# Patient Record
Sex: FEMALE | Race: OTHER | Employment: FULL TIME | URBAN - NONMETROPOLITAN AREA
[De-identification: names, ages, dates, MRNs, and addresses within clinical notes are randomized per-mention and may not be internally consistent; named-entity substitution may affect disease eponyms.]

---

## 2017-02-07 ENCOUNTER — HOSPITAL ENCOUNTER (OUTPATIENT)
Dept: NON INVASIVE DIAGNOSTICS | Age: 62
Discharge: HOME | End: 2017-02-07
Payer: COMMERCIAL

## 2017-02-07 PROCEDURE — 93350 STRESS TTE ONLY: CPT

## 2017-02-22 ENCOUNTER — TELEPHONE (OUTPATIENT)
Dept: CARDIOLOGY | Age: 62
End: 2017-02-22

## 2017-07-07 ENCOUNTER — TELEPHONE (OUTPATIENT)
Dept: CARDIOLOGY | Age: 62
End: 2017-07-07

## 2017-08-23 ENCOUNTER — OFFICE VISIT (OUTPATIENT)
Dept: CARDIOLOGY | Age: 62
End: 2017-08-23
Payer: COMMERCIAL

## 2017-08-23 VITALS
BODY MASS INDEX: 27.75 KG/M2 | HEART RATE: 60 BPM | SYSTOLIC BLOOD PRESSURE: 122 MMHG | HEIGHT: 61 IN | DIASTOLIC BLOOD PRESSURE: 70 MMHG | WEIGHT: 147 LBS

## 2017-08-23 DIAGNOSIS — I38 VALVULAR HEART DISEASE: Primary | ICD-10-CM

## 2017-08-23 DIAGNOSIS — Q23.1 BICUSPID AORTIC VALVE: ICD-10-CM

## 2017-08-23 DIAGNOSIS — I10 ESSENTIAL HYPERTENSION: ICD-10-CM

## 2017-08-23 PROBLEM — Q23.81 BICUSPID AORTIC VALVE: Status: ACTIVE | Noted: 2017-08-23

## 2017-08-23 PROCEDURE — 99213 OFFICE O/P EST LOW 20 MIN: CPT | Performed by: CLINICAL NURSE SPECIALIST

## 2017-08-23 RX ORDER — RANITIDINE 150 MG/1
150 TABLET ORAL 2 TIMES DAILY
COMMUNITY
End: 2019-02-01

## 2017-08-23 ASSESSMENT — ENCOUNTER SYMPTOMS
SHORTNESS OF BREATH: 1
VOMITING: 0
BLURRED VISION: 0
NAUSEA: 0
ORTHOPNEA: 0
COUGH: 0
HEARTBURN: 0

## 2017-08-25 ENCOUNTER — HOSPITAL ENCOUNTER (OUTPATIENT)
Dept: WOMENS IMAGING | Age: 62
Discharge: HOME | End: 2017-08-25
Payer: COMMERCIAL

## 2017-08-25 DIAGNOSIS — Z12.31 VISIT FOR SCREENING MAMMOGRAM: ICD-10-CM

## 2017-08-25 PROCEDURE — 77063 BREAST TOMOSYNTHESIS BI: CPT

## 2018-01-15 ENCOUNTER — APPOINTMENT (OUTPATIENT)
Dept: GENERAL RADIOLOGY | Age: 63
End: 2018-01-15
Payer: COMMERCIAL

## 2018-01-15 ENCOUNTER — HOSPITAL ENCOUNTER (EMERGENCY)
Age: 63
Discharge: HOME | End: 2018-01-16
Payer: COMMERCIAL

## 2018-01-15 ENCOUNTER — APPOINTMENT (OUTPATIENT)
Dept: CT IMAGING | Age: 63
End: 2018-01-15
Payer: COMMERCIAL

## 2018-01-15 VITALS
SYSTOLIC BLOOD PRESSURE: 154 MMHG | RESPIRATION RATE: 18 BRPM | TEMPERATURE: 98.9 F | DIASTOLIC BLOOD PRESSURE: 87 MMHG | OXYGEN SATURATION: 96 % | HEART RATE: 84 BPM

## 2018-01-15 DIAGNOSIS — J10.1 INFLUENZA A: Primary | ICD-10-CM

## 2018-01-15 DIAGNOSIS — I10 ESSENTIAL HYPERTENSION: ICD-10-CM

## 2018-01-15 LAB
ALBUMIN SERPL-MCNC: 4 G/DL (ref 3.5–5.2)
ALP BLD-CCNC: 61 U/L (ref 35–104)
ALT SERPL-CCNC: 19 U/L (ref 5–33)
ANION GAP SERPL CALCULATED.3IONS-SCNC: 10 MMOL/L (ref 7–19)
AST SERPL-CCNC: 26 U/L (ref 5–32)
BACTERIA: NEGATIVE /HPF
BASOPHILS ABSOLUTE: 0 K/UL (ref 0–0.2)
BASOPHILS RELATIVE PERCENT: 0.2 % (ref 0–1)
BILIRUB SERPL-MCNC: 0.3 MG/DL (ref 0.2–1.2)
BILIRUBIN URINE: NEGATIVE
BLOOD, URINE: ABNORMAL
BUN BLDV-MCNC: 12 MG/DL (ref 8–23)
CALCIUM SERPL-MCNC: 8.3 MG/DL (ref 8.8–10.2)
CHLORIDE BLD-SCNC: 94 MMOL/L (ref 98–111)
CLARITY: CLEAR
CO2: 27 MMOL/L (ref 22–29)
COLOR: YELLOW
CREAT SERPL-MCNC: 0.6 MG/DL (ref 0.5–0.9)
EOSINOPHILS ABSOLUTE: 0 K/UL (ref 0–0.6)
EOSINOPHILS RELATIVE PERCENT: 0.6 % (ref 0–5)
EPITHELIAL CELLS, UA: 1 /HPF (ref 0–5)
GFR NON-AFRICAN AMERICAN: >60
GLUCOSE BLD-MCNC: 94 MG/DL (ref 74–109)
GLUCOSE URINE: NEGATIVE MG/DL
HCT VFR BLD CALC: 40.7 % (ref 37–47)
HEMOGLOBIN: 14.1 G/DL (ref 12–16)
HYALINE CASTS: 0 /HPF (ref 0–8)
KETONES, URINE: NEGATIVE MG/DL
LACTIC ACID: 1.1 MMOL/L (ref 0.5–1.9)
LEUKOCYTE ESTERASE, URINE: NEGATIVE
LYMPHOCYTES ABSOLUTE: 1 K/UL (ref 1.1–4.5)
LYMPHOCYTES RELATIVE PERCENT: 21.6 % (ref 20–40)
MCH RBC QN AUTO: 29.8 PG (ref 27–31)
MCHC RBC AUTO-ENTMCNC: 34.6 G/DL (ref 33–37)
MCV RBC AUTO: 86 FL (ref 81–99)
MONOCYTES ABSOLUTE: 0.6 K/UL (ref 0–0.9)
MONOCYTES RELATIVE PERCENT: 11.9 % (ref 0–10)
NEUTROPHILS ABSOLUTE: 3 K/UL (ref 1.5–7.5)
NEUTROPHILS RELATIVE PERCENT: 65.7 % (ref 50–65)
NITRITE, URINE: NEGATIVE
PDW BLD-RTO: 11.5 % (ref 11.5–14.5)
PH UA: 7
PLATELET # BLD: 155 K/UL (ref 130–400)
PMV BLD AUTO: 11 FL (ref 9.4–12.3)
POTASSIUM SERPL-SCNC: 4 MMOL/L (ref 3.5–5)
PROTEIN UA: NEGATIVE MG/DL
RAPID INFLUENZA  B AGN: NEGATIVE
RAPID INFLUENZA A AGN: POSITIVE
RBC # BLD: 4.73 M/UL (ref 4.2–5.4)
RBC UA: 0 /HPF (ref 0–4)
SODIUM BLD-SCNC: 131 MMOL/L (ref 136–145)
SPECIFIC GRAVITY UA: 1
TOTAL PROTEIN: 6.6 G/DL (ref 6.6–8.7)
TROPONIN: <0.01 NG/ML (ref 0–0.03)
UROBILINOGEN, URINE: 0.2 E.U./DL
WBC # BLD: 4.6 K/UL (ref 4.8–10.8)
WBC UA: 0 /HPF (ref 0–5)

## 2018-01-15 PROCEDURE — 84484 ASSAY OF TROPONIN QUANT: CPT

## 2018-01-15 PROCEDURE — 99284 EMERGENCY DEPT VISIT MOD MDM: CPT

## 2018-01-15 PROCEDURE — 2580000003 HC RX 258: Performed by: PHYSICIAN ASSISTANT

## 2018-01-15 PROCEDURE — 36415 COLL VENOUS BLD VENIPUNCTURE: CPT

## 2018-01-15 PROCEDURE — 70450 CT HEAD/BRAIN W/O DYE: CPT

## 2018-01-15 PROCEDURE — 71045 X-RAY EXAM CHEST 1 VIEW: CPT

## 2018-01-15 PROCEDURE — 81001 URINALYSIS AUTO W/SCOPE: CPT

## 2018-01-15 PROCEDURE — 99284 EMERGENCY DEPT VISIT MOD MDM: CPT | Performed by: PHYSICIAN ASSISTANT

## 2018-01-15 PROCEDURE — 96375 TX/PRO/DX INJ NEW DRUG ADDON: CPT

## 2018-01-15 PROCEDURE — 80053 COMPREHEN METABOLIC PANEL: CPT

## 2018-01-15 PROCEDURE — 87804 INFLUENZA ASSAY W/OPTIC: CPT

## 2018-01-15 PROCEDURE — 6360000002 HC RX W HCPCS: Performed by: PHYSICIAN ASSISTANT

## 2018-01-15 PROCEDURE — 96374 THER/PROPH/DIAG INJ IV PUSH: CPT

## 2018-01-15 PROCEDURE — 93005 ELECTROCARDIOGRAM TRACING: CPT

## 2018-01-15 PROCEDURE — 6370000000 HC RX 637 (ALT 250 FOR IP): Performed by: PHYSICIAN ASSISTANT

## 2018-01-15 PROCEDURE — 83605 ASSAY OF LACTIC ACID: CPT

## 2018-01-15 PROCEDURE — 87040 BLOOD CULTURE FOR BACTERIA: CPT

## 2018-01-15 PROCEDURE — 85025 COMPLETE CBC W/AUTO DIFF WBC: CPT

## 2018-01-15 RX ORDER — ONDANSETRON 4 MG/1
4 TABLET, ORALLY DISINTEGRATING ORAL EVERY 8 HOURS PRN
Qty: 20 TABLET | Refills: 0 | Status: SHIPPED | OUTPATIENT
Start: 2018-01-15 | End: 2019-02-01

## 2018-01-15 RX ORDER — MORPHINE SULFATE 4 MG/ML
2 INJECTION, SOLUTION INTRAMUSCULAR; INTRAVENOUS ONCE
Status: COMPLETED | OUTPATIENT
Start: 2018-01-15 | End: 2018-01-15

## 2018-01-15 RX ORDER — ONDANSETRON 2 MG/ML
4 INJECTION INTRAMUSCULAR; INTRAVENOUS ONCE
Status: COMPLETED | OUTPATIENT
Start: 2018-01-15 | End: 2018-01-15

## 2018-01-15 RX ORDER — FLUTICASONE PROPIONATE 50 MCG
1 SPRAY, SUSPENSION (ML) NASAL DAILY
Qty: 1 BOTTLE | Refills: 0 | Status: SHIPPED | OUTPATIENT
Start: 2018-01-15

## 2018-01-15 RX ORDER — GUAIFENESIN 600 MG/1
600 TABLET, EXTENDED RELEASE ORAL 2 TIMES DAILY
Qty: 20 TABLET | Refills: 0 | Status: SHIPPED | OUTPATIENT
Start: 2018-01-15 | End: 2018-01-25

## 2018-01-15 RX ORDER — OSELTAMIVIR PHOSPHATE 75 MG/1
75 CAPSULE ORAL 2 TIMES DAILY
Qty: 10 CAPSULE | Refills: 0 | Status: SHIPPED | OUTPATIENT
Start: 2018-01-15 | End: 2018-01-20

## 2018-01-15 RX ORDER — SODIUM CHLORIDE 9 MG/ML
INJECTION, SOLUTION INTRAVENOUS CONTINUOUS
Status: DISCONTINUED | OUTPATIENT
Start: 2018-01-15 | End: 2018-01-16 | Stop reason: HOSPADM

## 2018-01-15 RX ORDER — CLONIDINE HYDROCHLORIDE 0.1 MG/1
0.1 TABLET ORAL ONCE
Status: COMPLETED | OUTPATIENT
Start: 2018-01-15 | End: 2018-01-15

## 2018-01-15 RX ORDER — BENZONATATE 100 MG/1
100 CAPSULE ORAL 3 TIMES DAILY PRN
Qty: 20 CAPSULE | Refills: 0 | Status: SHIPPED | OUTPATIENT
Start: 2018-01-15 | End: 2018-01-22

## 2018-01-15 RX ADMIN — ONDANSETRON 4 MG: 2 INJECTION INTRAMUSCULAR; INTRAVENOUS at 22:17

## 2018-01-15 RX ADMIN — CLONIDINE HYDROCHLORIDE 0.1 MG: 0.1 TABLET ORAL at 23:18

## 2018-01-15 RX ADMIN — SODIUM CHLORIDE: 9 INJECTION, SOLUTION INTRAVENOUS at 22:17

## 2018-01-15 RX ADMIN — Medication 2 MG: at 22:17

## 2018-01-15 ASSESSMENT — ENCOUNTER SYMPTOMS
VOMITING: 0
ABDOMINAL DISTENTION: 0
CHEST TIGHTNESS: 0
SINUS PRESSURE: 0
RHINORRHEA: 1
APNEA: 0
DIARRHEA: 0
CONSTIPATION: 0
NAUSEA: 0
WHEEZING: 0
SORE THROAT: 0
EYE PAIN: 0
SHORTNESS OF BREATH: 0
ABDOMINAL PAIN: 0
COUGH: 1

## 2018-01-15 ASSESSMENT — PAIN SCALES - GENERAL: PAINLEVEL_OUTOF10: 8

## 2018-01-16 NOTE — ED PROVIDER NOTES
Skin: Skin is warm and dry. No rash noted. She is not diaphoretic. Psychiatric: She has a normal mood and affect. Nursing note and vitals reviewed. DIAGNOSTIC RESULTS     RADIOLOGY:   Non-plain film images such as CT, Ultrasound and MRI are read by the radiologist.   Interpretation per the Radiologist below, if available at the time of this note:    RADIOLOGY REPORT   Final Result      CT Head WO Contrast   Final Result   No acute intracranial abnormality. The above findings are recorded on a digital voice clip in PACS. Signed by Dr Wagner Rasmussen on 1/16/2018 7:11 AM      XR CHEST PORTABLE   Final Result   A lobulated soft tissue density in the left superior   mediastinum which may represent an abnormal vascular structure or a   mass. This needs further evaluation with contrast enhanced CT scan of   the chest.   No active cardiopulmonary disease. The above findings are recorded on a digital voice clip in PACS. Signed by Dr Wagner Rasmussen on 1/16/2018 7:07 AM          LABS:  Labs Reviewed   RAPID INFLUENZA A/B ANTIGENS - Abnormal; Notable for the following:        Result Value    Rapid Influenza A Ag POSITIVE (*)     All other components within normal limits    Narrative:     CALL  Vargas  KLED tel. ,  Microbiology results called to and read back by KUNAL OLIVERA IN ER, 01/15/2018  22:50, by SHERMAN   CBC WITH AUTO DIFFERENTIAL - Abnormal; Notable for the following:     WBC 4.6 (*)     Neutrophils % 65.7 (*)     Monocytes % 11.9 (*)     Lymphocytes # 1.0 (*)     All other components within normal limits   URINALYSIS - Abnormal; Notable for the following:     Blood, Urine SMALL (*)     All other components within normal limits   COMPREHENSIVE METABOLIC PANEL - Abnormal; Notable for the following:     Sodium 131 (*)     Chloride 94 (*)     Calcium 8.3 (*)     All other components within normal limits    Narrative:     previously hemolyzed.    CULTURE BLOOD #1   CULTURE BLOOD #2   LACTIC ACID, PLASMA

## 2018-01-21 LAB
BLOOD CULTURE, ROUTINE: NORMAL
CULTURE, BLOOD 2: NORMAL

## 2018-07-06 ENCOUNTER — TELEPHONE (OUTPATIENT)
Dept: CARDIOLOGY | Age: 63
End: 2018-07-06

## 2018-07-06 NOTE — TELEPHONE ENCOUNTER
Called pt about rescheduling appointment on 08/29/18 with MDL due to him retiring.  Appointment was rescheduled with Dr. Suresh Liao on 10/12/18 at 1:00 pm.

## 2018-09-21 ENCOUNTER — HOSPITAL ENCOUNTER (OUTPATIENT)
Dept: WOMENS IMAGING | Age: 63
Discharge: HOME | End: 2018-09-21
Payer: COMMERCIAL

## 2018-09-21 DIAGNOSIS — Z12.39 BREAST CANCER SCREENING: ICD-10-CM

## 2018-09-21 PROCEDURE — 77063 BREAST TOMOSYNTHESIS BI: CPT

## 2018-11-02 ENCOUNTER — OFFICE VISIT (OUTPATIENT)
Dept: CARDIOLOGY | Age: 63
End: 2018-11-02
Payer: COMMERCIAL

## 2018-11-02 VITALS
WEIGHT: 150 LBS | DIASTOLIC BLOOD PRESSURE: 92 MMHG | BODY MASS INDEX: 28.32 KG/M2 | SYSTOLIC BLOOD PRESSURE: 150 MMHG | HEART RATE: 74 BPM | HEIGHT: 61 IN

## 2018-11-02 DIAGNOSIS — R06.02 SHORTNESS OF BREATH: Primary | ICD-10-CM

## 2018-11-02 DIAGNOSIS — I10 ESSENTIAL HYPERTENSION: ICD-10-CM

## 2018-11-02 DIAGNOSIS — I38 VALVULAR HEART DISEASE: ICD-10-CM

## 2018-11-02 DIAGNOSIS — Q23.1 BICUSPID AORTIC VALVE: ICD-10-CM

## 2018-11-02 PROCEDURE — 99213 OFFICE O/P EST LOW 20 MIN: CPT | Performed by: NURSE PRACTITIONER

## 2018-11-02 RX ORDER — ESTRADIOL 1 MG/1
1 TABLET ORAL DAILY
COMMUNITY
End: 2020-05-28

## 2018-11-02 RX ORDER — HYDROCHLOROTHIAZIDE 12.5 MG/1
12.5 TABLET ORAL DAILY
COMMUNITY
End: 2019-10-04 | Stop reason: SDUPTHER

## 2018-11-02 NOTE — PROGRESS NOTES
Take 1 tablet by mouth every 8 hours as needed for Nausea or Vomiting, Disp: 20 tablet, Rfl: 0    fluticasone (FLONASE) 50 MCG/ACT nasal spray, 1 spray by Nasal route daily, Disp: 1 Bottle, Rfl: 0    ranitidine (ZANTAC) 150 MG tablet, Take 150 mg by mouth 2 times daily, Disp: , Rfl:     PE:  Vitals:    11/02/18 1405   BP: (!) 150/92   Pulse: 74       Estimated body mass index is 28.34 kg/m² as calculated from the following:    Height as of this encounter: 5' 1\" (1.549 m). Weight as of this encounter: 150 lb (68 kg). Constitutional: She is oriented to person, place, and time. She appears well-developed and well-nourished in no acute distress. Head: Normocephalic and atraumatic. Neck:  Neck supple without JVD present. Cardiovascular: Normal rate, Regular rhythm, normal heart sounds. 2/6 RUSB murmur ascultated. No gallop and no friction rub. No carotid bruits. No peripheral edema. Pulmonary/Chest:  Lungs clear to auscultation bilaterally without evidence of respiratory distress. She is without wheezes. She is without rales or ronchi. Musculoskeletal: Normal range of motion. Gait is normal without assitive device. Neurological: She is alert and oriented to person, place, and time. Skin: Skin is warm and dry without rash or pallor. Psychiatric: She has a normal mood and affect. Her behavior is normal. Thought content normal.     Lab Results   Component Value Date    CREATININE 0.6 01/15/2018    HGB 14.1 01/15/2018       TTE 11/08/2016    IMPRESSION:  1. Normal left ventricular systolic function with an ejection fraction of  63%. 2.  Asymmetric septal hypertrophy without definite outflow tract obstruction. 3.  Left atrial enlargement. 4.  Mild aortic stenosis with a probable bicuspid aortic valve. 5.  Mild to moderate aortic insufficiency. 6.  Mild pulmonic insufficiency. 7.  Mild mitral and tricuspid regurgitation.   8.  The right ventricular systolic pressure was estimated to be 26

## 2019-01-09 ENCOUNTER — TELEPHONE (OUTPATIENT)
Dept: CARDIOLOGY | Age: 64
End: 2019-01-09

## 2019-01-10 ENCOUNTER — TELEPHONE (OUTPATIENT)
Dept: CARDIOLOGY | Age: 64
End: 2019-01-10

## 2019-01-11 ENCOUNTER — HOSPITAL ENCOUNTER (OUTPATIENT)
Dept: NON INVASIVE DIAGNOSTICS | Age: 64
Discharge: HOME | End: 2019-01-11
Payer: COMMERCIAL

## 2019-01-11 DIAGNOSIS — R06.02 SHORTNESS OF BREATH: ICD-10-CM

## 2019-01-11 LAB
LV EF: 58 %
LVEF MODALITY: NORMAL

## 2019-01-11 PROCEDURE — 93306 TTE W/DOPPLER COMPLETE: CPT

## 2019-02-01 ENCOUNTER — OFFICE VISIT (OUTPATIENT)
Dept: CARDIOLOGY | Age: 64
End: 2019-02-01
Payer: COMMERCIAL

## 2019-02-01 VITALS
HEART RATE: 66 BPM | DIASTOLIC BLOOD PRESSURE: 110 MMHG | WEIGHT: 156 LBS | BODY MASS INDEX: 29.45 KG/M2 | SYSTOLIC BLOOD PRESSURE: 180 MMHG | HEIGHT: 61 IN

## 2019-02-01 DIAGNOSIS — I51.89 DIASTOLIC DYSFUNCTION: ICD-10-CM

## 2019-02-01 DIAGNOSIS — R07.89 OTHER CHEST PAIN: ICD-10-CM

## 2019-02-01 DIAGNOSIS — R00.2 PALPITATIONS: ICD-10-CM

## 2019-02-01 DIAGNOSIS — I10 UNCONTROLLED HYPERTENSION: ICD-10-CM

## 2019-02-01 DIAGNOSIS — I38 VALVULAR HEART DISEASE: Primary | ICD-10-CM

## 2019-02-01 PROCEDURE — 99214 OFFICE O/P EST MOD 30 MIN: CPT | Performed by: CLINICAL NURSE SPECIALIST

## 2019-02-01 PROCEDURE — 93000 ELECTROCARDIOGRAM COMPLETE: CPT | Performed by: CLINICAL NURSE SPECIALIST

## 2019-02-01 RX ORDER — METOPROLOL SUCCINATE 25 MG/1
25 TABLET, EXTENDED RELEASE ORAL NIGHTLY
Qty: 30 TABLET | Refills: 5 | Status: SHIPPED | OUTPATIENT
Start: 2019-02-01 | End: 2019-10-04 | Stop reason: SDUPTHER

## 2019-02-01 RX ORDER — OMEPRAZOLE 20 MG/1
20 CAPSULE, DELAYED RELEASE ORAL DAILY
COMMUNITY

## 2019-02-01 ASSESSMENT — ENCOUNTER SYMPTOMS
WHEEZING: 0
VOMITING: 0
NAUSEA: 0
ABDOMINAL PAIN: 0
CHEST TIGHTNESS: 1
SHORTNESS OF BREATH: 1
COUGH: 0
EYE REDNESS: 0
FACIAL SWELLING: 0

## 2019-02-22 ENCOUNTER — HOSPITAL ENCOUNTER (OUTPATIENT)
Dept: NON INVASIVE DIAGNOSTICS | Age: 64
Discharge: HOME | End: 2019-02-22
Payer: COMMERCIAL

## 2019-02-22 ENCOUNTER — HOSPITAL ENCOUNTER (OUTPATIENT)
Dept: NUCLEAR MEDICINE | Age: 64
Discharge: HOME | End: 2019-02-24
Payer: COMMERCIAL

## 2019-02-22 DIAGNOSIS — R07.89 OTHER CHEST PAIN: ICD-10-CM

## 2019-02-22 PROCEDURE — 6360000002 HC RX W HCPCS: Performed by: CLINICAL NURSE SPECIALIST

## 2019-02-22 PROCEDURE — 78452 HT MUSCLE IMAGE SPECT MULT: CPT

## 2019-02-22 PROCEDURE — 93017 CV STRESS TEST TRACING ONLY: CPT

## 2019-02-22 PROCEDURE — A9500 TC99M SESTAMIBI: HCPCS | Performed by: CLINICAL NURSE SPECIALIST

## 2019-02-22 PROCEDURE — 3430000000 HC RX DIAGNOSTIC RADIOPHARMACEUTICAL: Performed by: CLINICAL NURSE SPECIALIST

## 2019-02-22 RX ADMIN — TETRAKIS(2-METHOXYISOBUTYLISOCYANIDE)COPPER(I) TETRAFLUOROBORATE 10 MILLICURIE: 1 INJECTION, POWDER, LYOPHILIZED, FOR SOLUTION INTRAVENOUS at 11:35

## 2019-02-22 RX ADMIN — TETRAKIS(2-METHOXYISOBUTYLISOCYANIDE)COPPER(I) TETRAFLUOROBORATE 30 MILLICURIE: 1 INJECTION, POWDER, LYOPHILIZED, FOR SOLUTION INTRAVENOUS at 11:35

## 2019-02-22 RX ADMIN — REGADENOSON 0.4 MG: 0.08 INJECTION, SOLUTION INTRAVENOUS at 11:35

## 2019-02-25 LAB
LV EF: 66 %
LVEF MODALITY: NORMAL

## 2019-03-01 ENCOUNTER — OFFICE VISIT (OUTPATIENT)
Dept: CARDIOLOGY | Age: 64
End: 2019-03-01
Payer: COMMERCIAL

## 2019-03-01 VITALS
SYSTOLIC BLOOD PRESSURE: 174 MMHG | BODY MASS INDEX: 29.45 KG/M2 | DIASTOLIC BLOOD PRESSURE: 104 MMHG | WEIGHT: 156 LBS | HEART RATE: 72 BPM | HEIGHT: 61 IN

## 2019-03-01 DIAGNOSIS — R07.89 NON-CARDIAC CHEST PAIN: ICD-10-CM

## 2019-03-01 DIAGNOSIS — Q23.1 BICUSPID AORTIC VALVE: ICD-10-CM

## 2019-03-01 DIAGNOSIS — R00.2 PALPITATIONS: ICD-10-CM

## 2019-03-01 DIAGNOSIS — I38 VALVULAR HEART DISEASE: ICD-10-CM

## 2019-03-01 DIAGNOSIS — I10 UNCONTROLLED HYPERTENSION: Primary | ICD-10-CM

## 2019-03-01 PROCEDURE — 99213 OFFICE O/P EST LOW 20 MIN: CPT | Performed by: CLINICAL NURSE SPECIALIST

## 2019-03-01 RX ORDER — AMLODIPINE BESYLATE 5 MG/1
5 TABLET ORAL DAILY
Qty: 30 TABLET | Refills: 5 | Status: SHIPPED | OUTPATIENT
Start: 2019-03-01 | End: 2019-10-04 | Stop reason: SDUPTHER

## 2019-03-01 ASSESSMENT — ENCOUNTER SYMPTOMS
ABDOMINAL PAIN: 0
CHEST TIGHTNESS: 1
COUGH: 0
WHEEZING: 0
NAUSEA: 0
FACIAL SWELLING: 0
VOMITING: 0
SHORTNESS OF BREATH: 1
EYE REDNESS: 0

## 2019-04-01 ENCOUNTER — HOSPITAL ENCOUNTER (OUTPATIENT)
Dept: GENERAL RADIOLOGY | Age: 64
Discharge: HOME | End: 2019-04-01
Payer: COMMERCIAL

## 2019-04-01 DIAGNOSIS — M54.5 LOW BACK PAIN, UNSPECIFIED BACK PAIN LATERALITY, UNSPECIFIED CHRONICITY, WITH SCIATICA PRESENCE UNSPECIFIED: ICD-10-CM

## 2019-04-01 PROCEDURE — 72100 X-RAY EXAM L-S SPINE 2/3 VWS: CPT

## 2019-04-02 ENCOUNTER — OFFICE VISIT (OUTPATIENT)
Dept: CARDIOLOGY | Age: 64
End: 2019-04-02
Payer: COMMERCIAL

## 2019-04-02 VITALS
BODY MASS INDEX: 29.45 KG/M2 | HEART RATE: 62 BPM | DIASTOLIC BLOOD PRESSURE: 78 MMHG | SYSTOLIC BLOOD PRESSURE: 136 MMHG | WEIGHT: 156 LBS | HEIGHT: 61 IN

## 2019-04-02 DIAGNOSIS — I10 ESSENTIAL HYPERTENSION: Primary | ICD-10-CM

## 2019-04-02 DIAGNOSIS — I38 VALVULAR HEART DISEASE: ICD-10-CM

## 2019-04-02 DIAGNOSIS — Q23.1 BICUSPID AORTIC VALVE: ICD-10-CM

## 2019-04-02 PROCEDURE — 99213 OFFICE O/P EST LOW 20 MIN: CPT | Performed by: CLINICAL NURSE SPECIALIST

## 2019-04-02 ASSESSMENT — ENCOUNTER SYMPTOMS
CHEST TIGHTNESS: 1
VOMITING: 0
EYE REDNESS: 0
COUGH: 0
WHEEZING: 0
NAUSEA: 0
FACIAL SWELLING: 0
ABDOMINAL PAIN: 0
SHORTNESS OF BREATH: 0

## 2019-04-02 NOTE — PATIENT INSTRUCTIONS
Return in about 6 months (around 10/2/2019) for APRN. Continue present treatment  Blood Pressure goal is less than 140/90    Call with any questionsor concerns  Follow up with Khloe Jensen MD for non cardiac problems  Report any new problems  Cardiovascular Fitness-Exercise as tolerated. Strive for 15 minutes of exercise most days of the week. Cardiac / HealthyDiet  Continue current medications as directed  Continue plan of treatment  It is always recommended that you bring your medicationsbottles with you to each visit - this is for your safety!

## 2019-04-02 NOTE — PROGRESS NOTES
[facility], [address], [address]  [address]  Phone: [phone]  Fax: [phone]    OFFICE VISIT:  2019    Michelle Oconnor - : 1955    Reason For Visit:  Lashon Cruz is a 58 y.o. female who is here for follow-up for valvular heart disease, uncontrolled hypertension    HPI  Patient is here for follow-up with a history of valvular heart disease, palpitations, and uncontrolled hypertension. Last visit we started metoprolol to treat her uncontrolled hypertension. She had been refusing blood pressure medicine in the past. Her mother ended up on dialysis due to uncontrolled blood pressure. She seems to understand the importance of controlling blood pressure to prevent end-stage organ damage. At last visit, we started amlodipine 5 mg daily. She states she is tolerating this well. She states her mother-in-law passed away about a week ago. She states she is under a lot of stress caring for her and she thinks this was also impacting her blood pressure. She followed up with her noncardiac chest pain with her PCP who in turn increased her Prozac which seems to be helping. Kyra Jung MD is PCP.   Michelle Oconnor has the following history as recorded in [facility]:    Patient Active Problem List    Diagnosis Date Noted    Non-cardiac chest pain 2019    Uncontrolled hypertension     Diastolic dysfunction     Valvular heart disease 2017    Bicuspid aortic valve 2017    Palpitations 10/20/2015    Tricuspid regurgitation     Hypertension     Murmur      Past Medical History:   Diagnosis Date    Atrophic vaginitis     Bicuspid aortic valve 2017    Bilateral carpal tunnel syndrome     GERD (gastroesophageal reflux disease)     Hypertension     Murmur     RLS (restless legs syndrome)     Tricuspid regurgitation 2014    echo mild to moderate    Valvular heart disease 2017     Past Surgical History: Procedure Laterality Date    HYSTERECTOMY       Family History   Problem Relation Age of Onset    High Blood Pressure Mother     Heart Disease Mother     Kidney Disease Mother     Coronary Art Dis Other     Hypertension Other     Breast Cancer Other     Cancer Other     Stroke Other     High Blood Pressure Father      Social History     Tobacco Use    Smoking status: Never Smoker    Smokeless tobacco: Never Used   Substance Use Topics    Alcohol use: Not on file      Current Outpatient Medications   Medication Sig Dispense Refill    amLODIPine (NORVASC) 5 MG tablet Take 1 tablet by mouth daily 30 tablet 5    omeprazole (PRILOSEC) 20 MG delayed release capsule Take 20 mg by mouth daily      metoprolol succinate (TOPROL XL) 25 MG extended release tablet Take 1 tablet by mouth nightly 30 tablet 5    hydrochlorothiazide (HYDRODIURIL) 12.5 MG tablet Take 12.5 mg by mouth daily      estradiol (ESTRACE) 1 MG tablet Take 1 mg by mouth daily      fluticasone (FLONASE) 50 MCG/ACT nasal spray 1 spray by Nasal route daily 1 Bottle 0     No current facility-administered medications for this visit. Allergies: Sulfa antibiotics    Review of Systems  Review of Systems   Constitutional: Negative for activity change, diaphoresis, fatigue, fever and unexpected weight change. HENT: Negative for facial swelling and nosebleeds. Eyes: Negative for redness and visual disturbance. Respiratory: Positive for chest tightness (improving with increase in Prozac). Negative for cough, shortness of breath and wheezing. Cardiovascular: Negative for chest pain, palpitations and leg swelling. Gastrointestinal: Negative for abdominal pain, nausea and vomiting. Endocrine: Negative for cold intolerance and heat intolerance. Genitourinary: Negative for dysuria and hematuria. Musculoskeletal: Negative for arthralgias and myalgias. Skin: Negative for pallor and rash.    Neurological: Negative for dizziness, seizures, syncope, weakness and light-headedness. Hematological: Does not bruise/bleed easily. Psychiatric/Behavioral: Negative for agitation. The patient is not nervous/anxious. Objective  Vital Signs - /78   Pulse 62   Ht 5' 1\" (1.549 m)   Wt 156 lb (70.8 kg)   BMI 29.48 kg/m²      BP Readings from Last 3 Encounters:   04/02/19 136/78   03/01/19 (!) 174/104   02/01/19 (!) 180/110    Pulse Readings from Last 3 Encounters:   04/02/19 62   03/01/19 72   02/01/19 66        Physical Exam   Constitutional: She is oriented to person, place, and time. She appears well-developed and well-nourished. HENT:   Head: Normocephalic and atraumatic. Eyes: Pupils are equal, round, and reactive to light. Right eye exhibits no discharge. Left eye exhibits no discharge. Neck: No JVD present. No tracheal deviation present. Cardiovascular: Normal rate, regular rhythm and intact distal pulses. Exam reveals no gallop and no friction rub. Murmur (2/6 systolic) heard. No carotid bruit   Pulmonary/Chest: Effort normal and breath sounds normal. No respiratory distress. She has no wheezes. She has no rales. Abdominal: Soft. There is no tenderness. Musculoskeletal: She exhibits no edema. Normal gait and station   Neurological: She is alert and oriented to person, place, and time. No cranial nerve deficit. Skin: Skin is warm and dry. No rash noted. Psychiatric: She has a normal mood and affect. Her behavior is normal. Judgment normal.   Nursing note and vitals reviewed. Data:  Echo 1/11/19  Summary   1. Normal left ventricular size and systolic function EF 97-51%. Prominent   upper septum which the anterior leaflet of the mitral valve contacts   during diastole. (No systolic anterior motion).   2. Transmitral inflow pattern consistent with grade 2 diastolic   dysfunction   3. Mildly dilated left atrium.   4. Poor visualization of a moderately calcified aortic valve.  Unable to   discern whether it is bicuspid or tricuspid. (conflicting past data). Mild   aortic stenosis with a peak gradient of 16, and we have 9, and a   calculated valve area of 1.7 cm^2. Mild aortic regurgitation.   5. Mitral leaflets are mildly thickened with preserved mobility and mild   regurgitation   6. Trace pulmonic regurgitation   7. Right-sided chambers are normal size with preserved RV systolic   function   8. Trace tricuspid regurgitation with normal right ventricular systolic   pressure estimate of 28 mmHg   9. IVC assessment consistent with normal right atrial pressure 5 mmHg   10. No pericardial effusion and aortic root dimensions are within normal   limits    Lexiscan 2/2219  1. Ejection fraction 66% normal   2. Wall motion study demonstrates normal wall motion and thickening   3. Myocardial perfusion imaging demonstrates homogeneous uptake of the   tracer in all visualized segments. No areas of ischemia or infarction   are identified.       Impression   Summary impression:   Normal study   Signed by Dr Larri Fothergill on 2/22/2019 3:01 PM       Assessment:     Diagnosis Orders   1. Essential hypertension     2. Bicuspid aortic valve     3. Valvular heart disease       Hypertension- improved control with addition of amlodipine. Continue present regimen. Valvular heart disease-reviewed recent echo which shows mild aortic stenosis and regurgitation as well as diastolic dysfunction    Non-cardiac Chest pain-normal Lexiscan. Better with increase in Prozac    Stable cardiovascular status. No evidence of overt heart failure,angina or dysrhythmia. Plan    Return in about 6 months (around 10/2/2019) for APRN. Continue present treatment    Call with any questionsor concerns  Follow up with Kathleen Phillips MD for non cardiac problems  Report any new problems  Cardiovascular Fitness-Exercise as tolerated. Strive for 15 minutes of exercise most days of the week.     Cardiac / HealthyDiet  Continue current medications as directed  Continue plan of treatment  It is always recommended that you bring your medicationsbottles with you to each visit - this is for your safety!        Hayden Rapp APRN

## 2019-07-25 ENCOUNTER — TELEPHONE (OUTPATIENT)
Dept: CARDIOLOGY | Age: 64
End: 2019-07-25

## 2019-10-04 ENCOUNTER — HOSPITAL ENCOUNTER (OUTPATIENT)
Dept: WOMENS IMAGING | Age: 64
Discharge: HOME | End: 2019-10-04
Payer: COMMERCIAL

## 2019-10-04 ENCOUNTER — OFFICE VISIT (OUTPATIENT)
Dept: CARDIOLOGY | Age: 64
End: 2019-10-04
Payer: COMMERCIAL

## 2019-10-04 VITALS
WEIGHT: 161 LBS | SYSTOLIC BLOOD PRESSURE: 136 MMHG | DIASTOLIC BLOOD PRESSURE: 84 MMHG | HEART RATE: 72 BPM | BODY MASS INDEX: 30.42 KG/M2

## 2019-10-04 DIAGNOSIS — I38 VALVULAR HEART DISEASE: ICD-10-CM

## 2019-10-04 DIAGNOSIS — M85.9 DISORDER OF BONE DENSITY AND STRUCTURE, UNSPECIFIED: ICD-10-CM

## 2019-10-04 DIAGNOSIS — I51.89 DIASTOLIC DYSFUNCTION: ICD-10-CM

## 2019-10-04 DIAGNOSIS — I10 ESSENTIAL HYPERTENSION: Primary | ICD-10-CM

## 2019-10-04 DIAGNOSIS — Z12.31 ENCOUNTER FOR SCREENING MAMMOGRAM FOR BREAST CANCER: ICD-10-CM

## 2019-10-04 PROCEDURE — 77063 BREAST TOMOSYNTHESIS BI: CPT

## 2019-10-04 PROCEDURE — 99213 OFFICE O/P EST LOW 20 MIN: CPT | Performed by: CLINICAL NURSE SPECIALIST

## 2019-10-04 PROCEDURE — 77080 DXA BONE DENSITY AXIAL: CPT

## 2019-10-04 RX ORDER — HYDROCHLOROTHIAZIDE 12.5 MG/1
12.5 TABLET ORAL DAILY
Qty: 90 TABLET | Refills: 3 | Status: SHIPPED | OUTPATIENT
Start: 2019-10-04

## 2019-10-04 RX ORDER — AMLODIPINE BESYLATE 5 MG/1
5 TABLET ORAL DAILY
Qty: 90 TABLET | Refills: 3 | Status: SHIPPED | OUTPATIENT
Start: 2019-10-04 | End: 2020-05-28

## 2019-10-04 RX ORDER — METOPROLOL SUCCINATE 25 MG/1
25 TABLET, EXTENDED RELEASE ORAL NIGHTLY
Qty: 90 TABLET | Refills: 3 | Status: SHIPPED | OUTPATIENT
Start: 2019-10-04 | End: 2021-09-16 | Stop reason: SDUPTHER

## 2019-10-04 ASSESSMENT — ENCOUNTER SYMPTOMS
WHEEZING: 0
CHEST TIGHTNESS: 0
EYE REDNESS: 0
VOMITING: 0
COUGH: 0
NAUSEA: 0
SHORTNESS OF BREATH: 0
FACIAL SWELLING: 0
ABDOMINAL PAIN: 0

## 2019-11-12 ENCOUNTER — TELEPHONE (OUTPATIENT)
Dept: CARDIOLOGY | Age: 64
End: 2019-11-12

## 2019-11-13 ENCOUNTER — TELEPHONE (OUTPATIENT)
Dept: CARDIOLOGY | Age: 64
End: 2019-11-13

## 2020-04-03 ENCOUNTER — TELEPHONE (OUTPATIENT)
Dept: CARDIOLOGY | Age: 65
End: 2020-04-03

## 2020-05-28 ENCOUNTER — OFFICE VISIT (OUTPATIENT)
Dept: CARDIOLOGY | Age: 65
End: 2020-05-28
Payer: COMMERCIAL

## 2020-05-28 VITALS
HEART RATE: 70 BPM | SYSTOLIC BLOOD PRESSURE: 140 MMHG | WEIGHT: 156 LBS | BODY MASS INDEX: 29.45 KG/M2 | DIASTOLIC BLOOD PRESSURE: 90 MMHG | HEIGHT: 61 IN

## 2020-05-28 PROCEDURE — 99213 OFFICE O/P EST LOW 20 MIN: CPT | Performed by: CLINICAL NURSE SPECIALIST

## 2020-05-28 PROCEDURE — 93000 ELECTROCARDIOGRAM COMPLETE: CPT | Performed by: CLINICAL NURSE SPECIALIST

## 2020-05-28 ASSESSMENT — ENCOUNTER SYMPTOMS
FACIAL SWELLING: 0
COUGH: 0
ABDOMINAL PAIN: 0
SHORTNESS OF BREATH: 0
WHEEZING: 0
CHEST TIGHTNESS: 0
VOMITING: 0
NAUSEA: 0
EYE REDNESS: 0

## 2020-05-28 NOTE — LETTER
[address] [facility] [place]  [address], [place] [address], [address] [id number]  Phone: [phone]  Fax: [phone]            May 28, 2020          Re:  Ms. Mansi Gipson   :  1955  GAVINO Rehman [address]    To Whom It May Concern:     Ms. Paulo Schultz is under care of a cardiologist for history of valvular heart disease and hypertension. If you have any further questions, please do not hesitate to contact my office.     Sincerely,         Electronically signed: PABLO Condon, 2020 9:47 AM

## 2020-05-28 NOTE — PATIENT INSTRUCTIONS
Message left for pt to return call.   Return in about 9 months (around 2/28/2021) for Dr. Silver Gustafson.    Continue present treatment and low-sodium diet  Will prepare letter of your condition for   Report any issues with fluid retention, sudden weight gain, shortness of breath

## 2020-05-28 NOTE — PROGRESS NOTES
Constitutional:       Appearance: She is well-developed. HENT:      Head: Normocephalic and atraumatic. Eyes:      General:         Right eye: No discharge. Left eye: No discharge. Pupils: Pupils are equal, round, and reactive to light. Neck:      Vascular: No JVD. Trachea: No tracheal deviation. Cardiovascular:      Rate and Rhythm: Normal rate and regular rhythm. Heart sounds: Murmur (2/6 systolic) present. No friction rub. No gallop. Comments: No carotid bruit  Pulmonary:      Effort: Pulmonary effort is normal. No respiratory distress. Breath sounds: Normal breath sounds. No wheezing or rales. Abdominal:      Palpations: Abdomen is soft. Tenderness: There is no abdominal tenderness. Musculoskeletal:      Comments: Normal gait and station   Skin:     General: Skin is warm and dry. Findings: No rash. Neurological:      Mental Status: She is alert and oriented to person, place, and time. Cranial Nerves: No cranial nerve deficit. Psychiatric:         Behavior: Behavior normal.         Judgment: Judgment normal.         Data:  Echo 1/11/19  Summary   1. Normal left ventricular size and systolic function EF 33-83%. Prominent   upper septum which the anterior leaflet of the mitral valve contacts   during diastole. (No systolic anterior motion).   2. Transmitral inflow pattern consistent with grade 2 diastolic   dysfunction   3. Mildly dilated left atrium.   4. Poor visualization of a moderately calcified aortic valve. Unable to   discern whether it is bicuspid or tricuspid. (conflicting past data). Mild   aortic stenosis with a peak gradient of 16, and we have 9, and a   calculated valve area of 1.7 cm^2. Mild aortic regurgitation.   5. Mitral leaflets are mildly thickened with preserved mobility and mild   regurgitation   6. Trace pulmonic regurgitation   7. Right-sided chambers are normal size with preserved RV systolic   function   8.  Trace tricuspid problems  Cardiovascular Fitness-Exercise as tolerated. Strive for 15 minutes of exercise most days of the week. Cardiac / HealthyDiet  Continue current medications as directed  Continue plan of treatment  It is always recommended that you bring your medicationsbottles with you to each visit - this is for your safety!        Towana Cheadle, APRN

## 2020-10-16 ENCOUNTER — HOSPITAL ENCOUNTER (OUTPATIENT)
Dept: WOMENS IMAGING | Age: 65
Discharge: HOME | End: 2020-10-16
Payer: MEDICARE

## 2020-10-16 PROCEDURE — 77063 BREAST TOMOSYNTHESIS BI: CPT

## 2020-12-03 ENCOUNTER — TELEPHONE (OUTPATIENT)
Dept: CARDIOLOGY | Age: 65
End: 2020-12-03

## 2021-03-16 ENCOUNTER — OFFICE VISIT (OUTPATIENT)
Dept: CARDIOLOGY CLINIC | Age: 66
End: 2021-03-16
Payer: MEDICARE

## 2021-03-16 VITALS
BODY MASS INDEX: 29.27 KG/M2 | DIASTOLIC BLOOD PRESSURE: 88 MMHG | WEIGHT: 155 LBS | SYSTOLIC BLOOD PRESSURE: 138 MMHG | HEIGHT: 61 IN | HEART RATE: 61 BPM

## 2021-03-16 DIAGNOSIS — I10 ESSENTIAL HYPERTENSION: ICD-10-CM

## 2021-03-16 DIAGNOSIS — Q23.1 BICUSPID AORTIC VALVE: ICD-10-CM

## 2021-03-16 DIAGNOSIS — I10 ESSENTIAL HYPERTENSION: Primary | ICD-10-CM

## 2021-03-16 DIAGNOSIS — I38 VALVULAR HEART DISEASE: Primary | ICD-10-CM

## 2021-03-16 DIAGNOSIS — R00.2 PALPITATIONS: ICD-10-CM

## 2021-03-16 DIAGNOSIS — I51.89 DIASTOLIC DYSFUNCTION: ICD-10-CM

## 2021-03-16 PROCEDURE — 99213 OFFICE O/P EST LOW 20 MIN: CPT | Performed by: INTERNAL MEDICINE

## 2021-03-16 PROCEDURE — 93000 ELECTROCARDIOGRAM COMPLETE: CPT | Performed by: INTERNAL MEDICINE

## 2021-03-16 RX ORDER — LISINOPRIL 10 MG/1
10 TABLET ORAL DAILY
Qty: 30 TABLET | Refills: 3 | Status: SHIPPED | OUTPATIENT
Start: 2021-03-16 | End: 2022-03-15

## 2021-03-16 SDOH — HEALTH STABILITY: MENTAL HEALTH: HOW OFTEN DO YOU HAVE A DRINK CONTAINING ALCOHOL?: NEVER

## 2021-03-16 NOTE — PROGRESS NOTES
HISTORY  77-year-old lady with a history of dyslipidemia, hypertension, and aortic valvular disease returns for routine follow-up visit. Previously suspected to have a bicuspid valve though question on another echo. Last assessed by transthoracic echo in January 2019 demonstrating normal left ventricular size and systolic function, grade 2 diastolic dysfunction, mildly dilated left atrium, indeterminate aortic cusp number, and peak/mean gradients of 16/9 mmHg with accompanying mild AI. Her blood pressures have previously run in the 130s at the time of her visits and she gives a history of an elevated LDL somewhat countered by a very good HDL. On return today she denies any significant shortness of breath, palpitations, or chest discomfort. In discussion it is obvious she is reluctant to take the vaccine. PHYSICAL EXAM  On exam she carries 155 pounds on a 5 foot 1 inch frame. Pressure is 138/88 with a pulse of 61. EOMs full, sclerae and conjunctiva normal. PERRLA. Mask in place. Trachea midline with no neck masses. Assessment of internal jugular veins reveals no elevation of central venous pressure at 45 degrees. Carotid pulses normal without delay or bruit. Thyroid normal to palpation. Chest exam reveals normal respiratory effort, no abnormal breath sounds and normal expiratory phase. No skin lesions seen. PMI normal. S1, S2 normal with a 1/6 systolic murmur which peaks significantly before mid systole. No AI heard. Mild abdominal obesity. Normal bowel sounds without palpable mass or bruit. No clubbing or acrocyanosis. No significant lower extremity edema or signs of venous insufficiency. General motor strength appears to be within normal limits. Normal range of motion with normal gait. Alert, oriented x 3, memory and cognition normal as reflected by history and conversation. EKG reveals a sinus rhythm without abnormalities. ASSESSMENT/PLAN:   1.   Aortic valvular disease - questionable bicuspid valve not sufficiently visualized at the time of the last echo to make a determination. Very mild stenosis the time of her last echo and murmur today suggests no significant progression. Focus to be on strict control of her hypertension and lipids to reduce the risk of rapid progression. 2.  Hypertension - will add lisinopril 10 mg a day having worn her to stop the drug should she develop cough. 3.  Dyslipidemia - we will again try to retrieve most recent lipid profile  4.   Pandemic response - recommended to her that she obtain the vaccine

## 2021-03-16 NOTE — PATIENT INSTRUCTIONS
· Start Lisinopril 10 mg once daily. · If you develop a dry cough that does not go away, stop Lisinopril and call us. · Have a fasting lipid panel drawn before your next appointment with us.

## 2021-09-10 ENCOUNTER — HOSPITAL ENCOUNTER (OUTPATIENT)
Dept: GENERAL RADIOLOGY | Age: 66
Discharge: HOME | End: 2021-09-10
Payer: MEDICARE

## 2021-09-10 DIAGNOSIS — M54.2 CERVICALGIA: ICD-10-CM

## 2021-09-10 DIAGNOSIS — M79.601 RIGHT ARM PAIN: ICD-10-CM

## 2021-09-10 PROCEDURE — 72040 X-RAY EXAM NECK SPINE 2-3 VW: CPT

## 2021-09-10 PROCEDURE — 73030 X-RAY EXAM OF SHOULDER: CPT

## 2021-09-14 ENCOUNTER — HOSPITAL ENCOUNTER (OUTPATIENT)
Dept: MRI IMAGING | Age: 66
Discharge: HOME | End: 2021-09-14
Payer: MEDICARE

## 2021-09-14 DIAGNOSIS — R53.1 WEAKNESS: ICD-10-CM

## 2021-09-14 DIAGNOSIS — M54.2 CERVICALGIA: ICD-10-CM

## 2021-09-14 PROCEDURE — 72141 MRI NECK SPINE W/O DYE: CPT

## 2021-09-16 ENCOUNTER — OFFICE VISIT (OUTPATIENT)
Dept: CARDIOLOGY CLINIC | Age: 66
End: 2021-09-16
Payer: MEDICARE

## 2021-09-16 VITALS
OXYGEN SATURATION: 96 % | WEIGHT: 153 LBS | HEIGHT: 61 IN | BODY MASS INDEX: 28.89 KG/M2 | DIASTOLIC BLOOD PRESSURE: 70 MMHG | SYSTOLIC BLOOD PRESSURE: 130 MMHG | HEART RATE: 67 BPM

## 2021-09-16 DIAGNOSIS — I38 VALVULAR HEART DISEASE: Primary | ICD-10-CM

## 2021-09-16 DIAGNOSIS — I51.89 DIASTOLIC DYSFUNCTION: ICD-10-CM

## 2021-09-16 DIAGNOSIS — I10 ESSENTIAL HYPERTENSION: ICD-10-CM

## 2021-09-16 PROCEDURE — 99213 OFFICE O/P EST LOW 20 MIN: CPT | Performed by: CLINICAL NURSE SPECIALIST

## 2021-09-16 RX ORDER — METOPROLOL SUCCINATE 25 MG/1
25 TABLET, EXTENDED RELEASE ORAL NIGHTLY
Qty: 90 TABLET | Refills: 3 | Status: SHIPPED | OUTPATIENT
Start: 2021-09-16

## 2021-09-16 ASSESSMENT — ENCOUNTER SYMPTOMS
WHEEZING: 0
FACIAL SWELLING: 0
CHEST TIGHTNESS: 0
SHORTNESS OF BREATH: 0
NAUSEA: 0
ABDOMINAL PAIN: 0
EYE REDNESS: 0
VOMITING: 0
COUGH: 0

## 2021-09-16 NOTE — PROGRESS NOTES
[facility], [address], [address] [address]  Phone: [phone]  Fax: [phone]    OFFICE VISIT:  2021    Franco Mir - : 1955    Reason For Visit:  Herman Flores is a 78 y.o. female who is here for follow-up for valvular heart disease, uncontrolled hypertension    HPI  Patient is here for follow-up with a history of valvular heart disease, palpitations, diastolic dysfunction and hypertension. She is feeling well overall and blood pressure has been better controlled. She denies chest pain, unusual dyspnea, orthopnea, PND, edema, palpitations. 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 Kaela Mane MD is PCP.   Franco Mir has the following history as recorded in [facility]:    Patient Active Problem List    Diagnosis Date Noted    Non-cardiac chest pain     Diastolic dysfunction     Valvular heart disease 2017    Bicuspid aortic valve 2017    Palpitations 10/20/2015    Tricuspid regurgitation     Hypertension     Murmur      Past Medical History:   Diagnosis Date    Atrophic vaginitis     Bicuspid aortic valve 2017    Bilateral carpal tunnel syndrome     GERD (gastroesophageal reflux disease)     Hypertension     Murmur     RLS (restless legs syndrome)     Tricuspid regurgitation     echo mild to moderate    Valvular heart disease 2017     Past Surgical History:   Procedure Laterality Date    HYSTERECTOMY       Family History   Problem Relation Age of Onset    High Blood Pressure Mother     Heart Disease Mother     Kidney Disease Mother     Coronary Art Dis Other     Hypertension Other     Breast Cancer Other     Cancer Other     Stroke Other     High Blood Pressure Father      Social History     Tobacco Use    Smoking status: Never Smoker    Smokeless tobacco: Never Used   Substance Use Topics    Alcohol use: Never      Current Outpatient Medications Medication Sig Dispense Refill    metoprolol succinate (TOPROL XL) 25 MG extended release tablet Take 1 tablet by mouth nightly 90 tablet 3    lisinopril (PRINIVIL;ZESTRIL) 10 MG tablet Take 1 tablet by mouth daily 30 tablet 3    hydrochlorothiazide (HYDRODIURIL) 12.5 MG tablet Take 1 tablet by mouth daily 90 tablet 3    omeprazole (PRILOSEC) 20 MG delayed release capsule Take 20 mg by mouth daily      fluticasone (FLONASE) 50 MCG/ACT nasal spray 1 spray by Nasal route daily 1 Bottle 0     No current facility-administered medications for this visit. Allergies: Sulfa antibiotics    Review of Systems  Review of Systems   Constitutional: Negative for activity change, diaphoresis, fatigue, fever and unexpected weight change. HENT: Negative for facial swelling and nosebleeds. Eyes: Negative for redness and visual disturbance. Respiratory: Negative for cough, chest tightness, shortness of breath and wheezing. Cardiovascular: Negative for chest pain, palpitations and leg swelling. Gastrointestinal: Negative for abdominal pain, nausea and vomiting. Endocrine: Negative for cold intolerance and heat intolerance. Genitourinary: Negative for dysuria and hematuria. Musculoskeletal: Negative for arthralgias and myalgias. Skin: Negative for pallor and rash. Neurological: Negative for dizziness, seizures, syncope, weakness and light-headedness. Hematological: Does not bruise/bleed easily. Psychiatric/Behavioral: Negative for agitation. The patient is not nervous/anxious. Objective  Vital Signs - /70   Pulse 67   Ht 5' 1\" (1.549 m)   Wt 153 lb (69.4 kg)   SpO2 96%   BMI 28.91 kg/m²      BP Readings from Last 3 Encounters:   09/16/21 130/70   03/16/21 138/88   05/28/20 (!) 140/90    Pulse Readings from Last 3 Encounters:   09/16/21 67   03/16/21 61   05/28/20 70        Physical Exam  Vitals and nursing note reviewed. Constitutional:       Appearance: Normal appearance.  She is well-developed. HENT:      Head: Normocephalic and atraumatic. Eyes:      General:         Right eye: No discharge. Left eye: No discharge. Pupils: Pupils are equal, round, and reactive to light. Neck:      Vascular: No carotid bruit or JVD. Trachea: No tracheal deviation. Cardiovascular:      Rate and Rhythm: Normal rate and regular rhythm. Heart sounds: Murmur (2/6 systolic) heard. No friction rub. No gallop. Comments: No carotid bruit  Pulmonary:      Effort: Pulmonary effort is normal. No respiratory distress. Breath sounds: Normal breath sounds. No wheezing or rales. Abdominal:      Palpations: Abdomen is soft. Tenderness: There is no abdominal tenderness. Musculoskeletal:         General: No swelling. Cervical back: Neck supple. No tenderness. Right lower leg: No edema. Left lower leg: No edema. Comments: Normal gait and station   Skin:     General: Skin is warm and dry. Findings: No rash. Neurological:      Mental Status: She is alert and oriented to person, place, and time. Cranial Nerves: No cranial nerve deficit. Psychiatric:         Behavior: Behavior normal.         Judgment: Judgment normal.         Data:  Echo 1/11/19  Summary   1. Normal left ventricular size and systolic function EF 90-51%. Prominent   upper septum which the anterior leaflet of the mitral valve contacts   during diastole. (No systolic anterior motion).   2. Transmitral inflow pattern consistent with grade 2 diastolic   dysfunction   3. Mildly dilated left atrium.   4. Poor visualization of a moderately calcified aortic valve. Unable to   discern whether it is bicuspid or tricuspid. (conflicting past data). Mild   aortic stenosis with a peak gradient of 16, and we have 9, and a   calculated valve area of 1.7 cm^2.  Mild aortic regurgitation.   5. Mitral leaflets are mildly thickened with preserved mobility and mild   regurgitation   6. Trace pulmonic regurgitation   7. Right-sided chambers are normal size with preserved RV systolic   function   8. Trace tricuspid regurgitation with normal right ventricular systolic   pressure estimate of 28 mmHg   9. IVC assessment consistent with normal right atrial pressure 5 mmHg   10. No pericardial effusion and aortic root dimensions are within normal   limits    Lexiscan 2/2219  1. Ejection fraction 66% normal   2. Wall motion study demonstrates normal wall motion and thickening   3. Myocardial perfusion imaging demonstrates homogeneous uptake of the   tracer in all visualized segments. No areas of ischemia or infarction   are identified.       Impression   Summary impression:   Normal study   Signed by Dr Ronald Dong on 2/22/2019 3:01 PM         Assessment:     Diagnosis Orders   1. Valvular heart disease     2. Diastolic dysfunction     3. Essential hypertension       Valvular heart diseasereviewed  echo 1/19 which shows mild aortic stenosis and regurgitation and a questionable bicuspid aortic valve. Continue to monitor. No change in symptoms    Diastolic dysfunction grade 2 without signs of heart failure. On metoprolol and lisinopril. Continue present treatment    Hypertensionstable on current regimen with metoprolol, lisinopril, HCTZ    Stable cardiovascular status. No evidence of overt heart failure,angina or dysrhythmia. Plan    Return for Dr. Belinda Mascorro, as scheduled. 6mo  Continue present treatment and low-sodium diet  Will prepare letter of your condition for   Report any issues with fluid retention, sudden weight gain, shortness of breath    Call with any questionsor concerns  Follow up with Aleksandra Rasmussen MD for non cardiac problems  Report any new problems  Cardiovascular Fitness-Exercise as tolerated. Strive for 15 minutes of exercise most days of the week.     Cardiac / HealthyDiet  Continue current medications as directed  Continue plan of treatment  It is always recommended that you bring your medicationsbottles with you to each visit - this is for your safety!        PABLO Davis

## 2021-10-22 ENCOUNTER — HOSPITAL ENCOUNTER (OUTPATIENT)
Dept: WOMENS IMAGING | Age: 66
Discharge: HOME | End: 2021-10-22
Payer: MEDICARE

## 2021-10-22 DIAGNOSIS — Z12.31 BREAST CANCER SCREENING BY MAMMOGRAM: ICD-10-CM

## 2021-10-22 PROCEDURE — 77067 SCR MAMMO BI INCL CAD: CPT

## 2022-03-14 ENCOUNTER — TELEPHONE (OUTPATIENT)
Dept: CARDIOLOGY CLINIC | Age: 67
End: 2022-03-14

## 2022-03-15 ENCOUNTER — OFFICE VISIT (OUTPATIENT)
Dept: CARDIOLOGY CLINIC | Age: 67
End: 2022-03-15
Payer: MEDICARE

## 2022-03-15 VITALS
HEIGHT: 61 IN | HEART RATE: 59 BPM | SYSTOLIC BLOOD PRESSURE: 134 MMHG | BODY MASS INDEX: 29.83 KG/M2 | DIASTOLIC BLOOD PRESSURE: 80 MMHG | WEIGHT: 158 LBS

## 2022-03-15 DIAGNOSIS — I51.89 DIASTOLIC DYSFUNCTION: Primary | ICD-10-CM

## 2022-03-15 DIAGNOSIS — R00.2 PALPITATIONS: ICD-10-CM

## 2022-03-15 PROCEDURE — 93000 ELECTROCARDIOGRAM COMPLETE: CPT | Performed by: INTERNAL MEDICINE

## 2022-03-15 PROCEDURE — 99214 OFFICE O/P EST MOD 30 MIN: CPT | Performed by: INTERNAL MEDICINE

## 2022-03-15 RX ORDER — GABAPENTIN 300 MG/1
300 CAPSULE ORAL 3 TIMES DAILY
COMMUNITY

## 2022-03-15 RX ORDER — TRAMADOL HYDROCHLORIDE 50 MG/1
50 TABLET ORAL EVERY 6 HOURS PRN
COMMUNITY

## 2022-03-15 RX ORDER — DICLOFENAC POTASSIUM 25 MG/1
CAPSULE, LIQUID FILLED ORAL DAILY
COMMUNITY

## 2022-03-15 RX ORDER — MONTELUKAST SODIUM 10 MG/1
10 TABLET ORAL NIGHTLY
COMMUNITY

## 2022-03-15 NOTE — PROGRESS NOTES
HISTORY  40-year-old lady with a history of dyslipidemia, hypertension, and aortic valvular disease returns for routine follow-up. Prior assessment including an echo in 2019 suggest the presence of a bicuspid aortic valve with peak/mean gradients of 16/9 mmHg at that time accompanied by grade 2 diastolic dysfunction and a mildly dilated left atrium. Her blood pressures have consistently run in the 130s at the time of her visit. Her primary care physician manages her lipids which supposedly been acceptable [mildly elevated LDL mitigated by high HDL]. On return today she relates no change in exercise tolerance, no symptoms of dysrhythmia, no unusual shortness of breath, and no chest discomfort. She has not been vaccinated for COVID-19. PHYSICAL EXAM  On exam she carries 158 pounds in a 5 foot 1 inch frame. Pressure is 134/80 with pulse of 59. Very pleasant endomorphic middle-aged lady. EOMs full, sclerae and conjunctiva normal. PERRLA. Mask in place. Trachea midline with no neck masses. Assessment of internal jugular veins reveals no elevation of central venous pressure at 45 degrees. Carotid pulses normal without delay or bruit. Thyroid normal to palpation. Chest exam reveals normal respiratory effort, no abnormal breath sounds and normal expiratory phase. No skin lesions seen. PMI normal. S1, S2 normal with a 1/6 systolic murmur. Mild abdominal obesity. Normal bowel sounds without palpable mass or bruit. No clubbing or acrocyanosis. No significant lower extremity edema or signs of venous insufficiency. General motor strength appears to be within normal limits. Normal range of motion with normal gait. Alert, oriented x 3, memory and cognition normal as reflected by history and conversation. EKG reveals sinus bradycardia at 59, otherwise within normal limits. ASSESSMENT/PLAN:   1.   Hypertension -as above has diastolic dysfunction and a dilated left atrium along with which there is some scarring of the aortic valve. Target pressure is consistently less than 130. She is to have a trial increase in her hydrochlorothiazide to 25 mg a day, calling us if she tolerates and responds appropriately to the change at which time we will call in a prescription. Continue metoprolol. 2.  Dyslipidemia -monitored by primary care. 3.  Bicuspid aortic valve [suspected] -we will repeat echocardiogram in 1 year.   4.  Pandemic response -unvaccinated

## 2022-09-15 ENCOUNTER — OFFICE VISIT (OUTPATIENT)
Dept: CARDIOLOGY CLINIC | Age: 67
End: 2022-09-15
Payer: MEDICARE

## 2022-09-15 VITALS
WEIGHT: 156 LBS | SYSTOLIC BLOOD PRESSURE: 106 MMHG | OXYGEN SATURATION: 98 % | BODY MASS INDEX: 29.45 KG/M2 | HEIGHT: 61 IN | HEART RATE: 58 BPM | DIASTOLIC BLOOD PRESSURE: 78 MMHG

## 2022-09-15 DIAGNOSIS — I51.89 DIASTOLIC DYSFUNCTION: ICD-10-CM

## 2022-09-15 DIAGNOSIS — I38 VALVULAR HEART DISEASE: Primary | ICD-10-CM

## 2022-09-15 DIAGNOSIS — I10 ESSENTIAL HYPERTENSION: ICD-10-CM

## 2022-09-15 PROCEDURE — 99213 OFFICE O/P EST LOW 20 MIN: CPT | Performed by: CLINICAL NURSE SPECIALIST

## 2022-09-15 PROCEDURE — 1123F ACP DISCUSS/DSCN MKR DOCD: CPT | Performed by: CLINICAL NURSE SPECIALIST

## 2022-09-15 RX ORDER — LISINOPRIL AND HYDROCHLOROTHIAZIDE 12.5; 1 MG/1; MG/1
TABLET ORAL
COMMUNITY
Start: 2022-06-23

## 2022-09-15 RX ORDER — AMLODIPINE BESYLATE 5 MG/1
TABLET ORAL
COMMUNITY
Start: 2022-08-20

## 2022-09-15 ASSESSMENT — ENCOUNTER SYMPTOMS
ABDOMINAL PAIN: 0
SHORTNESS OF BREATH: 0
NAUSEA: 0
VOMITING: 0
CHEST TIGHTNESS: 0
EYE REDNESS: 0
FACIAL SWELLING: 0
WHEEZING: 0
COUGH: 0

## 2022-09-15 NOTE — PROGRESS NOTES
[facility], [address] [address]  Phone: [phone]  Fax: [phone]    OFFICE VISIT:  9/15/2022    Sal Cyr - : 1955    Reason For Visit:  [facility] is a 75 y.o. female who is here for follow-up for valvular heart disease,  hypertension    HPI  Patient is here for follow-up with a history of mild valvular heart disease, palpitations, diastolic dysfunction and hypertension. She is feeling well overall and blood pressure has been better controlled. She denies chest pain, unusual dyspnea, orthopnea, PND, edema, palpitations. 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   Sal Cyr has the following history as recorded in [facility]:    Patient Active Problem List    Diagnosis Date Noted    Non-cardiac chest pain     Diastolic dysfunction     Valvular heart disease 2017    Bicuspid aortic valve 2017    Palpitations 10/20/2015    Tricuspid regurgitation     Hypertension     Murmur      Past Medical History:   Diagnosis Date    Atrophic vaginitis     Bicuspid aortic valve 2017    Bilateral carpal tunnel syndrome     GERD (gastroesophageal reflux disease)     Hypertension     Murmur     RLS (restless legs syndrome)     Tricuspid regurgitation 2014    echo mild to moderate    Valvular heart disease 2017     Past Surgical History:   Procedure Laterality Date    HYSTERECTOMY (CERVIX STATUS UNKNOWN)       Family History   Problem Relation Age of Onset    High Blood Pressure Mother     Heart Disease Mother     Kidney Disease Mother     Coronary Art Dis Other     Hypertension Other     Breast Cancer Other     Cancer Other     Stroke Other     High Blood Pressure Father      Social History     Tobacco Use    Smoking status: Never    Smokeless tobacco: Never   Substance Use Topics    Alcohol use: Never      Current Outpatient Medications   Medication Sig Dispense Refill    lisinopril-hydroCHLOROthiazide (PRINZIDE;ZESTORETIC) 10-12.5 MG per tablet TAKE 1 TABLET BY MOUTH TWICE DAILY      gabapentin (NEURONTIN) 300 MG capsule Take 300 mg by mouth 3 times daily. montelukast (SINGULAIR) 10 MG tablet Take 10 mg by mouth nightly      Diclofenac Potassium 25 MG CAPS Take by mouth daily      traMADol (ULTRAM) 50 MG tablet Take 50 mg by mouth every 6 hours as needed for Pain.      metoprolol succinate (TOPROL XL) 25 MG extended release tablet Take 1 tablet by mouth nightly 90 tablet 3    hydrochlorothiazide (HYDRODIURIL) 12.5 MG tablet Take 1 tablet by mouth daily 90 tablet 3    omeprazole (PRILOSEC) 20 MG delayed release capsule Take 20 mg by mouth daily      fluticasone (FLONASE) 50 MCG/ACT nasal spray 1 spray by Nasal route daily 1 Bottle 0    amLODIPine (NORVASC) 5 MG tablet TAKE 1 TABLET BY MOUTH TWICE DAILY       No current facility-administered medications for this visit. Allergies: Sulfa antibiotics    Review of Systems  Review of Systems   Constitutional:  Negative for activity change, diaphoresis, fatigue, fever and unexpected weight change. HENT:  Negative for facial swelling and nosebleeds. Eyes:  Negative for redness and visual disturbance. Respiratory:  Negative for cough, chest tightness, shortness of breath and wheezing. Cardiovascular:  Negative for chest pain, palpitations and leg swelling. Gastrointestinal:  Negative for abdominal pain, nausea and vomiting. Endocrine: Negative for cold intolerance and heat intolerance. Genitourinary:  Negative for dysuria and hematuria. Musculoskeletal:  Negative for arthralgias and myalgias. Skin:  Negative for pallor and rash. Neurological:  Negative for dizziness, seizures, syncope, weakness and light-headedness. Hematological:  Does not bruise/bleed easily. Psychiatric/Behavioral:  Negative for agitation. The patient is not nervous/anxious.       Objective  Vital Signs - /78 Pulse 58   Ht 5' 1\" (1.549 m)   Wt 156 lb (70.8 kg)   SpO2 98%   BMI 29.48 kg/m²      BP Readings from Last 3 Encounters:   09/15/22 106/78   03/15/22 134/80   09/16/21 130/70    Pulse Readings from Last 3 Encounters:   09/15/22 58   03/15/22 59   09/16/21 67        Wt Readings from Last 3 Encounters:   09/15/22 156 lb (70.8 kg)   03/15/22 158 lb (71.7 kg)   09/16/21 153 lb (69.4 kg)       Physical Exam  Vitals and nursing note reviewed. Constitutional:       Appearance: Normal appearance. She is well-developed. HENT:      Head: Normocephalic and atraumatic. Eyes:      General:         Right eye: No discharge. Left eye: No discharge. Pupils: Pupils are equal, round, and reactive to light. Neck:      Vascular: No carotid bruit or JVD. Trachea: No tracheal deviation. Cardiovascular:      Rate and Rhythm: Normal rate and regular rhythm. Heart sounds: Murmur (2/6 systolic) heard. No friction rub. No gallop. Comments: No carotid bruit  Pulmonary:      Effort: Pulmonary effort is normal. No respiratory distress. Breath sounds: Normal breath sounds. No wheezing or rales. Abdominal:      Palpations: Abdomen is soft. Tenderness: There is no abdominal tenderness. Musculoskeletal:         General: No swelling. Cervical back: Neck supple. No tenderness. Right lower leg: No edema. Left lower leg: No edema. Comments: Normal gait and station   Skin:     General: Skin is warm and dry. Findings: No rash. Neurological:      Mental Status: She is alert and oriented to person, place, and time. Cranial Nerves: No cranial nerve deficit. Psychiatric:         Behavior: Behavior normal.         Judgment: Judgment normal.       Data:  Echo 1/11/19  Summary   1. Normal left ventricular size and systolic function EF 48-98%. Prominent   upper septum which the anterior leaflet of the mitral valve contacts   during diastole.  (No systolic anterior motion). 2. Transmitral inflow pattern consistent with grade 2 diastolic   dysfunction   3. Mildly dilated left atrium. 4. Poor visualization of a moderately calcified aortic valve. Unable to   discern whether it is bicuspid or tricuspid. (conflicting past data). Mild   aortic stenosis with a peak gradient of 16, and we have 9, and a   calculated valve area of 1.7 cm^2. Mild aortic regurgitation. 5. Mitral leaflets are mildly thickened with preserved mobility and mild   regurgitation   6. Trace pulmonic regurgitation   7. Right-sided chambers are normal size with preserved RV systolic   function   8. Trace tricuspid regurgitation with normal right ventricular systolic   pressure estimate of 28 mmHg   9. IVC assessment consistent with normal right atrial pressure 5 mmHg   10. No pericardial effusion and aortic root dimensions are within normal   limits    Lexiscan 2/2219  1. Ejection fraction 66% normal   2. Wall motion study demonstrates normal wall motion and thickening   3. Myocardial perfusion imaging demonstrates homogeneous uptake of the   tracer in all visualized segments. No areas of ischemia or infarction   are identified. Impression   Summary impression:   Normal study   Signed by Dr Josseline Warren on 2/22/2019 3:01 PM         Assessment:     Diagnosis Orders   1. Valvular heart disease        2. Diastolic dysfunction        3. Essential hypertension            Valvular heart disease-reviewed  echo 1/19 which shows mild aortic stenosis and regurgitation and a questionable bicuspid aortic valve. No change in symptoms. Dr. Sophie Patrick recommended repeat echo in 2023. Diastolic dysfunction- grade 2 without signs of heart failure. On metoprolol and lisinopril. Continue present treatment    Hypertension-stable on current regimen with metoprolol, lisinopril, HCTZ    Stable cardiovascular status. No evidence of overt heart failure,angina or dysrhythmia.      Plan    Return in about 6 months (around 3/15/2023) for Dr. Robert Navarrete, as scheduled. 6mo  Repeat Echo next year    Call with any questionsor concerns  Follow up with PABLO Bernard - CNP for non cardiac problems  Report any new problems  Cardiovascular Fitness-Exercise as tolerated. Strive for 15 minutes of exercise most days of the week. Cardiac / HealthyDiet  Continue current medications as directed  Continue plan of treatment  It is always recommended that you bring your medicationsbottles with you to each visit - this is for your safety!        Colette Thibodeaux APRN

## 2023-03-20 ENCOUNTER — TELEPHONE (OUTPATIENT)
Dept: CARDIOLOGY CLINIC | Age: 68
End: 2023-03-20

## 2023-03-30 ENCOUNTER — OFFICE VISIT (OUTPATIENT)
Dept: CARDIOLOGY CLINIC | Age: 68
End: 2023-03-30
Payer: MEDICARE

## 2023-03-30 VITALS
HEART RATE: 83 BPM | HEIGHT: 61 IN | BODY MASS INDEX: 30.58 KG/M2 | SYSTOLIC BLOOD PRESSURE: 128 MMHG | WEIGHT: 162 LBS | DIASTOLIC BLOOD PRESSURE: 82 MMHG

## 2023-03-30 DIAGNOSIS — I10 PRIMARY HYPERTENSION: ICD-10-CM

## 2023-03-30 DIAGNOSIS — I35.0 AORTIC VALVE STENOSIS, ETIOLOGY OF CARDIAC VALVE DISEASE UNSPECIFIED: Primary | ICD-10-CM

## 2023-03-30 DIAGNOSIS — I38 VALVULAR HEART DISEASE: ICD-10-CM

## 2023-03-30 DIAGNOSIS — I49.3 PVC (PREMATURE VENTRICULAR CONTRACTION): ICD-10-CM

## 2023-03-30 PROBLEM — R07.89 NON-CARDIAC CHEST PAIN: Status: RESOLVED | Noted: 2019-03-01 | Resolved: 2023-03-30

## 2023-03-30 PROCEDURE — 1123F ACP DISCUSS/DSCN MKR DOCD: CPT | Performed by: CLINICAL NURSE SPECIALIST

## 2023-03-30 PROCEDURE — 3074F SYST BP LT 130 MM HG: CPT | Performed by: CLINICAL NURSE SPECIALIST

## 2023-03-30 PROCEDURE — 3079F DIAST BP 80-89 MM HG: CPT | Performed by: CLINICAL NURSE SPECIALIST

## 2023-03-30 PROCEDURE — 99213 OFFICE O/P EST LOW 20 MIN: CPT | Performed by: CLINICAL NURSE SPECIALIST

## 2023-03-30 PROCEDURE — 93000 ELECTROCARDIOGRAM COMPLETE: CPT | Performed by: CLINICAL NURSE SPECIALIST

## 2023-03-30 RX ORDER — M-VIT,TX,IRON,MINS/CALC/FOLIC 27MG-0.4MG
1 TABLET ORAL DAILY
COMMUNITY

## 2023-03-30 ASSESSMENT — ENCOUNTER SYMPTOMS
CHEST TIGHTNESS: 0
SHORTNESS OF BREATH: 0
ABDOMINAL PAIN: 0
VOMITING: 0
NAUSEA: 0
COUGH: 0
EYE REDNESS: 0
WHEEZING: 0
FACIAL SWELLING: 0

## 2023-03-30 NOTE — PROGRESS NOTES
status: Never    Smokeless tobacco: Never   Substance Use Topics    Alcohol use: Never      Current Outpatient Medications   Medication Sig Dispense Refill    Multiple Vitamins-Minerals (THERAPEUTIC MULTIVITAMIN-MINERALS) tablet Take 1 tablet by mouth daily      amLODIPine (NORVASC) 5 MG tablet TAKE 1 TABLET BY MOUTH TWICE DAILY      gabapentin (NEURONTIN) 300 MG capsule Take 300 mg by mouth 3 times daily. montelukast (SINGULAIR) 10 MG tablet Take 10 mg by mouth nightly      Diclofenac Potassium 25 MG CAPS Take by mouth daily      traMADol (ULTRAM) 50 MG tablet Take 50 mg by mouth every 6 hours as needed for Pain. omeprazole (PRILOSEC) 20 MG delayed release capsule Take 20 mg by mouth daily      fluticasone (FLONASE) 50 MCG/ACT nasal spray 1 spray by Nasal route daily 1 Bottle 0     No current facility-administered medications for this visit. Allergies: Lisinopril and Sulfa antibiotics    Review of Systems  Review of Systems   Constitutional:  Negative for activity change, diaphoresis, fatigue, fever and unexpected weight change. HENT:  Negative for facial swelling and nosebleeds. Eyes:  Negative for redness and visual disturbance. Respiratory:  Negative for cough, chest tightness, shortness of breath and wheezing. Cardiovascular:  Negative for chest pain, palpitations and leg swelling. Gastrointestinal:  Negative for abdominal pain, nausea and vomiting. Endocrine: Negative for cold intolerance and heat intolerance. Genitourinary:  Negative for dysuria and hematuria. Musculoskeletal:  Negative for arthralgias and myalgias. Skin:  Negative for pallor and rash. Neurological:  Negative for dizziness, seizures, syncope, weakness and light-headedness. Hematological:  Does not bruise/bleed easily. Psychiatric/Behavioral:  Negative for agitation. The patient is not nervous/anxious.       Objective  Vital Signs - /82   Pulse 83   Ht 5' 1\" (1.549 m)   Wt 162 lb (73.5 kg)   BMI

## 2023-03-30 NOTE — PATIENT INSTRUCTIONS
[place] [facility] and [address] [facility] located on the first floor of [address] [address] through hospital main entrance and turn immediately to your left. Date/Time:     Patient's contact number:  [phone] (home) [phone] (work)    Echocardiogram -  No prep. Takes approximately 30 min. An echocardiogram uses sound waves to produce images of your heart. This commonly used test allows your doctor to see how your heart is beating and pumping blood. Your doctor can use the images from an echocardiogram to identify various abnormalities in the heart muscle and valves. This test has 2 parts: You will be asked to disrobe from the waist up and given a gown to wear. The technologist will then hook up an EKG monitor to you for the entire exam.   You will then have an ultrasound of your heart (echocardiogram) to assess the heart muscle, heart valves and heart function. You may eat and take any medicines before the exam.     If you need to change your appointment, please call outpatient scheduling at [phone].

## 2023-10-12 ENCOUNTER — OFFICE VISIT (OUTPATIENT)
Dept: CARDIOLOGY CLINIC | Age: 68
End: 2023-10-12
Payer: MEDICARE

## 2023-10-12 VITALS
HEART RATE: 99 BPM | BODY MASS INDEX: 30.23 KG/M2 | OXYGEN SATURATION: 94 % | WEIGHT: 160 LBS | SYSTOLIC BLOOD PRESSURE: 126 MMHG | DIASTOLIC BLOOD PRESSURE: 76 MMHG

## 2023-10-12 DIAGNOSIS — I51.89 DIASTOLIC DYSFUNCTION: ICD-10-CM

## 2023-10-12 DIAGNOSIS — I35.0 AORTIC VALVE STENOSIS, ETIOLOGY OF CARDIAC VALVE DISEASE UNSPECIFIED: Primary | ICD-10-CM

## 2023-10-12 DIAGNOSIS — I10 PRIMARY HYPERTENSION: ICD-10-CM

## 2023-10-12 DIAGNOSIS — I38 VALVULAR HEART DISEASE: ICD-10-CM

## 2023-10-12 PROCEDURE — 3078F DIAST BP <80 MM HG: CPT | Performed by: CLINICAL NURSE SPECIALIST

## 2023-10-12 PROCEDURE — 3074F SYST BP LT 130 MM HG: CPT | Performed by: CLINICAL NURSE SPECIALIST

## 2023-10-12 PROCEDURE — 1123F ACP DISCUSS/DSCN MKR DOCD: CPT | Performed by: CLINICAL NURSE SPECIALIST

## 2023-10-12 PROCEDURE — 99214 OFFICE O/P EST MOD 30 MIN: CPT | Performed by: CLINICAL NURSE SPECIALIST

## 2023-10-12 ASSESSMENT — ENCOUNTER SYMPTOMS
NAUSEA: 0
VOMITING: 0
CHEST TIGHTNESS: 0
COUGH: 0
WHEEZING: 0
SHORTNESS OF BREATH: 0
EYE REDNESS: 0
FACIAL SWELLING: 0
ABDOMINAL PAIN: 0

## 2023-10-12 NOTE — PROGRESS NOTES
[facility], [address] [place] [address]  Phone: [phone]  Fax: [phone]    OFFICE VISIT:  10/12/2023    Rupal Panchal - : 1955    Reason For Visit:  Gabi Coronado is a 70 y.o. female who is here for follow-up for valvular heart disease,  hypertension    HPI  Patient is here for follow-up with a history of  valvular heart disease with aortic stenosis, palpitations, diastolic dysfunction and hypertension. She is feeling well overall and blood pressure has been better controlled. She reports lisinopril has been stopped since her last visit here due to a chronic cough which has now resolved    She denies chest pain, unusual dyspnea, orthopnea, PND, edema, palpitations. 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 PABLO Rutledge CNP is PCP.   Rupal Panchal has the following history as recorded in [facility]:    Patient Active Problem List    Diagnosis Date Noted    PVC (premature ventricular contraction) 10/65/4289    Diastolic dysfunction 10/26/9839    Valvular heart disease 2017    Bicuspid aortic valve 2017    Palpitations 10/20/2015    Tricuspid regurgitation     Hypertension      Past Medical History:   Diagnosis Date    Atrophic vaginitis     Bicuspid aortic valve 2017    Bilateral carpal tunnel syndrome     GERD (gastroesophageal reflux disease)     Hypertension     Murmur     RLS (restless legs syndrome)     Tricuspid regurgitation 2014    echo mild to moderate    Valvular heart disease 2017     Past Surgical History:   Procedure Laterality Date    HYSTERECTOMY (CERVIX STATUS UNKNOWN)       Family History   Problem Relation Age of Onset    High Blood Pressure Mother     Heart Disease Mother     Kidney Disease Mother     Coronary Art Dis Other     Hypertension Other     Breast Cancer Other     Cancer Other     Stroke Other     High Blood Pressure Father      Social History     Tobacco Use    Smoking

## 2024-03-13 ENCOUNTER — TELEPHONE (OUTPATIENT)
Dept: CARDIOLOGY CLINIC | Age: 69
End: 2024-03-13

## 2024-03-13 NOTE — TELEPHONE ENCOUNTER
CC:  Nika Abreu is here today for   Chief Complaint   Patient presents with   • Shoulder     right shoulder      PCP Bryanna Simms MD   Medications: medications verified and updated  Refills needed today?  NO  denies known Latex allergy or symptoms of Latex sensitivity.  Patient would like communication of their results via:    Home Phone: [phone] (home)  Okay to leave a message containing results? Yes  Tobacco history: verified  E-mail: No e-mail address on record    Called to reschedule to Anselmo's next available, BP 3/13

## 2024-06-07 ENCOUNTER — TELEPHONE (OUTPATIENT)
Dept: CARDIOLOGY CLINIC | Age: 69
End: 2024-06-07

## 2024-10-11 ENCOUNTER — TELEPHONE (OUTPATIENT)
Dept: CARDIOLOGY CLINIC | Age: 69
End: 2024-10-11

## 2024-10-11 NOTE — TELEPHONE ENCOUNTER
Called and LVM to patient regarding a missed appt on 10/3/24 with Dr. Briones. Letter will be mailed out. If patient calls office back, please reschedule appt to Dr. Briones's next available if patient only wants to see her or appt can be moved to NP patient has seen before. 10/11/24 SL

## 2024-10-13 ENCOUNTER — APPOINTMENT (OUTPATIENT)
Dept: GENERAL RADIOLOGY | Facility: HOSPITAL | Age: 69
End: 2024-10-13
Payer: MEDICARE

## 2024-10-13 ENCOUNTER — HOSPITAL ENCOUNTER (EMERGENCY)
Facility: HOSPITAL | Age: 69
Discharge: HOME | End: 2024-10-13
Attending: EMERGENCY MEDICINE | Admitting: EMERGENCY MEDICINE
Payer: MEDICARE

## 2024-10-13 VITALS
DIASTOLIC BLOOD PRESSURE: 94 MMHG | WEIGHT: 180 LBS | HEIGHT: 66 IN | SYSTOLIC BLOOD PRESSURE: 141 MMHG | HEART RATE: 82 BPM | OXYGEN SATURATION: 98 % | RESPIRATION RATE: 18 BRPM | TEMPERATURE: 98 F | BODY MASS INDEX: 28.93 KG/M2

## 2024-10-13 DIAGNOSIS — R91.8 LEFT UPPER LOBE PULMONARY INFILTRATE: Primary | ICD-10-CM

## 2024-10-13 LAB
ALBUMIN SERPL-MCNC: 4.4 G/DL (ref 3.5–5.2)
ALBUMIN/GLOB SERPL: 1.5 G/DL
ALP SERPL-CCNC: 56 U/L (ref 39–117)
ALT SERPL W P-5'-P-CCNC: 22 U/L (ref 1–33)
ANION GAP SERPL CALCULATED.3IONS-SCNC: 12 MMOL/L (ref 5–15)
AST SERPL-CCNC: 25 U/L (ref 1–32)
BASOPHILS # BLD AUTO: 0.03 10*3/MM3 (ref 0–0.2)
BASOPHILS NFR BLD AUTO: 0.6 % (ref 0–1.5)
BILIRUB SERPL-MCNC: 0.4 MG/DL (ref 0–1.2)
BUN SERPL-MCNC: 14 MG/DL (ref 8–23)
BUN/CREAT SERPL: 16.3 (ref 7–25)
CALCIUM SPEC-SCNC: 9.8 MG/DL (ref 8.6–10.5)
CHLORIDE SERPL-SCNC: 104 MMOL/L (ref 98–107)
CO2 SERPL-SCNC: 24 MMOL/L (ref 22–29)
CREAT SERPL-MCNC: 0.86 MG/DL (ref 0.57–1)
D-LACTATE SERPL-SCNC: 1.1 MMOL/L (ref 0.5–2)
DEPRECATED RDW RBC AUTO: 38.5 FL (ref 37–54)
EGFRCR SERPLBLD CKD-EPI 2021: 72.8 ML/MIN/1.73
EOSINOPHIL # BLD AUTO: 0.12 10*3/MM3 (ref 0–0.4)
EOSINOPHIL NFR BLD AUTO: 2.2 % (ref 0.3–6.2)
ERYTHROCYTE [DISTWIDTH] IN BLOOD BY AUTOMATED COUNT: 12.1 % (ref 12.3–15.4)
FLUAV RNA RESP QL NAA+PROBE: NOT DETECTED
FLUBV RNA RESP QL NAA+PROBE: NOT DETECTED
GLOBULIN UR ELPH-MCNC: 2.9 GM/DL
GLUCOSE SERPL-MCNC: 95 MG/DL (ref 65–99)
HCT VFR BLD AUTO: 41.7 % (ref 34–46.6)
HGB BLD-MCNC: 13.9 G/DL (ref 12–15.9)
IMM GRANULOCYTES # BLD AUTO: 0.01 10*3/MM3 (ref 0–0.05)
IMM GRANULOCYTES NFR BLD AUTO: 0.2 % (ref 0–0.5)
LYMPHOCYTES # BLD AUTO: 1.47 10*3/MM3 (ref 0.7–3.1)
LYMPHOCYTES NFR BLD AUTO: 27.4 % (ref 19.6–45.3)
MAGNESIUM SERPL-MCNC: 1.9 MG/DL (ref 1.6–2.4)
MCH RBC QN AUTO: 28.8 PG (ref 26.6–33)
MCHC RBC AUTO-ENTMCNC: 33.3 G/DL (ref 31.5–35.7)
MCV RBC AUTO: 86.3 FL (ref 79–97)
MONOCYTES # BLD AUTO: 0.67 10*3/MM3 (ref 0.1–0.9)
MONOCYTES NFR BLD AUTO: 12.5 % (ref 5–12)
NEUTROPHILS NFR BLD AUTO: 3.07 10*3/MM3 (ref 1.7–7)
NEUTROPHILS NFR BLD AUTO: 57.1 % (ref 42.7–76)
NRBC BLD AUTO-RTO: 0 /100 WBC (ref 0–0.2)
PLATELET # BLD AUTO: 189 10*3/MM3 (ref 140–450)
PMV BLD AUTO: 11.2 FL (ref 6–12)
POTASSIUM SERPL-SCNC: 4.3 MMOL/L (ref 3.5–5.2)
PROT SERPL-MCNC: 7.3 G/DL (ref 6–8.5)
RBC # BLD AUTO: 4.83 10*6/MM3 (ref 3.77–5.28)
RSV RNA RESP QL NAA+PROBE: NOT DETECTED
SARS-COV-2 RNA RESP QL NAA+PROBE: NOT DETECTED
SODIUM SERPL-SCNC: 140 MMOL/L (ref 136–145)
TROPONIN T SERPL HS-MCNC: 12 NG/L
WBC NRBC COR # BLD AUTO: 5.37 10*3/MM3 (ref 3.4–10.8)

## 2024-10-13 PROCEDURE — 85025 COMPLETE CBC W/AUTO DIFF WBC: CPT | Performed by: EMERGENCY MEDICINE

## 2024-10-13 PROCEDURE — 83605 ASSAY OF LACTIC ACID: CPT | Performed by: EMERGENCY MEDICINE

## 2024-10-13 PROCEDURE — 71045 X-RAY EXAM CHEST 1 VIEW: CPT

## 2024-10-13 PROCEDURE — 80053 COMPREHEN METABOLIC PANEL: CPT | Performed by: EMERGENCY MEDICINE

## 2024-10-13 PROCEDURE — 84484 ASSAY OF TROPONIN QUANT: CPT | Performed by: EMERGENCY MEDICINE

## 2024-10-13 PROCEDURE — 93005 ELECTROCARDIOGRAM TRACING: CPT

## 2024-10-13 PROCEDURE — 99284 EMERGENCY DEPT VISIT MOD MDM: CPT

## 2024-10-13 PROCEDURE — 83735 ASSAY OF MAGNESIUM: CPT | Performed by: EMERGENCY MEDICINE

## 2024-10-13 PROCEDURE — 87637 SARSCOV2&INF A&B&RSV AMP PRB: CPT | Performed by: EMERGENCY MEDICINE

## 2024-10-13 PROCEDURE — 36415 COLL VENOUS BLD VENIPUNCTURE: CPT

## 2024-10-13 PROCEDURE — 87040 BLOOD CULTURE FOR BACTERIA: CPT | Performed by: EMERGENCY MEDICINE

## 2024-10-13 RX ORDER — LEVOFLOXACIN 500 MG/1
500 TABLET, FILM COATED ORAL DAILY
Qty: 7 TABLET | Refills: 0 | Status: SHIPPED | OUTPATIENT
Start: 2024-10-13

## 2024-10-13 RX ORDER — SODIUM CHLORIDE 0.9 % (FLUSH) 0.9 %
10 SYRINGE (ML) INJECTION AS NEEDED
Status: DISCONTINUED | OUTPATIENT
Start: 2024-10-13 | End: 2024-10-13 | Stop reason: HOSPADM

## 2024-10-13 RX ORDER — HYDROCODONE BITARTRATE AND HOMATROPINE METHYLBROMIDE ORAL SOLUTION 5; 1.5 MG/5ML; MG/5ML
5 LIQUID ORAL EVERY 4 HOURS PRN
Status: DISCONTINUED | OUTPATIENT
Start: 2024-10-13 | End: 2024-10-13 | Stop reason: HOSPADM

## 2024-10-13 RX ORDER — HYDROCODONE BITARTRATE AND HOMATROPINE METHYLBROMIDE ORAL SOLUTION 5; 1.5 MG/5ML; MG/5ML
5 LIQUID ORAL EVERY 6 HOURS PRN
Qty: 120 ML | Refills: 0 | Status: SHIPPED | OUTPATIENT
Start: 2024-10-13

## 2024-10-13 RX ADMIN — HYDROCODONE BITARTRATE AND HOMATROPINE METHYLBROMIDE ORAL SOLUTION 5 ML: 5; 1.5 LIQUID ORAL at 11:54

## 2024-10-13 NOTE — ED PROVIDER NOTES
Subjective   History of Present Illness  Patient complains of a cough that started on this past Thursday.  The cough is worsened since then.  She says it is a deep harsh cough.  She complains of some chest pain and points to the left anterior chest wall as the source of her pain.  However this pain is generally just when she coughs.  She says the cough is productive of some nondescript sputum.  She is felt chilled at times but did not note any definite fever.  Her daughter-in-law has a similar illness.  She does have a history of valvular heart disease followed at [facility].    History provided by:  Patient   used: No    Cough  Cough characteristics:  Productive  Sputum characteristics:  Nondescript  Severity:  Moderate  Onset quality:  Gradual  Duration:  3 days  Timing:  Constant  Progression:  Worsening  Chronicity:  New  Smoker: no    Context: sick contacts    Context: not animal exposure, not exposure to allergens, not fumes, not occupational exposure, not smoke exposure, not upper respiratory infection, not weather changes and not with activity    Relieved by:  Nothing  Worsened by:  Nothing  Ineffective treatments:  None tried  Associated symptoms: chest pain, chills, myalgias and sinus congestion    Associated symptoms: no diaphoresis, no ear fullness, no ear pain, no eye discharge, no fever, no headaches, no rash, no rhinorrhea, no shortness of breath, no sore throat, no weight loss and no wheezing    Risk factors: no chemical exposure, no recent infection and no recent travel        Review of Systems   Constitutional:  Positive for chills. Negative for diaphoresis, fever and weight loss.   HENT: Negative.  Negative for ear pain, rhinorrhea and sore throat.    Eyes:  Negative for discharge.   Respiratory:  Positive for cough. Negative for shortness of breath and wheezing.    Cardiovascular:  Positive for chest pain.   Gastrointestinal: Negative.    Genitourinary: Negative.     Musculoskeletal:  Positive for myalgias.   Skin:  Negative for rash.   Neurological: Negative.  Negative for headaches.   Psychiatric/Behavioral: Negative.     All other systems reviewed and are negative.      History reviewed. No pertinent past medical history.    No Known Allergies    History reviewed. No pertinent surgical history.    History reviewed. No pertinent family history.    Social History     Socioeconomic History    Marital status:        Prior to Admission medications    Not on File       Medications   sodium chloride 0.9 % flush 10 mL (has no administration in time range)   HYDROcodone Bit-Homatrop MBr (HYCODAN) 5-1.5 MG/5ML solution 5 mL (5 mL Oral Given 10/13/24 1154)   cefTRIAXone (ROCEPHIN) 1,000 mg in sodium chloride 0.9 % 100 mL MBP (has no administration in time range)       Vitals:    10/13/24 1118   BP:    Pulse:    Resp:    Temp: 98.8 °F (37.1 °C)   SpO2:          Objective   Physical Exam  Vitals and nursing note reviewed.   Constitutional:       Appearance: She is well-developed.   HENT:      Head: Normocephalic and atraumatic.   Eyes:      Extraocular Movements: Extraocular movements intact.      Pupils: Pupils are equal, round, and reactive to light.   Cardiovascular:      Rate and Rhythm: Normal rate and regular rhythm.   Pulmonary:      Effort: Pulmonary effort is normal.      Breath sounds: Normal breath sounds.   Abdominal:      General: Bowel sounds are normal.      Palpations: Abdomen is soft.   Musculoskeletal:         General: Normal range of motion.      Cervical back: Normal range of motion and neck supple.   Skin:     General: Skin is warm and dry.   Neurological:      General: No focal deficit present.      Mental Status: She is alert and oriented to person, place, and time.   Psychiatric:         Mood and Affect: Mood normal.         Behavior: Behavior normal.         Procedures         Lab Results (last 24 hours)       Procedure Component Value Units Date/Time     Blood Culture - Blood, Arm, Left [[id number]] Collected: 10/13/24 1100    Specimen: Blood from Arm, Left Updated: 10/13/24 1125    CBC & Differential [[id number]]  (Abnormal) Collected: 10/13/24 1107    Specimen: Blood Updated: 10/13/24 1120    Narrative:      The following orders were created for panel order CBC & Differential.  Procedure                               Abnormality         Status                     ---------                               -----------         ------                     CBC Auto Differential[[id number]]        Abnormal            Final result                 Please view results for these tests on the individual orders.    Comprehensive Metabolic Panel [[id number]] Collected: 10/13/24 1107    Specimen: Blood Updated: 10/13/24 1139     Glucose 95 mg/dL      BUN 14 mg/dL      Creatinine 0.86 mg/dL      Sodium 140 mmol/L      Potassium 4.3 mmol/L      Chloride 104 mmol/L      CO2 24.0 mmol/L      Calcium 9.8 mg/dL      Total Protein 7.3 g/dL      Albumin 4.4 g/dL      ALT (SGPT) 22 U/L      AST (SGOT) 25 U/L      Alkaline Phosphatase 56 U/L      Total Bilirubin 0.4 mg/dL      Globulin 2.9 gm/dL      A/G Ratio 1.5 g/dL      BUN/Creatinine Ratio 16.3     Anion Gap 12.0 mmol/L      eGFR 72.8 mL/min/1.73     Narrative:      GFR Normal >60  Chronic Kidney Disease <60  Kidney Failure <15      Single High Sensitivity Troponin T [[id number]]  (Normal) Collected: 10/13/24 1107    Specimen: Blood Updated: 10/13/24 1137     HS Troponin T 12 ng/L     Narrative:      High Sensitive Troponin T Reference Range:  <14.0 ng/L- Negative Female for AMI  <22.0 ng/L- Negative Male for AMI  >=14 - Abnormal Female indicating possible myocardial injury.  >=22 - Abnormal Male indicating possible myocardial injury.   Clinicians would have to utilize clinical acumen, EKG, Troponin, and serial changes to determine if it is an Acute Myocardial Infarction or myocardial injury due to an underlying chronic condition.          Blood Culture - Blood, Arm, Left [[id number]] Collected: 10/13/24 1107    Specimen: Blood from Arm, Left Updated: 10/13/24 1125    Lactic Acid, Plasma [[id number]]  (Normal) Collected: 10/13/24 1107    Specimen: Blood Updated: 10/13/24 1140     Lactate 1.1 mmol/L     Magnesium [[id number]]  (Normal) Collected: 10/13/24 1107    Specimen: Blood Updated: 10/13/24 1134     Magnesium 1.9 mg/dL     CBC Auto Differential [[id number]]  (Abnormal) Collected: 10/13/24 1107    Specimen: Blood Updated: 10/13/24 1120     WBC 5.37 10*3/mm3      RBC 4.83 10*6/mm3      Hemoglobin 13.9 g/dL      Hematocrit 41.7 %      MCV 86.3 fL      MCH 28.8 pg      MCHC 33.3 g/dL      RDW 12.1 %      RDW-SD 38.5 fl      MPV 11.2 fL      Platelets 189 10*3/mm3      Neutrophil % 57.1 %      Lymphocyte % 27.4 %      Monocyte % 12.5 %      Eosinophil % 2.2 %      Basophil % 0.6 %      Immature Grans % 0.2 %      Neutrophils, Absolute 3.07 10*3/mm3      Lymphocytes, Absolute 1.47 10*3/mm3      Monocytes, Absolute 0.67 10*3/mm3      Eosinophils, Absolute 0.12 10*3/mm3      Basophils, Absolute 0.03 10*3/mm3      Immature Grans, Absolute 0.01 10*3/mm3      nRBC 0.0 /100 WBC     COVID PRE-OP / PRE-PROCEDURE SCREENING ORDER (NO ISOLATION) - Swab, Nasopharynx [[id number]]  (Normal) Collected: 10/13/24 1108    Specimen: Swab from Nasopharynx Updated: 10/13/24 1159    Narrative:      The following orders were created for panel order COVID PRE-OP / PRE-PROCEDURE SCREENING ORDER (NO ISOLATION) - Swab, Nasopharynx.  Procedure                               Abnormality         Status                     ---------                               -----------         ------                     COVID-19, FLU A/B, RSV P...[[id number]]  Normal              Final result                 Please view results for these tests on the individual orders.    COVID-19, FLU A/B, RSV PCR 1 HR TAT - Swab, Nasopharynx [[id number]]  (Normal) Collected: 10/13/24 1108    Specimen: Swab from  Nasopharynx Updated: 10/13/24 1159     COVID19 Not Detected     Influenza A PCR Not Detected     Influenza B PCR Not Detected     RSV, PCR Not Detected    Narrative:      Fact sheet for providers: https://www.fda.gov/media/521550/download    Fact sheet for patients: https://www.fda.gov/media/193624/download    Test performed by PCR.            XR Chest 1 View   Final Result   Left midlung nodular infiltrate suspicious for pneumonia   versus summation artifact associated with overlap of the ribs and   inferior scapula. An underlying neoplasm also cannot be excluded with   this appearance and follow-up chest x-rays are recommended.       This report was signed and finalized on 10/13/2024 11:28 AM by Dr. Brock Florence MD.              ED Course          MDM  Number of Diagnoses or Management Options  Left upper lobe pulmonary infiltrate: new and requires workup  Diagnosis management comments: I told the patient her chest x-ray does have a density in the left lung that is suspicious for pneumonia.  At the very least she has a bronchitis and may have a pneumonia even though her lab work is otherwise okay.  We will treat it as a pneumonia to make sure we get it better.  Cardiac enzymes and EKG okay so there is no signs of cardiac problems and her heart disease is actually valvular disease not coronary artery disease.  Will give her dose of antibiotics here and prescription for same and for something for the cough.  She will be discharged in stable condition.       Amount and/or Complexity of Data Reviewed  Clinical lab tests: ordered and reviewed  Tests in the radiology section of CPT®: ordered and reviewed  Tests in the medicine section of CPT®: ordered and reviewed    Risk of Complications, Morbidity, and/or Mortality  Presenting problems: moderate  Diagnostic procedures: moderate  Management options: moderate    Patient Progress  Patient progress: stable        Final diagnoses:   Left upper lobe pulmonary  Toni Cuevas Jr., MD  10/13/24 3124

## 2024-10-17 LAB
QT INTERVAL: 394 MS
QTC INTERVAL: 468 MS

## 2024-10-18 LAB
BACTERIA SPEC AEROBE CULT: NORMAL
BACTERIA SPEC AEROBE CULT: NORMAL

## 2024-11-20 ENCOUNTER — TELEPHONE (OUTPATIENT)
Dept: MRI IMAGING | Facility: HOSPITAL | Age: 69
End: 2024-11-20
Payer: MEDICARE

## 2025-01-16 ENCOUNTER — OFFICE VISIT (OUTPATIENT)
Dept: CARDIOLOGY CLINIC | Age: 70
End: 2025-01-16
Payer: MEDICARE

## 2025-01-16 VITALS
WEIGHT: 165 LBS | HEIGHT: 61 IN | SYSTOLIC BLOOD PRESSURE: 138 MMHG | HEART RATE: 74 BPM | DIASTOLIC BLOOD PRESSURE: 78 MMHG | BODY MASS INDEX: 31.15 KG/M2 | OXYGEN SATURATION: 98 %

## 2025-01-16 DIAGNOSIS — I51.89 DIASTOLIC DYSFUNCTION: ICD-10-CM

## 2025-01-16 DIAGNOSIS — I35.1 NONRHEUMATIC AORTIC VALVE INSUFFICIENCY: ICD-10-CM

## 2025-01-16 DIAGNOSIS — I35.0 NONRHEUMATIC AORTIC VALVE STENOSIS: ICD-10-CM

## 2025-01-16 DIAGNOSIS — I38 VALVULAR HEART DISEASE: ICD-10-CM

## 2025-01-16 DIAGNOSIS — I10 PRIMARY HYPERTENSION: ICD-10-CM

## 2025-01-16 DIAGNOSIS — I49.3 PVC (PREMATURE VENTRICULAR CONTRACTION): ICD-10-CM

## 2025-01-16 DIAGNOSIS — I35.0 AORTIC VALVE STENOSIS, ETIOLOGY OF CARDIAC VALVE DISEASE UNSPECIFIED: Primary | ICD-10-CM

## 2025-01-16 PROCEDURE — 3075F SYST BP GE 130 - 139MM HG: CPT | Performed by: INTERNAL MEDICINE

## 2025-01-16 PROCEDURE — 1123F ACP DISCUSS/DSCN MKR DOCD: CPT | Performed by: INTERNAL MEDICINE

## 2025-01-16 PROCEDURE — 1160F RVW MEDS BY RX/DR IN RCRD: CPT | Performed by: INTERNAL MEDICINE

## 2025-01-16 PROCEDURE — 93000 ELECTROCARDIOGRAM COMPLETE: CPT | Performed by: INTERNAL MEDICINE

## 2025-01-16 PROCEDURE — 99214 OFFICE O/P EST MOD 30 MIN: CPT | Performed by: INTERNAL MEDICINE

## 2025-01-16 PROCEDURE — 1159F MED LIST DOCD IN RCRD: CPT | Performed by: INTERNAL MEDICINE

## 2025-01-16 PROCEDURE — 3078F DIAST BP <80 MM HG: CPT | Performed by: INTERNAL MEDICINE

## 2025-01-16 RX ORDER — VITAMIN B COMPLEX
1 CAPSULE ORAL DAILY
COMMUNITY

## 2025-01-16 RX ORDER — MULTIVIT-MIN/IRON/FOLIC ACID/K 18-600-40
2000 CAPSULE ORAL DAILY
COMMUNITY

## 2025-01-16 RX ORDER — TELMISARTAN 40 MG/1
40 TABLET ORAL DAILY
COMMUNITY
Start: 2025-01-03

## 2025-01-16 RX ORDER — MULTIVITAMIN WITH IRON
500 TABLET ORAL DAILY
COMMUNITY

## 2025-01-16 ASSESSMENT — ENCOUNTER SYMPTOMS
APNEA: 0
ABDOMINAL DISTENTION: 0
ABDOMINAL PAIN: 0
COUGH: 0
SHORTNESS OF BREATH: 0
VOMITING: 0
EYE DISCHARGE: 0
SORE THROAT: 0
NAUSEA: 0
CONSTIPATION: 0
EYE PAIN: 0
WHEEZING: 0
EYE REDNESS: 0
BLOOD IN STOOL: 0
FACIAL SWELLING: 0
DIARRHEA: 0
CHEST TIGHTNESS: 0

## 2025-01-16 NOTE — PROGRESS NOTES
Cardiology Office Visit Note  [address] [address], [facility]  [address]  Phone: [phone]  Fax: [phone]                            Date:  1/16/2025  Patient: Maye Butler  Age:  69 y.o., 1955    Referral: No ref. provider found    REASON FOR VISIT:  Follow-up (6 month follow up-EKG,BP)         PROBLEM LIST:    Patient Active Problem List    Diagnosis Date Noted    PVC (premature ventricular contraction) 03/30/2023    Diastolic dysfunction 02/01/2019    Valvular heart disease 08/23/2017    Bicuspid aortic valve 08/23/2017    Palpitations 10/20/2015    Tricuspid regurgitation      Overview Note:     echo mild to moderate      Hypertension          PRESENTATION: Maye Butler is a 69 y.o. year old female returns for an interim cardiology follow-up appointment.  Overall she has been doing okay.  No chest pain or shortness of breath.  She notes that she is a little low on energy but overall is very active in her day-to-day activities.      REVIEW OF SYSTEMS:  Review of Systems   Constitutional:  Negative for chills, fatigue and fever.   HENT:  Negative for congestion, facial swelling, hearing loss and sore throat.    Eyes:  Negative for pain, discharge, redness and visual disturbance.   Respiratory:  Negative for apnea, cough, chest tightness, shortness of breath and wheezing.    Cardiovascular:  Negative for chest pain, palpitations and leg swelling.   Gastrointestinal:  Negative for abdominal distention, abdominal pain, blood in stool, constipation, diarrhea, nausea and vomiting.   Endocrine: Negative for polydipsia, polyphagia and polyuria.   Genitourinary:  Negative for dysuria, flank pain, frequency and hematuria.   Musculoskeletal:  Negative for joint swelling, myalgias and neck pain.   Skin:  Negative for pallor and rash.   Neurological:  Negative for dizziness, syncope, speech difficulty, light-headedness, numbness and headaches.   Psychiatric/Behavioral:  Negative for confusion,

## 2025-02-28 ENCOUNTER — HOSPITAL ENCOUNTER (OUTPATIENT)
Age: 70
Discharge: HOME | End: 2025-02-28
Attending: INTERNAL MEDICINE
Payer: MEDICARE

## 2025-02-28 VITALS
WEIGHT: 165 LBS | BODY MASS INDEX: 31.15 KG/M2 | HEIGHT: 61 IN | SYSTOLIC BLOOD PRESSURE: 181 MMHG | DIASTOLIC BLOOD PRESSURE: 82 MMHG

## 2025-02-28 DIAGNOSIS — I35.1 NONRHEUMATIC AORTIC VALVE INSUFFICIENCY: ICD-10-CM

## 2025-02-28 DIAGNOSIS — I35.0 NONRHEUMATIC AORTIC VALVE STENOSIS: ICD-10-CM

## 2025-02-28 LAB
ECHO AO ASC DIAM: 2.9 CM
ECHO AO ASCENDING AORTA INDEX: 1.67 CM/M2
ECHO AO ROOT DIAM: 2.5 CM
ECHO AO ROOT INDEX: 1.44 CM/M2
ECHO AR MAX VEL PISA: 4.5 M/S
ECHO AV AREA PEAK VELOCITY: 1.8 CM2
ECHO AV AREA VTI: 1.7 CM2
ECHO AV AREA/BSA PEAK VELOCITY: 1 CM2/M2
ECHO AV AREA/BSA VTI: 1 CM2/M2
ECHO AV MEAN GRADIENT: 10 MMHG
ECHO AV MEAN VELOCITY: 1.5 M/S
ECHO AV PEAK GRADIENT: 17 MMHG
ECHO AV PEAK VELOCITY: 2.1 M/S
ECHO AV REGURGITANT PHT: 399 MS
ECHO AV VELOCITY RATIO: 0.52
ECHO AV VTI: 51 CM
ECHO BSA: 1.79 M2
ECHO EST RA PRESSURE: 3 MMHG
ECHO IVC PROX: 1.4 CM
ECHO LA AREA 2C: 21.2 CM2
ECHO LA AREA 4C: 22.2 CM2
ECHO LA DIAMETER INDEX: 2.3 CM/M2
ECHO LA DIAMETER: 4 CM
ECHO LA MAJOR AXIS: 5.7 CM
ECHO LA MINOR AXIS: 5.8 CM
ECHO LA TO AORTIC ROOT RATIO: 1.6
ECHO LA VOL BP: 64 ML (ref 22–52)
ECHO LA VOL MOD A2C: 63 ML (ref 22–52)
ECHO LA VOL MOD A4C: 64 ML (ref 22–52)
ECHO LA VOL/BSA BIPLANE: 37 ML/M2 (ref 16–34)
ECHO LA VOLUME INDEX MOD A2C: 36 ML/M2 (ref 16–34)
ECHO LA VOLUME INDEX MOD A4C: 37 ML/M2 (ref 16–34)
ECHO LV E' LATERAL VELOCITY: 4.46 CM/S
ECHO LV E' SEPTAL VELOCITY: 4.35 CM/S
ECHO LV EDV A2C: 109 ML
ECHO LV EDV A4C: 108 ML
ECHO LV EDV INDEX A4C: 62 ML/M2
ECHO LV EDV NDEX A2C: 63 ML/M2
ECHO LV EF PHYSICIAN: 60 %
ECHO LV EJECTION FRACTION A2C: 60 %
ECHO LV EJECTION FRACTION A4C: 55 %
ECHO LV EJECTION FRACTION BIPLANE: 59 % (ref 55–100)
ECHO LV ESV A2C: 44 ML
ECHO LV ESV A4C: 48 ML
ECHO LV ESV INDEX A2C: 25 ML/M2
ECHO LV ESV INDEX A4C: 28 ML/M2
ECHO LV FRACTIONAL SHORTENING: 32 % (ref 28–44)
ECHO LV GLOBAL LONGITUDINAL STRAIN (GLS): -17.1 %
ECHO LV INTERNAL DIMENSION DIASTOLE INDEX: 2.7 CM/M2
ECHO LV INTERNAL DIMENSION DIASTOLIC: 4.7 CM (ref 3.9–5.3)
ECHO LV INTERNAL DIMENSION SYSTOLIC INDEX: 1.84 CM/M2
ECHO LV INTERNAL DIMENSION SYSTOLIC: 3.2 CM
ECHO LV IVSD: 1 CM (ref 0.6–0.9)
ECHO LV MASS 2D: 175.8 G (ref 67–162)
ECHO LV MASS INDEX 2D: 101 G/M2 (ref 43–95)
ECHO LV POSTERIOR WALL DIASTOLIC: 1.1 CM (ref 0.6–0.9)
ECHO LV RELATIVE WALL THICKNESS RATIO: 0.47
ECHO LVOT AREA: 3.5 CM2
ECHO LVOT AV VTI INDEX: 0.49
ECHO LVOT DIAM: 2.1 CM
ECHO LVOT MEAN GRADIENT: 2 MMHG
ECHO LVOT MEAN GRADIENT: 2 MMHG
ECHO LVOT PEAK GRADIENT: 5 MMHG
ECHO LVOT PEAK VELOCITY: 1.1 M/S
ECHO LVOT STROKE VOLUME INDEX: 49.3 ML/M2
ECHO LVOT SV: 85.9 ML
ECHO LVOT VTI: 24.8 CM
ECHO MV A VELOCITY: 0.78 M/S
ECHO MV AREA VTI: 3.2 CM2
ECHO MV E DECELERATION TIME (DT): 206 MS
ECHO MV E VELOCITY: 0.65 M/S
ECHO MV E/A RATIO: 0.83
ECHO MV E/E' LATERAL: 14.57
ECHO MV E/E' RATIO (AVERAGED): 14.76
ECHO MV E/E' SEPTAL: 14.94
ECHO MV LVOT VTI INDEX: 1.08
ECHO MV MAX VELOCITY: 1 M/S
ECHO MV MEAN GRADIENT: 2 MMHG
ECHO MV MEAN VELOCITY: 0.6 M/S
ECHO MV PEAK GRADIENT: 4 MMHG
ECHO MV VTI: 26.7 CM
ECHO RA AREA 4C: 12.1 CM2
ECHO RA END SYSTOLIC VOLUME APICAL 4 CHAMBER INDEX BSA: 14 ML/M2
ECHO RA VOLUME: 25 ML
ECHO RIGHT VENTRICULAR SYSTOLIC PRESSURE (RVSP): 26 MMHG
ECHO RV BASAL DIMENSION: 3.5 CM
ECHO RV LONGITUDINAL DIMENSION: 5.9 CM
ECHO RV MID DIMENSION: 1.8 CM
ECHO RV TAPSE: 2.2 CM (ref 1.7–?)
ECHO TV REGURGITANT MAX VELOCITY: 2.42 M/S
ECHO TV REGURGITANT PEAK GRADIENT: 23 MMHG

## 2025-02-28 PROCEDURE — 6360000004 HC RX CONTRAST MEDICATION: Performed by: INTERNAL MEDICINE

## 2025-02-28 PROCEDURE — C8929 TTE W OR WO FOL WCON,DOPPLER: HCPCS

## 2025-02-28 PROCEDURE — 93356 MYOCRD STRAIN IMG SPCKL TRCK: CPT

## 2025-02-28 RX ADMIN — SULFUR HEXAFLUORIDE 2 ML: 60.7; .19; .19 INJECTION, POWDER, LYOPHILIZED, FOR SUSPENSION INTRAVENOUS; INTRAVESICAL at 10:34

## 2025-02-28 NOTE — RESULT ENCOUNTER NOTE
Progression of aortic valve regurgitation.  Recommend SUSANNE for further evaluation.  Please schedule for a Wednesday morning, if patient is agreeable.

## 2025-03-03 ENCOUNTER — TELEPHONE (OUTPATIENT)
Dept: CARDIOLOGY CLINIC | Age: 70
End: 2025-03-03

## 2025-03-03 NOTE — TELEPHONE ENCOUNTER
[place] at the [facility] [place] ([facility]) located on the first floor of [facility]. Enter through hospital main entrance and turn immediately to your left.     Procedure Date: 03/12/25  [place] from the Cath lab will call with arrival time the day prior  (Times are subject to change)     Procedure Instructions:   1) You will need to not have anything to eat or drink the morning before the procedure.   2) You can still take all of your morning medication with a sip of water   3) You will need a  for the procedure.        Went over the above instructions with patient and she voiced her understanding.

## 2025-03-03 NOTE — TELEPHONE ENCOUNTER
Ken Briones MD (Physician)  Cardiology  Progression of aortic valve regurgitation.  Recommend SUSANNE for further evaluation.  Please schedule for a Wednesday morning, if patient is agreeable.

## 2025-03-12 ENCOUNTER — RESULTS FOLLOW-UP (OUTPATIENT)
Age: 70
End: 2025-03-12

## 2025-03-12 ENCOUNTER — ANESTHESIA (OUTPATIENT)
Age: 70
End: 2025-03-12
Payer: MEDICARE

## 2025-03-12 ENCOUNTER — ANESTHESIA EVENT (OUTPATIENT)
Age: 70
End: 2025-03-12
Payer: MEDICARE

## 2025-03-12 ENCOUNTER — HOSPITAL ENCOUNTER (OUTPATIENT)
Age: 70
Discharge: HOME | End: 2025-03-12
Attending: INTERNAL MEDICINE | Admitting: INTERNAL MEDICINE
Payer: MEDICARE

## 2025-03-12 VITALS
SYSTOLIC BLOOD PRESSURE: 162 MMHG | BODY MASS INDEX: 31.15 KG/M2 | HEIGHT: 61 IN | DIASTOLIC BLOOD PRESSURE: 87 MMHG | TEMPERATURE: 97 F | RESPIRATION RATE: 27 BRPM | WEIGHT: 165 LBS | OXYGEN SATURATION: 97 % | HEART RATE: 69 BPM

## 2025-03-12 DIAGNOSIS — I35.1 MODERATE TO SEVERE AORTIC VALVE REGURGITATION: ICD-10-CM

## 2025-03-12 LAB
ANION GAP SERPL CALCULATED.3IONS-SCNC: 11 MMOL/L (ref 8–16)
BUN SERPL-MCNC: 19 MG/DL (ref 8–23)
CALCIUM SERPL-MCNC: 9.6 MG/DL (ref 8.8–10.2)
CHLORIDE SERPL-SCNC: 103 MMOL/L (ref 98–107)
CO2 SERPL-SCNC: 25 MMOL/L (ref 22–29)
CREAT SERPL-MCNC: 0.7 MG/DL (ref 0.5–0.9)
ECHO AO ASC DIAM: 2.7 CM
ECHO AO ASCENDING AORTA INDEX: 1.55 CM/M2
ECHO AO ROOT DIAM: 2.1 CM
ECHO AO ROOT INDEX: 1.21 CM/M2
ECHO AV MEAN GRADIENT: 20 MMHG
ECHO AV MEAN VELOCITY: 2 M/S
ECHO AV PEAK GRADIENT: 39 MMHG
ECHO AV PEAK VELOCITY: 3.1 M/S
ECHO AV VTI: 65.2 CM
ECHO BSA: 1.79 M2
ECHO LVOT AREA: 3.8 CM2
ECHO LVOT DIAM: 2.2 CM
ECHO TV REGURGITANT MAX VELOCITY: 3.18 M/S
ECHO TV REGURGITANT PEAK GRADIENT: 40 MMHG
EKG P AXIS: 37 DEGREES
EKG P-R INTERVAL: 180 MS
EKG Q-T INTERVAL: 448 MS
EKG QRS DURATION: 106 MS
EKG QTC CALCULATION (BAZETT): 456 MS
EKG T AXIS: 23 DEGREES
ERYTHROCYTE [DISTWIDTH] IN BLOOD BY AUTOMATED COUNT: 12.1 % (ref 11.5–14.5)
GLUCOSE SERPL-MCNC: 112 MG/DL (ref 70–99)
HCT VFR BLD AUTO: 42.6 % (ref 37–47)
HGB BLD-MCNC: 14.5 G/DL (ref 12–16)
MCH RBC QN AUTO: 29.5 PG (ref 27–31)
MCHC RBC AUTO-ENTMCNC: 34 G/DL (ref 33–37)
MCV RBC AUTO: 86.6 FL (ref 81–99)
PLATELET # BLD AUTO: 181 K/UL (ref 130–400)
PMV BLD AUTO: 11.1 FL (ref 9.4–12.3)
POTASSIUM SERPL-SCNC: 4 MMOL/L (ref 3.5–5)
RBC # BLD AUTO: 4.92 M/UL (ref 4.2–5.4)
SODIUM SERPL-SCNC: 139 MMOL/L (ref 136–145)
WBC # BLD AUTO: 4.7 K/UL (ref 4.8–10.8)

## 2025-03-12 PROCEDURE — 36415 COLL VENOUS BLD VENIPUNCTURE: CPT

## 2025-03-12 PROCEDURE — 80048 BASIC METABOLIC PNL TOTAL CA: CPT

## 2025-03-12 PROCEDURE — 3700000001 HC ADD 15 MINUTES (ANESTHESIA): Performed by: INTERNAL MEDICINE

## 2025-03-12 PROCEDURE — 3700000000 HC ANESTHESIA ATTENDED CARE: Performed by: INTERNAL MEDICINE

## 2025-03-12 PROCEDURE — 7100000010 HC PHASE II RECOVERY - FIRST 15 MIN: Performed by: INTERNAL MEDICINE

## 2025-03-12 PROCEDURE — 93312 ECHO TRANSESOPHAGEAL: CPT

## 2025-03-12 PROCEDURE — 7100000011 HC PHASE II RECOVERY - ADDTL 15 MIN: Performed by: INTERNAL MEDICINE

## 2025-03-12 PROCEDURE — 85027 COMPLETE CBC AUTOMATED: CPT

## 2025-03-12 PROCEDURE — 6360000002 HC RX W HCPCS: Performed by: NURSE ANESTHETIST, CERTIFIED REGISTERED

## 2025-03-12 PROCEDURE — 2580000003 HC RX 258: Performed by: INTERNAL MEDICINE

## 2025-03-12 RX ORDER — SODIUM CHLORIDE 9 MG/ML
INJECTION, SOLUTION INTRAVENOUS PRN
Status: DISCONTINUED | OUTPATIENT
Start: 2025-03-12 | End: 2025-03-12 | Stop reason: HOSPADM

## 2025-03-12 RX ORDER — SODIUM CHLORIDE 0.9 % (FLUSH) 0.9 %
5-40 SYRINGE (ML) INJECTION PRN
Status: DISCONTINUED | OUTPATIENT
Start: 2025-03-12 | End: 2025-03-12 | Stop reason: HOSPADM

## 2025-03-12 RX ORDER — LIDOCAINE HYDROCHLORIDE 20 MG/ML
SOLUTION OROPHARYNGEAL
Status: DISCONTINUED | OUTPATIENT
Start: 2025-03-12 | End: 2025-03-12

## 2025-03-12 RX ORDER — SODIUM CHLORIDE 0.9 % (FLUSH) 0.9 %
5-40 SYRINGE (ML) INJECTION EVERY 12 HOURS SCHEDULED
Status: DISCONTINUED | OUTPATIENT
Start: 2025-03-12 | End: 2025-03-12 | Stop reason: HOSPADM

## 2025-03-12 RX ORDER — LIDOCAINE HYDROCHLORIDE 10 MG/ML
INJECTION, SOLUTION EPIDURAL; INFILTRATION; INTRACAUDAL; PERINEURAL
Status: DISCONTINUED | OUTPATIENT
Start: 2025-03-12 | End: 2025-03-12 | Stop reason: SDUPTHER

## 2025-03-12 RX ORDER — SODIUM CHLORIDE 9 MG/ML
INJECTION, SOLUTION INTRAVENOUS CONTINUOUS
Status: DISCONTINUED | OUTPATIENT
Start: 2025-03-12 | End: 2025-03-12 | Stop reason: HOSPADM

## 2025-03-12 RX ORDER — PROPOFOL 10 MG/ML
INJECTION, EMULSION INTRAVENOUS
Status: DISCONTINUED | OUTPATIENT
Start: 2025-03-12 | End: 2025-03-12 | Stop reason: SDUPTHER

## 2025-03-12 RX ADMIN — PROPOFOL 550 MG: 10 INJECTION, EMULSION INTRAVENOUS at 07:46

## 2025-03-12 RX ADMIN — SODIUM CHLORIDE: 9 INJECTION, SOLUTION INTRAVENOUS at 06:57

## 2025-03-12 RX ADMIN — LIDOCAINE HYDROCHLORIDE 50 MG: 10 INJECTION, SOLUTION EPIDURAL; INFILTRATION; INTRACAUDAL; PERINEURAL at 07:46

## 2025-03-12 NOTE — H&P
-----------------------------------------------------------------  IMAGING:  No orders to display         Assessment and Recommendations:    This is a 69 y.o. year old female with past medical history of progressive aortic regurgitation.  Proceed with SUSANNE as planned.            Electronically signed by Ken Briones MD on 3/12/2025 at 5:15 PM

## 2025-03-12 NOTE — PROGRESS NOTES
TRANSFER - OUT REPORT:    Verbal report given to recovery, RN(name) on Maye Butler being transferred to recovery (unit) for routine post-op       Report consisted of patient's Situation, Background, Assessment and   Recommendations(SBAR).     Information from the following report(s) Nurse Handoff Report was reviewed with the receiving nurse.    Opportunity for questions and clarification was provided.      Patient transported with:   Monitor     13-Jul-2021 17:57

## 2025-03-12 NOTE — ANESTHESIA PRE PROCEDURE
Department of Anesthesiology  Preprocedure Note       Name:  Maye Butler   Age:  69 y.o.  :  1955                                          MRN:  [id number]         Date:  3/12/2025      Surgeon: * No surgeons listed *    Procedure: * No procedures listed *    Medications prior to admission:   Prior to Admission medications    Medication Sig Start Date End Date Taking? Authorizing Provider   telmisartan (MICARDIS) 40 MG tablet Take 1 tablet by mouth daily 1/3/25  Yes Cory Rodarte MD   b complex vitamins capsule Take 1 capsule by mouth daily   Yes Cory Rodarte MD   vitamin D 50 MCG ( UT) CAPS capsule Take 1 capsule by mouth daily   Yes Cory Rodarte MD   vitamin B-12 (CYANOCOBALAMIN) 500 MCG tablet Take 1 tablet by mouth daily   Yes Cory Rodarte MD   Misc Natural Products (BEET ROOT PO) Take by mouth   Yes Cory Rodarte MD   Multiple Vitamins-Minerals (THERAPEUTIC MULTIVITAMIN-MINERALS) tablet Take 1 tablet by mouth daily   Yes Cory Rodarte MD   amLODIPine (NORVASC) 5 MG tablet TAKE 1 TABLET BY MOUTH TWICE DAILY 22  Yes Cory Rodarte MD   omeprazole (PRILOSEC) 20 MG delayed release capsule Take 2 capsules by mouth daily   Yes Cory Rodarte MD   fluticasone (FLONASE) 50 MCG/ACT nasal spray 1 spray by Nasal route daily 1/15/18  Yes Isa Hoyt PA   gabapentin (NEURONTIN) 300 MG capsule Take 1 capsule by mouth 3 times daily.  Patient not taking: Reported on 3/12/2025    Cory Rodarte MD   montelukast (SINGULAIR) 10 MG tablet Take 1 tablet by mouth nightly  Patient not taking: Reported on 3/12/2025    Cory Rodarte MD   Diclofenac Potassium 25 MG CAPS Take by mouth daily  Patient not taking: Reported on 3/12/2025    Cory Rodarte MD   traMADol (ULTRAM) 50 MG tablet Take 1 tablet by mouth every 6 hours as needed for Pain.  Patient not taking: Reported on 3/12/2025    Cory Rodarte MD       Current

## 2025-03-12 NOTE — ANESTHESIA POSTPROCEDURE EVALUATION
Department of Anesthesiology  Postprocedure Note    Patient: Maye Butler  MRN: [id number]  YOB: 1955  Date of evaluation: 3/12/2025    Procedure Summary       Date: 03/12/25 Room / Location: [facility] Cath Lab    Anesthesia Start: 0736 Anesthesia Stop: 0817    Procedure: SUSANNE (PRN CONTRAST/BUBBLE/3D) Diagnosis:       Moderate to severe aortic valve regurgitation      Moderate to severe aortic valve regurgitation    Scheduled Providers: Ken Briones MD Responsible Provider: Anabel Asher APRN - CRNA    Anesthesia Type: General, TIVA ASA Status: 3            Anesthesia Type: General, TIVA    Yue Phase I: Yue Score: 10    Yue Phase II:      Anesthesia Post Evaluation    Patient location during evaluation: PACU  Patient participation: complete - patient participated  Level of consciousness: awake and alert  Pain score: 0  Airway patency: patent  Nausea & Vomiting: no nausea and no vomiting  Cardiovascular status: blood pressure returned to baseline  Respiratory status: acceptable, spontaneous ventilation, nonlabored ventilation and room air  Hydration status: euvolemic  Pain management: adequate    No notable events documented.

## 2025-03-12 NOTE — PROGRESS NOTES
Patient and daughter in law instructed on care post SUSANNE.  Given written instructions.  Both stated understanding.

## 2025-03-12 NOTE — PROGRESS NOTES
Returned post SUSANNE to room.   Drowsy but awakens easily to verbal stimuli.Denies any c/o pain.  Daughter in law at bedside.

## 2025-03-17 ENCOUNTER — TELEPHONE (OUTPATIENT)
Dept: CARDIAC SURGERY | Facility: CLINIC | Age: 70
End: 2025-03-17
Payer: MEDICARE

## 2025-03-17 NOTE — TELEPHONE ENCOUNTER
----- Message from Jayda Bravo sent at 3/17/2025 11:59 AM CDT -----  Magdalena this will be a self referral to Dr. Keenan.  Do you mind to call her and schedule new patient appointment with Dr. Keenan for valvular abnormality.

## 2025-03-17 NOTE — TELEPHONE ENCOUNTER
Attempted to call pt re: this message.  No answer.  VM message left for pt to call back to sched.  If pt calls back, please tx call to me.  Thanks!/marcia

## 2025-03-20 ENCOUNTER — TELEPHONE (OUTPATIENT)
Dept: CARDIOLOGY CLINIC | Age: 70
End: 2025-03-20

## 2025-03-20 NOTE — TELEPHONE ENCOUNTER
Patient is requesting a return call from the office in regards to if she needs to keep her appt on Monday. She states the provider is already aware that patient wants to have her surgery done at [facility] with . Please return her call.    Thank you!

## 2025-03-20 NOTE — TELEPHONE ENCOUNTER
HUB TRIED TO WT, UNSUCCESSFUL    Caller: Gladys Pino    Relationship: Self    Best call back number: [phone]    What is the best time to reach you: ANY    Who are you requesting to speak with (clinical staff, provider,  specific staff member): LAKEISHA    Do you know the name of the person who called: PT    What was the call regarding: PT WAS RETURNING LAKEISHA'S CALL ABOUT SCHEDULING. THANK YOU    Is it okay if the provider responds through [employer]: NO

## 2025-03-24 ENCOUNTER — RESULTS FOLLOW-UP (OUTPATIENT)
Dept: CARDIOLOGY CLINIC | Age: 70
End: 2025-03-24

## 2025-03-24 ENCOUNTER — OFFICE VISIT (OUTPATIENT)
Dept: CARDIOLOGY CLINIC | Age: 70
End: 2025-03-24

## 2025-03-24 ENCOUNTER — TELEPHONE (OUTPATIENT)
Dept: CARDIOLOGY CLINIC | Age: 70
End: 2025-03-24

## 2025-03-24 VITALS
DIASTOLIC BLOOD PRESSURE: 92 MMHG | WEIGHT: 161 LBS | SYSTOLIC BLOOD PRESSURE: 138 MMHG | BODY MASS INDEX: 30.4 KG/M2 | HEIGHT: 61 IN | HEART RATE: 84 BPM

## 2025-03-24 DIAGNOSIS — I35.0 AORTIC VALVE STENOSIS, ETIOLOGY OF CARDIAC VALVE DISEASE UNSPECIFIED: ICD-10-CM

## 2025-03-24 DIAGNOSIS — I38 VALVULAR HEART DISEASE: Primary | ICD-10-CM

## 2025-03-24 DIAGNOSIS — Z01.818 PREOP TESTING: Primary | ICD-10-CM

## 2025-03-24 DIAGNOSIS — R06.02 SHORTNESS OF BREATH: ICD-10-CM

## 2025-03-24 DIAGNOSIS — I38 VALVULAR HEART DISEASE: ICD-10-CM

## 2025-03-24 PROBLEM — R07.9 CHEST PAIN, UNSPECIFIED: Status: ACTIVE | Noted: 2025-03-24

## 2025-03-24 LAB
ALBUMIN SERPL-MCNC: 4.7 G/DL (ref 3.5–5.2)
ALP SERPL-CCNC: 66 U/L (ref 35–104)
ALT SERPL-CCNC: 36 U/L (ref 10–35)
ANION GAP SERPL CALCULATED.3IONS-SCNC: 9 MMOL/L (ref 8–16)
AST SERPL-CCNC: 27 U/L (ref 10–35)
BASOPHILS # BLD: 0 K/UL (ref 0–0.2)
BASOPHILS NFR BLD: 0.7 % (ref 0–1)
BILIRUB SERPL-MCNC: 0.3 MG/DL (ref 0.2–1.2)
BUN SERPL-MCNC: 21 MG/DL (ref 8–23)
CALCIUM SERPL-MCNC: 9.6 MG/DL (ref 8.8–10.2)
CHLORIDE SERPL-SCNC: 103 MMOL/L (ref 98–107)
CO2 SERPL-SCNC: 27 MMOL/L (ref 22–29)
CREAT SERPL-MCNC: 0.7 MG/DL (ref 0.5–0.9)
EOSINOPHIL # BLD: 0.1 K/UL (ref 0–0.6)
EOSINOPHIL NFR BLD: 1.4 % (ref 0–5)
ERYTHROCYTE [DISTWIDTH] IN BLOOD BY AUTOMATED COUNT: 12 % (ref 11.5–14.5)
GLUCOSE SERPL-MCNC: 93 MG/DL (ref 70–99)
HCT VFR BLD AUTO: 44.6 % (ref 37–47)
HGB BLD-MCNC: 14.5 G/DL (ref 12–16)
IMM GRANULOCYTES # BLD: 0 K/UL
LYMPHOCYTES # BLD: 2 K/UL (ref 1.1–4.5)
LYMPHOCYTES NFR BLD: 34.8 % (ref 20–40)
MCH RBC QN AUTO: 29 PG (ref 27–31)
MCHC RBC AUTO-ENTMCNC: 32.5 G/DL (ref 33–37)
MCV RBC AUTO: 89.2 FL (ref 81–99)
MONOCYTES # BLD: 0.6 K/UL (ref 0–0.9)
MONOCYTES NFR BLD: 10.2 % (ref 0–10)
NEUTROPHILS # BLD: 3 K/UL (ref 1.5–7.5)
NEUTS SEG NFR BLD: 52.7 % (ref 50–65)
PLATELET # BLD AUTO: 174 K/UL (ref 130–400)
PMV BLD AUTO: 12.1 FL (ref 9.4–12.3)
POTASSIUM SERPL-SCNC: 4.3 MMOL/L (ref 3.5–5.1)
PROT SERPL-MCNC: 7.3 G/DL (ref 6.4–8.3)
RBC # BLD AUTO: 5 M/UL (ref 4.2–5.4)
SODIUM SERPL-SCNC: 139 MMOL/L (ref 136–145)
WBC # BLD AUTO: 5.8 K/UL (ref 4.8–10.8)

## 2025-03-24 RX ORDER — POTASSIUM CHLORIDE 750 MG/1
10 TABLET, EXTENDED RELEASE ORAL DAILY
Qty: 30 TABLET | Refills: 2 | Status: SHIPPED | OUTPATIENT
Start: 2025-03-24 | End: 2025-04-23

## 2025-03-24 RX ORDER — FUROSEMIDE 20 MG/1
20 TABLET ORAL DAILY
Qty: 30 TABLET | Refills: 0 | Status: SHIPPED | OUTPATIENT
Start: 2025-03-24 | End: 2025-04-23

## 2025-03-24 RX ORDER — HYDRALAZINE HYDROCHLORIDE 25 MG/1
25 TABLET, FILM COATED ORAL 3 TIMES DAILY
Qty: 90 TABLET | Refills: 3 | Status: SHIPPED | OUTPATIENT
Start: 2025-03-24

## 2025-03-24 ASSESSMENT — ENCOUNTER SYMPTOMS
APNEA: 0
WHEEZING: 0
NAUSEA: 0
EYE REDNESS: 0
EYE DISCHARGE: 0
SORE THROAT: 0
DIARRHEA: 0
ABDOMINAL PAIN: 0
COUGH: 1
ABDOMINAL DISTENTION: 0
VOMITING: 0
EYE PAIN: 0
BLOOD IN STOOL: 0
CONSTIPATION: 0
SHORTNESS OF BREATH: 1
CHEST TIGHTNESS: 1
FACIAL SWELLING: 0

## 2025-03-24 NOTE — H&P (VIEW-ONLY)
Cardiology Office Visit Note  [address]  [address]  Phone: [phone]  Fax: [phone]                            Date:  3/24/2025  Patient: Maye Butler  Age:  69 y.o., 1955    Referral: No ref. provider found    REASON FOR VISIT:  2 Week Follow-Up         PROBLEM LIST:    Patient Active Problem List    Diagnosis Date Noted    PVC (premature ventricular contraction) 03/30/2023    Diastolic dysfunction 02/01/2019    Valvular heart disease 08/23/2017    Bicuspid aortic valve 08/23/2017    Palpitations 10/20/2015    Tricuspid regurgitation      Overview Note:     echo mild to moderate      Hypertension      She is accompanied by her son.    PRESENTATION:   History of Present Illness  The patient is a 69-year-old female who returns today for an internal cardiology follow-up appointment. She is known to have chronic valvular heart disease.     She reports experiencing fatigue and occasional lower extremity edema. She also mentions episodes of dyspnea and chest discomfort, accompanied by a persistent cough. Additionally, she describes a sensation of burning on her side.          REVIEW OF SYSTEMS:  Review of Systems   Constitutional:  Negative for chills, fatigue and fever.   HENT:  Negative for congestion, facial swelling, hearing loss and sore throat.    Eyes:  Negative for pain, discharge, redness and visual disturbance.   Respiratory:  Positive for cough, chest tightness and shortness of breath. Negative for apnea and wheezing.    Cardiovascular:  Negative for chest pain, palpitations and leg swelling.   Gastrointestinal:  Negative for abdominal distention, abdominal pain, blood in stool, constipation, diarrhea, nausea and vomiting.   Endocrine: Negative for polydipsia, polyphagia and polyuria.   Genitourinary:  Negative for dysuria, flank pain, frequency and hematuria.   Musculoskeletal:  Negative for joint swelling, myalgias and neck pain.   Skin:  Negative for

## 2025-03-24 NOTE — TELEPHONE ENCOUNTER
vessel. The wire will then be guided through until it reaches your heart. A soft, flexible catheter tube will then be slipped over the wire and threaded up to your heart.  The doctor will be taking x-ray pictures during the procedure to know where the wire and catheter are. Dye will be injected into the arteries of the heart. This will make the arteries and heart show up on the x-ray images. You may feel warm during the dye injection.     Insertion of Catheter with Guide Wire    Once in place, the catheter can be used to take measurements. Blood pressure can be taken within the heart's different chambers. Blood samples may also be taken. Multiple x-ray images will be taken to look for any disease in the arteries. An aortogram may also be done at this time. This step will give a clear image of the aorta (large artery leaving the heart). Once all the tests and images are complete, the catheter will be removed.  Sometimes, the doctor will perform balloon angioplasty and stenting if he finds an area in your arteries that is narrow or clogged. These are procedures that help to open narrowed arteries.  Finally, a bandage will be placed over the groin or arm area.     How Long Will It Take?  The procedure takes about 1-2 hours. Preparation before the test will take another 1-2 hours.      How Much Will It Hurt?  Although the procedure is generally not painful, it can cause some discomfort, including:   Burning sensation (when skin at catheter insertion site is anesthetized)   Pressure when catheter is inserted or replaced with other catheters   A flushing feeling or nausea when the dye is injected   Headache   Heart palpitations   Pain medicine will be given when needed.     Average Hospital Stay:  0-1 days     Postoperative Care At the Care Center   EKG and blood studies may be done.   You will likely need to lie still and flat on your back for a period of time. A pressure dressing may be placed over the area where the

## 2025-03-24 NOTE — PROGRESS NOTES
Cardiology Office Visit Note  [address]  [address]  Phone: [phone]  Fax: [phone]                            Date:  3/24/2025  Patient: Maye Butler  Age:  69 y.o., 1955    Referral: No ref. provider found    REASON FOR VISIT:  2 Week Follow-Up         PROBLEM LIST:    Patient Active Problem List    Diagnosis Date Noted    PVC (premature ventricular contraction) 03/30/2023    Diastolic dysfunction 02/01/2019    Valvular heart disease 08/23/2017    Bicuspid aortic valve 08/23/2017    Palpitations 10/20/2015    Tricuspid regurgitation      Overview Note:     echo mild to moderate      Hypertension      She is accompanied by her son.    PRESENTATION:   History of Present Illness  The patient is a 69-year-old female who returns today for an internal cardiology follow-up appointment. She is known to have chronic valvular heart disease.     She reports experiencing fatigue and occasional lower extremity edema. She also mentions episodes of dyspnea and chest discomfort, accompanied by a persistent cough. Additionally, she describes a sensation of burning on her side.          REVIEW OF SYSTEMS:  Review of Systems   Constitutional:  Negative for chills, fatigue and fever.   HENT:  Negative for congestion, facial swelling, hearing loss and sore throat.    Eyes:  Negative for pain, discharge, redness and visual disturbance.   Respiratory:  Positive for cough, chest tightness and shortness of breath. Negative for apnea and wheezing.    Cardiovascular:  Negative for chest pain, palpitations and leg swelling.   Gastrointestinal:  Negative for abdominal distention, abdominal pain, blood in stool, constipation, diarrhea, nausea and vomiting.   Endocrine: Negative for polydipsia, polyphagia and polyuria.   Genitourinary:  Negative for dysuria, flank pain, frequency and hematuria.   Musculoskeletal:  Negative for joint swelling, myalgias and neck pain.   Skin:  Negative for

## 2025-03-26 ENCOUNTER — HOSPITAL ENCOUNTER (OUTPATIENT)
Dept: CARDIOLOGY | Facility: HOSPITAL | Age: 70
Discharge: HOME | End: 2025-03-26
Payer: MEDICARE

## 2025-03-26 ENCOUNTER — DOCUMENTATION (OUTPATIENT)
Dept: CARDIAC SURGERY | Facility: CLINIC | Age: 70
End: 2025-03-26
Payer: MEDICARE

## 2025-03-26 DIAGNOSIS — I35.0 AORTIC VALVE STENOSIS, ETIOLOGY OF CARDIAC VALVE DISEASE UNSPECIFIED: ICD-10-CM

## 2025-03-26 DIAGNOSIS — I35.0 AORTIC VALVE STENOSIS, ETIOLOGY OF CARDIAC VALVE DISEASE UNSPECIFIED: Primary | ICD-10-CM

## 2025-03-26 NOTE — PROGRESS NOTES
Received echo imagin2023 Echo - [facility]  2025 Echo - [facility]  2025 Echo - [facility]    Will get these uploaded.

## 2025-03-26 NOTE — TELEPHONE ENCOUNTER
Attempted to notify PT of Lab results - WNL  Left a message   Can you call and let patient know of the procedure please.

## 2025-03-27 ENCOUNTER — HOSPITAL ENCOUNTER (OUTPATIENT)
Age: 70
Setting detail: OUTPATIENT SURGERY
Discharge: HOME | End: 2025-03-27
Attending: INTERNAL MEDICINE | Admitting: INTERNAL MEDICINE
Payer: MEDICARE

## 2025-03-27 VITALS
DIASTOLIC BLOOD PRESSURE: 60 MMHG | SYSTOLIC BLOOD PRESSURE: 152 MMHG | OXYGEN SATURATION: 95 % | HEIGHT: 61 IN | BODY MASS INDEX: 30.96 KG/M2 | WEIGHT: 164 LBS | TEMPERATURE: 97.3 F | HEART RATE: 76 BPM | RESPIRATION RATE: 11 BRPM

## 2025-03-27 DIAGNOSIS — R06.02 SHORTNESS OF BREATH: ICD-10-CM

## 2025-03-27 DIAGNOSIS — R07.9 CHEST PAIN, UNSPECIFIED: ICD-10-CM

## 2025-03-27 PROBLEM — I35.1 SEVERE AORTIC REGURGITATION: Status: ACTIVE | Noted: 2025-03-27

## 2025-03-27 LAB — ECHO BSA: 1.79 M2

## 2025-03-27 PROCEDURE — 6360000002 HC RX W HCPCS: Performed by: INTERNAL MEDICINE

## 2025-03-27 PROCEDURE — 7100000010 HC PHASE II RECOVERY - FIRST 15 MIN: Performed by: INTERNAL MEDICINE

## 2025-03-27 PROCEDURE — 93458 L HRT ARTERY/VENTRICLE ANGIO: CPT | Performed by: INTERNAL MEDICINE

## 2025-03-27 PROCEDURE — 6370000000 HC RX 637 (ALT 250 FOR IP): Performed by: INTERNAL MEDICINE

## 2025-03-27 PROCEDURE — 7100000011 HC PHASE II RECOVERY - ADDTL 15 MIN: Performed by: INTERNAL MEDICINE

## 2025-03-27 PROCEDURE — 6360000004 HC RX CONTRAST MEDICATION: Performed by: INTERNAL MEDICINE

## 2025-03-27 PROCEDURE — 2709999900 HC NON-CHARGEABLE SUPPLY: Performed by: INTERNAL MEDICINE

## 2025-03-27 PROCEDURE — 99152 MOD SED SAME PHYS/QHP 5/>YRS: CPT | Performed by: INTERNAL MEDICINE

## 2025-03-27 PROCEDURE — C1769 GUIDE WIRE: HCPCS | Performed by: INTERNAL MEDICINE

## 2025-03-27 PROCEDURE — C1894 INTRO/SHEATH, NON-LASER: HCPCS | Performed by: INTERNAL MEDICINE

## 2025-03-27 PROCEDURE — 2500000003 HC RX 250 WO HCPCS: Performed by: INTERNAL MEDICINE

## 2025-03-27 PROCEDURE — 2580000003 HC RX 258: Performed by: INTERNAL MEDICINE

## 2025-03-27 RX ORDER — HEPARIN SODIUM 1000 [USP'U]/ML
INJECTION, SOLUTION INTRAVENOUS; SUBCUTANEOUS PRN
Status: DISCONTINUED | OUTPATIENT
Start: 2025-03-27 | End: 2025-03-27 | Stop reason: HOSPADM

## 2025-03-27 RX ORDER — SODIUM CHLORIDE 9 MG/ML
INJECTION, SOLUTION INTRAVENOUS PRN
Status: DISCONTINUED | OUTPATIENT
Start: 2025-03-27 | End: 2025-03-27 | Stop reason: HOSPADM

## 2025-03-27 RX ORDER — SODIUM CHLORIDE 0.9 % (FLUSH) 0.9 %
5-40 SYRINGE (ML) INJECTION PRN
Status: DISCONTINUED | OUTPATIENT
Start: 2025-03-27 | End: 2025-03-27 | Stop reason: HOSPADM

## 2025-03-27 RX ORDER — ACETAMINOPHEN 325 MG/1
650 TABLET ORAL EVERY 4 HOURS PRN
Status: DISCONTINUED | OUTPATIENT
Start: 2025-03-27 | End: 2025-03-27 | Stop reason: HOSPADM

## 2025-03-27 RX ORDER — IODIXANOL 320 MG/ML
INJECTION, SOLUTION INTRAVASCULAR PRN
Status: DISCONTINUED | OUTPATIENT
Start: 2025-03-27 | End: 2025-03-27 | Stop reason: HOSPADM

## 2025-03-27 RX ORDER — SODIUM CHLORIDE 0.9 % (FLUSH) 0.9 %
5-40 SYRINGE (ML) INJECTION EVERY 12 HOURS SCHEDULED
Status: DISCONTINUED | OUTPATIENT
Start: 2025-03-27 | End: 2025-03-27 | Stop reason: HOSPADM

## 2025-03-27 RX ORDER — NITROGLYCERIN 20 MG/100ML
INJECTION INTRAVENOUS PRN
Status: DISCONTINUED | OUTPATIENT
Start: 2025-03-27 | End: 2025-03-27 | Stop reason: HOSPADM

## 2025-03-27 RX ORDER — SODIUM CHLORIDE 9 MG/ML
INJECTION, SOLUTION INTRAVENOUS CONTINUOUS
Status: DISCONTINUED | OUTPATIENT
Start: 2025-03-27 | End: 2025-03-27 | Stop reason: HOSPADM

## 2025-03-27 RX ORDER — ASPIRIN 81 MG/1
81 TABLET ORAL ONCE
Status: COMPLETED | OUTPATIENT
Start: 2025-03-27 | End: 2025-03-27

## 2025-03-27 RX ORDER — VERAPAMIL HYDROCHLORIDE 2.5 MG/ML
INJECTION, SOLUTION INTRAVENOUS PRN
Status: DISCONTINUED | OUTPATIENT
Start: 2025-03-27 | End: 2025-03-27 | Stop reason: HOSPADM

## 2025-03-27 RX ORDER — ONDANSETRON 2 MG/ML
4 INJECTION INTRAMUSCULAR; INTRAVENOUS EVERY 6 HOURS PRN
Status: DISCONTINUED | OUTPATIENT
Start: 2025-03-27 | End: 2025-03-27 | Stop reason: HOSPADM

## 2025-03-27 RX ADMIN — ASPIRIN 81 MG: 81 TABLET, COATED ORAL at 09:09

## 2025-03-27 RX ADMIN — SODIUM CHLORIDE: 9 INJECTION, SOLUTION INTRAVENOUS at 08:40

## 2025-03-27 RX ADMIN — ACETAMINOPHEN 650 MG: 325 TABLET ORAL at 13:30

## 2025-03-27 ASSESSMENT — PAIN SCALES - GENERAL
PAINLEVEL_OUTOF10: 5
PAINLEVEL_OUTOF10: 5

## 2025-03-27 ASSESSMENT — PAIN DESCRIPTION - ORIENTATION
ORIENTATION: RIGHT;MID
ORIENTATION: RIGHT

## 2025-03-27 ASSESSMENT — PAIN DESCRIPTION - DESCRIPTORS: DESCRIPTORS: SORE;TENDER

## 2025-03-27 ASSESSMENT — PAIN DESCRIPTION - LOCATION
LOCATION: OTHER (COMMENT)
LOCATION: OTHER (COMMENT)

## 2025-03-27 NOTE — INTERVAL H&P NOTE
Update History & Physical      Plan: The risks, benefits, expected outcome, and alternative to the recommended procedure have been discussed with the patient. Patient understands and wants to proceed with the procedure.     Electronically signed by Lena Armstrong MD on 3/27/2025 at 11:45 AM

## 2025-03-27 NOTE — PROGRESS NOTES
Patient out of bed with RN at bedside and ambulated to bathroom and back. Patient tolerated activity without difficulty. PT Now sitting up and moving rt arm around slightly as instructed. Daughter n law present.

## 2025-04-14 ENCOUNTER — OFFICE VISIT (OUTPATIENT)
Dept: CARDIAC SURGERY | Facility: CLINIC | Age: 70
End: 2025-04-14
Payer: MEDICARE

## 2025-04-14 ENCOUNTER — TELEPHONE (OUTPATIENT)
Dept: CARDIAC SURGERY | Facility: CLINIC | Age: 70
End: 2025-04-14
Payer: MEDICARE

## 2025-04-14 ENCOUNTER — LAB (OUTPATIENT)
Dept: LAB | Facility: HOSPITAL | Age: 70
End: 2025-04-14
Payer: MEDICARE

## 2025-04-14 VITALS
DIASTOLIC BLOOD PRESSURE: 78 MMHG | OXYGEN SATURATION: 97 % | BODY MASS INDEX: 26.07 KG/M2 | HEIGHT: 66 IN | SYSTOLIC BLOOD PRESSURE: 126 MMHG | HEART RATE: 96 BPM | WEIGHT: 162.2 LBS

## 2025-04-14 DIAGNOSIS — I35.1 SEVERE AORTIC VALVE REGURGITATION: ICD-10-CM

## 2025-04-14 DIAGNOSIS — I34.0 SEVERE MITRAL VALVE REGURGITATION: Primary | ICD-10-CM

## 2025-04-14 DIAGNOSIS — R79.89 OTHER SPECIFIED ABNORMAL FINDINGS OF BLOOD CHEMISTRY: ICD-10-CM

## 2025-04-14 DIAGNOSIS — I35.1 SEVERE AORTIC VALVE REGURGITATION: Primary | ICD-10-CM

## 2025-04-14 DIAGNOSIS — I34.0 SEVERE MITRAL VALVE REGURGITATION: ICD-10-CM

## 2025-04-14 LAB
ALBUMIN SERPL-MCNC: 4.3 G/DL (ref 3.5–5.2)
ALBUMIN/GLOB SERPL: 1.7 G/DL
ALP SERPL-CCNC: 70 U/L (ref 39–117)
ALT SERPL W P-5'-P-CCNC: 28 U/L (ref 1–33)
ANION GAP SERPL CALCULATED.3IONS-SCNC: 11 MMOL/L (ref 5–15)
AST SERPL-CCNC: 24 U/L (ref 1–32)
BASOPHILS # BLD AUTO: 0.03 10*3/MM3 (ref 0–0.2)
BASOPHILS NFR BLD AUTO: 0.5 % (ref 0–1.5)
BILIRUB SERPL-MCNC: 0.2 MG/DL (ref 0–1.2)
BUN SERPL-MCNC: 17 MG/DL (ref 8–23)
BUN/CREAT SERPL: 15 (ref 7–25)
CALCIUM SPEC-SCNC: 9.7 MG/DL (ref 8.6–10.5)
CHLORIDE SERPL-SCNC: 104 MMOL/L (ref 98–107)
CO2 SERPL-SCNC: 25 MMOL/L (ref 22–29)
CREAT SERPL-MCNC: 1.13 MG/DL (ref 0.57–1)
DEPRECATED RDW RBC AUTO: 38.5 FL (ref 37–54)
EGFRCR SERPLBLD CKD-EPI 2021: 52.4 ML/MIN/1.73
EOSINOPHIL # BLD AUTO: 0.11 10*3/MM3 (ref 0–0.4)
EOSINOPHIL NFR BLD AUTO: 1.9 % (ref 0.3–6.2)
ERYTHROCYTE [DISTWIDTH] IN BLOOD BY AUTOMATED COUNT: 12.3 % (ref 12.3–15.4)
GLOBULIN UR ELPH-MCNC: 2.6 GM/DL
GLUCOSE SERPL-MCNC: 98 MG/DL (ref 65–99)
HBA1C MFR BLD: 5.7 % (ref 4.8–5.6)
HCT VFR BLD AUTO: 40 % (ref 34–46.6)
HGB BLD-MCNC: 13.4 G/DL (ref 12–15.9)
IMM GRANULOCYTES # BLD AUTO: 0.01 10*3/MM3 (ref 0–0.05)
IMM GRANULOCYTES NFR BLD AUTO: 0.2 % (ref 0–0.5)
LYMPHOCYTES # BLD AUTO: 2.04 10*3/MM3 (ref 0.7–3.1)
LYMPHOCYTES NFR BLD AUTO: 34.8 % (ref 19.6–45.3)
MCH RBC QN AUTO: 28.8 PG (ref 26.6–33)
MCHC RBC AUTO-ENTMCNC: 33.5 G/DL (ref 31.5–35.7)
MCV RBC AUTO: 86 FL (ref 79–97)
MONOCYTES # BLD AUTO: 0.46 10*3/MM3 (ref 0.1–0.9)
MONOCYTES NFR BLD AUTO: 7.8 % (ref 5–12)
NEUTROPHILS NFR BLD AUTO: 3.21 10*3/MM3 (ref 1.7–7)
NEUTROPHILS NFR BLD AUTO: 54.8 % (ref 42.7–76)
NRBC BLD AUTO-RTO: 0 /100 WBC (ref 0–0.2)
PLATELET # BLD AUTO: 189 10*3/MM3 (ref 140–450)
PMV BLD AUTO: 11.2 FL (ref 6–12)
POTASSIUM SERPL-SCNC: 4 MMOL/L (ref 3.5–5.2)
PROT SERPL-MCNC: 6.9 G/DL (ref 6–8.5)
RBC # BLD AUTO: 4.65 10*6/MM3 (ref 3.77–5.28)
SODIUM SERPL-SCNC: 140 MMOL/L (ref 136–145)
WBC NRBC COR # BLD AUTO: 5.86 10*3/MM3 (ref 3.4–10.8)

## 2025-04-14 PROCEDURE — 83036 HEMOGLOBIN GLYCOSYLATED A1C: CPT

## 2025-04-14 PROCEDURE — 85025 COMPLETE CBC W/AUTO DIFF WBC: CPT

## 2025-04-14 PROCEDURE — 80053 COMPREHEN METABOLIC PANEL: CPT

## 2025-04-14 PROCEDURE — 36415 COLL VENOUS BLD VENIPUNCTURE: CPT

## 2025-04-14 RX ORDER — OMEPRAZOLE 40 MG/1
40 CAPSULE, DELAYED RELEASE ORAL DAILY
COMMUNITY

## 2025-04-14 RX ORDER — FUROSEMIDE 20 MG/1
1 TABLET ORAL DAILY
COMMUNITY
Start: 2025-03-24 | End: 2025-04-24

## 2025-04-14 RX ORDER — MONTELUKAST SODIUM 10 MG/1
10 TABLET ORAL DAILY
COMMUNITY

## 2025-04-14 RX ORDER — FLUTICASONE PROPIONATE 50 MCG
1 SPRAY, SUSPENSION (ML) NASAL DAILY
COMMUNITY
Start: 2024-06-07

## 2025-04-14 RX ORDER — POTASSIUM CHLORIDE 750 MG/1
1 TABLET, EXTENDED RELEASE ORAL DAILY
COMMUNITY
Start: 2025-03-24 | End: 2025-04-24

## 2025-04-14 RX ORDER — VITAMIN B COMPLEX
1 CAPSULE ORAL DAILY
COMMUNITY

## 2025-04-14 RX ORDER — M-VIT,TX,IRON,MINS/CALC/FOLIC 27MG-0.4MG
1 TABLET ORAL DAILY
COMMUNITY

## 2025-04-14 RX ORDER — TELMISARTAN 40 MG/1
40 TABLET ORAL DAILY
COMMUNITY

## 2025-04-14 RX ORDER — AMLODIPINE BESYLATE 5 MG/1
1 TABLET ORAL EVERY 12 HOURS
COMMUNITY

## 2025-04-14 NOTE — TELEPHONE ENCOUNTER
Can we get heart cath images from [facility] please.  TAVR CT ordered.  Tentative OR date is 5/21/2025.  Please advise patient we will need dental clearance prior to surgery if she does not have full dentures.

## 2025-04-14 NOTE — PROGRESS NOTES
Cardiac Surgery Consultation    Referring Physician: Self-Referral, Cardiologist - Dr. Donis    Primary Care Physician: LYNNE Mei    Chief Complaint   Patient presents with    Aortic Stenosis     New patient self-referred         Subjective     History of Present Illness  The patient presents for evaluation of aortic and mitral valve regurgitation.    She was initially diagnosed with a heart murmur by her primary care physician, which led to a referral to Dr. Donis. An echocardiogram conducted 5 years ago revealed some valvular abnormalities but nothing needing intervention, but annual monitoring was recommended due to the presence of the murmur. A subsequent echocardiogram identified valvular issues, prompting further investigation via a transesophageal echocardiogram. This test revealed a congenital bicuspid aortic valve, aortic valve regurgitation, this was potentially causing impaired function of the adjacent valve. She reports experiencing dyspnea and fatigue, particularly during activities such as singing in [employer] or performing household chores like mopping or sweeping. These symptoms have progressively worsened over the past few months. She also reports occasional chest discomfort described as a burning sensation. She has no history of cardiac, pulmonary, or thoracic surgeries. She also reports no history of untreated streptococcal pharyngitis or rheumatic fever. 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 She occasionally requires additional rest breaks during work due to her symptoms. She has been prescribed Lasix, which provides some relief when she is seated.    She reports lower extremity edema and has been prescribed a diuretic. She suspects she may have sleep apnea and experiences palpitations approximately once a week, even during sleep.    SOCIAL HISTORY  She does not smoke. 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    MEDICATIONS  Current: Lasix      Review of Systems   Constitutional:  Positive for activity change and fatigue. Negative for unexpected weight change.   Respiratory:  Positive for chest tightness and shortness of breath.    Cardiovascular:  Positive for leg swelling. Negative for chest pain.        A complete review of systems was performed, is negative except stated above.    History reviewed. No pertinent past medical history.  History reviewed. No pertinent surgical history.  History reviewed. No pertinent family history.  Social History     Tobacco Use    Smoking status: Never     Passive exposure: Past (Pt reports her parents used to smoke)    Smokeless tobacco: Never   Vaping Use    Vaping status: Never Used   Substance Use Topics    Alcohol use: Never    Drug use: Never     Current Outpatient Medications   Medication Sig Dispense Refill    amLODIPine (NORVASC) 5 MG tablet Take 1 tablet by mouth Every 12 (Twelve) Hours.      b complex vitamins capsule Take 1 capsule by mouth Daily.      Cholecalciferol 50 MCG (2000 UT) capsule Take 1 capsule by mouth Daily.      Coenzyme Q10 (CO Q 10 PO) Take  by mouth.      cyanocobalamin (VITAMIN B-12) 500 MCG tablet Take 1 tablet by mouth Daily.      fluticasone (FLONASE) 50 MCG/ACT nasal spray 1 spray by Each Nare route Daily.      furosemide (LASIX) 20 MG tablet Take 1 tablet by mouth Daily.      HYDROcodone Bit-Homatrop MBr (HYCODAN) 5-1.5 MG/5ML solution Take 5 mL by mouth Every 6 (Six) Hours As Needed for Cough. 120 mL 0    levoFLOXacin (LEVAQUIN) 500 MG tablet Take 1 tablet by mouth Daily. 7 tablet 0    Misc Natural Products (BEET ROOT PO) Take  by mouth.      montelukast (SINGULAIR) 10 MG tablet Take 1 tablet by mouth Daily.      multivitamin with minerals (therapeutic multivitamin-minerals) tablet tablet Take 1 tablet by mouth Daily.      NON FORMULARY Liver Aid      NON FORMULARY Sugarbear Hair Vitamins      Omega-3 Fatty Acids (OMEGA 3 PO) Take  by  "mouth.      omeprazole (priLOSEC) 40 MG capsule Take 1 capsule by mouth Daily.      potassium chloride (KLOR-CON M10) 10 MEQ CR tablet Take 1 tablet by mouth Daily.      Probiotic Product (PROBIOTIC PO) Take  by mouth.      Red Yeast Rice Extract (RED YEAST RICE PO) Take  by mouth.      telmisartan (MICARDIS) 40 MG tablet Take 1 tablet by mouth Daily.      Thiamine HCl (B-1 PO) Take  by mouth.      [START ON 5/20/2025] mupirocin (BACTROBAN) 2 % nasal ointment Administer 1 Application into the nostril(s) as directed by provider 1 time for 1 dose. Apply pea-sized amount to a Q-tip and swab each nare x 1 dose on 5/20/2025 at 1700 1 each 0     No current facility-administered medications for this visit.     Allergies:  Lisinopril and Sulfa antibiotics    Objective      Vital Signs  Visit Vitals  /78   Pulse 96   Ht 167.6 cm (66\")   Wt 73.6 kg (162 lb 3.2 oz)   SpO2 97%   BMI 26.18 kg/m²         Physical Exam  Vitals reviewed.   Constitutional:       General: She is not in acute distress.     Appearance: She is well-developed. She is not diaphoretic.   HENT:      Head: Normocephalic and atraumatic.   Eyes:      General: No scleral icterus.     Pupils: Pupils are equal, round, and reactive to light.   Neck:      Vascular: No JVD.      Trachea: No tracheal deviation.   Cardiovascular:      Rate and Rhythm: Normal rate and regular rhythm.      Heart sounds: Murmur heard.   Pulmonary:      Effort: Pulmonary effort is normal. No respiratory distress.      Breath sounds: Normal breath sounds. No stridor. No wheezing.   Abdominal:      General: There is no distension.      Palpations: Abdomen is soft.      Tenderness: There is no abdominal tenderness.   Musculoskeletal:         General: No deformity. Normal range of motion.      Cervical back: Normal range of motion and neck supple.   Skin:     General: Skin is warm and dry.      Capillary Refill: Capillary refill takes less than 2 seconds.      Coloration: Skin is not " pale.      Findings: No erythema or rash.   Neurological:      Mental Status: She is alert and oriented to person, place, and time.      Cranial Nerves: No cranial nerve deficit.   Psychiatric:         Behavior: Behavior normal.         Thought Content: Thought content normal.         Judgment: Judgment normal.        Physical Exam      Results Review:     WBC   Date Value Ref Range Status   04/14/2025 5.86 3.40 - 10.80 10*3/mm3 Final   03/24/2025 5.8 4.8 - 10.8 K/uL Final     RBC   Date Value Ref Range Status   04/14/2025 4.65 3.77 - 5.28 10*6/mm3 Final   03/24/2025 5 4.20 - 5.40 M/uL Final     Hemoglobin   Date Value Ref Range Status   04/14/2025 13.4 12.0 - 15.9 g/dL Final   03/24/2025 14.5 12.0 - 16.0 g/dL Final     Hematocrit   Date Value Ref Range Status   04/14/2025 40.0 34.0 - 46.6 % Final   03/24/2025 44.6 37.0 - 47.0 % Final     MCV   Date Value Ref Range Status   04/14/2025 86.0 79.0 - 97.0 fL Final   03/24/2025 89.2 81.0 - 99.0 fL Final     MCH   Date Value Ref Range Status   04/14/2025 28.8 26.6 - 33.0 pg Final   03/24/2025 29 27.0 - 31.0 pg Final     MCHC   Date Value Ref Range Status   04/14/2025 33.5 31.5 - 35.7 g/dL Final   03/24/2025 32.5 (L) 33.0 - 37.0 g/dL Final     RDW   Date Value Ref Range Status   04/14/2025 12.3 12.3 - 15.4 % Final   03/24/2025 12 11.5 - 14.5 % Final     RDW-SD   Date Value Ref Range Status   04/14/2025 38.5 37.0 - 54.0 fl Final     MPV   Date Value Ref Range Status   04/14/2025 11.2 6.0 - 12.0 fL Final   03/24/2025 12.1 9.4 - 12.3 fL Final     Platelets   Date Value Ref Range Status   04/14/2025 189 140 - 450 10*3/mm3 Final   03/24/2025 174 130 - 400 K/uL Final     Neutrophil Rel %   Date Value Ref Range Status   03/24/2025 52.7 50.0 - 65.0 % Final     Neutrophil %   Date Value Ref Range Status   04/14/2025 54.8 42.7 - 76.0 % Final     Lymphocyte Rel %   Date Value Ref Range Status   03/24/2025 34.8 20.0 - 40.0 % Final     Lymphocyte %   Date Value Ref Range Status    04/14/2025 34.8 19.6 - 45.3 % Final     Monocyte Rel %   Date Value Ref Range Status   03/24/2025 10.2 (H) 0.0 - 10.0 % Final     Monocyte %   Date Value Ref Range Status   04/14/2025 7.8 5.0 - 12.0 % Final     Eosinophil Rel %   Date Value Ref Range Status   01/15/2018 0.6 0.0 - 5.0 % Final     Eosinophil %   Date Value Ref Range Status   04/14/2025 1.9 0.3 - 6.2 % Final   03/24/2025 1.4 0.0 - 5.0 % Final     Basophil Rel %   Date Value Ref Range Status   03/24/2025 0.7 0.0 - 1.0 % Final     Basophil %   Date Value Ref Range Status   04/14/2025 0.5 0.0 - 1.5 % Final     Immature Grans %   Date Value Ref Range Status   04/14/2025 0.2 0.0 - 0.5 % Final     Neutrophils Absolute   Date Value Ref Range Status   03/24/2025 3 1.5 - 7.5 K/uL Final     Neutrophils, Absolute   Date Value Ref Range Status   04/14/2025 3.21 1.70 - 7.00 10*3/mm3 Final     Lymphocytes Absolute   Date Value Ref Range Status   03/24/2025 2 1.1 - 4.5 K/uL Final     Lymphocytes, Absolute   Date Value Ref Range Status   04/14/2025 2.04 0.70 - 3.10 10*3/mm3 Final     Monocytes Absolute   Date Value Ref Range Status   03/24/2025 0.6 0.00 - 0.90 K/uL Final     Monocytes, Absolute   Date Value Ref Range Status   04/14/2025 0.46 0.10 - 0.90 10*3/mm3 Final     Eosinophils Absolute   Date Value Ref Range Status   03/24/2025 0.1 0.00 - 0.60 K/uL Final     Eosinophils, Absolute   Date Value Ref Range Status   04/14/2025 0.11 0.00 - 0.40 10*3/mm3 Final     Basophils Absolute   Date Value Ref Range Status   03/24/2025 0 0.00 - 0.20 K/uL Final     Basophils, Absolute   Date Value Ref Range Status   04/14/2025 0.03 0.00 - 0.20 10*3/mm3 Final     Immature Grans, Absolute   Date Value Ref Range Status   04/14/2025 0.01 0.00 - 0.05 10*3/mm3 Final   03/24/2025 0 K/uL Final     nRBC   Date Value Ref Range Status   04/14/2025 0.0 0.0 - 0.2 /100 WBC Final     Glucose   Date Value Ref Range Status   04/14/2025 98 65 - 99 mg/dL Final   03/24/2025 93 70 - 99 mg/dL Final      Sodium   Date Value Ref Range Status   04/14/2025 140 136 - 145 mmol/L Final   03/24/2025 139 136 - 145 mmol/L Final     Potassium   Date Value Ref Range Status   04/14/2025 4.0 3.5 - 5.2 mmol/L Final   03/24/2025 4.3 3.5 - 5.1 mmol/L Final     CO2   Date Value Ref Range Status   04/14/2025 25.0 22.0 - 29.0 mmol/L Final   03/24/2025 27 22 - 29 mmol/L Final     Chloride   Date Value Ref Range Status   04/14/2025 104 98 - 107 mmol/L Final   03/24/2025 103 98 - 107 mmol/L Final     Anion Gap   Date Value Ref Range Status   04/14/2025 11.0 5.0 - 15.0 mmol/L Final   03/24/2025 9 8 - 16 mmol/L Final     Creatinine   Date Value Ref Range Status   04/14/2025 1.13 (H) 0.57 - 1.00 mg/dL Final   03/24/2025 0.7 0.5 - 0.9 mg/dL Final     BUN   Date Value Ref Range Status   04/14/2025 17 8 - 23 mg/dL Final   03/24/2025 21 8 - 23 mg/dL Final     BUN/Creatinine Ratio   Date Value Ref Range Status   04/14/2025 15.0 7.0 - 25.0 Final     Calcium   Date Value Ref Range Status   04/14/2025 9.7 8.6 - 10.5 mg/dL Final   03/24/2025 9.6 8.8 - 10.2 mg/dL Final     eGFR Non  Am   Date Value Ref Range Status   01/15/2018 >60 >60 Final     Comment:     This calculation may be inaccurate for patients under the age of 18 years.  For ages 18 and older, a GFR >60 mL/min/1.73m2 (not corrected for weight) is  valid for stable renal function.     Alkaline Phosphatase   Date Value Ref Range Status   04/14/2025 70 39 - 117 U/L Final   03/24/2025 66 35 - 104 U/L Final     Total Protein   Date Value Ref Range Status   04/14/2025 6.9 6.0 - 8.5 g/dL Final   03/24/2025 7.3 6.4 - 8.3 g/dL Final     ALT (SGPT)   Date Value Ref Range Status   04/14/2025 28 1 - 33 U/L Final   03/24/2025 36 (H) 10 - 35 U/L Final     AST (SGOT)   Date Value Ref Range Status   04/14/2025 24 1 - 32 U/L Final   03/24/2025 27 10 - 35 U/L Final     Total Bilirubin   Date Value Ref Range Status   04/14/2025 0.2 0.0 - 1.2 mg/dL Final   03/24/2025 0.3 0.2 - 1.2 mg/dL Final      Albumin   Date Value Ref Range Status   04/14/2025 4.3 3.5 - 5.2 g/dL Final   03/24/2025 4.7 3.5 - 5.2 g/dL Final   01/15/2018 4.0 3.5 - 5.2 g/dL Final     Globulin   Date Value Ref Range Status   04/14/2025 2.6 gm/dL Final        I reviewed the patient's clinical results and discussed with patient.    Results  I personally reviewed echocardiogram the following is my interpretation:  Normal LV function, there is severe mitral regurgitation with a posteriorly directed jet and prolapse of the anterior leaflet, difficult to say exactly where this is but it does correspond to where the eccentric AI jet is.  In some views it appears that there is prolapse of the posterior leaflet but there is not significant MR associated with this. There is severe eccentric aortic valve regurgitation with bicuspid valve, moderate calcifications, there is mild to moderate aortic valve stenosis there is a mild to moderate septal bulge below the aortic valve    Coronary Angiography 3/27/2025:  1.  No significant CAD.   2.  Proceed with surgical consultation for complex valvular heart disease   as mentioned in primary cardiologist note.   3.  Follow-up with primary cardiologist as scheduled.     Procedure Details   The patient was prepped and draped in the usual sterile fashion. A micropuncture needle was used to obtain right radial arterial access. After access was gained, we placed to a short 6 slender sheath. Through this sheath, we advanced our usual diagnostic catheters over a 0.038 inch wire to perform our diagnostic cardiac catheterization.     An LVEDP was measured but an LV gram was not performed given recent echo.     Coronary Findings Diagnostic Dominance: Left   Left Main: The vessel is large.   Left Anterior Descending: The vessel is moderate in size. The vessel is angiographically normal.   Left Circumflex: The vessel is angiographically normal.   Right Coronary Artery: The vessel is small. The vessel is angiographically  normal.     Intervention   No interventions have been documented.     Left Ventricle   LV EDP is: 18.     Bleeding Risk Calculator   Bleeding Risk points = 0.     Cath Recommendations   Post cardiac cath protocol is recommended.       Assessment & Plan       Assessment & Plan    Ms. Pino is a 69-year-old female she presents to me with complex valvular heart disease.  She has severe eccentric aortic valve insufficiency likely associated with a bicuspid aortic valve.  She also has severe eccentric mitral valve regurgitation.  She has class II NYHA heart failure symptoms.  She is never had surgery on her lungs or chest.  She has known about some valvular heart disease for some time but recently she has been experiencing more heart failure type symptoms with fatigue and shortness of breath with activity, no chest pain.  She has normal endorgan function.  She does not remember having rheumatic fever before.    I discussed with Ms. Pino and her son today the natural history of valvular heart disease and patient such as hers.  We discussed causes, and treatment options.  I would recommend aortic valve replacement mitral valve repair versus replacement.  I did discuss with her the possibility of this being from rheumatic disease before and if this is the case more likely to have to replace mitral valve at time of surgery.  We discussed the rationale and expectations behind this they understand.  We discussed operative approaches and need for double valve surgery required sternotomy.  She understands and she is okay with this.  I discussed with her risk and benefits of surgery at length. We discussed the risk of surgery including but not limited to bleeding, infection, injury to major organs or vessels, chronic heart failure, arrhythmias, need for pacemaker, prolonged ventilation, renal failure, stroke, risk of anesthesia, risk of sternal wound or bone healing problems, reoperation, and/or death.  I did use the novel STS  multi valve risk calculator, her risk of mortality is less than 2% for mitral valve repair aortic valve replacement.  We discussed this she understands.  She wants to proceed with surgery.    We will complete our workup and plan on surgery soon.  She will follow-up with [facility] cardiology after repair.  Thank you for trusting me with the care of Ms. Pino.  Please do not hesitate to call questions or concerns.        Dilshad Keenan MD   Cardiothoracic Surgeon        Patient or patient representative verbalized consent for the use of Ambient Listening during the visit with  Dilshad Keenan MD for chart documentation. 4/17/2025  06:02 CDT

## 2025-04-15 ENCOUNTER — OFFICE VISIT (OUTPATIENT)
Dept: CARDIOLOGY CLINIC | Age: 70
End: 2025-04-15
Payer: MEDICARE

## 2025-04-15 ENCOUNTER — PREP FOR SURGERY (OUTPATIENT)
Dept: OTHER | Facility: HOSPITAL | Age: 70
End: 2025-04-15
Payer: MEDICARE

## 2025-04-15 VITALS
BODY MASS INDEX: 30.78 KG/M2 | DIASTOLIC BLOOD PRESSURE: 88 MMHG | HEART RATE: 79 BPM | HEIGHT: 61 IN | WEIGHT: 163 LBS | SYSTOLIC BLOOD PRESSURE: 130 MMHG | OXYGEN SATURATION: 99 %

## 2025-04-15 DIAGNOSIS — R06.02 SHORTNESS OF BREATH: ICD-10-CM

## 2025-04-15 DIAGNOSIS — I34.0 SEVERE MITRAL VALVE REGURGITATION: ICD-10-CM

## 2025-04-15 DIAGNOSIS — I35.1 SEVERE AORTIC VALVE REGURGITATION: Primary | ICD-10-CM

## 2025-04-15 DIAGNOSIS — R06.02 SOB (SHORTNESS OF BREATH): ICD-10-CM

## 2025-04-15 DIAGNOSIS — I38 VALVULAR HEART DISEASE: Primary | ICD-10-CM

## 2025-04-15 PROCEDURE — 1159F MED LIST DOCD IN RCRD: CPT | Performed by: INTERNAL MEDICINE

## 2025-04-15 PROCEDURE — 1123F ACP DISCUSS/DSCN MKR DOCD: CPT | Performed by: INTERNAL MEDICINE

## 2025-04-15 PROCEDURE — 3075F SYST BP GE 130 - 139MM HG: CPT | Performed by: INTERNAL MEDICINE

## 2025-04-15 PROCEDURE — 3079F DIAST BP 80-89 MM HG: CPT | Performed by: INTERNAL MEDICINE

## 2025-04-15 PROCEDURE — 1160F RVW MEDS BY RX/DR IN RCRD: CPT | Performed by: INTERNAL MEDICINE

## 2025-04-15 PROCEDURE — 99214 OFFICE O/P EST MOD 30 MIN: CPT | Performed by: INTERNAL MEDICINE

## 2025-04-15 RX ORDER — SODIUM CHLORIDE 9 MG/ML
30 INJECTION, SOLUTION INTRAVENOUS CONTINUOUS PRN
OUTPATIENT
Start: 2025-05-21 | End: 2025-05-21

## 2025-04-15 RX ORDER — ACETAMINOPHEN 500 MG
1000 TABLET ORAL ONCE
OUTPATIENT
Start: 2025-05-21

## 2025-04-15 RX ORDER — IBUPROFEN 600 MG/1
1 TABLET ORAL
OUTPATIENT
Start: 2025-04-15

## 2025-04-15 RX ORDER — SODIUM CHLORIDE 9 MG/ML
40 INJECTION, SOLUTION INTRAVENOUS AS NEEDED
OUTPATIENT
Start: 2025-04-15

## 2025-04-15 RX ORDER — DEXTROSE MONOHYDRATE 25 G/50ML
10-50 INJECTION, SOLUTION INTRAVENOUS
OUTPATIENT
Start: 2025-04-15

## 2025-04-15 RX ORDER — NICOTINE POLACRILEX 4 MG
15 LOZENGE BUCCAL
OUTPATIENT
Start: 2025-04-15

## 2025-04-15 RX ORDER — SODIUM CHLORIDE 0.9 % (FLUSH) 0.9 %
10 SYRINGE (ML) INJECTION EVERY 12 HOURS SCHEDULED
OUTPATIENT
Start: 2025-04-15

## 2025-04-15 RX ORDER — SODIUM CHLORIDE 0.9 % (FLUSH) 0.9 %
30 SYRINGE (ML) INJECTION ONCE AS NEEDED
OUTPATIENT
Start: 2025-04-15

## 2025-04-15 RX ORDER — SODIUM CHLORIDE 0.9 % (FLUSH) 0.9 %
10 SYRINGE (ML) INJECTION AS NEEDED
OUTPATIENT
Start: 2025-04-15

## 2025-04-15 NOTE — TELEPHONE ENCOUNTER
Called Sarah to request images as we still have not received them in [employer]. Spoke with Gary. She states they do not have a way to send cardiac imaging electronically, so she is making a disc to send to [facility]. She has requested a faxed request for this. I am sending this now.

## 2025-04-15 NOTE — TELEPHONE ENCOUNTER
Surgery orders are in for 5/21/2025.  Confirm she does not take anticoagulation or Plavix.  Need dental clearance.  Please call with prework information.

## 2025-04-15 NOTE — PROGRESS NOTES
Cardiology Office Visit Note  [address] [facility] [address] [place] [address]  [address]  Phone: [phone]  Fax: [phone]                            Date:  4/15/2025  Patient: Maye Butler  Age:  69 y.o., 1955    Referral: No ref. provider found    REASON FOR VISIT:  Follow-up         PROBLEM LIST:  Patient Active Problem List    Diagnosis Date Noted    Severe aortic regurgitation 03/27/2025     Priority: High    Chest pain, unspecified 03/24/2025    Shortness of breath 03/24/2025    PVC (premature ventricular contraction) 03/30/2023    Diastolic dysfunction 02/01/2019    Valvular heart disease 08/23/2017    Bicuspid aortic valve 08/23/2017    Palpitations 10/20/2015    Tricuspid regurgitation      Overview Note:     echo mild to moderate      Hypertension        ASSESSMENT PLAN:  Assessment & Plan  Chronic valvular heart disease: Severe mitral regurgitation due to mitral valve prolapse; severe aortic regurgitation due to congenital bicuspid valve; diagnostic left heart catheterization excluded significant coronary disease.  - Monitor for worsening symptoms: shortness of breath, cough, chest pain, syncope  - Maintain stable condition prior to surgery  - Monitor blood pressure and heart rate at home  - Weigh daily to detect rapid weight gain due to fluid retention  - Take an extra dose of Lasix if necessary to manage fluid retention  - Undergo CT scan of the heart to evaluate valves  - Complete pre-surgical evaluations: carotid scan, pulmonary function tests, blood work  - Tentative surgery date: third week of May 2025    Follow-up:  - Post-surgery follow-up anticipated around July 2025      PRESENTATION:   History of Present Illness  The patient, a 69-year-old female with chronic valvular heart disease, was last evaluated on 03/24/2025. She presents with multiple symptoms and has been diagnosed with severe mitral regurgitation and severe aortic regurgitation.  Complains of fatigue, shortness of breath

## 2025-04-16 ENCOUNTER — TELEPHONE (OUTPATIENT)
Dept: CARDIAC SURGERY | Facility: CLINIC | Age: 70
End: 2025-04-16
Payer: MEDICARE

## 2025-04-16 NOTE — TELEPHONE ENCOUNTER
Pt scheduled for CT TAVR on 05/13. Called re: this appointment. No answer. Left detailed message and requested she return call to confirm her appointment. All appointment information/instructions are in separate TE.

## 2025-04-18 NOTE — TELEPHONE ENCOUNTER
Called pt to provide prework/surgery information. No answer. LM requesting call back.    If pt returns call, please relay the following and document in this encounter:    05/19/2025: Prework (appointment with nurse and anesthesiologist)  Please check in at main registration at 11:15 AM. Please bring a photo ID, insurance card and list of current medications.     05/20/2025: LYNNE Holland sent in a prescription for mupirocin (Bactroban) nasal ointment to [facility] in [place]. This may be picked up now, but please do not use this until 05/20/2025 at 5:00 PM. You will place pea-size amount on a Q-Tip and swab in each nostril. This will help prevent infection prior to surgery.     05/21/2025: Surgery  Please check in at main registration at 5:00 AM. Please have nothing to eat or drink and no medications after midnight (unless they are approved by prework).     We will need dental clearance from your dentist (if you have any native teeth [your own teeth]) If you have not already visited them, please make an appointment. Your dental clearance may be faxed to our office at [phone]. Please call our office and let us know when and where your appointment is, or if you only have dentures.

## 2025-04-21 RX ORDER — FUROSEMIDE 20 MG/1
20 TABLET ORAL DAILY
Qty: 90 TABLET | Refills: 1 | Status: SHIPPED | OUTPATIENT
Start: 2025-04-21

## 2025-04-21 NOTE — TELEPHONE ENCOUNTER
Gladys Pino is aware of prework date/time and surgery date 05/15/2025, arrival at 0500. Pt is aware of Bactroban prescription and instructions for use at 1700 the evening prior to surgery. Pt is aware nothing to eat or drink and no medications after midnight (unless approved by prework). Last dose of Plavix 05/09/2025. Pt is aware of echo appointment added to his testing 04/24. Pt voiced understanding to all and has also requested a message with this information [facility]. I will do this now.

## 2025-04-29 ENCOUNTER — TELEPHONE (OUTPATIENT)
Dept: CARDIAC SURGERY | Facility: CLINIC | Age: 70
End: 2025-04-29

## 2025-04-29 NOTE — TELEPHONE ENCOUNTER
Patient calling re: toenail fungus. Pt states she has been treating toenail fungus with OTC medication and it is still not healing up. She is inquiring if this would cause delay with OR. She can be reached at [phone]

## 2025-04-29 NOTE — TELEPHONE ENCOUNTER
Called pt and discussed. Advised her to reach out to her PCP now in case she needs evaluation and treatment that exceeds anything OTC. She voiced understanding.

## 2025-05-06 ASSESSMENT — ENCOUNTER SYMPTOMS
ABDOMINAL PAIN: 0
EYE REDNESS: 0
BLOOD IN STOOL: 0
WHEEZING: 0
EYE DISCHARGE: 0
CHEST TIGHTNESS: 0
SORE THROAT: 0
APNEA: 0
VOMITING: 0
EYE PAIN: 0
DIARRHEA: 0
ABDOMINAL DISTENTION: 0
NAUSEA: 0
SHORTNESS OF BREATH: 0
CONSTIPATION: 0
FACIAL SWELLING: 0
COUGH: 0

## 2025-05-13 ENCOUNTER — HOSPITAL ENCOUNTER (OUTPATIENT)
Dept: CT IMAGING | Facility: HOSPITAL | Age: 70
Discharge: HOME | End: 2025-05-13
Admitting: NURSE PRACTITIONER
Payer: MEDICARE

## 2025-05-13 DIAGNOSIS — I34.0 SEVERE MITRAL VALVE REGURGITATION: ICD-10-CM

## 2025-05-13 DIAGNOSIS — I35.1 SEVERE AORTIC VALVE REGURGITATION: ICD-10-CM

## 2025-05-13 PROCEDURE — 71275 CT ANGIOGRAPHY CHEST: CPT

## 2025-05-13 PROCEDURE — 74174 CTA ABD&PLVS W/CONTRAST: CPT

## 2025-05-13 PROCEDURE — 25510000001 IOPAMIDOL PER 1 ML: Performed by: NURSE PRACTITIONER

## 2025-05-13 RX ORDER — IOPAMIDOL 755 MG/ML
100 INJECTION, SOLUTION INTRAVASCULAR
Status: COMPLETED | OUTPATIENT
Start: 2025-05-13 | End: 2025-05-13

## 2025-05-13 RX ADMIN — IOPAMIDOL 100 ML: 755 INJECTION, SOLUTION INTRAVENOUS at 13:01

## 2025-05-16 ENCOUNTER — TELEPHONE (OUTPATIENT)
Dept: CARDIAC SURGERY | Facility: CLINIC | Age: 70
End: 2025-05-16
Payer: MEDICARE

## 2025-05-16 NOTE — TELEPHONE ENCOUNTER
"Pt calling to report she is having problems with her sinuses. She has been sneezing in the morning and her right eye has been \"crying\". She reports she has been taking OTC pseudoephedrine. She denies fever. She states she is starting to feel better after taking that medication. She is wondering if this will affect her surgery. I did advise her to reach out to her PCP (she confirmed is LYNNE Mei). She states she did this morning and they told her to contact our office.  "

## 2025-05-19 ENCOUNTER — PRE-ADMISSION TESTING (OUTPATIENT)
Dept: PREADMISSION TESTING | Facility: HOSPITAL | Age: 70
DRG: 220 | End: 2025-05-19
Payer: MEDICARE

## 2025-05-19 ENCOUNTER — ANESTHESIA EVENT (OUTPATIENT)
Dept: PERIOP | Facility: HOSPITAL | Age: 70
End: 2025-05-19
Payer: MEDICARE

## 2025-05-19 ENCOUNTER — HOSPITAL ENCOUNTER (OUTPATIENT)
Dept: PULMONOLOGY | Facility: HOSPITAL | Age: 70
Discharge: HOME | End: 2025-05-19
Payer: MEDICARE

## 2025-05-19 ENCOUNTER — HOSPITAL ENCOUNTER (OUTPATIENT)
Dept: GENERAL RADIOLOGY | Facility: HOSPITAL | Age: 70
Discharge: HOME | End: 2025-05-19
Payer: MEDICARE

## 2025-05-19 VITALS
DIASTOLIC BLOOD PRESSURE: 75 MMHG | RESPIRATION RATE: 18 BRPM | SYSTOLIC BLOOD PRESSURE: 144 MMHG | HEART RATE: 77 BPM | WEIGHT: 164.46 LBS | OXYGEN SATURATION: 97 % | BODY MASS INDEX: 31.05 KG/M2 | HEIGHT: 61 IN

## 2025-05-19 DIAGNOSIS — R06.02 SOB (SHORTNESS OF BREATH): ICD-10-CM

## 2025-05-19 DIAGNOSIS — I35.1 SEVERE AORTIC VALVE REGURGITATION: ICD-10-CM

## 2025-05-19 DIAGNOSIS — I34.0 SEVERE MITRAL VALVE REGURGITATION: ICD-10-CM

## 2025-05-19 LAB
ABO GROUP BLD: NORMAL
ALBUMIN SERPL-MCNC: 4.6 G/DL (ref 3.5–5.2)
ALBUMIN/GLOB SERPL: 1.6 G/DL
ALP SERPL-CCNC: 61 U/L (ref 39–117)
ALT SERPL W P-5'-P-CCNC: 25 U/L (ref 1–33)
ANION GAP SERPL CALCULATED.3IONS-SCNC: 11 MMOL/L (ref 5–15)
APTT PPP: 23 SECONDS (ref 24.5–36)
ARTERIAL PATENCY WRIST A: ABNORMAL
AST SERPL-CCNC: 31 U/L (ref 1–32)
ATMOSPHERIC PRESS: 750 MMHG
BASE EXCESS BLDA CALC-SCNC: 1 MMOL/L (ref 0–2)
BASOPHILS # BLD AUTO: 0.05 10*3/MM3 (ref 0–0.2)
BASOPHILS NFR BLD AUTO: 1.1 % (ref 0–1.5)
BDY SITE: ABNORMAL
BILIRUB SERPL-MCNC: 0.4 MG/DL (ref 0–1.2)
BILIRUB UR QL STRIP: NEGATIVE
BLD GP AB SCN SERPL QL: NEGATIVE
BODY TEMPERATURE: 37
BUN SERPL-MCNC: 18 MG/DL (ref 8–23)
BUN/CREAT SERPL: 24.3 (ref 7–25)
CALCIUM SPEC-SCNC: 9.4 MG/DL (ref 8.6–10.5)
CHLORIDE SERPL-SCNC: 101 MMOL/L (ref 98–107)
CLARITY UR: CLEAR
CO2 SERPL-SCNC: 25 MMOL/L (ref 22–29)
COLOR UR: YELLOW
CREAT SERPL-MCNC: 0.74 MG/DL (ref 0.57–1)
DEPRECATED RDW RBC AUTO: 41.7 FL (ref 37–54)
EGFRCR SERPLBLD CKD-EPI 2021: 87.2 ML/MIN/1.73
EOSINOPHIL # BLD AUTO: 0.11 10*3/MM3 (ref 0–0.4)
EOSINOPHIL NFR BLD AUTO: 2.3 % (ref 0.3–6.2)
ERYTHROCYTE [DISTWIDTH] IN BLOOD BY AUTOMATED COUNT: 13 % (ref 12.3–15.4)
GLOBULIN UR ELPH-MCNC: 2.8 GM/DL
GLUCOSE SERPL-MCNC: 94 MG/DL (ref 65–99)
GLUCOSE UR STRIP-MCNC: NEGATIVE MG/DL
HCO3 BLDA-SCNC: 25.3 MMOL/L (ref 20–26)
HCT VFR BLD AUTO: 38.7 % (ref 34–46.6)
HGB BLD-MCNC: 12.9 G/DL (ref 12–15.9)
HGB UR QL STRIP.AUTO: NEGATIVE
IMM GRANULOCYTES # BLD AUTO: 0.01 10*3/MM3 (ref 0–0.05)
IMM GRANULOCYTES NFR BLD AUTO: 0.2 % (ref 0–0.5)
INR PPP: 0.9 (ref 0.91–1.09)
KETONES UR QL STRIP: NEGATIVE
LEUKOCYTE ESTERASE UR QL STRIP.AUTO: NEGATIVE
LYMPHOCYTES # BLD AUTO: 1.67 10*3/MM3 (ref 0.7–3.1)
LYMPHOCYTES NFR BLD AUTO: 35.5 % (ref 19.6–45.3)
Lab: ABNORMAL
MCH RBC QN AUTO: 29.2 PG (ref 26.6–33)
MCHC RBC AUTO-ENTMCNC: 33.3 G/DL (ref 31.5–35.7)
MCV RBC AUTO: 87.6 FL (ref 79–97)
MODALITY: ABNORMAL
MONOCYTES # BLD AUTO: 0.45 10*3/MM3 (ref 0.1–0.9)
MONOCYTES NFR BLD AUTO: 9.6 % (ref 5–12)
NEUTROPHILS NFR BLD AUTO: 2.42 10*3/MM3 (ref 1.7–7)
NEUTROPHILS NFR BLD AUTO: 51.3 % (ref 42.7–76)
NITRITE UR QL STRIP: NEGATIVE
NRBC BLD AUTO-RTO: 0 /100 WBC (ref 0–0.2)
PCO2 BLDA: 38.3 MM HG (ref 35–45)
PCO2 TEMP ADJ BLD: 38.3 MM HG (ref 35–45)
PH BLDA: 7.43 PH UNITS (ref 7.35–7.45)
PH UR STRIP.AUTO: 7 [PH] (ref 5–8)
PH, TEMP CORRECTED: 7.43 PH UNITS (ref 7.35–7.45)
PLATELET # BLD AUTO: 173 10*3/MM3 (ref 140–450)
PMV BLD AUTO: 11.9 FL (ref 6–12)
PO2 BLDA: 76.8 MM HG (ref 83–108)
PO2 TEMP ADJ BLD: 76.8 MM HG (ref 83–108)
POTASSIUM SERPL-SCNC: 4.2 MMOL/L (ref 3.5–5.2)
PROT SERPL-MCNC: 7.4 G/DL (ref 6–8.5)
PROT UR QL STRIP: NEGATIVE
PROTHROMBIN TIME: 12.6 SECONDS (ref 11.8–14.8)
RBC # BLD AUTO: 4.42 10*6/MM3 (ref 3.77–5.28)
RH BLD: POSITIVE
SAO2 % BLDCOA: 95.3 % (ref 94–99)
SODIUM SERPL-SCNC: 137 MMOL/L (ref 136–145)
SP GR UR STRIP: <=1.005 (ref 1–1.03)
T&S EXPIRATION DATE: NORMAL
UROBILINOGEN UR QL STRIP: NORMAL
VENTILATOR MODE: ABNORMAL
WBC NRBC COR # BLD AUTO: 4.71 10*3/MM3 (ref 3.4–10.8)

## 2025-05-19 PROCEDURE — 71046 X-RAY EXAM CHEST 2 VIEWS: CPT

## 2025-05-19 PROCEDURE — 85730 THROMBOPLASTIN TIME PARTIAL: CPT

## 2025-05-19 PROCEDURE — 85610 PROTHROMBIN TIME: CPT

## 2025-05-19 PROCEDURE — 86901 BLOOD TYPING SEROLOGIC RH(D): CPT

## 2025-05-19 PROCEDURE — 86920 COMPATIBILITY TEST SPIN: CPT

## 2025-05-19 PROCEDURE — 86900 BLOOD TYPING SEROLOGIC ABO: CPT

## 2025-05-19 PROCEDURE — 81003 URINALYSIS AUTO W/O SCOPE: CPT

## 2025-05-19 PROCEDURE — 80053 COMPREHEN METABOLIC PANEL: CPT

## 2025-05-19 PROCEDURE — 82803 BLOOD GASES ANY COMBINATION: CPT

## 2025-05-19 PROCEDURE — 93010 ELECTROCARDIOGRAM REPORT: CPT | Performed by: INTERNAL MEDICINE

## 2025-05-19 PROCEDURE — 86850 RBC ANTIBODY SCREEN: CPT

## 2025-05-19 PROCEDURE — 85025 COMPLETE CBC W/AUTO DIFF WBC: CPT

## 2025-05-19 PROCEDURE — 36600 WITHDRAWAL OF ARTERIAL BLOOD: CPT

## 2025-05-19 PROCEDURE — 93005 ELECTROCARDIOGRAM TRACING: CPT

## 2025-05-19 PROCEDURE — 36415 COLL VENOUS BLD VENIPUNCTURE: CPT

## 2025-05-19 RX ORDER — POTASSIUM CHLORIDE 750 MG/1
10 TABLET, EXTENDED RELEASE ORAL DAILY
Status: ON HOLD | COMMUNITY

## 2025-05-19 RX ORDER — TERBINAFINE HYDROCHLORIDE 250 MG/1
250 TABLET ORAL DAILY
Status: ON HOLD | COMMUNITY

## 2025-05-19 NOTE — DISCHARGE INSTRUCTIONS

## 2025-05-19 NOTE — TELEPHONE ENCOUNTER
Patient denies fever.  She states she has a runny nose and watery eyes and does have seasonal allergies.  No cough or shortness of breath.  Discussed with Dr. Keenan.  Use allergy pill daily and plan to proceed with surgery as scheduled.

## 2025-05-19 NOTE — ANESTHESIA PREPROCEDURE EVALUATION
Anesthesia Evaluation     Patient summary reviewed   no history of anesthetic complications:   NPO Solid Status: > 8 hours             Airway   Mallampati: II  TM distance: >3 FB  Neck ROM: full  No difficulty expected  Dental      Pulmonary    (+) ,shortness of breath  (-) sleep apnea, no home oxygen  Cardiovascular   Exercise tolerance: poor (<4 METS)    (+) hypertension, valvular problems/murmurs AI, MR and AS, GIL  (-) pacemaker, past MI, cardiac stents, CABG    ROS comment: Mitral Valve -Severe regurgitation   Bicuspid valve with complete commisural fusion of the right and noncoronary cusps. Moderate sclerosis of the aortic valve cusps. Severe regurgitation with an eccentrically directed jet and may underestimate severity. Mild to moderate stenosis of the aortic valve.    Neuro/Psych  (-) seizures, CVA  GI/Hepatic/Renal/Endo    (+) obesity, GERD  (-) diabetes    Musculoskeletal     Abdominal    Substance History      OB/GYN          Other                          Anesthesia Plan    ASA 4     general     (Predominant symptom is SOA.  No C/I to SHIRA)  intravenous induction     Anesthetic plan, risks, benefits, and alternatives have been provided, discussed and informed consent has been obtained with: patient.    Use of blood products discussed with patient  Consented to blood products.        CODE STATUS:

## 2025-05-21 ENCOUNTER — ANESTHESIA (OUTPATIENT)
Dept: PERIOP | Facility: HOSPITAL | Age: 70
End: 2025-05-21
Payer: MEDICARE

## 2025-05-21 ENCOUNTER — APPOINTMENT (OUTPATIENT)
Dept: CARDIOLOGY | Facility: HOSPITAL | Age: 70
End: 2025-05-21
Payer: MEDICARE

## 2025-05-21 ENCOUNTER — APPOINTMENT (OUTPATIENT)
Dept: GENERAL RADIOLOGY | Facility: HOSPITAL | Age: 70
End: 2025-05-21
Payer: MEDICARE

## 2025-05-21 ENCOUNTER — TELEPHONE (OUTPATIENT)
Dept: CARDIAC SURGERY | Facility: CLINIC | Age: 70
End: 2025-05-21
Payer: MEDICARE

## 2025-05-21 ENCOUNTER — HOSPITAL ENCOUNTER (INPATIENT)
Facility: HOSPITAL | Age: 70
LOS: 8 days | Discharge: HOME | End: 2025-05-29
Attending: SURGERY | Admitting: SURGERY
Payer: MEDICARE

## 2025-05-21 DIAGNOSIS — I34.0 SEVERE MITRAL VALVE REGURGITATION: ICD-10-CM

## 2025-05-21 DIAGNOSIS — I35.1 SEVERE AORTIC VALVE REGURGITATION: ICD-10-CM

## 2025-05-21 DIAGNOSIS — Z74.09 IMPAIRED MOBILITY: Primary | ICD-10-CM

## 2025-05-21 LAB
A-A DO2: ABNORMAL
ALBUMIN SERPL-MCNC: 3.6 G/DL (ref 3.5–5.2)
ALBUMIN SERPL-MCNC: 4.5 G/DL (ref 3.5–5.2)
ANION GAP SERPL CALCULATED.3IONS-SCNC: 11 MMOL/L (ref 5–15)
ANION GAP SERPL CALCULATED.3IONS-SCNC: 11 MMOL/L (ref 5–15)
APTT PPP: 41.7 SECONDS (ref 24.5–36)
ARTERIAL PATENCY WRIST A: ABNORMAL
ATMOSPHERIC PRESS: 749 MMHG
ATMOSPHERIC PRESS: 749 MMHG
ATMOSPHERIC PRESS: 750 MMHG
ATMOSPHERIC PRESS: 751 MMHG
AV MEAN PRESS GRAD SYS DOP V1V2: 12 MMHG
AV VMAX SYS DOP: 219 CM/SEC
BASE EXCESS BLDA CALC-SCNC: -1.5 MMOL/L (ref 0–2)
BASE EXCESS BLDA CALC-SCNC: -1.6 MMOL/L (ref 0–2)
BASE EXCESS BLDA CALC-SCNC: -1.7 MMOL/L (ref 0–2)
BASE EXCESS BLDA CALC-SCNC: -2.2 MMOL/L (ref 0–2)
BASE EXCESS BLDA CALC-SCNC: -2.7 MMOL/L (ref 0–2)
BASE EXCESS BLDA CALC-SCNC: -2.9 MMOL/L (ref 0–2)
BASE EXCESS BLDA CALC-SCNC: 0.6 MMOL/L (ref 0–2)
BASE EXCESS BLDA CALC-SCNC: 0.9 MMOL/L (ref 0–2)
BASOPHILS # BLD AUTO: 0.02 10*3/MM3 (ref 0–0.2)
BASOPHILS NFR BLD AUTO: 0.2 % (ref 0–1.5)
BDY SITE: ABNORMAL
BH CV ECHO MEAS - AO MAX PG: 19.2 MMHG
BH CV ECHO MEAS - AO V2 VTI: 55.3 CM
BODY TEMPERATURE: 37
BUN SERPL-MCNC: 18 MG/DL (ref 8–23)
BUN SERPL-MCNC: 18 MG/DL (ref 8–23)
BUN/CREAT SERPL: 22 (ref 7–25)
BUN/CREAT SERPL: 23.1 (ref 7–25)
CA-I BLD-MCNC: 4.16 MG/DL (ref 4.6–5.4)
CA-I BLD-MCNC: 4.24 MG/DL (ref 4.6–5.4)
CA-I BLD-MCNC: 4.65 MG/DL (ref 4.6–5.4)
CA-I BLD-MCNC: 4.77 MG/DL (ref 4.6–5.4)
CA-I BLD-MCNC: 5.09 MG/DL (ref 4.6–5.4)
CA-I BLD-MCNC: 5.39 MG/DL (ref 4.6–5.4)
CA-I BLD-MCNC: 6.26 MG/DL (ref 4.6–5.4)
CALCIUM SPEC-SCNC: 9.2 MG/DL (ref 8.6–10.5)
CALCIUM SPEC-SCNC: 9.8 MG/DL (ref 8.6–10.5)
CHLORIDE SERPL-SCNC: 110 MMOL/L (ref 98–107)
CHLORIDE SERPL-SCNC: 111 MMOL/L (ref 98–107)
CO2 SERPL-SCNC: 21 MMOL/L (ref 22–29)
CO2 SERPL-SCNC: 21 MMOL/L (ref 22–29)
COHGB MFR BLD: 0.3 % (ref 0–5)
COHGB MFR BLD: 0.4 % (ref 0–5)
COHGB MFR BLD: 0.6 % (ref 0–5)
COHGB MFR BLD: 0.8 % (ref 0–5)
COHGB MFR BLD: 0.8 % (ref 0–5)
COHGB MFR BLD: 1.1 % (ref 0–5)
CPAP: ABNORMAL
CREAT SERPL-MCNC: 0.78 MG/DL (ref 0.57–1)
CREAT SERPL-MCNC: 0.82 MG/DL (ref 0.57–1)
DEPRECATED RDW RBC AUTO: 40.3 FL (ref 37–54)
DEPRECATED RDW RBC AUTO: 40.8 FL (ref 37–54)
DEPRECATED RDW RBC AUTO: 41.6 FL (ref 37–54)
EGFRCR SERPLBLD CKD-EPI 2021: 77.1 ML/MIN/1.73
EGFRCR SERPLBLD CKD-EPI 2021: 81.8 ML/MIN/1.73
EOSINOPHIL # BLD AUTO: 0.05 10*3/MM3 (ref 0–0.4)
EOSINOPHIL NFR BLD AUTO: 0.4 % (ref 0.3–6.2)
EPAP: ABNORMAL
ERYTHROCYTE [DISTWIDTH] IN BLOOD BY AUTOMATED COUNT: 12.8 % (ref 12.3–15.4)
ERYTHROCYTE [DISTWIDTH] IN BLOOD BY AUTOMATED COUNT: 12.9 % (ref 12.3–15.4)
ERYTHROCYTE [DISTWIDTH] IN BLOOD BY AUTOMATED COUNT: 12.9 % (ref 12.3–15.4)
FIBRINOGEN PPP-MCNC: 193 MG/DL (ref 240–460)
FIBRINOGEN PPP-MCNC: 223 MG/DL (ref 240–460)
GAS FLOW AIRWAY: 2.5 LPM
GAS FLOW AIRWAY: 3 LPM
GAS FLOW AIRWAY: ABNORMAL L/MIN
GLUCOSE BLDA-MCNC: 100 MG/DL (ref 65–99)
GLUCOSE BLDA-MCNC: 109 MG/DL (ref 65–99)
GLUCOSE BLDA-MCNC: 114 MG/DL (ref 65–99)
GLUCOSE BLDA-MCNC: 115 MG/DL (ref 65–99)
GLUCOSE BLDA-MCNC: 98 MG/DL (ref 65–99)
GLUCOSE BLDC GLUCOMTR-MCNC: 146 MG/DL (ref 70–130)
GLUCOSE BLDC GLUCOMTR-MCNC: 153 MG/DL (ref 70–130)
GLUCOSE BLDC GLUCOMTR-MCNC: 156 MG/DL (ref 70–130)
GLUCOSE BLDC GLUCOMTR-MCNC: 174 MG/DL (ref 70–130)
GLUCOSE BLDC GLUCOMTR-MCNC: 179 MG/DL (ref 70–130)
GLUCOSE BLDC GLUCOMTR-MCNC: 183 MG/DL (ref 70–130)
GLUCOSE SERPL-MCNC: 126 MG/DL (ref 65–99)
GLUCOSE SERPL-MCNC: 183 MG/DL (ref 65–99)
HCO3 BLDA-SCNC: 22.5 MMOL/L (ref 20–26)
HCO3 BLDA-SCNC: 22.8 MMOL/L (ref 20–26)
HCO3 BLDA-SCNC: 23 MMOL/L (ref 20–26)
HCO3 BLDA-SCNC: 23.1 MMOL/L (ref 20–26)
HCO3 BLDA-SCNC: 23.3 MMOL/L (ref 20–26)
HCO3 BLDA-SCNC: 23.6 MMOL/L (ref 20–26)
HCO3 BLDA-SCNC: 23.8 MMOL/L (ref 20–26)
HCO3 BLDA-SCNC: 24.3 MMOL/L (ref 20–26)
HCT VFR BLD AUTO: 24.7 % (ref 34–46.6)
HCT VFR BLD AUTO: 29.6 % (ref 34–46.6)
HCT VFR BLD AUTO: 32.3 % (ref 34–46.6)
HCT VFR BLD CALC: 25 % (ref 38–51)
HCT VFR BLD CALC: 25.5 % (ref 38–51)
HCT VFR BLD CALC: 26.7 % (ref 38–51)
HCT VFR BLD CALC: 34.6 % (ref 38–51)
HCT VFR BLD CALC: 34.7 % (ref 38–51)
HCT VFR BLD CALC: 36.1 % (ref 38–51)
HGB BLD-MCNC: 10 G/DL (ref 12–15.9)
HGB BLD-MCNC: 10.9 G/DL (ref 12–15.9)
HGB BLD-MCNC: 8.2 G/DL (ref 12–15.9)
HGB BLDA-MCNC: 11.3 G/DL (ref 12–16)
HGB BLDA-MCNC: 11.3 G/DL (ref 12–16)
HGB BLDA-MCNC: 11.8 G/DL (ref 12–16)
HGB BLDA-MCNC: 8.2 G/DL (ref 12–16)
HGB BLDA-MCNC: 8.3 G/DL (ref 12–16)
HGB BLDA-MCNC: 8.7 G/DL (ref 12–16)
IMM GRANULOCYTES # BLD AUTO: 0.08 10*3/MM3 (ref 0–0.05)
IMM GRANULOCYTES NFR BLD AUTO: 0.7 % (ref 0–0.5)
INHALED O2 CONCENTRATION: 100 %
INHALED O2 CONCENTRATION: 30 %
INHALED O2 CONCENTRATION: 30 %
INHALED O2 CONCENTRATION: 60 %
INR PPP: 1.22 (ref 0.91–1.09)
INR PPP: 1.44 (ref 0.91–1.09)
IPAP: ABNORMAL
LACTATE BLDA-SCNC: 1.3 MMOL/L (ref 0.5–2)
LACTATE BLDA-SCNC: 1.4 MMOL/L (ref 0.5–2)
LACTATE BLDA-SCNC: 1.5 MMOL/L (ref 0.5–2)
LACTATE BLDA-SCNC: 1.5 MMOL/L (ref 0.5–2)
LACTATE BLDA-SCNC: 2.3 MMOL/L (ref 0.5–2)
LYMPHOCYTES # BLD AUTO: 1.82 10*3/MM3 (ref 0.7–3.1)
LYMPHOCYTES NFR BLD AUTO: 15.3 % (ref 19.6–45.3)
Lab: ABNORMAL
Lab: NORMAL
Lab: NORMAL
MCH RBC QN AUTO: 29.3 PG (ref 26.6–33)
MCH RBC QN AUTO: 29.4 PG (ref 26.6–33)
MCH RBC QN AUTO: 29.4 PG (ref 26.6–33)
MCHC RBC AUTO-ENTMCNC: 33.2 G/DL (ref 31.5–35.7)
MCHC RBC AUTO-ENTMCNC: 33.7 G/DL (ref 31.5–35.7)
MCHC RBC AUTO-ENTMCNC: 33.8 G/DL (ref 31.5–35.7)
MCV RBC AUTO: 86.8 FL (ref 79–97)
MCV RBC AUTO: 87.1 FL (ref 79–97)
MCV RBC AUTO: 88.5 FL (ref 79–97)
METHGB BLD QL: 0.8 % (ref 0–3)
METHGB BLD QL: 0.9 % (ref 0–3)
METHGB BLD QL: 1 % (ref 0–3)
METHGB BLD QL: 1.1 % (ref 0–3)
MODALITY: ABNORMAL
MONOCYTES # BLD AUTO: 0.5 10*3/MM3 (ref 0.1–0.9)
MONOCYTES NFR BLD AUTO: 4.2 % (ref 5–12)
NEUTROPHILS NFR BLD AUTO: 79.2 % (ref 42.7–76)
NEUTROPHILS NFR BLD AUTO: 9.43 10*3/MM3 (ref 1.7–7)
NITRIC OXIDE: ABNORMAL
NOTE: ABNORMAL
NOTIFIED BY: ABNORMAL
NOTIFIED WHO: ABNORMAL
OXYHGB MFR BLDV: 94.8 % (ref 94–99)
OXYHGB MFR BLDV: 98.3 % (ref 94–99)
OXYHGB MFR BLDV: 98.4 % (ref 94–99)
OXYHGB MFR BLDV: 98.6 % (ref 94–99)
OXYHGB MFR BLDV: 98.7 % (ref 94–99)
OXYHGB MFR BLDV: 98.8 % (ref 94–99)
PAW @ PEAK INSP FLOW SETTING VENT: ABNORMAL CM[H2O]
PCO2 BLDA: 29.3 MM HG (ref 35–45)
PCO2 BLDA: 31.9 MM HG (ref 35–45)
PCO2 BLDA: 35.1 MM HG (ref 35–45)
PCO2 BLDA: 36.6 MM HG (ref 35–45)
PCO2 BLDA: 37.1 MM HG (ref 35–45)
PCO2 BLDA: 40.8 MM HG (ref 35–45)
PCO2 BLDA: 45.6 MM HG (ref 35–45)
PCO2 BLDA: 51.2 MM HG (ref 35–45)
PCO2 TEMP ADJ BLD: 29.3 MM HG (ref 35–45)
PCO2 TEMP ADJ BLD: 31.9 MM HG (ref 35–45)
PCO2 TEMP ADJ BLD: 35.1 MM HG (ref 35–45)
PCO2 TEMP ADJ BLD: 36.6 MM HG (ref 35–45)
PCO2 TEMP ADJ BLD: 37.1 MM HG (ref 35–45)
PCO2 TEMP ADJ BLD: 40.8 MM HG (ref 35–45)
PCO2 TEMP ADJ BLD: 45.6 MM HG (ref 35–45)
PCO2 TEMP ADJ BLD: 51.2 MM HG (ref 35–45)
PEEP RESPIRATORY: 10 CM[H2O]
PEEP RESPIRATORY: 10 CM[H2O]
PEEP RESPIRATORY: 5 CM[H2O]
PEEP RESPIRATORY: ABNORMAL CM[H2O]
PH BLDA: 7.29 PH UNITS (ref 7.35–7.45)
PH BLDA: 7.32 PH UNITS (ref 7.35–7.45)
PH BLDA: 7.36 PH UNITS (ref 7.35–7.45)
PH BLDA: 7.4 PH UNITS (ref 7.35–7.45)
PH BLDA: 7.4 PH UNITS (ref 7.35–7.45)
PH BLDA: 7.41 PH UNITS (ref 7.35–7.45)
PH BLDA: 7.48 PH UNITS (ref 7.35–7.45)
PH BLDA: 7.51 PH UNITS (ref 7.35–7.45)
PH, TEMP CORRECTED: 7.29 PH UNITS (ref 7.35–7.45)
PH, TEMP CORRECTED: 7.32 PH UNITS (ref 7.35–7.45)
PH, TEMP CORRECTED: 7.36 PH UNITS (ref 7.35–7.45)
PH, TEMP CORRECTED: 7.4 PH UNITS (ref 7.35–7.45)
PH, TEMP CORRECTED: 7.4 PH UNITS (ref 7.35–7.45)
PH, TEMP CORRECTED: 7.41 PH UNITS (ref 7.35–7.45)
PH, TEMP CORRECTED: 7.48 PH UNITS (ref 7.35–7.45)
PH, TEMP CORRECTED: 7.51 PH UNITS (ref 7.35–7.45)
PHOSPHATE SERPL-MCNC: 4.1 MG/DL (ref 2.5–4.5)
PHOSPHATE SERPL-MCNC: 4.2 MG/DL (ref 2.5–4.5)
PLATELET # BLD AUTO: 132 10*3/MM3 (ref 140–450)
PLATELET # BLD AUTO: 89 10*3/MM3 (ref 140–450)
PLATELET # BLD AUTO: 91 10*3/MM3 (ref 140–450)
PMV BLD AUTO: 10.9 FL (ref 6–12)
PMV BLD AUTO: 11.4 FL (ref 6–12)
PMV BLD AUTO: 11.5 FL (ref 6–12)
PO2 BLD: 242 MM[HG] (ref 0–500)
PO2 BLD: 326 MM[HG] (ref 0–500)
PO2 BLDA: 72.5 MM HG (ref 83–108)
PO2 BLDA: 81.5 MM HG (ref 83–108)
PO2 BLDA: 97.7 MM HG (ref 83–108)
PO2 BLDA: ABNORMAL MM[HG]
PO2 TEMP ADJ BLD: 252 MM HG (ref 83–108)
PO2 TEMP ADJ BLD: 396 MM HG (ref 83–108)
PO2 TEMP ADJ BLD: 408 MM HG (ref 83–108)
PO2 TEMP ADJ BLD: 72.5 MM HG (ref 83–108)
PO2 TEMP ADJ BLD: 81.5 MM HG (ref 83–108)
PO2 TEMP ADJ BLD: 97.7 MM HG (ref 83–108)
PO2 TEMP ADJ BLD: >547 MM HG (ref 83–108)
PO2 TEMP ADJ BLD: >547 MM HG (ref 83–108)
POTASSIUM BLDA-SCNC: 3.9 MMOL/L (ref 3.5–5.2)
POTASSIUM BLDA-SCNC: 4 MMOL/L (ref 3.5–5.2)
POTASSIUM BLDA-SCNC: 4.2 MMOL/L (ref 3.5–5.2)
POTASSIUM BLDA-SCNC: 4.3 MMOL/L (ref 3.5–5.2)
POTASSIUM BLDA-SCNC: 4.9 MMOL/L (ref 3.5–5.2)
POTASSIUM BLDA-SCNC: 5.1 MMOL/L (ref 3.5–5.2)
POTASSIUM SERPL-SCNC: 4.1 MMOL/L (ref 3.5–5.2)
POTASSIUM SERPL-SCNC: 5 MMOL/L (ref 3.5–5.2)
PROTHROMBIN TIME: 16.1 SECONDS (ref 11.8–14.8)
PROTHROMBIN TIME: 18.3 SECONDS (ref 11.8–14.8)
PSV: 10 CMH2O
PSV: ABNORMAL
PULSE OX: ABNORMAL
RBC # BLD AUTO: 2.79 10*6/MM3 (ref 3.77–5.28)
RBC # BLD AUTO: 3.4 10*6/MM3 (ref 3.77–5.28)
RBC # BLD AUTO: 3.72 10*6/MM3 (ref 3.77–5.28)
SAO2 % BLDCOA: 100 % (ref 94–99)
SAO2 % BLDCOA: 95.4 % (ref 94–99)
SAO2 % BLDCOA: 96.6 % (ref 94–99)
SAO2 % BLDCOA: 97.3 % (ref 94–99)
SAO2 % BLDCOA: 99.6 % (ref 94–99)
SAO2 % BLDCOA: 99.9 % (ref 94–99)
SAO2 % BLDCOA: >100.1 % (ref 94–99)
SAO2 % BLDCOA: >100.1 % (ref 94–99)
SET MECH RESP RATE: 20
SET MECH RESP RATE: 20
SET MECH RESP RATE: ABNORMAL
SODIUM BLDA-SCNC: 140 MMOL/L (ref 136–145)
SODIUM BLDA-SCNC: 141 MMOL/L (ref 136–145)
SODIUM BLDA-SCNC: 143 MMOL/L (ref 136–145)
SODIUM BLDA-SCNC: 145 MMOL/L (ref 136–145)
SODIUM SERPL-SCNC: 142 MMOL/L (ref 136–145)
SODIUM SERPL-SCNC: 143 MMOL/L (ref 136–145)
TOTAL RATE: ABNORMAL
VENTILATOR MODE: ABNORMAL
VT ON VENT VENT: 500 ML
VT ON VENT VENT: 500 ML
VT ON VENT VENT: ABNORMAL ML
WBC NRBC COR # BLD AUTO: 11.9 10*3/MM3 (ref 3.4–10.8)
WBC NRBC COR # BLD AUTO: 7.61 10*3/MM3 (ref 3.4–10.8)
WBC NRBC COR # BLD AUTO: 8.5 10*3/MM3 (ref 3.4–10.8)

## 2025-05-21 PROCEDURE — 25010000002 NICARDIPINE IN NACL 20-0.86 MG/200ML-% SOLUTION: Performed by: SURGERY

## 2025-05-21 PROCEDURE — 25810000003 LACTATED RINGERS PER 1000 ML: Performed by: SURGERY

## 2025-05-21 PROCEDURE — 85018 HEMOGLOBIN: CPT

## 2025-05-21 PROCEDURE — 82803 BLOOD GASES ANY COMBINATION: CPT

## 2025-05-21 PROCEDURE — 25810000003 SODIUM CHLORIDE 0.9 % SOLUTION 250 ML FLEX CONT: Performed by: NURSE ANESTHETIST, CERTIFIED REGISTERED

## 2025-05-21 PROCEDURE — 85610 PROTHROMBIN TIME: CPT | Performed by: SURGERY

## 2025-05-21 PROCEDURE — 94664 DEMO&/EVAL PT USE INHALER: CPT

## 2025-05-21 PROCEDURE — 71045 X-RAY EXAM CHEST 1 VIEW: CPT

## 2025-05-21 PROCEDURE — 25810000003 LACTATED RINGERS PER 1000 ML: Performed by: NURSE ANESTHETIST, CERTIFIED REGISTERED

## 2025-05-21 PROCEDURE — 85027 COMPLETE CBC AUTOMATED: CPT | Performed by: SURGERY

## 2025-05-21 PROCEDURE — 82947 ASSAY GLUCOSE BLOOD QUANT: CPT

## 2025-05-21 PROCEDURE — 25010000002 ONDANSETRON PER 1 MG: Performed by: SURGERY

## 2025-05-21 PROCEDURE — 02RF08Z REPLACEMENT OF AORTIC VALVE WITH ZOOPLASTIC TISSUE, OPEN APPROACH: ICD-10-PCS | Performed by: SURGERY

## 2025-05-21 PROCEDURE — 5A1221Z PERFORMANCE OF CARDIAC OUTPUT, CONTINUOUS: ICD-10-PCS | Performed by: SURGERY

## 2025-05-21 PROCEDURE — C1760 CLOSURE DEV, VASC: HCPCS | Performed by: SURGERY

## 2025-05-21 PROCEDURE — 94799 UNLISTED PULMONARY SVC/PX: CPT

## 2025-05-21 PROCEDURE — 84295 ASSAY OF SERUM SODIUM: CPT

## 2025-05-21 PROCEDURE — P9037 PLATE PHERES LEUKOREDU IRRAD: HCPCS

## 2025-05-21 PROCEDURE — 84132 ASSAY OF SERUM POTASSIUM: CPT

## 2025-05-21 PROCEDURE — 25010000002 SUGAMMADEX 200 MG/2ML SOLUTION: Performed by: NURSE ANESTHETIST, CERTIFIED REGISTERED

## 2025-05-21 PROCEDURE — 85384 FIBRINOGEN ACTIVITY: CPT | Performed by: SURGERY

## 2025-05-21 PROCEDURE — 94002 VENT MGMT INPAT INIT DAY: CPT

## 2025-05-21 PROCEDURE — 25010000002 MIDAZOLAM PER 1MG: Performed by: ANESTHESIOLOGY

## 2025-05-21 PROCEDURE — 25010000002 ACETAMINOPHEN 10 MG/ML SOLUTION: Performed by: SURGERY

## 2025-05-21 PROCEDURE — 88311 DECALCIFY TISSUE: CPT | Performed by: SURGERY

## 2025-05-21 PROCEDURE — P9035 PLATELET PHERES LEUKOREDUCED: HCPCS

## 2025-05-21 PROCEDURE — 25010000002 ALBUMIN HUMAN 5% PER 50 ML: Performed by: SURGERY

## 2025-05-21 PROCEDURE — 33426 REPAIR OF MITRAL VALVE: CPT | Performed by: SURGERY

## 2025-05-21 PROCEDURE — C1889 IMPLANT/INSERT DEVICE, NOC: HCPCS | Performed by: SURGERY

## 2025-05-21 PROCEDURE — 33405 REPLACEMENT AORTIC VALVE OPN: CPT | Performed by: SURGERY

## 2025-05-21 PROCEDURE — 25010000002 VANCOMYCIN 1 G RECONSTITUTED SOLUTION 1 EACH VIAL: Performed by: SURGERY

## 2025-05-21 PROCEDURE — C1713 ANCHOR/SCREW BN/BN,TIS/BN: HCPCS | Performed by: SURGERY

## 2025-05-21 PROCEDURE — C1751 CATH, INF, PER/CENT/MIDLINE: HCPCS | Performed by: NURSE ANESTHETIST, CERTIFIED REGISTERED

## 2025-05-21 PROCEDURE — B24BZZ4 ULTRASONOGRAPHY OF HEART WITH AORTA, TRANSESOPHAGEAL: ICD-10-PCS | Performed by: SURGERY

## 2025-05-21 PROCEDURE — S0260 H&P FOR SURGERY: HCPCS | Performed by: SURGERY

## 2025-05-21 PROCEDURE — 88305 TISSUE EXAM BY PATHOLOGIST: CPT | Performed by: SURGERY

## 2025-05-21 PROCEDURE — 80069 RENAL FUNCTION PANEL: CPT | Performed by: SURGERY

## 2025-05-21 PROCEDURE — 25010000002 CEFAZOLIN PER 500 MG: Performed by: NURSE ANESTHETIST, CERTIFIED REGISTERED

## 2025-05-21 PROCEDURE — 85730 THROMBOPLASTIN TIME PARTIAL: CPT | Performed by: SURGERY

## 2025-05-21 PROCEDURE — 83050 HGB METHEMOGLOBIN QUAN: CPT

## 2025-05-21 PROCEDURE — 25010000002 AMINOCAPROIC ACID PER 5 G: Performed by: NURSE ANESTHETIST, CERTIFIED REGISTERED

## 2025-05-21 PROCEDURE — 82330 ASSAY OF CALCIUM: CPT

## 2025-05-21 PROCEDURE — 36430 TRANSFUSION BLD/BLD COMPNT: CPT

## 2025-05-21 PROCEDURE — 85025 COMPLETE CBC W/AUTO DIFF WBC: CPT | Performed by: SURGERY

## 2025-05-21 PROCEDURE — 25010000002 HEPARIN (PORCINE) PER 1000 UNITS: Performed by: NURSE ANESTHETIST, CERTIFIED REGISTERED

## 2025-05-21 PROCEDURE — 83605 ASSAY OF LACTIC ACID: CPT

## 2025-05-21 PROCEDURE — 82375 ASSAY CARBOXYHB QUANT: CPT

## 2025-05-21 PROCEDURE — 25010000002 MIDAZOLAM HCL (PF) 5 MG/5ML SOLUTION: Performed by: NURSE ANESTHETIST, CERTIFIED REGISTERED

## 2025-05-21 PROCEDURE — 25010000002 CEFAZOLIN PER 500 MG: Performed by: NURSE PRACTITIONER

## 2025-05-21 PROCEDURE — P9041 ALBUMIN (HUMAN),5%, 50ML: HCPCS | Performed by: SURGERY

## 2025-05-21 PROCEDURE — 25010000002 BUPIVACAINE LIPOSOME 1.3 % SUSPENSION 20 ML VIAL: Performed by: SURGERY

## 2025-05-21 PROCEDURE — 93010 ELECTROCARDIOGRAM REPORT: CPT | Performed by: INTERNAL MEDICINE

## 2025-05-21 PROCEDURE — 93005 ELECTROCARDIOGRAM TRACING: CPT | Performed by: SURGERY

## 2025-05-21 PROCEDURE — 94761 N-INVAS EAR/PLS OXIMETRY MLT: CPT

## 2025-05-21 PROCEDURE — 82805 BLOOD GASES W/O2 SATURATION: CPT

## 2025-05-21 PROCEDURE — 02UG0JZ SUPPLEMENT MITRAL VALVE WITH SYNTHETIC SUBSTITUTE, OPEN APPROACH: ICD-10-PCS | Performed by: SURGERY

## 2025-05-21 PROCEDURE — 82948 REAGENT STRIP/BLOOD GLUCOSE: CPT

## 2025-05-21 PROCEDURE — 25810000003 LACTATED RINGERS PER 1000 ML: Performed by: ANESTHESIOLOGY

## 2025-05-21 PROCEDURE — 25010000002 LIDOCAINE PF 2% 2 % SOLUTION: Performed by: NURSE ANESTHETIST, CERTIFIED REGISTERED

## 2025-05-21 PROCEDURE — 25010000002 PROTAMINE SULFATE PER 10 MG: Performed by: NURSE ANESTHETIST, CERTIFIED REGISTERED

## 2025-05-21 PROCEDURE — 93318 ECHO TRANSESOPHAGEAL INTRAOP: CPT | Performed by: NURSE ANESTHETIST, CERTIFIED REGISTERED

## 2025-05-21 PROCEDURE — P9100 PATHOGEN TEST FOR PLATELETS: HCPCS

## 2025-05-21 PROCEDURE — 25810000003 SODIUM CHLORIDE 0.9 % SOLUTION: Performed by: NURSE ANESTHETIST, CERTIFIED REGISTERED

## 2025-05-21 PROCEDURE — 25810000003 SODIUM CHLORIDE 0.9 % SOLUTION 250 ML FLEX CONT: Performed by: SURGERY

## 2025-05-21 PROCEDURE — 02L70CK OCCLUSION OF LEFT ATRIAL APPENDAGE WITH EXTRALUMINAL DEVICE, OPEN APPROACH: ICD-10-PCS | Performed by: SURGERY

## 2025-05-21 DEVICE — RNG MITRAL PHYSIOII MDL 5200 30MM: Type: IMPLANTABLE DEVICE | Site: HEART | Status: FUNCTIONAL

## 2025-05-21 DEVICE — COR-KNOT® QUICK LOAD® SINGLES
Type: IMPLANTABLE DEVICE | Site: HEART | Status: FUNCTIONAL
Brand: COR-KNOT® QUICK LOAD®

## 2025-05-21 DEVICE — VLV AORTA INSPRIS RESILIA 23MM: Type: IMPLANTABLE DEVICE | Site: HEART | Status: FUNCTIONAL

## 2025-05-21 DEVICE — APPL CLIP LAA ATRICLIP FLX 35MM: Type: IMPLANTABLE DEVICE | Site: HEART | Status: FUNCTIONAL

## 2025-05-21 DEVICE — PLEDGET INCISIONLINE REINF TFE SFT PTFE 1.5X3X7MM WHT: Type: IMPLANTABLE DEVICE | Site: HEART | Status: FUNCTIONAL

## 2025-05-21 DEVICE — ADHS SURG BIOGLUE PREF 5ML: Type: IMPLANTABLE DEVICE | Site: HEART | Status: FUNCTIONAL

## 2025-05-21 DEVICE — COR-KNOT MINI® COMBO KITBASE PACKAGE TYPE - KITEACH STERILE PACKAGE KIT CONTAINS (2) SINGLE PATIENT USE COR-KNOT MINI® DEVICES AND (12) COR-KNOT® QUICK LOADS®.
Type: IMPLANTABLE DEVICE | Site: HEART | Status: FUNCTIONAL
Brand: COR-KNOT MINI®

## 2025-05-21 DEVICE — WAX BONE HEMO AESCULAP 2.5GM: Type: IMPLANTABLE DEVICE | Site: CHEST | Status: FUNCTIONAL

## 2025-05-21 DEVICE — WR SUT NONABS MF SS V40 1/2CIR TC 6/0 18IN M649G: Type: IMPLANTABLE DEVICE | Site: CHEST | Status: FUNCTIONAL

## 2025-05-21 DEVICE — KT HEMOST ABS SURGIFOAM PORCN 1GRAM: Type: IMPLANTABLE DEVICE | Site: CHEST | Status: FUNCTIONAL

## 2025-05-21 RX ORDER — SUFENTANIL CITRATE 50 UG/ML
INJECTION EPIDURAL; INTRAVENOUS AS NEEDED
Status: DISCONTINUED | OUTPATIENT
Start: 2025-05-21 | End: 2025-05-21 | Stop reason: SURG

## 2025-05-21 RX ORDER — ACETAMINOPHEN 10 MG/ML
1000 INJECTION, SOLUTION INTRAVENOUS EVERY 8 HOURS
Status: COMPLETED | OUTPATIENT
Start: 2025-05-21 | End: 2025-05-21

## 2025-05-21 RX ORDER — SODIUM CHLORIDE 0.9 % (FLUSH) 0.9 %
10 SYRINGE (ML) INJECTION AS NEEDED
Status: DISCONTINUED | OUTPATIENT
Start: 2025-05-21 | End: 2025-05-21 | Stop reason: HOSPADM

## 2025-05-21 RX ORDER — LIDOCAINE HYDROCHLORIDE 10 MG/ML
0.5 INJECTION, SOLUTION EPIDURAL; INFILTRATION; INTRACAUDAL; PERINEURAL ONCE AS NEEDED
Status: DISCONTINUED | OUTPATIENT
Start: 2025-05-21 | End: 2025-05-21 | Stop reason: HOSPADM

## 2025-05-21 RX ORDER — IBUPROFEN 600 MG/1
1 TABLET ORAL
Status: DISCONTINUED | OUTPATIENT
Start: 2025-05-21 | End: 2025-05-21 | Stop reason: HOSPADM

## 2025-05-21 RX ORDER — ACETAMINOPHEN 160 MG/5ML
650 SOLUTION ORAL EVERY 4 HOURS PRN
Status: DISCONTINUED | OUTPATIENT
Start: 2025-05-22 | End: 2025-05-29 | Stop reason: HOSPADM

## 2025-05-21 RX ORDER — SODIUM CHLORIDE 9 MG/ML
40 INJECTION, SOLUTION INTRAVENOUS AS NEEDED
Status: DISCONTINUED | OUTPATIENT
Start: 2025-05-21 | End: 2025-05-21 | Stop reason: HOSPADM

## 2025-05-21 RX ORDER — METOPROLOL TARTRATE 25 MG/1
12.5 TABLET, FILM COATED ORAL EVERY 12 HOURS SCHEDULED
Status: DISCONTINUED | OUTPATIENT
Start: 2025-05-22 | End: 2025-05-22

## 2025-05-21 RX ORDER — ASPIRIN 81 MG/1
81 TABLET ORAL DAILY
Status: DISCONTINUED | OUTPATIENT
Start: 2025-05-23 | End: 2025-05-29 | Stop reason: HOSPADM

## 2025-05-21 RX ORDER — SODIUM CHLORIDE, SODIUM LACTATE, POTASSIUM CHLORIDE, CALCIUM CHLORIDE 600; 310; 30; 20 MG/100ML; MG/100ML; MG/100ML; MG/100ML
INJECTION, SOLUTION INTRAVENOUS CONTINUOUS PRN
Status: DISCONTINUED | OUTPATIENT
Start: 2025-05-21 | End: 2025-05-21 | Stop reason: SURG

## 2025-05-21 RX ORDER — MONTELUKAST SODIUM 10 MG/1
10 TABLET ORAL DAILY
Status: DISCONTINUED | OUTPATIENT
Start: 2025-05-21 | End: 2025-05-21

## 2025-05-21 RX ORDER — DEXTROSE MONOHYDRATE AND SODIUM CHLORIDE 5; .45 G/100ML; G/100ML
30 INJECTION, SOLUTION INTRAVENOUS CONTINUOUS
Status: DISPENSED | OUTPATIENT
Start: 2025-05-21 | End: 2025-05-22

## 2025-05-21 RX ORDER — NOREPINEPHRINE BITARTRATE 0.03 MG/ML
INJECTION, SOLUTION INTRAVENOUS CONTINUOUS PRN
Status: DISCONTINUED | OUTPATIENT
Start: 2025-05-21 | End: 2025-05-21 | Stop reason: SURG

## 2025-05-21 RX ORDER — OXYCODONE HYDROCHLORIDE 10 MG/1
10 TABLET ORAL EVERY 4 HOURS PRN
Status: DISCONTINUED | OUTPATIENT
Start: 2025-05-21 | End: 2025-05-23

## 2025-05-21 RX ORDER — CHLORHEXIDINE GLUCONATE 500 MG/1
1 CLOTH TOPICAL ONCE
Status: COMPLETED | OUTPATIENT
Start: 2025-05-21 | End: 2025-05-21

## 2025-05-21 RX ORDER — SODIUM CHLORIDE, SODIUM LACTATE, POTASSIUM CHLORIDE, CALCIUM CHLORIDE 600; 310; 30; 20 MG/100ML; MG/100ML; MG/100ML; MG/100ML
100 INJECTION, SOLUTION INTRAVENOUS CONTINUOUS
Status: DISCONTINUED | OUTPATIENT
Start: 2025-05-21 | End: 2025-05-21

## 2025-05-21 RX ORDER — BISACODYL 5 MG/1
10 TABLET, DELAYED RELEASE ORAL 2 TIMES DAILY
Status: DISCONTINUED | OUTPATIENT
Start: 2025-05-22 | End: 2025-05-29 | Stop reason: HOSPADM

## 2025-05-21 RX ORDER — ALBUMIN HUMAN 50 G/1000ML
500 SOLUTION INTRAVENOUS AS NEEDED
Status: DISPENSED | OUTPATIENT
Start: 2025-05-21 | End: 2025-05-24

## 2025-05-21 RX ORDER — ALBUTEROL SULFATE 0.83 MG/ML
2.5 SOLUTION RESPIRATORY (INHALATION) EVERY 4 HOURS PRN
Status: ACTIVE | OUTPATIENT
Start: 2025-05-21 | End: 2025-05-22

## 2025-05-21 RX ORDER — ENOXAPARIN SODIUM 100 MG/ML
40 INJECTION SUBCUTANEOUS DAILY
Status: DISCONTINUED | OUTPATIENT
Start: 2025-05-22 | End: 2025-05-29 | Stop reason: HOSPADM

## 2025-05-21 RX ORDER — MORPHINE SULFATE 2 MG/ML
2 INJECTION, SOLUTION INTRAMUSCULAR; INTRAVENOUS
Status: DISCONTINUED | OUTPATIENT
Start: 2025-05-21 | End: 2025-05-23 | Stop reason: ALTCHOICE

## 2025-05-21 RX ORDER — FLUTICASONE PROPIONATE 50 MCG
1 SPRAY, SUSPENSION (ML) NASAL DAILY
Status: DISCONTINUED | OUTPATIENT
Start: 2025-05-22 | End: 2025-05-29 | Stop reason: HOSPADM

## 2025-05-21 RX ORDER — ACETAMINOPHEN 650 MG/1
650 SUPPOSITORY RECTAL EVERY 4 HOURS PRN
Status: DISCONTINUED | OUTPATIENT
Start: 2025-05-22 | End: 2025-05-29 | Stop reason: HOSPADM

## 2025-05-21 RX ORDER — ROCURONIUM BROMIDE 10 MG/ML
INJECTION, SOLUTION INTRAVENOUS AS NEEDED
Status: DISCONTINUED | OUTPATIENT
Start: 2025-05-21 | End: 2025-05-21 | Stop reason: SURG

## 2025-05-21 RX ORDER — OXYCODONE HYDROCHLORIDE 5 MG/1
5 TABLET ORAL EVERY 4 HOURS PRN
Status: DISCONTINUED | OUTPATIENT
Start: 2025-05-21 | End: 2025-05-23

## 2025-05-21 RX ORDER — DEXTROSE MONOHYDRATE AND SODIUM CHLORIDE 5; .45 G/100ML; G/100ML
30 INJECTION, SOLUTION INTRAVENOUS CONTINUOUS
Status: DISCONTINUED | OUTPATIENT
Start: 2025-05-21 | End: 2025-05-21

## 2025-05-21 RX ORDER — PROTAMINE SULFATE 10 MG/ML
INJECTION, SOLUTION INTRAVENOUS AS NEEDED
Status: DISCONTINUED | OUTPATIENT
Start: 2025-05-21 | End: 2025-05-21 | Stop reason: SURG

## 2025-05-21 RX ORDER — SODIUM CHLORIDE 0.9 % (FLUSH) 0.9 %
10 SYRINGE (ML) INJECTION EVERY 12 HOURS SCHEDULED
Status: DISCONTINUED | OUTPATIENT
Start: 2025-05-21 | End: 2025-05-21 | Stop reason: HOSPADM

## 2025-05-21 RX ORDER — PHENYLEPHRINE HCL IN 0.9% NACL 1 MG/10 ML
SYRINGE (ML) INTRAVENOUS AS NEEDED
Status: DISCONTINUED | OUTPATIENT
Start: 2025-05-21 | End: 2025-05-21 | Stop reason: SURG

## 2025-05-21 RX ORDER — VECURONIUM BROMIDE 1 MG/ML
INJECTION, POWDER, LYOPHILIZED, FOR SOLUTION INTRAVENOUS AS NEEDED
Status: DISCONTINUED | OUTPATIENT
Start: 2025-05-21 | End: 2025-05-21 | Stop reason: SURG

## 2025-05-21 RX ORDER — HYDRALAZINE HYDROCHLORIDE 25 MG/1
25 TABLET, FILM COATED ORAL 3 TIMES DAILY
COMMUNITY
End: 2025-05-29 | Stop reason: HOSPADM

## 2025-05-21 RX ORDER — IPRATROPIUM BROMIDE AND ALBUTEROL SULFATE 2.5; .5 MG/3ML; MG/3ML
3 SOLUTION RESPIRATORY (INHALATION)
Status: DISCONTINUED | OUTPATIENT
Start: 2025-05-21 | End: 2025-05-22

## 2025-05-21 RX ORDER — DEXTROSE MONOHYDRATE 25 G/50ML
10-50 INJECTION, SOLUTION INTRAVENOUS
Status: DISCONTINUED | OUTPATIENT
Start: 2025-05-21 | End: 2025-05-29 | Stop reason: HOSPADM

## 2025-05-21 RX ORDER — HEPARIN SODIUM 1000 [USP'U]/ML
INJECTION, SOLUTION INTRAVENOUS; SUBCUTANEOUS AS NEEDED
Status: DISCONTINUED | OUTPATIENT
Start: 2025-05-21 | End: 2025-05-21 | Stop reason: SURG

## 2025-05-21 RX ORDER — MIDAZOLAM HYDROCHLORIDE 5 MG/5ML
INJECTION, SOLUTION INTRAMUSCULAR; INTRAVENOUS AS NEEDED
Status: DISCONTINUED | OUTPATIENT
Start: 2025-05-21 | End: 2025-05-21 | Stop reason: SURG

## 2025-05-21 RX ORDER — NICARDIPINE HYDROCHLORIDE 2.5 MG/ML
INJECTION INTRAVENOUS AS NEEDED
Status: DISCONTINUED | OUTPATIENT
Start: 2025-05-21 | End: 2025-05-21 | Stop reason: SURG

## 2025-05-21 RX ORDER — SODIUM CHLORIDE 9 MG/ML
INJECTION, SOLUTION INTRAVENOUS CONTINUOUS PRN
Status: DISCONTINUED | OUTPATIENT
Start: 2025-05-21 | End: 2025-05-21 | Stop reason: SURG

## 2025-05-21 RX ORDER — METOPROLOL TARTRATE 1 MG/ML
INJECTION, SOLUTION INTRAVENOUS AS NEEDED
Status: DISCONTINUED | OUTPATIENT
Start: 2025-05-21 | End: 2025-05-21 | Stop reason: SURG

## 2025-05-21 RX ORDER — POLYETHYLENE GLYCOL 3350 17 G/17G
17 POWDER, FOR SOLUTION ORAL DAILY
Status: DISCONTINUED | OUTPATIENT
Start: 2025-05-22 | End: 2025-05-29 | Stop reason: HOSPADM

## 2025-05-21 RX ORDER — PANTOPRAZOLE SODIUM 40 MG/1
40 TABLET, DELAYED RELEASE ORAL
Status: DISCONTINUED | OUTPATIENT
Start: 2025-05-22 | End: 2025-05-29 | Stop reason: HOSPADM

## 2025-05-21 RX ORDER — CEFAZOLIN SODIUM 1 G/3ML
INJECTION, POWDER, FOR SOLUTION INTRAMUSCULAR; INTRAVENOUS AS NEEDED
Status: DISCONTINUED | OUTPATIENT
Start: 2025-05-21 | End: 2025-05-21 | Stop reason: SURG

## 2025-05-21 RX ORDER — MIDAZOLAM HYDROCHLORIDE 2 MG/2ML
0.5 INJECTION, SOLUTION INTRAMUSCULAR; INTRAVENOUS
Status: DISCONTINUED | OUTPATIENT
Start: 2025-05-21 | End: 2025-05-21 | Stop reason: HOSPADM

## 2025-05-21 RX ORDER — MIDAZOLAM HYDROCHLORIDE 2 MG/2ML
2 INJECTION, SOLUTION INTRAMUSCULAR; INTRAVENOUS ONCE
Status: COMPLETED | OUTPATIENT
Start: 2025-05-21 | End: 2025-05-21

## 2025-05-21 RX ORDER — SODIUM CHLORIDE 0.9 % (FLUSH) 0.9 %
30 SYRINGE (ML) INJECTION ONCE AS NEEDED
Status: DISCONTINUED | OUTPATIENT
Start: 2025-05-21 | End: 2025-05-21 | Stop reason: HOSPADM

## 2025-05-21 RX ORDER — CALCIUM CHLORIDE 100 MG/ML
INJECTION INTRAVENOUS; INTRAVENTRICULAR AS NEEDED
Status: DISCONTINUED | OUTPATIENT
Start: 2025-05-21 | End: 2025-05-21 | Stop reason: SURG

## 2025-05-21 RX ORDER — MONTELUKAST SODIUM 10 MG/1
10 TABLET ORAL DAILY
Status: DISCONTINUED | OUTPATIENT
Start: 2025-05-22 | End: 2025-05-29 | Stop reason: HOSPADM

## 2025-05-21 RX ORDER — MAGNESIUM HYDROXIDE 1200 MG/15ML
LIQUID ORAL AS NEEDED
Status: DISCONTINUED | OUTPATIENT
Start: 2025-05-21 | End: 2025-05-21 | Stop reason: HOSPADM

## 2025-05-21 RX ORDER — LIDOCAINE HYDROCHLORIDE 20 MG/ML
INJECTION, SOLUTION EPIDURAL; INFILTRATION; INTRACAUDAL; PERINEURAL AS NEEDED
Status: DISCONTINUED | OUTPATIENT
Start: 2025-05-21 | End: 2025-05-21 | Stop reason: SURG

## 2025-05-21 RX ORDER — DEXTROSE MONOHYDRATE, SODIUM CHLORIDE, AND POTASSIUM CHLORIDE 50; 1.49; 4.5 G/1000ML; G/1000ML; G/1000ML
30 INJECTION, SOLUTION INTRAVENOUS CONTINUOUS
Status: DISCONTINUED | OUTPATIENT
Start: 2025-05-21 | End: 2025-05-23

## 2025-05-21 RX ORDER — SODIUM CHLORIDE 9 MG/ML
1000 INJECTION, SOLUTION INTRAVENOUS CONTINUOUS
Status: DISCONTINUED | OUTPATIENT
Start: 2025-05-21 | End: 2025-05-21

## 2025-05-21 RX ORDER — MEPERIDINE HYDROCHLORIDE 50 MG/ML
25 INJECTION INTRAMUSCULAR; INTRAVENOUS; SUBCUTANEOUS
Status: DISCONTINUED | OUTPATIENT
Start: 2025-05-21 | End: 2025-05-22

## 2025-05-21 RX ORDER — FUROSEMIDE 20 MG/1
20 TABLET ORAL DAILY
COMMUNITY
End: 2025-05-29 | Stop reason: HOSPADM

## 2025-05-21 RX ORDER — SODIUM CHLORIDE 9 MG/ML
30 INJECTION, SOLUTION INTRAVENOUS CONTINUOUS PRN
Status: DISCONTINUED | OUTPATIENT
Start: 2025-05-21 | End: 2025-05-21 | Stop reason: HOSPADM

## 2025-05-21 RX ORDER — DEXMEDETOMIDINE HYDROCHLORIDE 4 UG/ML
.2-1.5 INJECTION, SOLUTION INTRAVENOUS
Status: DISCONTINUED | OUTPATIENT
Start: 2025-05-21 | End: 2025-05-23

## 2025-05-21 RX ORDER — NICOTINE POLACRILEX 4 MG
15 LOZENGE BUCCAL
Status: DISCONTINUED | OUTPATIENT
Start: 2025-05-21 | End: 2025-05-21 | Stop reason: HOSPADM

## 2025-05-21 RX ORDER — DEXMEDETOMIDINE HYDROCHLORIDE 4 UG/ML
INJECTION, SOLUTION INTRAVENOUS CONTINUOUS PRN
Status: DISCONTINUED | OUTPATIENT
Start: 2025-05-21 | End: 2025-05-21 | Stop reason: SURG

## 2025-05-21 RX ORDER — SODIUM CHLORIDE 0.9 % (FLUSH) 0.9 %
3 SYRINGE (ML) INJECTION EVERY 12 HOURS SCHEDULED
Status: DISCONTINUED | OUTPATIENT
Start: 2025-05-21 | End: 2025-05-21 | Stop reason: HOSPADM

## 2025-05-21 RX ORDER — SODIUM CHLORIDE, SODIUM LACTATE, POTASSIUM CHLORIDE, CALCIUM CHLORIDE 600; 310; 30; 20 MG/100ML; MG/100ML; MG/100ML; MG/100ML
1000 INJECTION, SOLUTION INTRAVENOUS CONTINUOUS
Status: DISCONTINUED | OUTPATIENT
Start: 2025-05-21 | End: 2025-05-21

## 2025-05-21 RX ORDER — ASPIRIN 81 MG/1
162 TABLET, CHEWABLE ORAL ONCE
Status: COMPLETED | OUTPATIENT
Start: 2025-05-22 | End: 2025-05-22

## 2025-05-21 RX ORDER — DEXTROSE MONOHYDRATE, SODIUM CHLORIDE, AND POTASSIUM CHLORIDE 50; 1.49; 4.5 G/1000ML; G/1000ML; G/1000ML
30 INJECTION, SOLUTION INTRAVENOUS CONTINUOUS
Status: DISPENSED | OUTPATIENT
Start: 2025-05-21 | End: 2025-05-22

## 2025-05-21 RX ORDER — ONDANSETRON 2 MG/ML
4 INJECTION INTRAMUSCULAR; INTRAVENOUS EVERY 6 HOURS PRN
Status: DISCONTINUED | OUTPATIENT
Start: 2025-05-21 | End: 2025-05-29 | Stop reason: HOSPADM

## 2025-05-21 RX ORDER — CHLORHEXIDINE GLUCONATE 500 MG/1
1 CLOTH TOPICAL EVERY 24 HOURS
Status: DISCONTINUED | OUTPATIENT
Start: 2025-05-22 | End: 2025-05-24 | Stop reason: HOSPADM

## 2025-05-21 RX ORDER — SODIUM CHLORIDE 0.9 % (FLUSH) 0.9 %
3-10 SYRINGE (ML) INJECTION AS NEEDED
Status: DISCONTINUED | OUTPATIENT
Start: 2025-05-21 | End: 2025-05-21 | Stop reason: HOSPADM

## 2025-05-21 RX ORDER — ATORVASTATIN CALCIUM 10 MG/1
20 TABLET, FILM COATED ORAL NIGHTLY
Status: DISCONTINUED | OUTPATIENT
Start: 2025-05-22 | End: 2025-05-29 | Stop reason: HOSPADM

## 2025-05-21 RX ORDER — ACETAMINOPHEN 500 MG
1000 TABLET ORAL ONCE
Status: COMPLETED | OUTPATIENT
Start: 2025-05-21 | End: 2025-05-21

## 2025-05-21 RX ORDER — NICOTINE POLACRILEX 4 MG
15 LOZENGE BUCCAL
Status: DISCONTINUED | OUTPATIENT
Start: 2025-05-21 | End: 2025-05-29 | Stop reason: HOSPADM

## 2025-05-21 RX ORDER — MULTIPLE VITAMINS W/ MINERALS TAB 9MG-400MCG
1 TAB ORAL DAILY
Status: DISCONTINUED | OUTPATIENT
Start: 2025-05-21 | End: 2025-05-21

## 2025-05-21 RX ORDER — ACETAMINOPHEN 325 MG/1
650 TABLET ORAL EVERY 4 HOURS PRN
Status: DISCONTINUED | OUTPATIENT
Start: 2025-05-22 | End: 2025-05-29 | Stop reason: HOSPADM

## 2025-05-21 RX ORDER — MULTIPLE VITAMINS W/ MINERALS TAB 9MG-400MCG
1 TAB ORAL DAILY
Status: DISCONTINUED | OUTPATIENT
Start: 2025-05-22 | End: 2025-05-29 | Stop reason: HOSPADM

## 2025-05-21 RX ORDER — DEXTROSE MONOHYDRATE 25 G/50ML
10-50 INJECTION, SOLUTION INTRAVENOUS
Status: DISCONTINUED | OUTPATIENT
Start: 2025-05-21 | End: 2025-05-21 | Stop reason: HOSPADM

## 2025-05-21 RX ORDER — NOREPINEPHRINE BITARTRATE 0.03 MG/ML
.02-.3 INJECTION, SOLUTION INTRAVENOUS CONTINUOUS PRN
Status: DISCONTINUED | OUTPATIENT
Start: 2025-05-21 | End: 2025-05-23

## 2025-05-21 RX ORDER — IBUPROFEN 600 MG/1
1 TABLET ORAL
Status: DISCONTINUED | OUTPATIENT
Start: 2025-05-21 | End: 2025-05-29 | Stop reason: HOSPADM

## 2025-05-21 RX ADMIN — METOPROLOL TARTRATE 1 MG: 5 INJECTION INTRAVENOUS at 08:00

## 2025-05-21 RX ADMIN — ALBUMIN (HUMAN) 500 ML: 12.5 INJECTION, SOLUTION INTRAVENOUS at 13:39

## 2025-05-21 RX ADMIN — Medication 1 APPLICATION: at 06:00

## 2025-05-21 RX ADMIN — Medication 100 MCG: at 08:21

## 2025-05-21 RX ADMIN — MIDAZOLAM HYDROCHLORIDE 2 MG: 1 INJECTION, SOLUTION INTRAMUSCULAR; INTRAVENOUS at 10:40

## 2025-05-21 RX ADMIN — AMINOCAPROIC ACID 5 G: 250 INJECTION, SOLUTION INTRAVENOUS at 07:51

## 2025-05-21 RX ADMIN — ACETAMINOPHEN 1000 MG: 500 TABLET, FILM COATED ORAL at 06:00

## 2025-05-21 RX ADMIN — HEPARIN SODIUM 28000 UNITS: 1000 INJECTION, SOLUTION INTRAVENOUS; SUBCUTANEOUS at 08:13

## 2025-05-21 RX ADMIN — Medication 100 MCG: at 08:19

## 2025-05-21 RX ADMIN — ONDANSETRON 4 MG: 2 INJECTION INTRAMUSCULAR; INTRAVENOUS at 18:53

## 2025-05-21 RX ADMIN — VECURONIUM BROMIDE 5 MG: 1 INJECTION, POWDER, LYOPHILIZED, FOR SOLUTION INTRAVENOUS at 08:13

## 2025-05-21 RX ADMIN — CALCIUM CHLORIDE 1 G: 100 INJECTION INTRAVENOUS; INTRAVENTRICULAR at 11:26

## 2025-05-21 RX ADMIN — SUFENTANIL CITRATE 50 MCG: 50 INJECTION, SOLUTION EPIDURAL; INTRAVENOUS at 08:06

## 2025-05-21 RX ADMIN — VECURONIUM BROMIDE 5 MG: 1 INJECTION, POWDER, LYOPHILIZED, FOR SOLUTION INTRAVENOUS at 08:43

## 2025-05-21 RX ADMIN — MIDAZOLAM HYDROCHLORIDE 1 MG: 1 INJECTION, SOLUTION INTRAMUSCULAR; INTRAVENOUS at 06:30

## 2025-05-21 RX ADMIN — NICARDIPINE HYDROCHLORIDE 2.5 MG/HR: 0.1 INJECTION, SOLUTION INTRAVENOUS at 13:00

## 2025-05-21 RX ADMIN — Medication 100 MCG: at 08:29

## 2025-05-21 RX ADMIN — DEXTROSE MONOHYDRATE AND SODIUM CHLORIDE 30 ML/HR: 5; .45 INJECTION, SOLUTION INTRAVENOUS at 19:03

## 2025-05-21 RX ADMIN — POTASSIUM CHLORIDE, DEXTROSE MONOHYDRATE AND SODIUM CHLORIDE 30 ML/HR: 150; 5; 450 INJECTION, SOLUTION INTRAVENOUS at 14:11

## 2025-05-21 RX ADMIN — IPRATROPIUM BROMIDE AND ALBUTEROL SULFATE 3 ML: 2.5; .5 SOLUTION RESPIRATORY (INHALATION) at 20:36

## 2025-05-21 RX ADMIN — ALBUMIN (HUMAN) 500 ML: 12.5 INJECTION, SOLUTION INTRAVENOUS at 12:54

## 2025-05-21 RX ADMIN — Medication 100 MCG: at 07:31

## 2025-05-21 RX ADMIN — INSULIN HUMAN 1.9 UNITS/HR: 1 INJECTION, SOLUTION INTRAVENOUS at 16:01

## 2025-05-21 RX ADMIN — IPRATROPIUM BROMIDE AND ALBUTEROL SULFATE 3 ML: 2.5; .5 SOLUTION RESPIRATORY (INHALATION) at 14:05

## 2025-05-21 RX ADMIN — Medication 1 APPLICATION: at 18:01

## 2025-05-21 RX ADMIN — SODIUM CHLORIDE: 9 INJECTION, SOLUTION INTRAVENOUS at 07:35

## 2025-05-21 RX ADMIN — SUFENTANIL CITRATE 100 MCG: 50 INJECTION, SOLUTION EPIDURAL; INTRAVENOUS at 07:15

## 2025-05-21 RX ADMIN — MIDAZOLAM HYDROCHLORIDE 1 MG: 1 INJECTION, SOLUTION INTRAMUSCULAR; INTRAVENOUS at 07:14

## 2025-05-21 RX ADMIN — POTASSIUM CHLORIDE, DEXTROSE MONOHYDRATE AND SODIUM CHLORIDE 30 ML/HR: 150; 5; 450 INJECTION, SOLUTION INTRAVENOUS at 14:03

## 2025-05-21 RX ADMIN — ACETAMINOPHEN 1000 MG: 10 INJECTION INTRAVENOUS at 21:05

## 2025-05-21 RX ADMIN — ROCURONIUM BROMIDE 100 MG: 10 INJECTION, SOLUTION INTRAVENOUS at 07:17

## 2025-05-21 RX ADMIN — Medication 100 MCG: at 08:03

## 2025-05-21 RX ADMIN — SUFENTANIL CITRATE 50 MCG: 50 INJECTION, SOLUTION EPIDURAL; INTRAVENOUS at 08:07

## 2025-05-21 RX ADMIN — LIDOCAINE HYDROCHLORIDE 100 MG: 20 INJECTION, SOLUTION EPIDURAL; INFILTRATION; INTRACAUDAL; PERINEURAL at 07:15

## 2025-05-21 RX ADMIN — CEFAZOLIN 2000 MG: 2 INJECTION, POWDER, FOR SOLUTION INTRAMUSCULAR; INTRAVENOUS at 07:54

## 2025-05-21 RX ADMIN — DEXMEDETOMIDINE HYDROCHLORIDE 0.2 MCG/KG/HR: 4 INJECTION, SOLUTION INTRAVENOUS at 11:29

## 2025-05-21 RX ADMIN — SODIUM CHLORIDE, POTASSIUM CHLORIDE, SODIUM LACTATE AND CALCIUM CHLORIDE: 600; 310; 30; 20 INJECTION, SOLUTION INTRAVENOUS at 07:35

## 2025-05-21 RX ADMIN — DEXTROSE MONOHYDRATE AND SODIUM CHLORIDE 30 ML/HR: 5; .45 INJECTION, SOLUTION INTRAVENOUS at 19:13

## 2025-05-21 RX ADMIN — SUGAMMADEX 200 MG: 100 INJECTION, SOLUTION INTRAVENOUS at 12:37

## 2025-05-21 RX ADMIN — Medication 100 MCG: at 08:31

## 2025-05-21 RX ADMIN — Medication 100 MCG: at 07:35

## 2025-05-21 RX ADMIN — AMINOCAPROIC ACID 1 G/HR: 250 INJECTION, SOLUTION INTRAVENOUS at 07:50

## 2025-05-21 RX ADMIN — SODIUM CHLORIDE, POTASSIUM CHLORIDE, SODIUM LACTATE AND CALCIUM CHLORIDE 1000 ML: 600; 310; 30; 20 INJECTION, SOLUTION INTRAVENOUS at 06:30

## 2025-05-21 RX ADMIN — Medication 100 MCG: at 07:54

## 2025-05-21 RX ADMIN — NICARDIPINE HYDROCHLORIDE 250 MCG: 2.5 INJECTION INTRAVENOUS at 12:11

## 2025-05-21 RX ADMIN — Medication 1250 MG: at 07:50

## 2025-05-21 RX ADMIN — VANCOMYCIN HYDROCHLORIDE 1000 MG: 1 INJECTION, POWDER, LYOPHILIZED, FOR SOLUTION INTRAVENOUS at 19:40

## 2025-05-21 RX ADMIN — DEXTROSE MONOHYDRATE AND SODIUM CHLORIDE 30 ML/HR: 5; .45 INJECTION, SOLUTION INTRAVENOUS at 19:04

## 2025-05-21 RX ADMIN — Medication 100 MCG: at 07:21

## 2025-05-21 RX ADMIN — NOREPINEPHRINE BITARTRATE 0.02 MCG/KG/MIN: 0.03 INJECTION, SOLUTION INTRAVENOUS at 11:39

## 2025-05-21 RX ADMIN — SUFENTANIL CITRATE 50 MCG: 50 INJECTION, SOLUTION EPIDURAL; INTRAVENOUS at 10:56

## 2025-05-21 RX ADMIN — ROCURONIUM BROMIDE 50 MG: 10 INJECTION, SOLUTION INTRAVENOUS at 11:35

## 2025-05-21 RX ADMIN — SODIUM CHLORIDE, POTASSIUM CHLORIDE, SODIUM LACTATE AND CALCIUM CHLORIDE 100 ML/HR: 600; 310; 30; 20 INJECTION, SOLUTION INTRAVENOUS at 06:30

## 2025-05-21 RX ADMIN — SUFENTANIL CITRATE 50 MCG: 50 INJECTION, SOLUTION EPIDURAL; INTRAVENOUS at 08:57

## 2025-05-21 RX ADMIN — Medication 100 MCG: at 07:27

## 2025-05-21 RX ADMIN — CEFAZOLIN 2 G: 1 INJECTION, POWDER, FOR SOLUTION INTRAMUSCULAR; INTRAVENOUS at 11:51

## 2025-05-21 RX ADMIN — CHLORHEXIDINE GLUCONATE 1 APPLICATION: 500 CLOTH TOPICAL at 12:44

## 2025-05-21 RX ADMIN — OXYCODONE HYDROCHLORIDE 10 MG: 10 TABLET ORAL at 20:47

## 2025-05-21 RX ADMIN — ACETAMINOPHEN 1000 MG: 10 INJECTION INTRAVENOUS at 13:40

## 2025-05-21 RX ADMIN — MIDAZOLAM HYDROCHLORIDE 2 MG: 1 INJECTION, SOLUTION INTRAMUSCULAR; INTRAVENOUS at 07:07

## 2025-05-21 RX ADMIN — PROTAMINE SULFATE 250 MG: 10 INJECTION, SOLUTION INTRAVENOUS at 11:29

## 2025-05-21 NOTE — OP NOTE
Cardiac Surgery Operative Note     Date of Procedure: 5/21/2025     Preoperative Diagnosis:   Severe Mitral Valve Regurgitation  Severe Aortic Valve Insufficiency  Hypertension  Hyperlipidemia  Obesity, BMI 31.1  GERD     Postoperative Diagnosis:  Same with Bicuspid Aortic Valve     Procedures Performed:  1. Mitral Valve Repair, Radical with Annuloplasty  2. AVR, Inspiris 23mm  3. LAAE with 35mm Atriclip     Procedure Summary: Mitral Valve Repair, (Closure of Cleft Between P1 and P2, Annuloplasty with 30mm Physio2 Ring), Aortic Valve Replacement (23mm Inspiris), LAAE (35mm Atriclip)     Surgeon:  Dilshad Keenan MD  Assistants: Verito Castillo CFA  Anesthesia Staff:  Lorelei Jackson MD  Anesthesia Type:  General     Estimated Blood Loss:  Minimal (Cell Saver)     Drains:  1. 24 Upper sorbian Dean drains x2-anterior and posterior mediastinum     Specimens: None      Operative Findings:     Posterior leaflet of mitral valve with deep cleft between P1 and P2, Primarily Closed, Annuloplasty with 30mm Physio 2 Ring    Aortic Valve was Dora 1 Bicuspid with Fusion of Right and Non-coronary Cusps, moderately calcified, replaced with 23 mm Inspiris Valve     SHIRA showed normal LV function with severe MR preop with eccentric posteriorly directed jet and severe eccentric AI with jet hitting AMVL, post procedure, trace MR with excellent coaptation, well seated AV with no AI, expected transvalvular gradient, normal RV and LV function.       Operative Indications:   Ms. Pino is a 69-year-old female she presented me with progressively worsening heart failure symptoms.  She has shortness of breath with activity fatigue.  She had known moderate aortic insufficiency and mild to moderate MR previously but underwent further workup and was found to progress to severe eccentric aortic insufficiency and severe mitral regurgitation with a posteriorly directed jet.  With this she was referred to me for consideration of surgical treatment of  her valve disease.  She had no significant coronary artery disease, LV function was normal.  With this I discussed with her proceeding with aortic valve replacement, mitral valve repair versus replacement.  Her aortic valve did appear bicuspid on echocardiogram.  She understood and agreed to proceed.     Total aortic cross-clamp time: 114 minutes  Total cardiac bypass time:  149 minutes     Operative description in detail:  The patient was taken to the operative suite where she was placed in a supine position.  Induction of general anesthesia and placement of a single-lumen endotracheal tube was performed without remark.  Appropriate arterial and venous access was established without remark.  A right IJ central venous catheter was placed followed by a [place] pulmonary artery catheter by anesthesia staff.  The patient was then prepped and draped in the usual and sterile surgical fashion.  A timeout was performed.  Perioperative antibiotics were administered. Beta blocker was given.     Median sternotomy was performed in standard fashion. Hemostasis was ensured along sternal edges.  Sternal retractor was placed.  Midline mediastinal fat and thymus were divided and pericardium opened.  A pericardial well was created.  The distal ascending aorta, right atrial appendage, and superior vena cava were cannulated for cardiopulmonary bypass standard fashion.  Aortic line was tested and was satisfactory.  A combined cardioplegia/aortic root vent set was secured with a horizontal mattress 4-0 Prolene suture.  A retrograde cardioplegia catheter was inserted into the coronary sinus to the free wall of the right atrium.  With an appropriate ACT cardiopulmonary bypass was commenced.      With that, I proceeded to apply the aortic cross-clamp and administered standard cardioplegia in antegrade and retrograde fashion.  I ensured there is no ventricular distention through the entirety of administration of cardioplegia.  I applied  topical hypothermia to the ventricle upon arrest.       Sondergaard's groove was developed sharply and the left atrium was entered.  A drop sucker was placed in the left atrium to aid in decompression.  Once arrest occurred switched to retrograde cardioplegia given the severe AI, created aortotomy just below the aortic fat pad and extended towards the pulmonary artery and then into the noncoronary cusp using scissors.  I suspended the valve with sutures in each commissure.  I then gave the remaining antegrade dose with direct cardioplegia down each coronary.  There was excellent diastolic arrest.  Cardioplegia was given every 15 minutes combination of antegrade and retrograde plegia.    I then inspected the aortic valve it was per above findings aortic valve was incised in its entirety and valve sized to a 23 mm Inspiris valve.    I then measured the left atrial appendage to 35mm atrial clip and placed in standard fashion.    I then turned my attention of the mitral valve.  I then extended the atriotomy superiorly and inferiorly.  Mitral retractor was placed and the mitral valve and left atrium was exposed.  I then placed commissure sutures to suspend the valve.  I examined the valve and there was some thickening of the anterior leaflet but mild at most.  There is a cleft between P1 and P2 and on testing this appeared to be where the MR was coming from.  I closed this cleft with interrupted 5-0 Ethibond sutures. I then tested the valve using Thomas balloon and the aortic outflow tract which showed appropriate Ant to Post leaflet ratio and excellent coaptation without significant MR.  I then ringed the mitral valve with 2-0 Ethibond repair sutures. I then passed the annular sutures through a 30 mm physio2 annuloplasty ring.  The ring was seated and all sutures tied using core knot device.  Valve was retested and showed excellent coaptation with essential no MR.  I was happy with the overall result of mitral valve  repair. I tied down the final knots for the NeoChords. After this I performed de-airing maneuvers and close the left atrium with running 3-0 Prolene suture from each corner.    I then returned my attention to the aortic valve.  I resized the aortic annulus and confirmed 23 mm Inspiris valve is correct size.  I then placed 2-0 Ethibond sutures around the aortic valve annulus with pledgets on the ventricular side.  The sutures were passed through the sewing cuff of the 23 mm valve and it was seated into place.  It seated well and both coronary ostia were present above the annulus and not behind post.  All sutures were then tied using the core knot device.  I then performed appropriate de-airing maneuvers and closed the aorta in 2 layers using running 4-0 Prolene suture.  Suture line was reinforced with BioGlue.     With that being accomplished, a terminal hotshot was administered.  The patient was placed in trendelenburg position.  Upon completion of terminal hotshot and placement of temporary epicardial pacing wires, with the aortic vent on high and pump flows diminished, the aortic cross-clamp was released.  With that, full support was implemented.  A nonworking beating phase was implemented.       Ventilation was restored.  With all in readiness, the heart was allowed to fill and then weaned off cardiopulmonary bypass.  We removed the aortic root vent/cardioplegia cannula set after ensuring no intracardiac air.  Its associated pursestring suture was tied securely and this was reinforced as per matter of routine.  The table was now placed in neutral position.  I decannulated the superior vena caval line and reinforced with Prolene suture.  I then removed the IVC venous line and reinforced it as a matter routine.  Systemic intravenous protamine was administered.  All associated blood volume was returned to the patient.  With continued good hemodynamics, I decannulated the arterial line and tied down it associated  pursestring sutures.  Aortic cannulation site was reinforced as per routine.  At this time I tied down the previously snared pursestring suture.  The mediastinum was drained with 24 Frisian Dean drains placed anteriorly and posteriorly.  I surveyed the chest and hemostasis was excellent.  The sternum was reapproximated with stainless steel wires.  In layers anatomically the soft tissue planes were reapproximated. Instruments, sharps, and sponge counts were reported as correct.  Dressings were placed.  She was transferred to the ICU in stable condition.        Complications: None     Disposition: Transferred to ICU in stable and guarded condition.     Dilshad Keenan M.D.  Cardiothoracic Surgeon

## 2025-05-21 NOTE — PLAN OF CARE
Goal Outcome Evaluation:  Plan of Care Reviewed With: patient        Progress: improving     Recovering in ICU s/p AVR, MVR, and LAAE. She is mechanically ventilated and beginning to awaken. Received 1L albumin. Paced AAI 90 bpm, underlying NSR 70's. CO 4.61, CI 2.65, SV 51, SVR 1266.  Drips: IVFs, Insulin  MST: 140 ml, UOP: 2810 ml.       Problem: Adult Inpatient Plan of Care  Goal: Plan of Care Review  Outcome: Progressing  Flowsheets (Taken 5/21/2025 1709)  Progress: improving  Plan of Care Reviewed With: patient  Goal: Patient-Specific Goal (Individualized)  Outcome: Progressing  Goal: Absence of Hospital-Acquired Illness or Injury  Outcome: Progressing  Intervention: Identify and Manage Fall Risk  Recent Flowsheet Documentation  Taken 5/21/2025 1704 by Myla Farrell RN  Safety Promotion/Fall Prevention: safety round/check completed  Taken 5/21/2025 1616 by Myla Farrell RN  Safety Promotion/Fall Prevention: safety round/check completed  Taken 5/21/2025 1500 by Myla Farrell RN  Safety Promotion/Fall Prevention: safety round/check completed  Taken 5/21/2025 1415 by Myla Farrell RN  Safety Promotion/Fall Prevention: safety round/check completed  Taken 5/21/2025 1250 by Myla Farrell RN  Safety Promotion/Fall Prevention: safety round/check completed  Taken 5/21/2025 1240 by Myla Farrell RN  Safety Promotion/Fall Prevention: safety round/check completed  Intervention: Prevent Skin Injury  Recent Flowsheet Documentation  Taken 5/21/2025 1704 by Myla Farrell RN  Body Position:   turned   supine   lower extremity elevated   upper extremity elevated  Taken 5/21/2025 1616 by Myla Farrell RN  Body Position:   turned   left   lower extremity elevated   upper extremity elevated  Skin Protection:   incontinence pads utilized   silicone foam dressing in place  Taken 5/21/2025 1415 by Myla Farrell RN  Body Position:   turned   right   lower extremity elevated   upper extremity elevated  Taken 5/21/2025  1250 by Myla Farrell RN  Body Position: supine  Skin Protection:   incontinence pads utilized   transparent dressing maintained  Intervention: Prevent and Manage VTE (Venous Thromboembolism) Risk  Recent Flowsheet Documentation  Taken 5/21/2025 1616 by Myla Farrell RN  VTE Prevention/Management:   bilateral   SCDs (sequential compression devices) on  Taken 5/21/2025 1250 by Myla Farrell RN  VTE Prevention/Management:   bilateral   SCDs (sequential compression devices) on  Goal: Optimal Comfort and Wellbeing  Outcome: Progressing  Goal: Readiness for Transition of Care  Outcome: Progressing     Problem: Mechanical Ventilation Invasive  Goal: Effective Communication  Outcome: Progressing  Goal: Optimal Device Function  Outcome: Progressing  Intervention: Optimize Device Care and Function  Recent Flowsheet Documentation  Taken 5/21/2025 1704 by Myla Farrell RN  Oral Care: swabbed with antiseptic solution  Taken 5/21/2025 1415 by Myla Farrell RN  Oral Care: swabbed with antiseptic solution  Taken 5/21/2025 1250 by Myla Farrell RN  Oral Care: swabbed with antiseptic solution  Goal: Mechanical Ventilation Liberation  Outcome: Progressing  Goal: Absence of Device-Related Skin and Tissue Injury  Outcome: Progressing  Intervention: Maintain Skin and Tissue Health  Recent Flowsheet Documentation  Taken 5/21/2025 1616 by Myla Farrell RN  Device Skin Pressure Protection:   absorbent pad utilized/changed   tubing/devices free from skin contact  Taken 5/21/2025 1250 by Myla Farrell RN  Device Skin Pressure Protection: absorbent pad utilized/changed  Goal: Absence of Ventilator-Induced Lung Injury  Outcome: Progressing  Intervention: Prevent Ventilator-Associated Pneumonia  Recent Flowsheet Documentation  Taken 5/21/2025 1704 by Myla Farrell RN  Head of Bed ([facility]) Positioning: HOB at 30 degrees  Oral Care: swabbed with antiseptic solution  Taken 5/21/2025 1616 by Myla Farrell RN  Head of Bed ([facility])  Positioning: HOB at 30 degrees  Taken 5/21/2025 1415 by Myla Farrell RN  Head of Bed ([facility]) Positioning: HOB at 30 degrees  Oral Care: swabbed with antiseptic solution  Taken 5/21/2025 1250 by Myla Farrell RN  Head of Bed ([facility]) Positioning: HOB at 30 degrees  Oral Care: swabbed with antiseptic solution     Problem: Skin Injury Risk Increased  Goal: Skin Health and Integrity  Outcome: Progressing  Intervention: Optimize Skin Protection  Recent Flowsheet Documentation  Taken 5/21/2025 1704 by Myla Farrell RN  Head of Bed ([facility]) Positioning: HOB at 30 degrees  Taken 5/21/2025 1616 by Myla Farrell RN  Pressure Reduction Techniques:   heels elevated off bed   weight shift assistance provided  Head of Bed ([facility]) Positioning: [facility] at 30 degrees  Pressure Reduction Devices: pressure-redistributing mattress utilized  Skin Protection:   incontinence pads utilized   silicone foam dressing in place  Taken 5/21/2025 1415 by Myla Farrell RN  Head of Bed ([facility]) Positioning: [facility] at 30 degrees  Taken 5/21/2025 1250 by Myla Farrell RN  Activity Management: bedrest  Pressure Reduction Techniques:   heels elevated off bed   weight shift assistance provided  Head of Bed ([facility]) Positioning: HOB at 30 degrees  Pressure Reduction Devices: pressure-redistributing mattress utilized  Skin Protection:   incontinence pads utilized   transparent dressing maintained     Problem: Cardiovascular Surgery  Goal: Improved Activity Tolerance  Outcome: Progressing  Goal: Optimal Coping with Heart Surgery  Outcome: Progressing  Goal: Absence of Bleeding  Outcome: Progressing  Goal: Effective Bowel Elimination  Outcome: Progressing  Goal: Effective Cardiac Function  Outcome: Progressing  Goal: Optimal Cerebral Tissue Perfusion  Outcome: Progressing  Intervention: Protect and Optimize Cerebral Perfusion  Recent Flowsheet Documentation  Taken 5/21/2025 1704 by Myla Farrell RN  Head of Bed ([facility]) Positioning: HOB at 30 degrees  Taken 5/21/2025  1616 by Myla Farrell RN  Head of Bed ([facility]) Positioning: HOB at 30 degrees  Taken 5/21/2025 1415 by Myla Farrell RN  Head of Bed ([facility]) Positioning: HOB at 30 degrees  Taken 5/21/2025 1250 by Myla Farrell RN  Head of Bed ([facility]) Positioning: HOB at 30 degrees  Goal: Fluid and Electrolyte Balance  Outcome: Progressing  Goal: Blood Glucose Level Within Target Range  Outcome: Progressing  Goal: Absence of Infection Signs and Symptoms  Outcome: Progressing  Goal: Anesthesia/Sedation Recovery  Outcome: Progressing  Intervention: Optimize Anesthesia Recovery  Recent Flowsheet Documentation  Taken 5/21/2025 1704 by Myla Farrell RN  Safety Promotion/Fall Prevention: safety round/check completed  Taken 5/21/2025 1616 by Myla Farrell RN  Safety Promotion/Fall Prevention: safety round/check completed  Taken 5/21/2025 1500 by Myla Farrell RN  Safety Promotion/Fall Prevention: safety round/check completed  Taken 5/21/2025 1415 by Myla Farrell RN  Safety Promotion/Fall Prevention: safety round/check completed  Taken 5/21/2025 1250 by Myla Farrell RN  Safety Promotion/Fall Prevention: safety round/check completed  Taken 5/21/2025 1240 by Myla Farrell RN  Safety Promotion/Fall Prevention: safety round/check completed  Goal: Acceptable Pain Control  Outcome: Progressing  Goal: Nausea and Vomiting Relief  Outcome: Progressing  Goal: Effective Urinary Elimination  Outcome: Progressing  Goal: Effective Oxygenation and Ventilation  Outcome: Progressing

## 2025-05-21 NOTE — TELEPHONE ENCOUNTER
Pt having surgery this morning and had heart cath completed at [facility] [place]. We have the heart cath report, however we do not have imaging. I have called the cardiopulmonary department and spoke with Laz. Provided pt information and she states she will have them sent over in the next 10 minutes. Will upload to pt's chart once received.

## 2025-05-21 NOTE — TELEPHONE ENCOUNTER
Gela called our office and states that when she got to [facility], [employer] OR manager had already come to  the disc, so there was nothing for her to . Jayda called OR and confirmed this. Thanked Gela and told her I appreciated her very much for trying although there was lack of communication coming from all departments.

## 2025-05-21 NOTE — ANESTHESIA PROCEDURE NOTES
Intra-Op Anesthesia SHIRA    Procedure Performed: Intra-Op Anesthesia SHIRA       Start Time:  5/21/2025 7:36 AM       End Time:      Preanesthesia Checklist:  Patient identified, IV assessed, risks and benefits discussed, monitors and equipment assessed, procedure being performed at surgeon's request and anesthesia consent obtained.    General Procedure Information  SHIRA Placed for monitoring purposes only -- This is not a diagnostic SHIRA  Diagnostic Indications for Echo:  assessment of surgical repair, defect repair evaluation and hemodynamic monitoring  Physician Requesting Echo: Dilshad Keenan MD  Location performed:  OR  Intubated  Bite block not placed  Heart visualized  Probe Insertion:  Easy  Probe Type:  Multiplane  Modalities:  2D only, color flow mapping and continuous wave Doppler        Anesthesia Information  Performed Personally  Anesthesiologist:  Lorelei Jackson MD      Echocardiogram Comments:       SHIRA placed at surgeon request.     Please see cardiology diagnostic evaluation for complete report.

## 2025-05-21 NOTE — PROGRESS NOTES
RT EQUIPMENT DEVICE RELATED - SKIN ASSESSMENT    Dennys Score:        RT Medical Equipment/Device:     ETT Garcia/Anchorfast    Skin Assessment:      Cheek:  Intact  Neck:  Intact  Lips:  Intact  Mouth:  Intact    Device Skin Pressure Protection:  Skin-to-device areas padded:  Anchorfast    Nurse Notification:  Brandi Ceron, RRT

## 2025-05-21 NOTE — ANESTHESIA PROCEDURE NOTES
Central Line      Patient reassessed immediately prior to procedure    Patient location during procedure: OR  Start time: 5/21/2025 7:25 AM  Indications: vascular access, MD/Surgeon request and central pressure monitoring  Staff  Anesthesiologist: Lorelei Jackson MD  Preanesthetic Checklist  Completed: patient identified, IV checked, site marked, risks and benefits discussed, surgical consent, monitors and equipment checked, pre-op evaluation and timeout performed  Central Line Prep  Sterile Tech:cap, gloves, gown, mask and sterile barriers  Prep: chloraprep  Patient monitoring: blood pressure monitoring, continuous pulse oximetry and EKG  Central Line Procedure  Laterality:right  Location:internal jugular  Catheter Type:MAC and [place]-Souleymane  Catheter Size:9 Fr  Guidance:ultrasound guided  PROCEDURE NOTE/ULTRASOUND INTERPRETATION.  Using ultrasound guidance the potential vascular sites for insertion of the catheter were visualized to determine the patency of the vessel to be used for vascular access.  After selecting the appropriate site for insertion, the needle was visualized under ultrasound being inserted into the internal jugular vein, followed by ultrasound confirmation of wire and catheter placement. There were no abnormalities seen on ultrasound; an image was taken; and the patient tolerated the procedure with no complications.   Assessment  Post procedure:biopatch applied, line sutured and occlusive dressing applied  Assessement:blood return through all ports and chest x-ray ordered  Complications:no  Patient Tolerance:patient tolerated the procedure well with no apparent complications

## 2025-05-21 NOTE — ANESTHESIA POSTPROCEDURE EVALUATION
"Patient: Gladys Pino    Procedure Summary       Date: 05/21/25 Room / Location:  [facility] OR  /  [facility]    Anesthesia Start: 0706 Anesthesia Stop: 1240    Procedures:       AORTIC VALVE REPLACEMENT 23MM, MITRAL VALVE REPAIR 30MM RING, LEFT ATRIAL APPENDAGE EXCLUSION 35MM, TRANSESOPHAGEAL ECHOCARDIOGRAM (Chest)      MITRAL VALVE REPAIR/REPLACEMENT (Chest)      ATRIAL APPENDAGE EXCLUSION LEFT WITH TRANSESOPHAGEAL ECHOCARDIOGRAM (Chest) Diagnosis:       Severe aortic valve regurgitation      Severe mitral valve regurgitation      (Severe aortic valve regurgitation [I35.1])      (Severe mitral valve regurgitation [I34.0])    Surgeons: Dilshad Keenan MD Provider: Jaskaran Rosales CRNA    Anesthesia Type: general ASA Status: 4            Anesthesia Type: general    Vitals  Vitals Value Taken Time   BP 88/64 05/21/25 12:46   Temp     Pulse 90 05/21/25 12:46   Resp     SpO2 95 % 05/21/25 12:46   Vitals shown include unfiled device data.        Post Anesthesia Care and Evaluation    Patient location during evaluation: ICU  Patient participation: complete - patient participated  Level of consciousness: obtunded/minimal responses (sedated on ventilator)  Pain management: adequate    Airway patency: patent  Anesthetic complications: No anesthetic complications  PONV Status: NA  Cardiovascular status: acceptable and hemodynamically stable  Respiratory status: acceptable and ETT  Hydration status: acceptable    Comments: Patient transported to ICU with invasive monitors intact. No issues throughout transport, VSS. Blood pressure 101/63, pulse 90, temperature 97.8 °F (36.6 °C), temperature source Temporal, resp. rate 14, height 155 cm (61.02\"), weight 74.7 kg (164 lb 10.9 oz), SpO2 99%.      "

## 2025-05-21 NOTE — ANESTHESIA PROCEDURE NOTES
Airway  Reason: elective    Date/Time: 5/21/2025 7:19 AM  Airway not difficult    General Information and Staff    Patient location during procedure: OR  CRNA/CAA: Jaskaran Rosales CRNA    Indications and Patient Condition  Indications for airway management: airway protection    Preoxygenated: yes    Mask difficulty assessment: 1 - vent by mask    Final Airway Details    Final airway type: endotracheal airway      Successful airway: ETT  Cuffed: yes   Successful intubation technique: video laryngoscopy  Adjuncts used in placement: intubating stylet  Endotracheal tube insertion site: oral  Blade: Jacobson  Blade size: 3  ETT size (mm): 7.5  Cormack-Lehane Classification: grade I - full view of glottis  Placement verified by: chest auscultation and capnometry   Cuff volume (mL): 10  Measured from: lips  ETT/EBT  to lips (cm): 22  Number of attempts at approach: 1  Assessment: lips, teeth, and gum same as pre-op and atraumatic intubation    Additional Comments  Grade iib with DL per cl cunningham

## 2025-05-21 NOTE — TELEPHONE ENCOUNTER
Spoke with Anh and she states imaging is sent to [facility]. Imaging is actually uploaded to [facility] once we receive this electronically. Anh advised me to contact Melva or Whitney. I did call Shira VIVIAN And she states she does not have access to [employer] and advised me to contact Melva. She provided extension [phone]. Called this extension and rang once and went to busy signal. Called the extension that I have for her [phone]. This rang twice and call was forwarded to voicemail. I did not leave a voicemail. I have sent a secure chat to both Melva and Whitney and this message has still yet to be read. Jayda was able to reach Melva and she told her that [facility] can send via [facility]. Called [facility] again and spoke with Viral and Dilip. Both state they do not and cannot send the images electronically. They state that when they try to send via [facility] that [employer] is not able to open images, however they have the disc ready and waiting for a  to come get this. Called our  line and placed STAT order for pickup and delivery of this disc. Per Sydnee, they will have this picked up and sent to our office within the next 30 minutes. Tracking # [id number]. Provided our office address and informed we are in the [facility]. Received a call from the  that is picking up the disc, Gela ([phone]). She confirmed all information and she will have this to our office in the next 30 minutes. I will upload this once this is received.

## 2025-05-21 NOTE — PROGRESS NOTES
"Pharmacy Dosing Service  Pharmacokinetics  Vancomycin Initial Evaluation  Assessment/Action/Plan:  Loading dose?: 1250 mg   Current Order: Vancomycin 1000 mg IVPB every 12 hours  Current end date:5/22/25    Vancomycin dosage initiated based on indication, patient specific factors (age, weight, and renal function), and population pharmacokinetic parameters (see below). Pharmacy will continue to follow daily and adjust dose accordingly.     Subjective:  Gladys Pino is a 69 y.o. female with a Vancomycin \"Pharmacy to Dose\" consult for the treatment of surgical ppx, day 1 of 2 of treatment.    AUC Model Data:  Regimen: 1000 mg IV every 12 hours.  Start time: 20:50 on 05/21/2025  Exposure target: AUC24 (range) 400-600 mg/L.hr   AUC24,ss: 582 mg/L.hr  Probability of AUC24 > 400: 86 %  Ctrough,ss: 18.9 mg/L  Probability of Ctrough,ss > 20: 45 %  Probability of nephrotoxicity (Lodise LAURENT 2009): 15 %    Objective:  Ht: 155 cm (61.02\"); Wt: 74.7 kg (164 lb 10.9 oz)  Estimated Creatinine Clearance: 65.4 mL/min (by C-G formula based on SCr of 0.74 mg/dL).   Creatinine   Date Value Ref Range Status   05/19/2025 0.74 0.57 - 1.00 mg/dL Final      Lab Results   Component Value Date    WBC 8.50 05/21/2025    WBC 4.71 05/19/2025    WBC 5.86 04/14/2025      Baseline culture results:  Microbiology Results (last 10 days)       ** No results found for the last 240 hours. **            Angel Fernández, PharmD  05/21/25 12:46 CDT    "

## 2025-05-21 NOTE — ANESTHESIA PROCEDURE NOTES
Arterial Line      Patient reassessed immediately prior to procedure    Patient location during procedure: holding area   Line placed for hemodynamic monitoring.  Performed By   CRNA/CAA: Jaskaran Rosales CRNA   Preanesthetic Checklist  Completed: patient identified, IV checked, site marked, risks and benefits discussed, surgical consent, monitors and equipment checked, pre-op evaluation and timeout performed  Arterial Line Prep    Sterile Tech: gloves  Prep: ChloraPrep  Arterial Line Procedure   Laterality:left  Location:  radial artery  Catheter size: 20 G   Guidance: landmark technique  Number of attempts: 2  Successful placement: yes   Post Assessment   Dressing Type: secured with tape and wrist guard applied.   Complications no  Circ/Move/Sens Assessment: normal.   Patient Tolerance: patient tolerated the procedure well with no apparent complications  Additional Notes  Placed by cl cunningham

## 2025-05-21 NOTE — PAYOR COMM NOTE
"ADMIT INPT 5-21-25  [id number]     Gladys Chong (69 y.o. Female)       Date of Birth   1955    Social Security Number       Address   [address] [place] [id number]    Home Phone   [phone]    MRN   [id number]       Episcopalian   Nondenominational    Marital Status                               Admission Date   5/21/2025    Admission Type   Elective    Admitting Provider   Dilshad Keenan MD    Attending Provider   Dilshad Keenan MD    Department, Room/Bed   [facility] INTENSIVE CARE, I007/1       Discharge Date       Discharge Disposition       Discharge Destination                                 Attending Provider: Dilshad Keenan MD    Allergies: Lisinopril, Sulfa Antibiotics    Isolation: None   Infection: None   Code Status: CPR    Ht: 155 cm (61.02\")   Wt: 74.7 kg (164 lb 10.9 oz)    Admission Cmt: None   Principal Problem: Aortic valve insufficiency [I35.1]                   Active Insurance as of 5/21/2025       Primary Coverage       Payor Plan Insurance Group Employer/Plan Group    ANTHEM MEDICARE REPLACEMENT ANTHEM MEDICARE ADVANTAGE PPO [id number]       Payor Plan Address Payor Plan Phone Number Payor Plan Fax Number Effective Dates    [address] [phone]  1/1/2024 - None Entered    [facility] [id number]         Subscriber Name Subscriber Birth Date Member ID       GLADYS CHONG 1955 [id number]                     Emergency Contacts        (Rel.) Home Phone Work Phone Mobile Phone    ROHIT CHONG (Son) -- -- [phone]    kayla chong (Relative) -- -- [phone]                 History & Physical        KeenanDilshad MD at 05/21/25 0705          No significant change.  Discussed again the operative plan, risks and benefits; she understands and agrees to proceed.    Dilshad Keenan M.D.  Cardiothoracic Surgeon    Cardiac Surgery Consultation     Referring Physician: Self-Referral, Cardiologist - Dr. Donis     Primary Care Physician: " LYNNE Mei          Chief Complaint   Patient presents with    Aortic Stenosis       New patient self-referred            Subjective  History of Present Illness  The patient presents for evaluation of aortic and mitral valve regurgitation.     She was initially diagnosed with a heart murmur by her primary care physician, which led to a referral to Dr. Donis. An echocardiogram conducted 5 years ago revealed some valvular abnormalities but nothing needing intervention, but annual monitoring was recommended due to the presence of the murmur. A subsequent echocardiogram identified valvular issues, prompting further investigation via a transesophageal echocardiogram. This test revealed a congenital bicuspid aortic valve, aortic valve regurgitation, this was potentially causing impaired function of the adjacent valve. She reports experiencing dyspnea and fatigue, particularly during activities such as singing in [employer] or performing household chores like mopping or sweeping. These symptoms have progressively worsened over the past few months. She also reports occasional chest discomfort described as a burning sensation. She has no history of cardiac, pulmonary, or thoracic surgeries. She also reports no history of untreated streptococcal pharyngitis or rheumatic fever. 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 She occasionally requires additional rest breaks during work due to her symptoms. She has been prescribed Lasix, which provides some relief when she is seated.     She reports lower extremity edema and has been prescribed a diuretic. She suspects she may have sleep apnea and experiences palpitations approximately once a week, even during sleep.     SOCIAL HISTORY  She does not smoke. 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     MEDICATIONS  Current: Lasix        Review of Systems   Constitutional:  Positive for activity change and  fatigue. Negative for unexpected weight change.   Respiratory:  Positive for chest tightness and shortness of breath.    Cardiovascular:  Positive for leg swelling. Negative for chest pain.         A complete review of systems was performed, is negative except stated above.     Medical History   History reviewed. No pertinent past medical history.     Surgical History   History reviewed. No pertinent surgical history.     History reviewed. No pertinent family history.  Social History   Social History            Tobacco Use    Smoking status: Never       Passive exposure: Past (Pt reports her parents used to smoke)    Smokeless tobacco: Never   Vaping Use    Vaping status: Never Used   Substance Use Topics    Alcohol use: Never    Drug use: Never         Current Medications          Current Outpatient Medications   Medication Sig Dispense Refill    amLODIPine (NORVASC) 5 MG tablet Take 1 tablet by mouth Every 12 (Twelve) Hours.        b complex vitamins capsule Take 1 capsule by mouth Daily.        Cholecalciferol 50 MCG (2000 UT) capsule Take 1 capsule by mouth Daily.        Coenzyme Q10 (CO Q 10 PO) Take  by mouth.        cyanocobalamin (VITAMIN B-12) 500 MCG tablet Take 1 tablet by mouth Daily.        fluticasone (FLONASE) 50 MCG/ACT nasal spray 1 spray by Each Nare route Daily.        furosemide (LASIX) 20 MG tablet Take 1 tablet by mouth Daily.        HYDROcodone Bit-Homatrop MBr (HYCODAN) 5-1.5 MG/5ML solution Take 5 mL by mouth Every 6 (Six) Hours As Needed for Cough. 120 mL 0    levoFLOXacin (LEVAQUIN) 500 MG tablet Take 1 tablet by mouth Daily. 7 tablet 0    Misc Natural Products (BEET ROOT PO) Take  by mouth.        montelukast (SINGULAIR) 10 MG tablet Take 1 tablet by mouth Daily.        multivitamin with minerals (therapeutic multivitamin-minerals) tablet tablet Take 1 tablet by mouth Daily.        NON FORMULARY Liver Aid        NON FORMULARY Sugarbear Hair Vitamins        Omega-3 Fatty Acids (OMEGA 3 PO)  "Take  by mouth.        omeprazole (priLOSEC) 40 MG capsule Take 1 capsule by mouth Daily.        potassium chloride (KLOR-CON M10) 10 MEQ CR tablet Take 1 tablet by mouth Daily.        Probiotic Product (PROBIOTIC PO) Take  by mouth.        Red Yeast Rice Extract (RED YEAST RICE PO) Take  by mouth.        telmisartan (MICARDIS) 40 MG tablet Take 1 tablet by mouth Daily.        Thiamine HCl (B-1 PO) Take  by mouth.        [START ON 5/20/2025] mupirocin (BACTROBAN) 2 % nasal ointment Administer 1 Application into the nostril(s) as directed by provider 1 time for 1 dose. Apply pea-sized amount to a Q-tip and swab each nare x 1 dose on 5/20/2025 at 1700 1 each 0      No current facility-administered medications for this visit.         Allergies:  Lisinopril and Sulfa antibiotics     Objective  Vital Signs  Visit Vitals  /78   Pulse 96   Ht 167.6 cm (66\")   Wt 73.6 kg (162 lb 3.2 oz)   SpO2 97%   BMI 26.18 kg/m²            Physical Exam  Vitals reviewed.   Constitutional:       General: She is not in acute distress.     Appearance: She is well-developed. She is not diaphoretic.   HENT:      Head: Normocephalic and atraumatic.   Eyes:      General: No scleral icterus.     Pupils: Pupils are equal, round, and reactive to light.   Neck:      Vascular: No JVD.      Trachea: No tracheal deviation.   Cardiovascular:      Rate and Rhythm: Normal rate and regular rhythm.      Heart sounds: Murmur heard.   Pulmonary:      Effort: Pulmonary effort is normal. No respiratory distress.      Breath sounds: Normal breath sounds. No stridor. No wheezing.   Abdominal:      General: There is no distension.      Palpations: Abdomen is soft.      Tenderness: There is no abdominal tenderness.   Musculoskeletal:         General: No deformity. Normal range of motion.      Cervical back: Normal range of motion and neck supple.   Skin:     General: Skin is warm and dry.      Capillary Refill: Capillary refill takes less than 2 seconds.     "  Coloration: Skin is not pale.      Findings: No erythema or rash.   Neurological:      Mental Status: She is alert and oriented to person, place, and time.      Cranial Nerves: No cranial nerve deficit.   Psychiatric:         Behavior: Behavior normal.         Thought Content: Thought content normal.         Judgment: Judgment normal.         Physical Exam        Results Review:            WBC   Date Value Ref Range Status   04/14/2025 5.86 3.40 - 10.80 10*3/mm3 Final   03/24/2025 5.8 4.8 - 10.8 K/uL Final            RBC   Date Value Ref Range Status   04/14/2025 4.65 3.77 - 5.28 10*6/mm3 Final   03/24/2025 5 4.20 - 5.40 M/uL Final            Hemoglobin   Date Value Ref Range Status   04/14/2025 13.4 12.0 - 15.9 g/dL Final   03/24/2025 14.5 12.0 - 16.0 g/dL Final            Hematocrit   Date Value Ref Range Status   04/14/2025 40.0 34.0 - 46.6 % Final   03/24/2025 44.6 37.0 - 47.0 % Final            MCV   Date Value Ref Range Status   04/14/2025 86.0 79.0 - 97.0 fL Final   03/24/2025 89.2 81.0 - 99.0 fL Final            MCH   Date Value Ref Range Status   04/14/2025 28.8 26.6 - 33.0 pg Final   03/24/2025 29 27.0 - 31.0 pg Final            MCHC   Date Value Ref Range Status   04/14/2025 33.5 31.5 - 35.7 g/dL Final   03/24/2025 32.5 (L) 33.0 - 37.0 g/dL Final            RDW   Date Value Ref Range Status   04/14/2025 12.3 12.3 - 15.4 % Final   03/24/2025 12 11.5 - 14.5 % Final            RDW-SD   Date Value Ref Range Status   04/14/2025 38.5 37.0 - 54.0 fl Final            MPV   Date Value Ref Range Status   04/14/2025 11.2 6.0 - 12.0 fL Final   03/24/2025 12.1 9.4 - 12.3 fL Final            Platelets   Date Value Ref Range Status   04/14/2025 189 140 - 450 10*3/mm3 Final   03/24/2025 174 130 - 400 K/uL Final            Neutrophil Rel %   Date Value Ref Range Status   03/24/2025 52.7 50.0 - 65.0 % Final            Neutrophil %   Date Value Ref Range Status   04/14/2025 54.8 42.7 - 76.0 % Final            Lymphocyte  Rel %   Date Value Ref Range Status   03/24/2025 34.8 20.0 - 40.0 % Final            Lymphocyte %   Date Value Ref Range Status   04/14/2025 34.8 19.6 - 45.3 % Final            Monocyte Rel %   Date Value Ref Range Status   03/24/2025 10.2 (H) 0.0 - 10.0 % Final            Monocyte %   Date Value Ref Range Status   04/14/2025 7.8 5.0 - 12.0 % Final            Eosinophil Rel %   Date Value Ref Range Status   01/15/2018 0.6 0.0 - 5.0 % Final            Eosinophil %   Date Value Ref Range Status   04/14/2025 1.9 0.3 - 6.2 % Final   03/24/2025 1.4 0.0 - 5.0 % Final            Basophil Rel %   Date Value Ref Range Status   03/24/2025 0.7 0.0 - 1.0 % Final            Basophil %   Date Value Ref Range Status   04/14/2025 0.5 0.0 - 1.5 % Final            Immature Grans %   Date Value Ref Range Status   04/14/2025 0.2 0.0 - 0.5 % Final            Neutrophils Absolute   Date Value Ref Range Status   03/24/2025 3 1.5 - 7.5 K/uL Final            Neutrophils, Absolute   Date Value Ref Range Status   04/14/2025 3.21 1.70 - 7.00 10*3/mm3 Final            Lymphocytes Absolute   Date Value Ref Range Status   03/24/2025 2 1.1 - 4.5 K/uL Final            Lymphocytes, Absolute   Date Value Ref Range Status   04/14/2025 2.04 0.70 - 3.10 10*3/mm3 Final            Monocytes Absolute   Date Value Ref Range Status   03/24/2025 0.6 0.00 - 0.90 K/uL Final            Monocytes, Absolute   Date Value Ref Range Status   04/14/2025 0.46 0.10 - 0.90 10*3/mm3 Final            Eosinophils Absolute   Date Value Ref Range Status   03/24/2025 0.1 0.00 - 0.60 K/uL Final            Eosinophils, Absolute   Date Value Ref Range Status   04/14/2025 0.11 0.00 - 0.40 10*3/mm3 Final            Basophils Absolute   Date Value Ref Range Status   03/24/2025 0 0.00 - 0.20 K/uL Final            Basophils, Absolute   Date Value Ref Range Status   04/14/2025 0.03 0.00 - 0.20 10*3/mm3 Final            Immature Grans, Absolute   Date Value Ref Range Status   04/14/2025  0.01 0.00 - 0.05 10*3/mm3 Final   03/24/2025 0 K/uL Final            nRBC   Date Value Ref Range Status   04/14/2025 0.0 0.0 - 0.2 /100 WBC Final            Glucose   Date Value Ref Range Status   04/14/2025 98 65 - 99 mg/dL Final   03/24/2025 93 70 - 99 mg/dL Final            Sodium   Date Value Ref Range Status   04/14/2025 140 136 - 145 mmol/L Final   03/24/2025 139 136 - 145 mmol/L Final            Potassium   Date Value Ref Range Status   04/14/2025 4.0 3.5 - 5.2 mmol/L Final   03/24/2025 4.3 3.5 - 5.1 mmol/L Final            CO2   Date Value Ref Range Status   04/14/2025 25.0 22.0 - 29.0 mmol/L Final   03/24/2025 27 22 - 29 mmol/L Final            Chloride   Date Value Ref Range Status   04/14/2025 104 98 - 107 mmol/L Final   03/24/2025 103 98 - 107 mmol/L Final            Anion Gap   Date Value Ref Range Status   04/14/2025 11.0 5.0 - 15.0 mmol/L Final   03/24/2025 9 8 - 16 mmol/L Final            Creatinine   Date Value Ref Range Status   04/14/2025 1.13 (H) 0.57 - 1.00 mg/dL Final   03/24/2025 0.7 0.5 - 0.9 mg/dL Final            BUN   Date Value Ref Range Status   04/14/2025 17 8 - 23 mg/dL Final   03/24/2025 21 8 - 23 mg/dL Final            BUN/Creatinine Ratio   Date Value Ref Range Status   04/14/2025 15.0 7.0 - 25.0 Final            Calcium   Date Value Ref Range Status   04/14/2025 9.7 8.6 - 10.5 mg/dL Final   03/24/2025 9.6 8.8 - 10.2 mg/dL Final              eGFR Non  Am   Date Value Ref Range Status   01/15/2018 >60 >60 Final       Comment:       This calculation may be inaccurate for patients under the age of 18 years.  For ages 18 and older, a GFR >60 mL/min/1.73m2 (not corrected for weight) is  valid for stable renal function.            Alkaline Phosphatase   Date Value Ref Range Status   04/14/2025 70 39 - 117 U/L Final   03/24/2025 66 35 - 104 U/L Final            Total Protein   Date Value Ref Range Status   04/14/2025 6.9 6.0 - 8.5 g/dL Final   03/24/2025 7.3 6.4 - 8.3 g/dL Final             ALT (SGPT)   Date Value Ref Range Status   04/14/2025 28 1 - 33 U/L Final   03/24/2025 36 (H) 10 - 35 U/L Final            AST (SGOT)   Date Value Ref Range Status   04/14/2025 24 1 - 32 U/L Final   03/24/2025 27 10 - 35 U/L Final            Total Bilirubin   Date Value Ref Range Status   04/14/2025 0.2 0.0 - 1.2 mg/dL Final   03/24/2025 0.3 0.2 - 1.2 mg/dL Final            Albumin   Date Value Ref Range Status   04/14/2025 4.3 3.5 - 5.2 g/dL Final   03/24/2025 4.7 3.5 - 5.2 g/dL Final   01/15/2018 4.0 3.5 - 5.2 g/dL Final            Globulin   Date Value Ref Range Status   04/14/2025 2.6 gm/dL Final                     I reviewed the patient's clinical results and discussed with patient.     Results  I personally reviewed echocardiogram the following is my interpretation:  Normal LV function, there is severe mitral regurgitation with a posteriorly directed jet and prolapse of the anterior leaflet, difficult to say exactly where this is but it does correspond to where the eccentric AI jet is.  In some views it appears that there is prolapse of the posterior leaflet but there is not significant MR associated with this. There is severe eccentric aortic valve regurgitation with bicuspid valve, moderate calcifications, there is mild to moderate aortic valve stenosis there is a mild to moderate septal bulge below the aortic valve     Coronary Angiography 3/27/2025:  1.  No significant CAD.   2.  Proceed with surgical consultation for complex valvular heart disease   as mentioned in primary cardiologist note.   3.  Follow-up with primary cardiologist as scheduled.     Procedure Details   The patient was prepped and draped in the usual sterile fashion. A micropuncture needle was used to obtain right radial arterial access. After access was gained, we placed to a short 6 slender sheath. Through this sheath, we advanced our usual diagnostic catheters over a 0.038 inch wire to perform our diagnostic cardiac  catheterization.     An LVEDP was measured but an LV gram was not performed given recent echo.     Coronary Findings Diagnostic Dominance: Left   Left Main: The vessel is large.   Left Anterior Descending: The vessel is moderate in size. The vessel is angiographically normal.   Left Circumflex: The vessel is angiographically normal.   Right Coronary Artery: The vessel is small. The vessel is angiographically normal.     Intervention   No interventions have been documented.     Left Ventricle   LV EDP is: 18.     Bleeding Risk Calculator   Bleeding Risk points = 0.     Cath Recommendations   Post cardiac cath protocol is recommended.         Assessment & Plan  Assessment & Plan     Ms. Pino is a 69-year-old female she presents to me with complex valvular heart disease.  She has severe eccentric aortic valve insufficiency likely associated with a bicuspid aortic valve.  She also has severe eccentric mitral valve regurgitation.  She has class II NYHA heart failure symptoms.  She is never had surgery on her lungs or chest.  She has known about some valvular heart disease for some time but recently she has been experiencing more heart failure type symptoms with fatigue and shortness of breath with activity, no chest pain.  She has normal endorgan function.  She does not remember having rheumatic fever before.     I discussed with Ms. Pino and her son today the natural history of valvular heart disease and patient such as hers.  We discussed causes, and treatment options.  I would recommend aortic valve replacement mitral valve repair versus replacement.  I did discuss with her the possibility of this being from rheumatic disease before and if this is the case more likely to have to replace mitral valve at time of surgery.  We discussed the rationale and expectations behind this they understand.  We discussed operative approaches and need for double valve surgery required sternotomy.  She understands and she is okay  with this.  I discussed with her risk and benefits of surgery at length. We discussed the risk of surgery including but not limited to bleeding, infection, injury to major organs or vessels, chronic heart failure, arrhythmias, need for pacemaker, prolonged ventilation, renal failure, stroke, risk of anesthesia, risk of sternal wound or bone healing problems, reoperation, and/or death.  I did use the novel STS multi valve risk calculator, her risk of mortality is less than 2% for mitral valve repair aortic valve replacement.  We discussed this she understands.  She wants to proceed with surgery.     We will complete our workup and plan on surgery soon.  She will follow-up with [facility] cardiology after repair.  Thank you for trusting me with the care of Ms. Pino.  Please do not hesitate to call questions or concerns.           Dilshad Keenan MD   Cardiothoracic Surgeon    Electronically signed by Dilshad Keenan MD at 25 0705       Facility-Administered Medications as of 2025   Medication Dose Route Frequency Provider Last Rate Last Admin    acetaminophen (OFIRMEV) injection 1,000 mg  1,000 mg Intravenous Q8H Dilshad Keenan MD   1,000 mg at 25 1340    [START ON 2025] acetaminophen (TYLENOL) tablet 650 mg  650 mg Oral Q4H PRN Dilshad Keenan MD        Or    [START ON 2025] acetaminophen (TYLENOL) 160 MG/5ML oral solution 650 mg  650 mg Oral Q4H PRN Dilshad Keenan MD        Or    [START ON 2025] acetaminophen (TYLENOL) suppository 650 mg  650 mg Rectal Q4H PRN Dilshad Keenan MD        [COMPLETED] acetaminophen (TYLENOL) tablet 1,000 mg  1,000 mg Oral Once Jayda Bravo APRN   1,000 mg at 25 0600    albumin human 5 % solution 500 mL  500 mL Intravenous PRN Dilshad Keenan MD   500 mL at 25 1339    albuterol (PROVENTIL) nebulizer solution 0.083% 2.5 mg/3mL  2.5 mg Nebulization Q4H PRN Dilshad Keenan MD        [] aminocaproic acid (AMICAR) 5 g in sodium chloride  0.9 % 100 mL infusion  1 g/hr Intravenous Continuous Keenan, Dilshad GLEZ MD   Stopped at 05/21/25 1551    [START ON 5/22/2025] aspirin chewable tablet 162 mg  162 mg Oral Once Shlomo, Dilshad GLEZ MD        [START ON 5/23/2025] aspirin EC tablet 81 mg  81 mg Oral Daily Keenan, Dilshad GLEZ MD        [START ON 5/22/2025] atorvastatin (LIPITOR) tablet 20 mg  20 mg Oral Nightly Shlomo, Dilshad GLEZ MD        [START ON 5/22/2025] bisacodyl (DULCOLAX) EC tablet 10 mg  10 mg Oral BID Keenan, Dilshad GLEZ MD        Calcium Replacement - Follow Nurse / BPA Driven Protocol   Not Applicable PRN Shlomo, Dilshad GLEZ MD        [COMPLETED] ceFAZolin 2000 mg IVPB in 100 mL NS (MBP)  2,000 mg Intravenous Once Jayda Bravo APRN   2,000 mg at 05/21/25 0754    [COMPLETED] Chlorhexidine Gluconate Cloth 2 % pads 1 Application  1 Application Topical Once Dilshad Keenan MD   1 Application at 05/21/25 1244    [START ON 5/22/2025] Chlorhexidine Gluconate Cloth 2 % pads 1 Application  1 Application Topical Q24H Shlomo, Dilshad GLEZ MD        clevidipine (CLEVIPREX) infusion 0.5 mg/mL  2-32 mg/hr Intravenous Continuous PRN Shlomo, Dilshad GLEZ MD        dexmedetomidine (PRECEDEX) 400 mcg in 100 mL NS infusion  0.2-1.5 mcg/kg/hr Intravenous Titrated Shlomo, Dilshad GLEZ MD   Stopped at 05/21/25 1508    dextrose (D50W) (25 g/50 mL) IV injection 10-50 mL  10-50 mL Intravenous Q15 Min PRN Shlomo, Dilshad GLEZ MD        dextrose (GLUTOSE) oral gel 15 g  15 g Oral Q15 Min PRN Shlomo, Dilshad GLEZ MD        dextrose 5 % and sodium chloride 0.45 % with KCl 20 mEq/L infusion  30 mL/hr Intravenous Continuous Shlomo, Dilshad GLEZ MD 30 mL/hr at 05/21/25 1403 30 mL/hr at 05/21/25 1403    dextrose 5 % and sodium chloride 0.45 % with KCl 20 mEq/L infusion  30 mL/hr Intravenous Continuous Shlomo, Dilshad GLEZ MD 30 mL/hr at 05/21/25 1411 30 mL/hr at 05/21/25 1411    [START ON 5/22/2025] enoxaparin sodium (LOVENOX) syringe 40 mg  40 mg Subcutaneous Daily Keenan, Dilshad GLEZ MD        [START ON 5/22/2025] fluticasone  (FLONASE) 50 MCG/ACT nasal spray 1 spray  1 spray Each Nare Daily Keenan, Dilshad GLEZ MD        glucagon (GLUCAGEN) injection 1 mg  1 mg Intramuscular Q15 Min PRN Keenan, Dilshad GLEZ MD        insulin regular 1 unit/mL in 0.9% sodium chloride (Glucommander)  0-100 Units/hr Intravenous Titrated Keenan, Dilshad GLEZ MD 2.9 mL/hr at 05/21/25 1756 2.9 Units/hr at 05/21/25 1756    ipratropium-albuterol (DUO-NEB) nebulizer solution 3 mL  3 mL Nebulization 4x Daily - RT Keenan, Dilshad GLEZ MD   3 mL at 05/21/25 1405    Magnesium Cardiology Dose Replacement - Follow Nurse / BPA Driven Protocol   Not Applicable PRN Keenan, Dilshad GLEZ MD        meperidine (DEMEROL) injection 25 mg  25 mg Intravenous Q1H PRN Keenan, Dilshad GLEZ MD        [START ON 5/22/2025] metoprolol tartrate (LOPRESSOR) tablet 12.5 mg  12.5 mg Oral Q12H Keenan, Dilshad GLEZ MD        [COMPLETED] Midazolam HCl (PF) (VERSED) injection 2 mg  2 mg Intravenous Once Abiodun Richards MD   1 mg at 05/21/25 0630    [START ON 5/22/2025] montelukast (SINGULAIR) tablet 10 mg  10 mg Oral Daily Keenan, Dilshad GLEZ MD        Morphine sulfate (PF) injection 2 mg  2 mg Intravenous Q30 Min PRN Keenan, Dilshad GLEZ MD        [START ON 5/22/2025] multivitamin with minerals 1 tablet  1 tablet Oral Daily Keenan, Dilshad GLEZ MD        mupirocin (BACTROBAN) 2 % nasal ointment 1 Application  1 Application Each Nare BID Keenan, Dilshad GLEZ MD   1 Application at 05/21/25 1801    [COMPLETED] mupirocin (BACTROBAN) 2 % nasal ointment   Each Nare Once Jayda Bravo APRN   1 Application at 05/21/25 0600    niCARdipine (CARDENE) 20 mg in 200 mL NS infusion  2.5-15 mg/hr Intravenous Continuous PRN Keenan, Dilshad GLEZ MD   Stopped at 05/21/25 1508    norepinephrine (LEVOPHED) 8 mg in 250 mL NS infusion (premix)  0.02-0.3 mcg/kg/min Intravenous Continuous PRN Keenan, Dilshad GLEZ MD        ondansetron (ZOFRAN) injection 4 mg  4 mg Intravenous Q6H PRN Keenan, Dlishad GLEZ MD        oxyCODONE (ROXICODONE) immediate release tablet 10 mg  10 mg Oral  "Q4H PRN Keenan, Dilshad GLEZ MD        oxyCODONE (ROXICODONE) immediate release tablet 5 mg  5 mg Oral Q4H PRN Keenan, Dilshad GLEZ MD        [START ON 5/22/2025] pantoprazole (PROTONIX) EC tablet 40 mg  40 mg Oral Q AM Keenan, Dilshad GLEZ MD        Pharmacy to dose vancomycin   Not Applicable Continuous PRN Keenan, Dilshad GLEZ MD        Phosphorus Replacement - Follow Nurse / BPA Driven Protocol   Not Applicable PRN Keenan, Dilshad GLEZ MD        [START ON 5/22/2025] polyethylene glycol (MIRALAX) packet 17 g  17 g Oral Daily Keenan, Dilshad GLEZ MD        Potassium Replacement - Follow Nurse / BPA Driven Protocol   Not Applicable PRN Keenan, Dilshad GLEZ MD        propofol (DIPRIVAN) infusion 10 mg/mL 100 mL  5-50 mcg/kg/min Intravenous Continuous PRN Keenan, Dilshad GLEZ MD        vancomycin (VANCOCIN) 1,000 mg in sodium chloride 0.9 % 250 mL IVPB-VTB  1,000 mg Intravenous Q12H Keenan, Dilshad GLEZ MD         Orders (all)        Start     Ordered    05/24/25 0600  XR Chest PA & Lateral  1 Time Imaging         05/21/25 1239    05/23/25 1200  Remove All Dressings 48 Hours Post-Op  Once         05/21/25 1239    05/23/25 1200  Leave Incisions Open to Air Unless Draining or Edges Are Not Approximated  Continuous         05/21/25 1239    05/23/25 0900  aspirin EC tablet 81 mg  Daily         05/21/25 1239    05/23/25 0600  Wound Care  Daily       05/21/25 1239    05/23/25 0600  CBC (No Diff)  Daily       05/21/25 1239    05/23/25 0600  Basic Metabolic Panel  Daily       05/21/25 1239    05/22/25 2100  atorvastatin (LIPITOR) tablet 20 mg  Nightly         05/21/25 1239    05/22/25 1400  Intake & Output  Every Shift       05/21/25 1239    05/22/25 1237  acetaminophen (TYLENOL) tablet 650 mg  Every 4 Hours PRN        Placed in \"Or\" Linked Group    05/21/25 1239    05/22/25 1237  acetaminophen (TYLENOL) 160 MG/5ML oral solution 650 mg  Every 4 Hours PRN        Placed in \"Or\" Linked Group    05/21/25 1239    05/22/25 1237  acetaminophen (TYLENOL) suppository 650 mg  " "Every 4 Hours PRN        Placed in \"Or\" Linked Group    05/21/25 1239    05/22/25 1200  Vital Signs  Every 4 Hours      Comments: Perform Vitals Less Frequently Only if Patient Stable      05/21/25 1239    05/22/25 0900  fluticasone (FLONASE) 50 MCG/ACT nasal spray 1 spray  Daily         05/21/25 1238    05/22/25 0900  enoxaparin sodium (LOVENOX) syringe 40 mg  Daily         05/21/25 1239    05/22/25 0900  aspirin chewable tablet 162 mg  Once         05/21/25 1239    05/22/25 0900  metoprolol tartrate (LOPRESSOR) tablet 12.5 mg  Every 12 Hours Scheduled         05/21/25 1239    05/22/25 0900  bisacodyl (DULCOLAX) EC tablet 10 mg  2 Times Daily         05/21/25 1239    05/22/25 0900  polyethylene glycol (MIRALAX) packet 17 g  Daily         05/21/25 1239    05/22/25 0900  montelukast (SINGULAIR) tablet 10 mg  Daily         05/21/25 1241    05/22/25 0900  multivitamin with minerals 1 tablet  Daily         05/21/25 1241    05/22/25 0800  Wean & Extubate Per Rapid Wean and Extubation Protocol  Every Morning       05/21/25 1239    05/22/25 0800  Wean FiO2 per Respiratory Therapist for SpO2  for SpO2 >92%, until at 30 % FiO2  Every Morning      Comments: Wean FiO2 per Respiratory Therapist for SpO2  for SpO2 >92%, until at 30 % FiO2    05/21/25 1239    05/22/25 0800  Wean Respiratory Rate by a minimum of 2 every hour if indicated until the rate of 4 is achieved.  Every Morning      Comments: Wean Respiratory Rate by a minimum of 2 every hour if indicated until the rate of 4 is achieved.    05/21/25 1239 05/22/25 0600  pantoprazole (PROTONIX) EC tablet 40 mg  Every Early Morning         05/21/25 1238    05/22/25 0600  CBC & Differential  Daily       05/21/25 1239    05/22/25 0600  Renal Function Panel  Daily       05/21/25 1239    05/22/25 0600  Magnesium  Daily       05/21/25 1239    05/22/25 0600  Protime-INR  Daily       05/21/25 1239    05/22/25 0600  XR Chest 1 View  Daily       05/21/25 1239    05/22/25 0600  ECG " 12 Lead Other; s/p AVR and MV Repair  Daily       05/21/25 1239    05/22/25 0400  Chlorhexidine Gluconate Cloth 2 % pads 1 Application  Every 24 Hours         05/21/25 1239    05/21/25 2000  vancomycin (VANCOCIN) 1,000 mg in sodium chloride 0.9 % 250 mL IVPB-VTB  Every 12 Hours         05/21/25 1246    05/21/25 1806  POC Glucose Once  PROCEDURE ONCE        Comments: Complete no more than 45 minutes prior to patient eating      05/21/25 1754    05/21/25 1800  mupirocin (BACTROBAN) 2 % nasal ointment 1 Application  2 Times Daily         05/21/25 1239    05/21/25 1716  POC Glucose Once  PROCEDURE ONCE        Comments: Complete no more than 45 minutes prior to patient eating      05/21/25 1703    05/21/25 1634  Blood Gas, Arterial -  Once        Comments: if no ABG has been obtained in the previous 4 hours during Vent Liberation      05/21/25 1239    05/21/25 1610  POC Glucose Once  PROCEDURE ONCE        Comments: Complete no more than 45 minutes prior to patient eating      05/21/25 1557    05/21/25 1600  Check Peripheral Pulses Every 4 Hours  Every 4 Hours       05/21/25 1239    05/21/25 1500  ipratropium-albuterol (DUO-NEB) nebulizer solution 3 mL  4 Times Daily - RT         05/21/25 1239    05/21/25 1432  Calcium, Ionized  PROCEDURE ONCE         05/21/25 1430    05/21/25 1431  Blood Gas, Arterial -  PROCEDURE ONCE         05/21/25 1428    05/21/25 1430  dextrose 5 % and sodium chloride 0.45 % with KCl 20 mEq/L infusion  Continuous         05/21/25 1341    05/21/25 1400  Cardiac Output Parameters Every 2 Hours Until 24 Hours Post Op  Every 2 Hours       05/21/25 1239    05/21/25 1400  acetaminophen (OFIRMEV) injection 1,000 mg  Every 8 Hours         05/21/25 1239    05/21/25 1345  aminocaproic acid (AMICAR) 5 g in sodium chloride 0.9 % 100 mL infusion  Continuous         05/21/25 1257    05/21/25 1330  Chlorhexidine Gluconate Cloth 2 % pads 1 Application  Once         05/21/25 1239    05/21/25 1330  insulin regular 1  unit/mL in 0.9% sodium chloride (Glucommander)  Titrated        Note to Pharmacy: Upon initiation of Glucommander insulin drip, make sure all other insulin orders and anti-diabetic medications have been discontinued.    05/21/25 1239    05/21/25 1330  dexmedetomidine (PRECEDEX) 400 mcg in 100 mL NS infusion  Titrated         05/21/25 1239    05/21/25 1330  dextrose 5 % and sodium chloride 0.45 % with KCl 20 mEq/L infusion  Continuous         05/21/25 1239    05/21/25 1307  Manual Differential  Once,   Status:  Canceled         05/21/25 1306    05/21/25 1305  CBC Auto Differential  Once         05/21/25 1304    05/21/25 1300  Vital Signs Every Hour Until 24 Hours Post Op  Every Hour      Comments: Perform Vitals Less Frequently Only if Patient Stable      05/21/25 1239    05/21/25 1300  Check Peripheral Pulses Every 1 Hour x4  Every Hour       05/21/25 1239    05/21/25 1300  Intake & Output Every 15 Minutes x2, Every 30 Minutes x4  Every Hour       05/21/25 1239    05/21/25 1300  Intake & Output Every Hour Until 24 Hours Post Op  Every Hour       05/21/25 1239 05/21/25 1300  Cardiac Output Parameters On Admission, Then Every 1 Hour x4  Every Hour       05/21/25 1239    05/21/25 1300  Ambulate Patient  3 Times Daily         05/21/25 1239    05/21/25 1300  Incentive Spirometry  Every Hour While Awake       05/21/25 1239 05/21/25 1300  Nurse and RT to Assess Patient for Readiness to Wean Criteria as Follows:  Every Hour      Comments: Patient is awake and following commands, Patient is spontaneously breathing, respiratory rate <25/min, Patient's hemodynamics are stable, Patient has no evidence of clinically significant bleeding, SpO2 >92% on <30% FiO2, PEEP < or = to 8.    05/21/25 1239    05/21/25 1248  Calcium, Ionized  PROCEDURE ONCE         05/21/25 1246    05/21/25 1247  Blood Gas, Arterial With Co-Ox  PROCEDURE ONCE         05/21/25 1245    05/21/25 1247  ECG 12 Lead Other; post op  STAT         05/21/25  1246    05/21/25 1245  montelukast (SINGULAIR) tablet 10 mg  Daily,   Status:  Discontinued         05/21/25 1238    05/21/25 1245  multivitamin with minerals 1 tablet  Daily,   Status:  Discontinued         05/21/25 1238    05/21/25 1240  Code Status and Medical Interventions: CPR (Attempt to Resuscitate); Full Support  Continuous         05/21/25 1239    05/21/25 1240  Vital Signs & Post-Op Checks Every 15 Minutes x2, Every 30 Minutes x4  Per Hospital Policy/Protocol,   Status:  Canceled        Comments: Perform Vitals Less Frequently Only if Patient Stable    05/21/25 1239    05/21/25 1240  Daily Weights  Daily       05/21/25 1239    05/21/25 1240  Continuous Cardiac Monitoring  Continuous        Comments: Follow Standing Orders As Outlined in Process Instructions (Open Order Report to View Full Instructions)    05/21/25 1239    05/21/25 1240  Telemetry - Maintain IV Access  Continuous         05/21/25 1239    05/21/25 1240  Telemetry - Place Orders & Notify Provider of Results When Patient Experiences Acute Chest Pain, Dysrhythmia or Respiratory Distress  Until Discontinued         05/21/25 1239    05/21/25 1240  May Be Off Telemetry for Tests  Continuous         05/21/25 1239    05/21/25 1240  Continuous Pulse Oximetry  Continuous         05/21/25 1239    05/21/25 1240  Discontinue NG After Extubation  Once         05/21/25 1239    05/21/25 1240  Chest Tube Settings Chest Tube, Mediastinal Tube; -20 cm Suction  Continuous         05/21/25 1239    05/21/25 1240  Begin Rewarming with Thermal Unit As Needed For Temp Less Than 96F  Once         05/21/25 1239    05/21/25 1240  ACE Wraps to [facility] for 24 Hours, Then Apply KEDAR Hose  Continuous         05/21/25 1239    05/21/25 1240  Apply Surgical Bra Per Order Details  Per Order Details        Comments: Apply Surgical Bra For Medial Sternotomy Incision Support    05/21/25 1239    05/21/25 1240  Wound Dressing  Continuous         05/21/25 1239     05/21/25 1240  Notify Surgeon if Drainage From Surgical Incision Site  Until Discontinued         05/21/25 1239    05/21/25 1240  ISOLATE PACER WIRES  Continuous         05/21/25 1239    05/21/25 1240  Turn Patient Every 2 Hours or Begin Bed Rotation Once Hemodynamically Stable  Now Then Every 2 Hours         05/21/25 1239    05/21/25 1240  Advance PROM to AROM  Now Then Every 4 Hours         05/21/25 1239    05/21/25 1240  Up in Chair for Meals  Until Discontinued         05/21/25 1239 05/21/25 1240  Notify Provider - Urine Output  Until Discontinued         05/21/25 1239    05/21/25 1240  Notify Provider - Chest Tube Output  Until Discontinued         05/21/25 1239 05/21/25 1240  Notify Provider (Specify)  Until Discontinued         05/21/25 1239 05/21/25 1240  Cardiac Sternal Precautions - Maintain at All Times & Teach Patient  Continuous        Comments: Maintain Sternal Precautions at All Times & Teach Patient    05/21/25 1239 05/21/25 1240  Fall Precautions  Continuous         05/21/25 1239 05/21/25 1240  Initial Ventilator Settings Per Pulmonologist / Anesthesiologist  Once         05/21/25 1239 05/21/25 1240  Ventilator - Vent Mode: Alternate (Custom) Mode: See Comments  Continuous,   Status:  Canceled        Comments: Initial Mechanical Ventilation Settings upon Arrival to the ICU: SIMV, Tidal Volume 5-7 mL/kg ideal body weight based on height formula, Respiratory Rate 14-16/min, PEEP 5 cmH2O if <90kg, PEEP 8 cmH2O if >90kg, FiO2 60%.    05/21/25 1239 05/21/25 1240  Ventilator - Vent Mode: Alternate (Custom) Mode: See Comments  Continuous        Comments: RT adjusts Mechanical Ventilation as needed to achieve: pH 7.35-7.45, pCO2 34-45 mmHg, PaO2 > 90 mmHg, SpO2 > 92%.    05/21/25 1239 05/21/25 1240  Extubate Patient If  All of the Following Criteria are Met:  Once        Comments: Patient tolerated Spontaneous Breathing Trial for 20 minutes, respiratory rate < 25/min, SpO2 >94%,  patient remains hemodynamically stable, patient is awake and following commands, RSBI f/vt < 90, Elevate HOB > 35 degrees, Vital Capacity: 10-15 mL/kg, NIF >-25 cm H2O, minute ventilation <14 l/min, Obtain ABG: pH of 7.33 to 7.55, confirm with nurse if ok to extubate. Prior to extubation, Propofol must be off, ok to proceed with Precedex with provider order. If criteria NOT met: Wait 1 hour, reassess. If still does not meet criteria: call Physician.    05/21/25 1239    05/21/25 1240  Notify Physician if Vapotherm Mask Needed to Keep SpO2 Greater Than 90% or Continuing Respiratory Distress  Until Discontinued        Comments: Notify Physician if Vapotherm mask needed to keep SpO2 >90% or continuing respiratory distress.r    05/21/25 1239    05/21/25 1240  Call Physician if Racemic needed.  Until Discontinued        Comments: Call Physician if Racemic needed.    05/21/25 1239    05/21/25 1240  RT To Evaluate & Demonstrate Use of IS  Once        Comments: RT To Evaluate & Demonstrate Use of IS    05/21/25 1239    05/21/25 1240  XR Chest 1 View  1 Time Imaging         05/21/25 1239    05/21/25 1240  ECG 12 Lead Other; s/p AVR and MV Repair  STAT         05/21/25 1239    05/21/25 1240  Protime-INR  STAT         05/21/25 1239    05/21/25 1240  aPTT  STAT         05/21/25 1239    05/21/25 1240  Calcium, Ionized  STAT         05/21/25 1239    05/21/25 1240  Blood Gas, Arterial -  STAT         05/21/25 1239    05/21/25 1240  CBC & Differential  STAT         05/21/25 1239    05/21/25 1240  Fibrinogen  STAT         05/21/25 1239    05/21/25 1240  CBC (No Diff)  Now Then Every 4 Hours       05/21/25 1239    05/21/25 1240  Renal Function Panel  Now Then Every 4 Hours       05/21/25 1239    05/21/25 1240  Draw Labs and Notify Provider if Chest Tube Output Greater Than 100mL/hr  Until Discontinued         05/21/25 1239    05/21/25 1240  Advance Diet As Tolerated -  Until Discontinued         05/21/25 1239    05/21/25 1240  Cardiac  Rehab Evaluation  Once        Provider:  (Not yet assigned)    05/21/25 1239    05/21/25 1240  PT Consult: Eval & Treat  Once         05/21/25 1239    05/21/25 1240  RN to Release PRN POC Glucose Orders Per Glucommander  Continuous         05/21/25 1239    05/21/25 1240  RN to Order STAT Glucose for BG Less Than 10 mg/dl or Greater Than 600 mg/dl  Continuous        Comments: Do not delay treatment of unstable patient in order to obtain glucose sample from Lab.   Inform provider of results.    05/21/25 1239    05/21/25 1240  Prior to Initiating Glucommander™, Ensure All Prior Insulin Orders are Discontinued  Once         05/21/25 1239    05/21/25 1240  PRIOR to START of Intravenous Insulin Infusion, Notify Provider if K+ is Less Than 3.3, for Extra Potassium Orders, if Indicated. Do Not Start Insulin Drip if K+ Less Than 3.3.  Per Order Details         05/21/25 1239    05/21/25 1240  Use a Dedicated Line for Insulin Infusion (If Possible).  May Use a Carrier Fluid of NS at KVO Rate if Insulin Rate is Insufficient to Maintain IV Patency.  Prime IV Line With Insulin Infusion  Continuous         05/21/25 1239    05/21/25 1240  If Insulin Infusion is Discontinued, Glucommander Must Also be Discontinued. If Insulin Infusion is Re-Ordered Discontinue Previous Settings in Glucommander & Start New  Per Order Details         05/21/25 1239    05/21/25 1240  Patient is on Glucommander  Continuous         05/21/25 1239    05/21/25 1240  Notify Provider  Continuous        Comments: Open Order Report to View Parameters Requiring Provider Notification    05/21/25 1239    05/21/25 1240  If Patient Has Diet Order or Bolus Tube Feeds Utilize the Start Meal / Meal Bolus Feature in Glucommander  Continuous         05/21/25 1239    05/21/25 1240  NPO Diet NPO Type: Sips with Meds  Diet Effective Now         05/21/25 1239    05/21/25 1239  oxyCODONE (ROXICODONE) immediate release tablet 5 mg  Every 4 Hours PRN         05/21/25 1239     05/21/25 1239  oxyCODONE (ROXICODONE) immediate release tablet 10 mg  Every 4 Hours PRN         05/21/25 1239    05/21/25 1239  Morphine sulfate (PF) injection 2 mg  Every 30 Minutes PRN         05/21/25 1239    05/21/25 1239  niCARdipine (CARDENE) 20 mg in 200 mL NS infusion  Continuous PRN         05/21/25 1239    05/21/25 1239  norepinephrine (LEVOPHED) 8 mg in 250 mL NS infusion (premix)  Continuous PRN         05/21/25 1239    05/21/25 1239  clevidipine (CLEVIPREX) infusion 0.5 mg/mL  Continuous PRN         05/21/25 1239    05/21/25 1239  dextrose (GLUTOSE) oral gel 15 g  Every 15 Minutes PRN         05/21/25 1239    05/21/25 1239  dextrose (D50W) (25 g/50 mL) IV injection 10-50 mL  Every 15 Minutes PRN         05/21/25 1239    05/21/25 1239  glucagon (GLUCAGEN) injection 1 mg  Every 15 Minutes PRN         05/21/25 1239    05/21/25 1239  albumin human 5 % solution 500 mL  As Needed         05/21/25 1239    05/21/25 1239  propofol (DIPRIVAN) infusion 10 mg/mL 100 mL  Continuous PRN         05/21/25 1239    05/21/25 1239  meperidine (DEMEROL) injection 25 mg  Every 1 Hour PRN         05/21/25 1239    05/21/25 1239  Potassium Replacement - Follow Nurse / BPA Driven Protocol  As Needed         05/21/25 1239    05/21/25 1239  Magnesium Cardiology Dose Replacement - Follow Nurse / BPA Driven Protocol  As Needed         05/21/25 1239    05/21/25 1239  Phosphorus Replacement - Follow Nurse / BPA Driven Protocol  As Needed         05/21/25 1239    05/21/25 1239  Calcium Replacement - Follow Nurse / BPA Driven Protocol  As Needed         05/21/25 1239    05/21/25 1239  ondansetron (ZOFRAN) injection 4 mg  Every 6 Hours PRN         05/21/25 1239    05/21/25 1239  Pharmacy to dose vancomycin  Continuous PRN         05/21/25 1239    05/21/25 1239  albuterol (PROVENTIL) nebulizer solution 0.083% 2.5 mg/3mL  Every 4 Hours PRN         05/21/25 1239    05/21/25 1238  Target Arousal Level RASS -1 to -2  Continuous          "05/21/25 1238    05/21/25 1234  Inpatient Admission  Once         05/21/25 1238    05/21/25 1234  Continue Indwelling Urinary Catheter  Once        Placed in \"And\" Linked Group    05/21/25 1238    05/21/25 1234  Assess Need for Indwelling Urinary Catheter - Follow Removal Protocol  Continuous        Comments: Indwelling Urinary Catheter Removal Criteria  Discontinue Indwelling Urinary Catheter Unless One of the Following is Present  Urinary Retention or Obstruction  Chronic Thomas Catheter Use  End of Life  Critical Illness with Strict I/O   Tract or Abdominal Surgery  Stage 3/4 Sacral / Perineal Wound  Required Activity Restriction: Trauma  Required Activity Restriction: Spine Surgery  If Patient is Being Followed by Urology Contact Them PRIOR to Removal  Do Not Remove Indwelling Urinary Catheter Order is Present with a CLINICAL REASON to Maintain the Catheter. Provider is Required to Include a Clinical Reason to Maintain a Urinary Catheter    Chronic Thomas Catheter Use (Present on Admission)  Assess for Continued Need & Document Medical Necessity  If Infection is Suspected, Contact the Provider       Placed in \"And\" Linked Group    05/21/25 1238    05/21/25 1234  Urinary Catheter Care  Every Shift      Placed in \"And\" Linked Group    05/21/25 1238    05/21/25 1216  Transfuse Pheresed Platelets  Status:  Canceled       05/21/25 1216    05/21/25 1203  OR Blood Pickup  Once,   Status:  Canceled         05/21/25 1202    05/21/25 1142  Arterial Blood Gas with Coox and Lactate  PROCEDURE ONCE         05/21/25 1139    05/21/25 1137  CBC (No Diff)  RELEASE UPON ORDERING         05/21/25 1137    05/21/25 1137  Fibrinogen  RELEASE UPON ORDERING         05/21/25 1137    05/21/25 1137  Protime-INR  RELEASE UPON ORDERING         05/21/25 1137    05/21/25 1025  Arterial Blood Gas with Coox and Lactate  PROCEDURE ONCE         05/21/25 1023    05/21/25 0946  Arterial Blood Gas with Coox and Lactate  PROCEDURE ONCE         " 05/21/25 0945    05/21/25 0903  Tissue Pathology Exam  RELEASE UPON ORDERING         05/21/25 0903    05/21/25 0900  sodium chloride 0.9 % flush 3 mL  Every 12 Hours Scheduled,   Status:  Discontinued         05/21/25 0548    05/21/25 0900  sodium chloride 0.9 % flush 10 mL  Every 12 Hours Scheduled,   Status:  Discontinued         05/21/25 0548    05/21/25 0823  Arterial Blood Gas with Coox and Lactate  PROCEDURE ONCE         05/21/25 0820    05/21/25 0812  gelatin absorbable (GELFOAM) powder  As Needed,   Status:  Discontinued         05/21/25 0812    05/21/25 0812  sodium chloride (NS) irrigation solution  As Needed,   Status:  Discontinued         05/21/25 0812 05/21/25 0812  sterile water irrigation solution  As Needed,   Status:  Discontinued         05/21/25 0812 05/21/25 0812  thrombin topical  As Needed,   Status:  Discontinued         05/21/25 0813    05/21/25 0811  sodium chloride 1,000 mL with vancomycin 1,000 mg irrigation  As Needed,   Status:  Discontinued         05/21/25 0811    05/21/25 0811  bupivacaine-EPINEPHrine PF 20 mL, bupivacaine liposome 20 mL mixture  As Needed,   Status:  Discontinued         05/21/25 0812    05/21/25 0800  Arterial Blood Gas with Coox and Lactate  PROCEDURE ONCE         05/21/25 0757    05/21/25 0646  OR Blood Pickup  Once,   Status:  Canceled         05/21/25 0646    05/21/25 0640  Intra-Op TAVR Transesophageal Echo  Once         05/21/25 0640    05/21/25 0551  sodium chloride 0.9 % infusion 1,000 mL  Continuous,   Status:  Discontinued         05/21/25 0549    05/21/25 0551  lactated ringers infusion 1,000 mL  Continuous,   Status:  Discontinued         05/21/25 0549    05/21/25 0550  lactated ringers infusion  Continuous,   Status:  Discontinued         05/21/25 0548    05/21/25 0550  Midazolam HCl (PF) (VERSED) injection 2 mg  Once         05/21/25 0548    05/21/25 0550  acetaminophen (TYLENOL) tablet 1,000 mg  Once         05/21/25 0548    05/21/25 0550   mupirocin (BACTROBAN) 2 % nasal ointment  Once         05/21/25 0548    05/21/25 0550  ceFAZolin 2000 mg IVPB in 100 mL NS (MBP)  Once         05/21/25 0548    05/21/25 0549  Insert Peripheral IV  Once         05/21/25 0549    05/21/25 0549  Maintain IV Access  Continuous,   Status:  Canceled         05/21/25 0549    05/21/25 0549  Insert Peripheral IV  Once         05/21/25 0549    05/21/25 0549  Maintain IV Access  Continuous,   Status:  Canceled         05/21/25 0549    05/21/25 0548  sodium chloride 0.9 % flush 10 mL  As Needed,   Status:  Discontinued         05/21/25 0549 05/21/25 0548  lidocaine PF 1% (XYLOCAINE) injection 0.5 mL  Once As Needed,   Status:  Discontinued         05/21/25 0549 05/21/25 0548  sodium chloride 0.9 % flush 10 mL  As Needed,   Status:  Discontinued         05/21/25 0549    05/21/25 0548  Vital Signs - Per Anesthesia Protocol  As Needed,   Status:  Canceled       05/21/25 0548 05/21/25 0548  Notify Anesthesiologist with any acute changes in patient's condition.  Until Discontinued         05/21/25 0548 05/21/25 0548  Notify Anesthesiologist for unrelieved pain.  Until Discontinued        Comments: Notify Anesthesiologist    05/21/25 0548 05/21/25 0548  Oxygen Therapy- Nasal Cannula; Titrate 1-6 LPM Per SpO2; 90 - 95%  Continuous         05/21/25 0548 05/21/25 0548  Continuous Pulse Oximetry  Continuous,   Status:  Canceled         05/21/25 0548    05/21/25 0548  POC Glucose PRN  As Needed,   Status:  Canceled      Comments: Notify Anesthesiologist if Blood Sugar Greater Than 200      05/21/25 0548 05/21/25 0548  Insert Peripheral IV  Once         05/21/25 0548 05/21/25 0548  Saline Lock & Maintain IV Access  Continuous,   Status:  Canceled         05/21/25 0548 05/21/25 0548  sodium chloride 0.9 % flush 3-10 mL  As Needed,   Status:  Discontinued         05/21/25 0548 05/21/25 0548  sodium chloride 0.9 % infusion 40 mL  As Needed,   Status:  Discontinued          05/21/25 0548 05/21/25 0548  Midazolam HCl (PF) (VERSED) injection 0.5 mg  Every 10 Minutes PRN,   Status:  Discontinued         05/21/25 0548 05/21/25 0548  Follow Anesthesia Guidelines / Protocol  Once         05/21/25 0548    05/21/25 0548  STS Risk Score has been calculated and discussed with the patient and family  Continuous         05/21/25 0548 05/21/25 0548  Verify NPO Status  Continuous         05/21/25 0548 05/21/25 0548  Place Sequential Compression Device  Once         05/21/25 0548    05/21/25 0548  Chlorhexidine Shower / Bath After Skin Prep  Once        Comments: Chlorhexidine Skin Prep and Instructions For All Patients Having A Procedure Requiring an Outward Incision if Not Allergic.  If Allergic, Give Antibacterial Skin Wipes and Instructions.  Do Not Use For Facial Cases or on Any Mucus Membranes.    05/21/25 0548 05/21/25 0548  Clip Hair Chin to Ankles  Once         05/21/25 0548 05/21/25 0548  Additional Pre-Op Care Order / Instruction  Continuous         05/21/25 0548 05/21/25 0548  Notify Physician - Standard  Until Discontinued         05/21/25 0548 05/21/25 0548  Prepare RBC, 2 Units  Blood - Once         05/21/25 0548 05/21/25 0548  Prepare Platelet Pheresis, 1 Units  Blood - Once         05/21/25 0548 05/21/25 0548  Prepare Fresh Frozen Plasma, 2 Units  Blood - Once,   Status:  Canceled         05/21/25 0548    05/21/25 0548  Insert Peripheral IV x2  Once         05/21/25 0548 05/21/25 0548  Saline Lock & Maintain IV Access  Continuous,   Status:  Canceled         05/21/25 0548    05/21/25 0548  sodium chloride 0.9 % flush 10 mL  As Needed,   Status:  Discontinued         05/21/25 0548 05/21/25 0548  sodium chloride 0.9 % infusion 40 mL  As Needed,   Status:  Discontinued         05/21/25 0548 05/21/25 0548  sodium chloride 0.9 % flush 30 mL  Once As Needed,   Status:  Discontinued         05/21/25 0548    05/21/25 0548  sodium chloride 0.9 %  infusion  Continuous PRN,   Status:  Discontinued         05/21/25 0548    05/21/25 0548  dextrose (GLUTOSE) oral gel 15 g  Every 15 Minutes PRN,   Status:  Discontinued         05/21/25 0548    05/21/25 0548  dextrose (D50W) (25 g/50 mL) IV injection 10-50 mL  Every 15 Minutes PRN,   Status:  Discontinued         05/21/25 0548    05/21/25 0548  glucagon (GLUCAGEN) injection 1 mg  Every 15 Minutes PRN,   Status:  Discontinued         05/21/25 0548    05/19/25 1315  Oxygen Therapy- Nasal Cannula; Titrate 1-6 LPM Per SpO2; 90 - 95%  Continuous,   Status:  Canceled         05/19/25 1315    Unscheduled  Check Peripheral Pulses As Needed  As Needed       05/21/25 1239    Unscheduled  Cardiac Output Parameters PRN Until 24 Hours Post-Op  As Needed       05/21/25 1239    Unscheduled  Insert Nasogastric Tube If Indicated & Not Already in Place  As Needed      Comments: Indications: Nausea, Vomiting, Prolonged Intubation or to Administer Medications  Attach to Low Wall Suction if Any Residual    05/21/25 1239    Unscheduled  Wound Care  As Needed       05/21/25 1239    Unscheduled  Dangle at Bedside After Extubation  As Needed       05/21/25 1239    Unscheduled  Up in Chair As Tolerated After Extubation  As Needed       05/21/25 1239    Unscheduled  Oxygen Therapy- Nasal Cannula; 2 LPM  Continuous PRN       05/21/25 1239    Unscheduled  Blood Gas, Arterial -  As Needed      Comments: Obtain ABG as needed for ventilator changes      05/21/25 1239    Unscheduled  Begin Weaning When Criteria Met:  As Needed      Comments: If all of the weaning criteria are met, Respiratory Therapist is to initiate spontaneous breathing trial with PSV pressure support mode with PEEP 5 cmH2O if <90kg, PEEP 8 cmH2O if > or = 90kg. If criteria are not met, RT or nurse is to re-evaluate in 15-30 minutes.    05/21/25 1239    Unscheduled  Blood Gas, Arterial -  As Needed      Comments: Prior to Extubation      05/21/25 1239    Unscheduled  Oxygen  Therapy- Nasal Cannula; Titrate 1-6 LPM Per SpO2; 90 - 95%  Continuous PRN       05/21/25 1239    Unscheduled  CBC & Differential  As Needed        Comments: Chest Tube Drainage Greater Than 100mL/hr      05/21/25 1239    Unscheduled  aPTT  As Needed        Comments: Chest Tube Drainage Greater Than 100mL/hr      05/21/25 1239    Unscheduled  Protime-INR  As Needed        Comments: Chest Tube Drainage Greater Than 100mL/hr      05/21/25 1239    Unscheduled  Fibrin Split Products  As Needed        Comments: Chest Tube Drainage Greater Than 100mL/hr      05/21/25 1239    Unscheduled  Fibrinogen  As Needed        Comments: Chest Tube Drainage Greater Than 100mL/hr      05/21/25 1239    Unscheduled  Treat Hypoglycemia As Recommended By [employer]™ & Notify Provider of Treatment  As Needed      Comments: Follow Hypoglycemia Orders As Outlined in Process Instructions (Open Order Report to View Full Instructions)  Notify Provider Any Time Hypoglycemia Treatment is Administered    05/21/25 1239    Unscheduled  If Insulin Infusion is Paused - Follow [facility] Instructions  As Needed       05/21/25 1239    Unscheduled  POC Glucose PRN  As Needed      Comments: [employer] recommended POC testing will vary between 15 minutes and 2 hours.      05/21/25 1239    Signed and Held  Inpatient Admission  Once         Signed and Held    Signed and Held  Insert Indwelling Urinary Catheter  Once        Comments: Follow Protocol As Outlined in Process Instructions (Open Order Report to View Full Instructions)    Signed and Held    Signed and Held  Assess Need for Indwelling Urinary Catheter - Follow Removal Protocol  Continuous        Comments: Follow Protocol As Outlined in Process Instructions (Open Order Report to View Full Instructions)    Signed and Held    Signed and Held  Urinary Catheter Care  Every Shift       Signed and Held    --  furosemide (LASIX) 20 MG tablet  Daily         05/21/25 9111    --  hydrALAZINE (APRESOLINE)  25 MG tablet  3 Times Daily         05/21/25 1702                  Ventilator/Non-Invasive Ventilation Settings (From admission, onward)       Start     Ordered    05/21/25 1240  Ventilator - Vent Mode: Alternate (Custom) Mode: See Comments  (Initial Vent Settings upon arrival to ICU - PAD)  Continuous,   Status:  Canceled        Comments: Initial Mechanical Ventilation Settings upon Arrival to the ICU: SIMV, Tidal Volume 5-7 mL/kg ideal body weight based on height formula, Respiratory Rate 14-16/min, PEEP 5 cmH2O if <90kg, PEEP 8 cmH2O if >90kg, FiO2 60%.   Question:  Vent Mode  Answer:  Alternate (Custom) Mode: See Comments    05/21/25 1239    05/21/25 1240  Ventilator - Vent Mode: Alternate (Custom) Mode: See Comments  (Within 30 Minutes Arrival to ICU - PAD)  Continuous        Comments: RT adjusts Mechanical Ventilation as needed to achieve: pH 7.35-7.45, pCO2 34-45 mmHg, PaO2 > 90 mmHg, SpO2 > 92%.   Question:  Vent Mode  Answer:  Alternate (Custom) Mode: See Comments    05/21/25 1239                     Operative/Procedure Notes (all)        Dilshad Keenan MD at 05/21/25 0807  Version 1 of 1         Cardiac Surgery Operative Note     Date of Procedure: 5/21/2025     Preoperative Diagnosis:   Severe Mitral Valve Regurgitation  Severe Aortic Valve Insufficiency  Hypertension  Hyperlipidemia  Obesity, BMI 31.1  GERD     Postoperative Diagnosis:  Same with Bicuspid Aortic Valve     Procedures Performed:  1. Mitral Valve Repair, Radical with Annuloplasty  2. AVR, Inspiris 23mm  3. LAAE with 35mm Atriclip     Procedure Summary: Mitral Valve Repair, (Closure of Cleft Between P1 and P2, Annuloplasty with 30mm Physio2 Ring), Aortic Valve Replacement (23mm Inspiris), LAAE (35mm Atriclip)     Surgeon:  Dilshad Keenan MD  Assistants: Verito Castillo CFA  Anesthesia Staff:  Lorelei Jackson MD  Anesthesia Type:  General     Estimated Blood Loss:  Minimal (Cell Saver)     Drains:  1. 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       Operative Indications:   Ms. Pino is a 69-year-old female she presented me with progressively worsening heart failure symptoms.  She has shortness of breath with activity fatigue.  She had known moderate aortic insufficiency and mild to moderate MR previously but underwent further workup and was found to progress to severe eccentric aortic insufficiency and severe mitral regurgitation with a posteriorly directed jet.  With this she was referred to me for consideration of surgical treatment of her valve disease.  She had no significant coronary artery disease, LV function was normal.  With this I discussed with her proceeding with aortic valve replacement, mitral valve repair versus replacement.  Her aortic valve did appear bicuspid on echocardiogram.  She understood and agreed to proceed.     Total aortic cross-clamp time: 114 minutes  Total cardiac bypass time:  149 minutes     Operative description in detail:  The patient was taken to the operative suite where she was placed in a supine position.  Induction of general anesthesia and placement of a single-lumen endotracheal tube was performed without remark.  Appropriate arterial and venous access was established without remark.  A right IJ central venous catheter was placed followed by a [place] pulmonary artery catheter by anesthesia staff.  The patient  was then prepped and draped in the usual and sterile surgical fashion.  A timeout was performed.  Perioperative antibiotics were administered. Beta blocker was given.     Median sternotomy was performed in standard fashion. Hemostasis was ensured along sternal edges.  Sternal retractor was placed.  Midline mediastinal fat and thymus were divided and pericardium opened.  A pericardial well was created.  The distal ascending aorta, right atrial appendage, and superior vena cava were cannulated for cardiopulmonary bypass standard fashion.  Aortic line was tested and was satisfactory.  A combined cardioplegia/aortic root vent set was secured with a horizontal mattress 4-0 Prolene suture.  A retrograde cardioplegia catheter was inserted into the coronary sinus to the free wall of the right atrium.  With an appropriate ACT cardiopulmonary bypass was commenced.      With that, I proceeded to apply the aortic cross-clamp and administered standard cardioplegia in antegrade and retrograde fashion.  I ensured there is no ventricular distention through the entirety of administration of cardioplegia.  I applied topical hypothermia to the ventricle upon arrest.       Sondergaard's groove was developed sharply and the left atrium was entered.  A drop sucker was placed in the left atrium to aid in decompression.  Once arrest occurred switched to retrograde cardioplegia given the severe AI, created aortotomy just below the aortic fat pad and extended towards the pulmonary artery and then into the noncoronary cusp using scissors.  I suspended the valve with sutures in each commissure.  I then gave the remaining antegrade dose with direct cardioplegia down each coronary.  There was excellent diastolic arrest.  Cardioplegia was given every 15 minutes combination of antegrade and retrograde plegia.    I then inspected the aortic valve it was per above findings aortic valve was incised in its entirety and valve sized to a 23 mm Inspiris  valve.    I then measured the left atrial appendage to 35mm atrial clip and placed in standard fashion.    I then turned my attention of the mitral valve.  I then extended the atriotomy superiorly and inferiorly.  Mitral retractor was placed and the mitral valve and left atrium was exposed.  I then placed commissure sutures to suspend the valve.  I examined the valve and there was some thickening of the anterior leaflet but mild at most.  There is a cleft between P1 and P2 and on testing this appeared to be where the MR was coming from.  I closed this cleft with interrupted 5-0 Ethibond sutures. I then tested the valve using Thomas balloon and the aortic outflow tract which showed appropriate Ant to Post leaflet ratio and excellent coaptation without significant MR.  I then ringed the mitral valve with 2-0 Ethibond repair sutures. I then passed the annular sutures through a 30 mm physio2 annuloplasty ring.  The ring was seated and all sutures tied using core knot device.  Valve was retested and showed excellent coaptation with essential no MR.  I was happy with the overall result of mitral valve repair. I tied down the final knots for the NeoChords. After this I performed de-airing maneuvers and close the left atrium with running 3-0 Prolene suture from each corner.    I then returned my attention to the aortic valve.  I resized the aortic annulus and confirmed 23 mm Inspiris valve is correct size.  I then placed 2-0 Ethibond sutures around the aortic valve annulus with pledgets on the ventricular side.  The sutures were passed through the sewing cuff of the 23 mm valve and it was seated into place.  It seated well and both coronary ostia were present above the annulus and not behind post.  All sutures were then tied using the core knot device.  I then performed appropriate de-airing maneuvers and closed the aorta in 2 layers using running 4-0 Prolene suture.  Suture line was reinforced with BioGlue.     With that  being accomplished, a terminal hotshot was administered.  The patient was placed in trendelenburg position.  Upon completion of terminal hotshot and placement of temporary epicardial pacing wires, with the aortic vent on high and pump flows diminished, the aortic cross-clamp was released.  With that, full support was implemented.  A nonworking beating phase was implemented.       Ventilation was restored.  With all in readiness, the heart was allowed to fill and then weaned off cardiopulmonary bypass.  We removed the aortic root vent/cardioplegia cannula set after ensuring no intracardiac air.  Its associated pursestring suture was tied securely and this was reinforced as per matter of routine.  The table was now placed in neutral position.  I decannulated the superior vena caval line and reinforced with Prolene suture.  I then removed the IVC venous line and reinforced it as a matter routine.  Systemic intravenous protamine was administered.  All associated blood volume was returned to the patient.  With continued good hemodynamics, I decannulated the arterial line and tied down it associated pursestring sutures.  Aortic cannulation site was reinforced as per routine.  At this time I tied down the previously snared pursestring suture.  The mediastinum was drained with 24 Hungarian Dean drains placed anteriorly and posteriorly.  I surveyed the chest and hemostasis was excellent.  The sternum was reapproximated with stainless steel wires.  In layers anatomically the soft tissue planes were reapproximated. Instruments, sharps, and sponge counts were reported as correct.  Dressings were placed.  She was transferred to the ICU in stable condition.        Complications: None     Disposition: Transferred to ICU in stable and guarded condition.     Dilshad Keenan M.D.  Cardiothoracic Surgeon    Electronically signed by Dilshad Keenan MD at 05/21/25 5433       Physician Progress Notes (last 24 hours)  Notes from 05/20/25 4619  through 05/21/25 1815   No notes of this type exist for this encounter.

## 2025-05-21 NOTE — H&P
No significant change.  Discussed again the operative plan, risks and benefits; she understands and agrees to proceed.    Dilshad Keenan M.D.  Cardiothoracic Surgeon    Cardiac Surgery Consultation     Referring Physician: Self-Referral, Cardiologist - Dr. Donis     Primary Care Physician: LYNNE Mei          Chief Complaint   Patient presents with    Aortic Stenosis       New patient self-referred            Subjective  History of Present Illness  The patient presents for evaluation of aortic and mitral valve regurgitation.     She was initially diagnosed with a heart murmur by her primary care physician, which led to a referral to Dr. Donis. An echocardiogram conducted 5 years ago revealed some valvular abnormalities but nothing needing intervention, but annual monitoring was recommended due to the presence of the murmur. A subsequent echocardiogram identified valvular issues, prompting further investigation via a transesophageal echocardiogram. This test revealed a congenital bicuspid aortic valve, aortic valve regurgitation, this was potentially causing impaired function of the adjacent valve. She reports experiencing dyspnea and fatigue, particularly during activities such as singing in [employer] or performing household chores like mopping or sweeping. These symptoms have progressively worsened over the past few months. She also reports occasional chest discomfort described as a burning sensation. She has no history of cardiac, pulmonary, or thoracic surgeries. She also reports no history of untreated streptococcal pharyngitis or rheumatic fever. 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 She occasionally requires additional rest breaks during work due to her symptoms. She has been prescribed Lasix, which provides some relief when she is seated.     She reports lower extremity edema and has been prescribed a diuretic. She suspects she may have sleep apnea and  experiences palpitations approximately once a week, even during sleep.     SOCIAL HISTORY  She does not smoke. 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     MEDICATIONS  Current: Lasix        Review of Systems   Constitutional:  Positive for activity change and fatigue. Negative for unexpected weight change.   Respiratory:  Positive for chest tightness and shortness of breath.    Cardiovascular:  Positive for leg swelling. Negative for chest pain.         A complete review of systems was performed, is negative except stated above.     Medical History   History reviewed. No pertinent past medical history.     Surgical History   History reviewed. No pertinent surgical history.     History reviewed. No pertinent family history.  Social History   Social History            Tobacco Use    Smoking status: Never       Passive exposure: Past (Pt reports her parents used to smoke)    Smokeless tobacco: Never   Vaping Use    Vaping status: Never Used   Substance Use Topics    Alcohol use: Never    Drug use: Never         Current Medications          Current Outpatient Medications   Medication Sig Dispense Refill    amLODIPine (NORVASC) 5 MG tablet Take 1 tablet by mouth Every 12 (Twelve) Hours.        b complex vitamins capsule Take 1 capsule by mouth Daily.        Cholecalciferol 50 MCG (2000 UT) capsule Take 1 capsule by mouth Daily.        Coenzyme Q10 (CO Q 10 PO) Take  by mouth.        cyanocobalamin (VITAMIN B-12) 500 MCG tablet Take 1 tablet by mouth Daily.        fluticasone (FLONASE) 50 MCG/ACT nasal spray 1 spray by Each Nare route Daily.        furosemide (LASIX) 20 MG tablet Take 1 tablet by mouth Daily.        HYDROcodone Bit-Homatrop MBr (HYCODAN) 5-1.5 MG/5ML solution Take 5 mL by mouth Every 6 (Six) Hours As Needed for Cough. 120 mL 0    levoFLOXacin (LEVAQUIN) 500 MG tablet Take 1 tablet by mouth Daily. 7 tablet 0    Misc Natural Products (BEET ROOT PO) Take  by mouth.         "montelukast (SINGULAIR) 10 MG tablet Take 1 tablet by mouth Daily.        multivitamin with minerals (therapeutic multivitamin-minerals) tablet tablet Take 1 tablet by mouth Daily.        NON FORMULARY Liver Aid        NON FORMULARY [employer] Hair Vitamins        Omega-3 Fatty Acids (OMEGA 3 PO) Take  by mouth.        omeprazole (priLOSEC) 40 MG capsule Take 1 capsule by mouth Daily.        potassium chloride (KLOR-CON M10) 10 MEQ CR tablet Take 1 tablet by mouth Daily.        Probiotic Product (PROBIOTIC PO) Take  by mouth.        Red Yeast Rice Extract (RED YEAST RICE PO) Take  by mouth.        telmisartan (MICARDIS) 40 MG tablet Take 1 tablet by mouth Daily.        Thiamine HCl (B-1 PO) Take  by mouth.        [START ON 5/20/2025] mupirocin (BACTROBAN) 2 % nasal ointment Administer 1 Application into the nostril(s) as directed by provider 1 time for 1 dose. Apply pea-sized amount to a Q-tip and swab each nare x 1 dose on 5/20/2025 at 1700 1 each 0      No current facility-administered medications for this visit.         Allergies:  Lisinopril and Sulfa antibiotics     Objective  Vital Signs  Visit Vitals  /78   Pulse 96   Ht 167.6 cm (66\")   Wt 73.6 kg (162 lb 3.2 oz)   SpO2 97%   BMI 26.18 kg/m²            Physical Exam  Vitals reviewed.   Constitutional:       General: She is not in acute distress.     Appearance: She is well-developed. She is not diaphoretic.   HENT:      Head: Normocephalic and atraumatic.   Eyes:      General: No scleral icterus.     Pupils: Pupils are equal, round, and reactive to light.   Neck:      Vascular: No JVD.      Trachea: No tracheal deviation.   Cardiovascular:      Rate and Rhythm: Normal rate and regular rhythm.      Heart sounds: Murmur heard.   Pulmonary:      Effort: Pulmonary effort is normal. No respiratory distress.      Breath sounds: Normal breath sounds. No stridor. No wheezing.   Abdominal:      General: There is no distension.      Palpations: Abdomen is soft. "      Tenderness: There is no abdominal tenderness.   Musculoskeletal:         General: No deformity. Normal range of motion.      Cervical back: Normal range of motion and neck supple.   Skin:     General: Skin is warm and dry.      Capillary Refill: Capillary refill takes less than 2 seconds.      Coloration: Skin is not pale.      Findings: No erythema or rash.   Neurological:      Mental Status: She is alert and oriented to person, place, and time.      Cranial Nerves: No cranial nerve deficit.   Psychiatric:         Behavior: Behavior normal.         Thought Content: Thought content normal.         Judgment: Judgment normal.         Physical Exam        Results Review:            WBC   Date Value Ref Range Status   04/14/2025 5.86 3.40 - 10.80 10*3/mm3 Final   03/24/2025 5.8 4.8 - 10.8 K/uL Final            RBC   Date Value Ref Range Status   04/14/2025 4.65 3.77 - 5.28 10*6/mm3 Final   03/24/2025 5 4.20 - 5.40 M/uL Final            Hemoglobin   Date Value Ref Range Status   04/14/2025 13.4 12.0 - 15.9 g/dL Final   03/24/2025 14.5 12.0 - 16.0 g/dL Final            Hematocrit   Date Value Ref Range Status   04/14/2025 40.0 34.0 - 46.6 % Final   03/24/2025 44.6 37.0 - 47.0 % Final            MCV   Date Value Ref Range Status   04/14/2025 86.0 79.0 - 97.0 fL Final   03/24/2025 89.2 81.0 - 99.0 fL Final            MCH   Date Value Ref Range Status   04/14/2025 28.8 26.6 - 33.0 pg Final   03/24/2025 29 27.0 - 31.0 pg Final            MCHC   Date Value Ref Range Status   04/14/2025 33.5 31.5 - 35.7 g/dL Final   03/24/2025 32.5 (L) 33.0 - 37.0 g/dL Final            RDW   Date Value Ref Range Status   04/14/2025 12.3 12.3 - 15.4 % Final   03/24/2025 12 11.5 - 14.5 % Final            RDW-SD   Date Value Ref Range Status   04/14/2025 38.5 37.0 - 54.0 fl Final            MPV   Date Value Ref Range Status   04/14/2025 11.2 6.0 - 12.0 fL Final   03/24/2025 12.1 9.4 - 12.3 fL Final            Platelets   Date Value Ref Range  Status   04/14/2025 189 140 - 450 10*3/mm3 Final   03/24/2025 174 130 - 400 K/uL Final            Neutrophil Rel %   Date Value Ref Range Status   03/24/2025 52.7 50.0 - 65.0 % Final            Neutrophil %   Date Value Ref Range Status   04/14/2025 54.8 42.7 - 76.0 % Final            Lymphocyte Rel %   Date Value Ref Range Status   03/24/2025 34.8 20.0 - 40.0 % Final            Lymphocyte %   Date Value Ref Range Status   04/14/2025 34.8 19.6 - 45.3 % Final            Monocyte Rel %   Date Value Ref Range Status   03/24/2025 10.2 (H) 0.0 - 10.0 % Final            Monocyte %   Date Value Ref Range Status   04/14/2025 7.8 5.0 - 12.0 % Final            Eosinophil Rel %   Date Value Ref Range Status   01/15/2018 0.6 0.0 - 5.0 % Final            Eosinophil %   Date Value Ref Range Status   04/14/2025 1.9 0.3 - 6.2 % Final   03/24/2025 1.4 0.0 - 5.0 % Final            Basophil Rel %   Date Value Ref Range Status   03/24/2025 0.7 0.0 - 1.0 % Final            Basophil %   Date Value Ref Range Status   04/14/2025 0.5 0.0 - 1.5 % Final            Immature Grans %   Date Value Ref Range Status   04/14/2025 0.2 0.0 - 0.5 % Final            Neutrophils Absolute   Date Value Ref Range Status   03/24/2025 3 1.5 - 7.5 K/uL Final            Neutrophils, Absolute   Date Value Ref Range Status   04/14/2025 3.21 1.70 - 7.00 10*3/mm3 Final            Lymphocytes Absolute   Date Value Ref Range Status   03/24/2025 2 1.1 - 4.5 K/uL Final            Lymphocytes, Absolute   Date Value Ref Range Status   04/14/2025 2.04 0.70 - 3.10 10*3/mm3 Final            Monocytes Absolute   Date Value Ref Range Status   03/24/2025 0.6 0.00 - 0.90 K/uL Final            Monocytes, Absolute   Date Value Ref Range Status   04/14/2025 0.46 0.10 - 0.90 10*3/mm3 Final            Eosinophils Absolute   Date Value Ref Range Status   03/24/2025 0.1 0.00 - 0.60 K/uL Final            Eosinophils, Absolute   Date Value Ref Range Status   04/14/2025 0.11 0.00 - 0.40  10*3/mm3 Final            Basophils Absolute   Date Value Ref Range Status   03/24/2025 0 0.00 - 0.20 K/uL Final            Basophils, Absolute   Date Value Ref Range Status   04/14/2025 0.03 0.00 - 0.20 10*3/mm3 Final            Immature Grans, Absolute   Date Value Ref Range Status   04/14/2025 0.01 0.00 - 0.05 10*3/mm3 Final   03/24/2025 0 K/uL Final            nRBC   Date Value Ref Range Status   04/14/2025 0.0 0.0 - 0.2 /100 WBC Final            Glucose   Date Value Ref Range Status   04/14/2025 98 65 - 99 mg/dL Final   03/24/2025 93 70 - 99 mg/dL Final            Sodium   Date Value Ref Range Status   04/14/2025 140 136 - 145 mmol/L Final   03/24/2025 139 136 - 145 mmol/L Final            Potassium   Date Value Ref Range Status   04/14/2025 4.0 3.5 - 5.2 mmol/L Final   03/24/2025 4.3 3.5 - 5.1 mmol/L Final            CO2   Date Value Ref Range Status   04/14/2025 25.0 22.0 - 29.0 mmol/L Final   03/24/2025 27 22 - 29 mmol/L Final            Chloride   Date Value Ref Range Status   04/14/2025 104 98 - 107 mmol/L Final   03/24/2025 103 98 - 107 mmol/L Final            Anion Gap   Date Value Ref Range Status   04/14/2025 11.0 5.0 - 15.0 mmol/L Final   03/24/2025 9 8 - 16 mmol/L Final            Creatinine   Date Value Ref Range Status   04/14/2025 1.13 (H) 0.57 - 1.00 mg/dL Final   03/24/2025 0.7 0.5 - 0.9 mg/dL Final            BUN   Date Value Ref Range Status   04/14/2025 17 8 - 23 mg/dL Final   03/24/2025 21 8 - 23 mg/dL Final            BUN/Creatinine Ratio   Date Value Ref Range Status   04/14/2025 15.0 7.0 - 25.0 Final            Calcium   Date Value Ref Range Status   04/14/2025 9.7 8.6 - 10.5 mg/dL Final   03/24/2025 9.6 8.8 - 10.2 mg/dL Final              eGFR Non  Am   Date Value Ref Range Status   01/15/2018 >60 >60 Final       Comment:       This calculation may be inaccurate for patients under the age of 18 years.  For ages 18 and older, a GFR >60 mL/min/1.73m2 (not corrected for weight)  is  valid for stable renal function.            Alkaline Phosphatase   Date Value Ref Range Status   04/14/2025 70 39 - 117 U/L Final   03/24/2025 66 35 - 104 U/L Final            Total Protein   Date Value Ref Range Status   04/14/2025 6.9 6.0 - 8.5 g/dL Final   03/24/2025 7.3 6.4 - 8.3 g/dL Final            ALT (SGPT)   Date Value Ref Range Status   04/14/2025 28 1 - 33 U/L Final   03/24/2025 36 (H) 10 - 35 U/L Final            AST (SGOT)   Date Value Ref Range Status   04/14/2025 24 1 - 32 U/L Final   03/24/2025 27 10 - 35 U/L Final            Total Bilirubin   Date Value Ref Range Status   04/14/2025 0.2 0.0 - 1.2 mg/dL Final   03/24/2025 0.3 0.2 - 1.2 mg/dL Final            Albumin   Date Value Ref Range Status   04/14/2025 4.3 3.5 - 5.2 g/dL Final   03/24/2025 4.7 3.5 - 5.2 g/dL Final   01/15/2018 4.0 3.5 - 5.2 g/dL Final            Globulin   Date Value Ref Range Status   04/14/2025 2.6 gm/dL Final                     I reviewed the patient's clinical results and discussed with patient.     Results  I personally reviewed echocardiogram the following is my interpretation:  Normal LV function, there is severe mitral regurgitation with a posteriorly directed jet and prolapse of the anterior leaflet, difficult to say exactly where this is but it does correspond to where the eccentric AI jet is.  In some views it appears that there is prolapse of the posterior leaflet but there is not significant MR associated with this. There is severe eccentric aortic valve regurgitation with bicuspid valve, moderate calcifications, there is mild to moderate aortic valve stenosis there is a mild to moderate septal bulge below the aortic valve     Coronary Angiography 3/27/2025:  1.  No significant CAD.   2.  Proceed with surgical consultation for complex valvular heart disease   as mentioned in primary cardiologist note.   3.  Follow-up with primary cardiologist as scheduled.     Procedure Details   The patient was prepped and  draped in the usual sterile fashion. A micropuncture needle was used to obtain right radial arterial access. After access was gained, we placed to a short 6 slender sheath. Through this sheath, we advanced our usual diagnostic catheters over a 0.038 inch wire to perform our diagnostic cardiac catheterization.     An LVEDP was measured but an LV gram was not performed given recent echo.     Coronary Findings Diagnostic Dominance: Left   Left Main: The vessel is large.   Left Anterior Descending: The vessel is moderate in size. The vessel is angiographically normal.   Left Circumflex: The vessel is angiographically normal.   Right Coronary Artery: The vessel is small. The vessel is angiographically normal.     Intervention   No interventions have been documented.     Left Ventricle   LV EDP is: 18.     Bleeding Risk Calculator   Bleeding Risk points = 0.     Cath Recommendations   Post cardiac cath protocol is recommended.         Assessment & Plan  Assessment & Plan     Ms. Pino is a 69-year-old female she presents to me with complex valvular heart disease.  She has severe eccentric aortic valve insufficiency likely associated with a bicuspid aortic valve.  She also has severe eccentric mitral valve regurgitation.  She has class II NYHA heart failure symptoms.  She is never had surgery on her lungs or chest.  She has known about some valvular heart disease for some time but recently she has been experiencing more heart failure type symptoms with fatigue and shortness of breath with activity, no chest pain.  She has normal endorgan function.  She does not remember having rheumatic fever before.     I discussed with Ms. Pino and her son today the natural history of valvular heart disease and patient such as hers.  We discussed causes, and treatment options.  I would recommend aortic valve replacement mitral valve repair versus replacement.  I did discuss with her the possibility of this being from rheumatic disease  before and if this is the case more likely to have to replace mitral valve at time of surgery.  We discussed the rationale and expectations behind this they understand.  We discussed operative approaches and need for double valve surgery required sternotomy.  She understands and she is okay with this.  I discussed with her risk and benefits of surgery at length. We discussed the risk of surgery including but not limited to bleeding, infection, injury to major organs or vessels, chronic heart failure, arrhythmias, need for pacemaker, prolonged ventilation, renal failure, stroke, risk of anesthesia, risk of sternal wound or bone healing problems, reoperation, and/or death.  I did use the novel STS multi valve risk calculator, her risk of mortality is less than 2% for mitral valve repair aortic valve replacement.  We discussed this she understands.  She wants to proceed with surgery.     We will complete our workup and plan on surgery soon.  She will follow-up with [facility] cardiology after repair.  Thank you for trusting me with the care of Ms. Pino.  Please do not hesitate to call questions or concerns.           Dilshad Keenan MD   Cardiothoracic Surgeon

## 2025-05-22 ENCOUNTER — APPOINTMENT (OUTPATIENT)
Dept: GENERAL RADIOLOGY | Facility: HOSPITAL | Age: 70
End: 2025-05-22
Payer: MEDICARE

## 2025-05-22 LAB
ALBUMIN SERPL-MCNC: 4.3 G/DL (ref 3.5–5.2)
ANION GAP SERPL CALCULATED.3IONS-SCNC: 9 MMOL/L (ref 5–15)
BASOPHILS # BLD AUTO: 0.01 10*3/MM3 (ref 0–0.2)
BASOPHILS NFR BLD AUTO: 0.1 % (ref 0–1.5)
BH BB BLOOD EXPIRATION DATE: NORMAL
BH BB BLOOD TYPE BARCODE: 6200
BH BB DISPENSE STATUS: NORMAL
BH BB PRODUCT CODE: NORMAL
BH BB UNIT NUMBER: NORMAL
BUN SERPL-MCNC: 16 MG/DL (ref 8–23)
BUN/CREAT SERPL: 20 (ref 7–25)
CALCIUM SPEC-SCNC: 8.5 MG/DL (ref 8.6–10.5)
CHLORIDE SERPL-SCNC: 109 MMOL/L (ref 98–107)
CO2 SERPL-SCNC: 23 MMOL/L (ref 22–29)
CREAT SERPL-MCNC: 0.8 MG/DL (ref 0.57–1)
DEPRECATED RDW RBC AUTO: 42.7 FL (ref 37–54)
EGFRCR SERPLBLD CKD-EPI 2021: 79.4 ML/MIN/1.73
EOSINOPHIL # BLD AUTO: 0 10*3/MM3 (ref 0–0.4)
EOSINOPHIL NFR BLD AUTO: 0 % (ref 0.3–6.2)
ERYTHROCYTE [DISTWIDTH] IN BLOOD BY AUTOMATED COUNT: 13.2 % (ref 12.3–15.4)
GLUCOSE BLDC GLUCOMTR-MCNC: 142 MG/DL (ref 70–130)
GLUCOSE BLDC GLUCOMTR-MCNC: 143 MG/DL (ref 70–130)
GLUCOSE BLDC GLUCOMTR-MCNC: 150 MG/DL (ref 70–130)
GLUCOSE BLDC GLUCOMTR-MCNC: 156 MG/DL (ref 70–130)
GLUCOSE BLDC GLUCOMTR-MCNC: 157 MG/DL (ref 70–130)
GLUCOSE SERPL-MCNC: 151 MG/DL (ref 65–99)
HCT VFR BLD AUTO: 29.6 % (ref 34–46.6)
HGB BLD-MCNC: 9.8 G/DL (ref 12–15.9)
IMM GRANULOCYTES # BLD AUTO: 0.03 10*3/MM3 (ref 0–0.05)
IMM GRANULOCYTES NFR BLD AUTO: 0.3 % (ref 0–0.5)
INR PPP: 1.13 (ref 0.91–1.09)
LYMPHOCYTES # BLD AUTO: 0.32 10*3/MM3 (ref 0.7–3.1)
LYMPHOCYTES NFR BLD AUTO: 3.7 % (ref 19.6–45.3)
MAGNESIUM SERPL-MCNC: 2 MG/DL (ref 1.6–2.4)
MCH RBC QN AUTO: 29.3 PG (ref 26.6–33)
MCHC RBC AUTO-ENTMCNC: 33.1 G/DL (ref 31.5–35.7)
MCV RBC AUTO: 88.6 FL (ref 79–97)
MONOCYTES # BLD AUTO: 0.57 10*3/MM3 (ref 0.1–0.9)
MONOCYTES NFR BLD AUTO: 6.5 % (ref 5–12)
NEUTROPHILS NFR BLD AUTO: 7.78 10*3/MM3 (ref 1.7–7)
NEUTROPHILS NFR BLD AUTO: 89.4 % (ref 42.7–76)
PHOSPHATE SERPL-MCNC: 4.5 MG/DL (ref 2.5–4.5)
PLATELET # BLD AUTO: 87 10*3/MM3 (ref 140–450)
PMV BLD AUTO: 11.2 FL (ref 6–12)
POTASSIUM SERPL-SCNC: 4.1 MMOL/L (ref 3.5–5.2)
PROTHROMBIN TIME: 15.1 SECONDS (ref 11.8–14.8)
RBC # BLD AUTO: 3.34 10*6/MM3 (ref 3.77–5.28)
SODIUM SERPL-SCNC: 141 MMOL/L (ref 136–145)
UNIT  ABO: NORMAL
UNIT  RH: NORMAL
WBC NRBC COR # BLD AUTO: 8.71 10*3/MM3 (ref 3.4–10.8)

## 2025-05-22 PROCEDURE — 94761 N-INVAS EAR/PLS OXIMETRY MLT: CPT

## 2025-05-22 PROCEDURE — 82948 REAGENT STRIP/BLOOD GLUCOSE: CPT

## 2025-05-22 PROCEDURE — 80069 RENAL FUNCTION PANEL: CPT | Performed by: SURGERY

## 2025-05-22 PROCEDURE — 94664 DEMO&/EVAL PT USE INHALER: CPT

## 2025-05-22 PROCEDURE — 83735 ASSAY OF MAGNESIUM: CPT | Performed by: SURGERY

## 2025-05-22 PROCEDURE — 93010 ELECTROCARDIOGRAM REPORT: CPT | Performed by: INTERNAL MEDICINE

## 2025-05-22 PROCEDURE — 97116 GAIT TRAINING THERAPY: CPT

## 2025-05-22 PROCEDURE — 93005 ELECTROCARDIOGRAM TRACING: CPT | Performed by: SURGERY

## 2025-05-22 PROCEDURE — 85025 COMPLETE CBC W/AUTO DIFF WBC: CPT | Performed by: SURGERY

## 2025-05-22 PROCEDURE — 25810000003 SODIUM CHLORIDE 0.9 % SOLUTION 250 ML FLEX CONT: Performed by: SURGERY

## 2025-05-22 PROCEDURE — 25010000002 ONDANSETRON PER 1 MG: Performed by: SURGERY

## 2025-05-22 PROCEDURE — 71045 X-RAY EXAM CHEST 1 VIEW: CPT

## 2025-05-22 PROCEDURE — 85610 PROTHROMBIN TIME: CPT | Performed by: SURGERY

## 2025-05-22 PROCEDURE — 97162 PT EVAL MOD COMPLEX 30 MIN: CPT

## 2025-05-22 PROCEDURE — 25010000002 ENOXAPARIN PER 10 MG: Performed by: SURGERY

## 2025-05-22 PROCEDURE — 25010000002 VANCOMYCIN 1 G RECONSTITUTED SOLUTION 1 EACH VIAL: Performed by: SURGERY

## 2025-05-22 PROCEDURE — 94799 UNLISTED PULMONARY SVC/PX: CPT

## 2025-05-22 RX ORDER — METOPROLOL TARTRATE 25 MG/1
25 TABLET, FILM COATED ORAL EVERY 12 HOURS SCHEDULED
Status: DISCONTINUED | OUTPATIENT
Start: 2025-05-22 | End: 2025-05-29 | Stop reason: HOSPADM

## 2025-05-22 RX ORDER — LIDOCAINE 4 G/G
2 PATCH TOPICAL
Status: DISCONTINUED | OUTPATIENT
Start: 2025-05-22 | End: 2025-05-22

## 2025-05-22 RX ORDER — ACETAMINOPHEN 325 MG/1
650 TABLET ORAL EVERY 6 HOURS
Status: DISCONTINUED | OUTPATIENT
Start: 2025-05-22 | End: 2025-05-29 | Stop reason: HOSPADM

## 2025-05-22 RX ORDER — LIDOCAINE 4 G/G
2 PATCH TOPICAL EVERY 24 HOURS
Status: DISCONTINUED | OUTPATIENT
Start: 2025-05-23 | End: 2025-05-29 | Stop reason: HOSPADM

## 2025-05-22 RX ORDER — METHOCARBAMOL 500 MG/1
500 TABLET, FILM COATED ORAL EVERY 8 HOURS SCHEDULED
Status: DISCONTINUED | OUTPATIENT
Start: 2025-05-22 | End: 2025-05-28

## 2025-05-22 RX ORDER — METOPROLOL TARTRATE 25 MG/1
12.5 TABLET, FILM COATED ORAL ONCE
Status: COMPLETED | OUTPATIENT
Start: 2025-05-22 | End: 2025-05-22

## 2025-05-22 RX ADMIN — Medication 1 APPLICATION: at 17:57

## 2025-05-22 RX ADMIN — POLYETHYLENE GLYCOL 3350 17 G: 17 POWDER, FOR SOLUTION ORAL at 09:20

## 2025-05-22 RX ADMIN — Medication 1 APPLICATION: at 06:21

## 2025-05-22 RX ADMIN — LIDOCAINE 2 PATCH: 4 PATCH TOPICAL at 04:51

## 2025-05-22 RX ADMIN — METOPROLOL TARTRATE 12.5 MG: 25 TABLET, FILM COATED ORAL at 10:48

## 2025-05-22 RX ADMIN — BISACODYL 10 MG: 5 TABLET, COATED ORAL at 20:34

## 2025-05-22 RX ADMIN — OXYCODONE HYDROCHLORIDE 10 MG: 10 TABLET ORAL at 00:19

## 2025-05-22 RX ADMIN — ASPIRIN 162 MG: 81 TABLET, CHEWABLE ORAL at 09:23

## 2025-05-22 RX ADMIN — ACETAMINOPHEN 650 MG: 325 TABLET, FILM COATED ORAL at 07:54

## 2025-05-22 RX ADMIN — CHLORHEXIDINE GLUCONATE 1 APPLICATION: 500 CLOTH TOPICAL at 03:01

## 2025-05-22 RX ADMIN — ACETAMINOPHEN 650 MG: 325 TABLET, FILM COATED ORAL at 14:19

## 2025-05-22 RX ADMIN — IPRATROPIUM BROMIDE AND ALBUTEROL SULFATE 3 ML: 2.5; .5 SOLUTION RESPIRATORY (INHALATION) at 06:19

## 2025-05-22 RX ADMIN — OXYCODONE HYDROCHLORIDE 10 MG: 10 TABLET ORAL at 09:20

## 2025-05-22 RX ADMIN — VANCOMYCIN HYDROCHLORIDE 1000 MG: 1 INJECTION, POWDER, LYOPHILIZED, FOR SOLUTION INTRAVENOUS at 08:57

## 2025-05-22 RX ADMIN — ACETAMINOPHEN 650 MG: 325 TABLET, FILM COATED ORAL at 20:34

## 2025-05-22 RX ADMIN — METOPROLOL TARTRATE 25 MG: 25 TABLET, FILM COATED ORAL at 20:35

## 2025-05-22 RX ADMIN — MONTELUKAST SODIUM 10 MG: 10 TABLET, COATED ORAL at 09:20

## 2025-05-22 RX ADMIN — METOPROLOL TARTRATE 12.5 MG: 25 TABLET, FILM COATED ORAL at 09:20

## 2025-05-22 RX ADMIN — OXYCODONE HYDROCHLORIDE 10 MG: 10 TABLET ORAL at 04:50

## 2025-05-22 RX ADMIN — ONDANSETRON 4 MG: 2 INJECTION INTRAMUSCULAR; INTRAVENOUS at 09:31

## 2025-05-22 RX ADMIN — ATORVASTATIN CALCIUM 20 MG: 10 TABLET, FILM COATED ORAL at 20:35

## 2025-05-22 RX ADMIN — CALCIUM CHLORIDE 1000 MG: 100 INJECTION INTRAVENOUS; INTRAVENTRICULAR at 08:58

## 2025-05-22 RX ADMIN — VANCOMYCIN HYDROCHLORIDE 1000 MG: 1 INJECTION, POWDER, LYOPHILIZED, FOR SOLUTION INTRAVENOUS at 20:34

## 2025-05-22 RX ADMIN — FLUTICASONE PROPIONATE 1 SPRAY: 50 SPRAY, METERED NASAL at 09:22

## 2025-05-22 RX ADMIN — METHOCARBAMOL TABLETS 500 MG: 500 TABLET, COATED ORAL at 07:54

## 2025-05-22 RX ADMIN — ENOXAPARIN SODIUM 40 MG: 100 INJECTION SUBCUTANEOUS at 09:21

## 2025-05-22 RX ADMIN — PANTOPRAZOLE SODIUM 40 MG: 40 TABLET, DELAYED RELEASE ORAL at 06:21

## 2025-05-22 NOTE — PLAN OF CARE
Goal Outcome Evaluation:   A&O x 4. Extubated at 2036 when patient eventually woke up. 2 L NC. A paced at 90, underlying NSR 70s. Cardene on and off, currently off. 630 mL UOP. 258 mL MST. Last swan readings CO 5.11 / CI 2.94 / SV 57 / SVR 1361.   Current gtts: IVF x 2 and insulin @ 2.4  PRN devaughn x 3  Up to chair this am

## 2025-05-22 NOTE — PLAN OF CARE
Goal Outcome Evaluation:  Plan of Care Reviewed With: patient           Outcome Evaluation: PT treatment complete. She continues to be lethargic and complains of pain and weakness. She again stands and ambulates with min assist. Was able to ambulate 140 ft this afternoon needing cues for deep breathing and keeping her eyes open. PT will cont with mobility training and strengthening.    Anticipated Discharge Disposition (PT): home with assist, sub acute care setting

## 2025-05-22 NOTE — PLAN OF CARE
Goal Outcome Evaluation:  Plan of Care Reviewed With: patient, child           Outcome Evaluation: PT eval complete. She was found lethargic in the chair but oriented, son in room. Ms. Pino lives alone where she was independent in her mobiilty and ADL's. She was educated on sternal precautions and will need continued training. Today she stands and ambulates with min assist. Was able to ambulate 100 ft total. Demos pain, weakness, SOB and lethargy with activity. Needs cues to keep her eyes open and for deep breathing. PT will cont with mobility training and strengthening. I am hopeful she will recover and d.c home w family assist. If needed, consider short term placement for rehab.    Anticipated Discharge Disposition (PT): home with assist, sub acute care setting

## 2025-05-22 NOTE — THERAPY TREATMENT NOTE
Patient Name: Gladys Pino  : 1955    MRN: [id number]                              Today's Date: 2025       Admit Date: 2025    Visit Dx:     ICD-10-CM ICD-9-CM   1. Impaired mobility [Z74.09]  Z74.09 799.89   2. Severe aortic valve regurgitation  I35.1 424.1   3. Severe mitral valve regurgitation  I34.0 424.0     Patient Active Problem List   Diagnosis    Severe aortic valve regurgitation    Severe mitral valve regurgitation    Aortic valve insufficiency     Past Medical History:   Diagnosis Date    Allergic rhinitis     Aortic valve disease     Arrhythmia     Elevated cholesterol     GERD (gastroesophageal reflux disease)     Hyperlipidemia     Hypertension      Past Surgical History:   Procedure Laterality Date    AORTIC VALVE REPAIR/REPLACEMENT N/A 2025    Procedure: AORTIC VALVE REPLACEMENT 23MM, MITRAL VALVE REPAIR 30MM RING, LEFT ATRIAL APPENDAGE EXCLUSION 35MM, TRANSESOPHAGEAL ECHOCARDIOGRAM;  Surgeon: Dilshad Keenan MD;  Location:  [facility];  Service: Cardiothoracic;  Laterality: N/A;    ATRIAL APPENDAGE EXCLUSION LEFT WITH TRANSESOPHAGEAL ECHOCARDIOGRAM N/A 2025    Procedure: ATRIAL APPENDAGE EXCLUSION LEFT WITH TRANSESOPHAGEAL ECHOCARDIOGRAM;  Surgeon: Dilshad Keenan MD;  Location:  [facility];  Service: Cardiothoracic;  Laterality: N/A;    CATARACT EXTRACTION       SECTION      METATARSAL OSTEOTOMY Bilateral     MITRAL VALVE REPAIR/REPLACEMENT N/A 2025    Procedure: MITRAL VALVE REPAIR/REPLACEMENT;  Surgeon: Dilshad Keenan MD;  Location:  [facility];  Service: Cardiothoracic;  Laterality: N/A;      General Information       Row Name 25 1400 25 0855       Physical Therapy Time and Intention    Document Type therapy note (daily note)  -SERGEY evaluation  s/p  MVR, AVR, annuloplasty  -SERGEY    Mode of Treatment physical therapy  -SERGEY physical therapy  -SERGEY      Row Name 25 1400 25 0855       General Information    Patient Profile Reviewed yes  -SERGEY  yes  -SERGEY    Prior Level of Function -- independent:;all household mobility;community mobility;ADL's  -SERGEY    Existing Precautions/Restrictions fall;oxygen therapy device and L/min;sternal  chest tube  -SERGEY fall;oxygen therapy device and L/min;sternal  Chest tube  -SERGEY    Barriers to Rehab -- medically complex  -SERGEY      Row Name 05/22/25 0855          Living Environment    Current Living Arrangements home  -SERGEY     People in Home alone  -SERGEY       Row Name 05/22/25 0855          Home Main Entrance    Number of Stairs, Main Entrance one  -SERGEY       Row Name 05/22/25 0855          Stairs Within Home, Primary    Number of Stairs, Within Home, Primary none  -SERGEY       Row Name 05/22/25 0855          Cognition    Orientation Status (Cognition) oriented to;person;place;situation  lethargic  -SERGEY       Row Name 05/22/25 1400 05/22/25 0855       Safety Issues/Impairments Affecting Functional Mobility    Impairments Affecting Function (Mobility) balance;endurance/activity tolerance;strength;shortness of breath;pain  -SERGEY balance;endurance/activity tolerance;strength;shortness of breath;pain  -SERGEY              User Key  (r) = Recorded By, (t) = Taken By, (c) = Cosigned By      Initials Name Provider Type    SERGEY Jackson Ramon, PT DPT Physical Therapist                   Mobility       Row Name 05/22/25 0855          Bed Mobility    Comment, (Bed Mobility) in chair  -SERGEY       Row Name 05/22/25 1400 05/22/25 0855       Sit-Stand Transfer    Sit-Stand [place] (Transfers) minimum assist (75% patient effort)  -SERGEY minimum assist (75% patient effort)  -SERGEY      Row Name 05/22/25 1400 05/22/25 0855       Gait/Stairs (Locomotion)    [place] Level (Gait) minimum assist (75% patient effort)  -SERGEY minimum assist (75% patient effort)  -SERGEY    Distance in Feet (Gait) 140  -SERGEY 100  -SERGEY    Deviations/Abnormal Patterns (Gait) mendoza decreased;gait speed decreased;antalgic  -SERGEY mendoza decreased;gait speed decreased  -SERGEY    Bilateral Gait  Deviations forward flexed posture  -SERGEY forward flexed posture  -SERGEY              User Key  (r) = Recorded By, (t) = Taken By, (c) = Cosigned By      Initials Name Provider Type    Jackson Woods PT DPT Physical Therapist                   Obj/Interventions       Row Name 05/22/25 0855          Range of Motion Comprehensive    General Range of Motion bilateral lower extremity ROM WFL  -SERGEY       Row Name 05/22/25 0855          Strength Comprehensive (MMT)    Comment, General Manual Muscle Testing (MMT) Assessment B LE grossly 3+/5  -SERGEY       Row Name 05/22/25 0855          Balance    Balance Assessment sitting dynamic balance;standing dynamic balance  -SERGEY     Dynamic Sitting Balance standby assist  -SERGEY     Position, Sitting Balance unsupported;sitting in chair  -SERGEY     Dynamic Standing Balance minimal assist  -SERGEY     Position/Device Used, Standing Balance supported  -SERGEY               User Key  (r) = Recorded By, (t) = Taken By, (c) = Cosigned By      Initials Name Provider Type    Jackson Woods PT DPT Physical Therapist                   Goals/Plan       Row Name 05/22/25 0855          Bed Mobility Goal 1 (PT)    Activity/Assistive Device (Bed Mobility Goal 1, PT) sit to supine/supine to sit  -SERGEY     [place] Level/Cues Needed (Bed Mobility Goal 1, PT) supervision required  -SERGEY     Time Frame (Bed Mobility Goal 1, PT) long term goal (LTG);10 days  -SERGEY     Progress/Outcomes (Bed Mobility Goal 1, PT) new goal  -SERGEY       Row Name 05/22/25 0855          Transfer Goal 1 (PT)    Activity/Assistive Device (Transfer Goal 1, PT) sit-to-stand/stand-to-sit;bed-to-chair/chair-to-bed  -SERGEY     [place] Level/Cues Needed (Transfer Goal 1, PT) supervision required  -SERGEY     Time Frame (Transfer Goal 1, PT) long term goal (LTG);10 days  -SERGEY     Progress/Outcome (Transfer Goal 1, PT) new goal  -SERGEY       Row Name 05/22/25 0855          Gait Training Goal 1 (PT)    Activity/Assistive Device (Gait Training Goal 1, PT)  gait (walking locomotion);decrease fall risk;improve balance and speed;increase endurance/gait distance  -SERGEY     [place] Level (Gait Training Goal 1, PT) supervision required  -SERGEY     Distance (Gait Training Goal 1, PT) 200 ft  -SERGEY     Time Frame (Gait Training Goal 1, PT) long term goal (LTG);10 days  -SERGEY     Progress/Outcome (Gait Training Goal 1, PT) new goal  -SERGEY       Row Name 05/22/25 0855          Therapy Assessment/Plan (PT)    Planned Therapy Interventions (PT) bed mobility training;transfer training;gait training;balance training;home exercise program;patient/family education;postural re-education;stair training;strengthening  -SERGEY               User Key  (r) = Recorded By, (t) = Taken By, (c) = Cosigned By      Initials Name Provider Type    Jackson Woods, PT DPT Physical Therapist                   Clinical Impression       Row Name 05/22/25 1400 05/22/25 0855       Pain    Pain Location chest  -SERGEY chest  -SERGEY    Pain Management Interventions exercise or physical activity utilized;premedicated for activity  -SERGEY exercise or physical activity utilized  -SERGEY    Additional Documentation Pain Scale: FACES Pre/Post-Treatment (Group)  -SERGEY Pain Scale: FACES Pre/Post-Treatment (Group)  -SERGEY      Row Name 05/22/25 1400 05/22/25 0855       Pain Scale: FACES Pre/Post-Treatment    Pain: FACES Scale, Pretreatment 4-->hurts little more  -SERGEY 4-->hurts little more  -SERGEY    Posttreatment Pain Rating 4-->hurts little more  -SERGEY 4-->hurts little more  -SERGEY      Row Name 05/22/25 1400 05/22/25 0855       Plan of Care Review    Plan of Care Reviewed With patient  -SERGEY patient;child  -SERGEY    Outcome Evaluation PT treatment complete. She continues to be lethargic and complains of pain and weakness. She again stands and ambulates with min assist. Was able to ambulate 140 ft this afternoon needing cues for deep breathing and keeping her eyes open. PT will cont with mobility training and strengthening.  -SERGEY PT eval complete.  She was found lethargic in the chair but oriented, son in room. Ms. Pino lives alone where she was independent in her mobiilty and ADL's. She was educated on sternal precautions and will need continued training. Today she stands and ambulates with min assist. Was able to ambulate 100 ft total. Demos pain, weakness, SOB and lethargy with activity. Needs cues to keep her eyes open and for deep breathing. PT will cont with mobility training and strengthening. I am hopeful she will recover and d.c home w family assist. If needed, consider short term placement for rehab.  -SERGEY      Row Name 05/22/25 0855          Therapy Assessment/Plan (PT)    Patient/Family Therapy Goals Statement (PT) decrease pain  -SERGEY     Rehab Potential (PT) good  -SERGEY     Criteria for Skilled Interventions Met (PT) yes;meets criteria;skilled treatment is necessary  -SERGEY     Therapy Frequency (PT) 2 times/day  -SERGEY     Predicted Duration of Therapy Intervention (PT) until d.c  -SERGEY       Row Name 05/22/25 1400 05/22/25 0855       Vital Signs    Pre Systolic BP Rehab 115  -SERGEY 107  -SERGEY    Pre Treatment Diastolic BP 88  -SERGEY 68  -SERGEY    Post Systolic BP Rehab 120  -SERGEY 113  -SERGEY    Post Treatment Diastolic BP 87  -SERGEY 78  -SERGEY    Pretreatment Heart Rate (beats/min) 90  -SERGEY 92  -SERGEY    Posttreatment Heart Rate (beats/min) 89  -SERGEY 77  -SERGEY    Pre SpO2 (%) 94  -SERGEY 94  -SERGEY    O2 Delivery Pre Treatment nasal cannula  -SERGEY nasal cannula  -SERGEY    O2 Delivery Intra Treatment nasal cannula  -SERGEY nasal cannula  -SERGEY    Post SpO2 (%) 94  -SERGEY 91  -SERGEY    O2 Delivery Post Treatment nasal cannula  -SERGEY nasal cannula  -SERGEY    Pre Patient Position Sitting  -SERGEY Sitting  -SERGEY    Intra Patient Position Standing  -SERGEY Standing  -SERGEY    Post Patient Position Sitting  -SERGEY Sitting  -SERGEY      Row Name 05/22/25 1400 05/22/25 0855       Positioning and Restraints    Pre-Treatment Position sitting in chair/recliner  -SERGEY sitting in chair/recliner  -SERGEY    Post Treatment Position chair  -SERGEY chair  -SERGEY     In Chair reclined;call light within reach;encouraged to call for assist;RUE elevated;LUE elevated;RLE elevated;LLE elevated;patient within staff view;with family/caregiver;with nsg  -SERGEY reclined;call light within reach;encouraged to call for assist;RUE elevated;LUE elevated;RLE elevated;LLE elevated;with family/caregiver;patient within staff view;with nsg  B SCD  -SERGEY              User Key  (r) = Recorded By, (t) = Taken By, (c) = Cosigned By      Initials Name Provider Type    Jackson Woods, PT DPT Physical Therapist                   Outcome Measures       Row Name 05/22/25 1400 05/22/25 0855       How much help from another person do you currently need...    Turning from your back to your side while in flat bed without using bedrails? 2  -SERGEY 2  -SERGEY    Moving from lying on back to sitting on the side of a flat bed without bedrails? 2  -SERGEY 2  -SERGEY    Moving to and from a bed to a chair (including a wheelchair)? 3  -SERGEY 3  -SERGEY    Standing up from a chair using your arms (e.g., wheelchair, bedside chair)? 3  -SERGEY 3  -SERGEY    Climbing 3-5 steps with a railing? 2  -SERGEY 2  -SERGEY    To walk in hospital room? 3  -SERGEY 3  -SERGEY    AM-PAC 6 Clicks Score (PT) 15  -SERGEY 15  -SERGEY    Highest Level of Mobility Goal Move to Chair/Commode-4  -SERGEY Move to Chair/Commode-4  -SERGEY      Row Name 05/22/25 0759 05/22/25 0706       How much help from another person do you currently need...    Turning from your back to your side while in flat bed without using bedrails? 2  -SP 2  -SE    Moving from lying on back to sitting on the side of a flat bed without bedrails? 2  -SP 2  -SE    Moving to and from a bed to a chair (including a wheelchair)? 2  -SP 3  -SE    Standing up from a chair using your arms (e.g., wheelchair, bedside chair)? 3  -SP 3  -SE    Climbing 3-5 steps with a railing? 2  -SP 2  -SE    To walk in hospital room? 2  -SP 3  -SE    AM-PAC 6 Clicks Score (PT) 13  -SP 15  -SE    Highest Level of Mobility Goal Move to Chair/Commode-4  -SP  Move to Chair/Commode-4  -SE      Row Name 05/22/25 1400 05/22/25 0855       Functional Assessment    Outcome Measure Options AM-PAC 6 Clicks Basic Mobility (PT)  -SERGEY AM-PAC 6 Clicks Basic Mobility (PT)  -SERGEY              User Key  (r) = Recorded By, (t) = Taken By, (c) = Cosigned By      Initials Name Provider Type    Jackson Woods, PT DPT Physical Therapist    Hernando Kolb RN Registered Nurse    Laisha Lino RN Registered Nurse                                 Physical Therapy Education       Title: PT OT SLP Therapies (In Progress)       Topic: Physical Therapy (In Progress)       Point: Mobility training (In Progress)       Learning Progress Summary            Patient Acceptance, E, NR by SERGEY at 5/22/2025 1400    Comment: gait progression, PT POC    Acceptance, E, NR by SERGEY at 5/22/2025 0855    Comment: Benefits of activity, progression of PT, sternal prec                      Point: Home exercise program (Not Started)       Learner Progress:  Not documented in this visit.              Point: Body mechanics (Not Started)       Learner Progress:  Not documented in this visit.              Point: Precautions (In Progress)       Learning Progress Summary            Patient Acceptance, E, NR by SERGEY at 5/22/2025 0855    Comment: Benefits of activity, progression of PT, sternal prec                                      User Key       Initials Effective Dates Name Provider Type Magui RINCON 02/03/23 -  Jackson Ramon, PT DPT Physical Therapist PT                  PT Recommendation and Plan  Planned Therapy Interventions (PT): bed mobility training, transfer training, gait training, balance training, home exercise program, patient/family education, postural re-education, stair training, strengthening  Outcome Evaluation: PT treatment complete. She continues to be lethargic and complains of pain and weakness. She again stands and ambulates with min assist. Was able to ambulate 140 ft this afternoon  needing cues for deep breathing and keeping her eyes open. PT will cont with mobility training and strengthening.     Time Calculation:         PT Charges       Row Name 05/22/25 1522 05/22/25 0933          Time Calculation    Start Time 1400  -SERGEY 0855  -SERGEY     Stop Time 1430  -SERGEY 0933  -SERGEY     Time Calculation (min) 30 min  -SERGEY 38 min  -SERGEY     PT Received On 05/22/25  -SERGEY 05/22/25  -SERGEY     PT Goal Re-Cert Due Date -- 06/01/25  -SERGEY        Timed Charges    74415 - Gait Training Minutes  30  -SERGEY --        Total Minutes    Timed Charges Total Minutes 30  -SERGEY --      Total Minutes 30  -SERGEY --               User Key  (r) = Recorded By, (t) = Taken By, (c) = Cosigned By      Initials Name Provider Type    Jackson Woods, PT DPT Physical Therapist                  Therapy Charges for Today       Code Description Service Date Service Provider Modifiers Qty    15442693216 HC PT EVAL MOD COMPLEXITY 3 5/22/2025 Jackson Ramon, PT DPT GP 1    30788612869 HC GAIT TRAINING EA 15 MIN 5/22/2025 Jackson Ramon, PT DPT GP 2            PT G-Codes  Outcome Measure Options: AM-PAC 6 Clicks Basic Mobility (PT)  AM-PAC 6 Clicks Score (PT): 15  PT Discharge Summary  Anticipated Discharge Disposition (PT): home with assist, sub acute care setting    Jackson Ramon, PT DPT  5/22/2025

## 2025-05-22 NOTE — PROGRESS NOTES
RT EQUIPMENT DEVICE RELATED - SKIN ASSESSMENT    Dennys Score:  Dennys Score: 13     RT Medical Equipment/Device:     ETT Garcia/Anchorfast    Skin Assessment:      Cheek:  Intact  Neck:  Intact  Lips:  Intact  Mouth:  Intact    Device Skin Pressure Protection:  Skin-to-device areas padded:  Anchorfast    Nurse Notification:  Brandi Harp Canup, CRT

## 2025-05-22 NOTE — THERAPY EVALUATION
Patient Name: Gladys Pino  : 1955    MRN: [id number]                              Today's Date: 2025       Admit Date: 2025    Visit Dx:     ICD-10-CM ICD-9-CM   1. Impaired mobility [Z74.09]  Z74.09 799.89   2. Severe aortic valve regurgitation  I35.1 424.1   3. Severe mitral valve regurgitation  I34.0 424.0     Patient Active Problem List   Diagnosis    Severe aortic valve regurgitation    Severe mitral valve regurgitation    Aortic valve insufficiency     Past Medical History:   Diagnosis Date    Allergic rhinitis     Aortic valve disease     Arrhythmia     Elevated cholesterol     GERD (gastroesophageal reflux disease)     Hyperlipidemia     Hypertension      Past Surgical History:   Procedure Laterality Date    CATARACT EXTRACTION       SECTION      METATARSAL OSTEOTOMY Bilateral       General Information       Row Name 25 0855          Physical Therapy Time and Intention    Document Type evaluation  s/p  MVR, AVR, annuloplasty  -SERGEY     Mode of Treatment physical therapy  -SERGEY       Row Name 25 0855          General Information    Patient Profile Reviewed yes  -SERGEY     Prior Level of Function independent:;all household mobility;community mobility;ADL's  -SERGEY     Existing Precautions/Restrictions fall;oxygen therapy device and L/min;sternal  Chest tube  -SERGEY     Barriers to Rehab medically complex  -SERGEY       Row Name 25 0855          Living Environment    Current Living Arrangements home  -SERGEY     People in Home alone  -SERGEY       Row Name 25 0855          Home Main Entrance    Number of Stairs, Main Entrance one  -SERGEY       Row Name 25 0855          Stairs Within Home, Primary    Number of Stairs, Within Home, Primary none  -SERGEY       Row Name 25 0855          Cognition    Orientation Status (Cognition) oriented to;person;place;situation  lethargic  -SERGEY       Row Name 25 0855          Safety Issues/Impairments Affecting Functional Mobility     Impairments Affecting Function (Mobility) balance;endurance/activity tolerance;strength;shortness of breath;pain  -SERGEY               User Key  (r) = Recorded By, (t) = Taken By, (c) = Cosigned By      Initials Name Provider Type    Jackson Woods PT DPT Physical Therapist                   Mobility       Row Name 05/22/25 0855          Bed Mobility    Comment, (Bed Mobility) in chair  -SERGEY       Row Name 05/22/25 0855          Sit-Stand Transfer    Sit-Stand [place] (Transfers) minimum assist (75% patient effort)  -SERGEY       Row Name 05/22/25 08          Gait/Stairs (Locomotion)    [place] Level (Gait) minimum assist (75% patient effort)  -SERGEY     Distance in Feet (Gait) 100  -SERGEY     Deviations/Abnormal Patterns (Gait) mendoza decreased;gait speed decreased  -SERGEY     Bilateral Gait Deviations forward flexed posture  -SERGEY               User Key  (r) = Recorded By, (t) = Taken By, (c) = Cosigned By      Initials Name Provider Type    Jackson Woods PT DPT Physical Therapist                   Obj/Interventions       Row Name 05/22/25 0855          Range of Motion Comprehensive    General Range of Motion bilateral lower extremity ROM WFL  -SERGEY       [facility] Name 05/22/25 0855          Strength Comprehensive (MMT)    Comment, General Manual Muscle Testing (MMT) Assessment B LE grossly 3+/5  -SERGEY       [facility] Name 05/22/25 0855          Balance    Balance Assessment sitting dynamic balance;standing dynamic balance  -SERGEY     Dynamic Sitting Balance standby assist  -SERGEY     Position, Sitting Balance unsupported;sitting in chair  -SERGEY     Dynamic Standing Balance minimal assist  -SERGEY     Position/Device Used, Standing Balance supported  -SERGEY               User Key  (r) = Recorded By, (t) = Taken By, (c) = Cosigned By      Initials Name Provider Type    Jackson Woods PT DPT Physical Therapist                   Goals/Plan       Row Name 05/22/25 0855          Bed Mobility Goal 1 (PT)    Activity/Assistive Device  (Bed Mobility Goal 1, PT) sit to supine/supine to sit  -SERGEY     [place] Level/Cues Needed (Bed Mobility Goal 1, PT) supervision required  -SERGEY     Time Frame (Bed Mobility Goal 1, PT) long term goal (LTG);10 days  -SERGEY     Progress/Outcomes (Bed Mobility Goal 1, PT) new goal  -SERGEY       Row Name 05/22/25 0855          Transfer Goal 1 (PT)    Activity/Assistive Device (Transfer Goal 1, PT) sit-to-stand/stand-to-sit;bed-to-chair/chair-to-bed  -SERGEY     [place] Level/Cues Needed (Transfer Goal 1, PT) supervision required  -SERGEY     Time Frame (Transfer Goal 1, PT) long term goal (LTG);10 days  -SERGEY     Progress/Outcome (Transfer Goal 1, PT) new goal  -SERGEY       Row Name 05/22/25 0855          Gait Training Goal 1 (PT)    Activity/Assistive Device (Gait Training Goal 1, PT) gait (walking locomotion);decrease fall risk;improve balance and speed;increase endurance/gait distance  -SERGEY     [place] Level (Gait Training Goal 1, PT) supervision required  -SERGEY     Distance (Gait Training Goal 1, PT) 200 ft  -SERGEY     Time Frame (Gait Training Goal 1, PT) long term goal (LTG);10 days  -SERGEY     Progress/Outcome (Gait Training Goal 1, PT) new goal  -SERGEY       Row Name 05/22/25 0855          Therapy Assessment/Plan (PT)    Planned Therapy Interventions (PT) bed mobility training;transfer training;gait training;balance training;home exercise program;patient/family education;postural re-education;stair training;strengthening  -SERGEY               User Key  (r) = Recorded By, (t) = Taken By, (c) = Cosigned By      Initials Name Provider Type    Jackson Woods, PT DPT Physical Therapist                   Clinical Impression       Row Name 05/22/25 0855          Pain    Pain Location chest  -SERGEY     Pain Management Interventions exercise or physical activity utilized  -SERGEY     Additional Documentation Pain Scale: FACES Pre/Post-Treatment (Group)  -SERGEY       Row Name 05/22/25 0855          Pain Scale: FACES Pre/Post-Treatment    Pain:  FACES Scale, Pretreatment 4-->hurts little more  -SERGEY     Posttreatment Pain Rating 4-->hurts little more  -SERGEY       Row Name 05/22/25 0855          Plan of Care Review    Plan of Care Reviewed With patient;child  -SERGEY     Outcome Evaluation PT eval complete. She was found lethargic in the chair but oriented, son in room. Ms. Pino lives alone where she was independent in her mobiilty and ADL's. She was educated on sternal precautions and will need continued training. Today she stands and ambulates with min assist. Was able to ambulate 100 ft total. Demos pain, weakness, SOB and lethargy with activity. Needs cues to keep her eyes open and for deep breathing. PT will cont with mobility training and strengthening. I am hopeful she will recover and d.c home w family assist. If needed, consider short term placement for rehab.  -SERGEY       Row Name 05/22/25 0855          Therapy Assessment/Plan (PT)    Patient/Family Therapy Goals Statement (PT) decrease pain  -SERGEY     Rehab Potential (PT) good  -SERGEY     Criteria for Skilled Interventions Met (PT) yes;meets criteria;skilled treatment is necessary  -SERGEY     Therapy Frequency (PT) 2 times/day  -SERGEY     Predicted Duration of Therapy Intervention (PT) until d.c  -SERGEY       Row Name 05/22/25 0855          Vital Signs    Pre Systolic BP Rehab 107  -SERGEY     Pre Treatment Diastolic BP 68  -SERGEY     Post Systolic BP Rehab 113  -SERGEY     Post Treatment Diastolic BP 78  -SERGEY     Pretreatment Heart Rate (beats/min) 92  -SERGEY     Posttreatment Heart Rate (beats/min) 77  -SERGEY     Pre SpO2 (%) 94  -SERGEY     O2 Delivery Pre Treatment nasal cannula  -SERGEY     O2 Delivery Intra Treatment nasal cannula  -SERGEY     Post SpO2 (%) 91  -SERGEY     O2 Delivery Post Treatment nasal cannula  -SERGEY     Pre Patient Position Sitting  -SERGEY     Intra Patient Position Standing  -SERGEY     Post Patient Position Sitting  -SERGEY       Row Name 05/22/25 0855          Positioning and Restraints    Pre-Treatment Position sitting in  chair/recliner  -SERGEY     Post Treatment Position chair  -SERGEY     In Chair reclined;call light within reach;encouraged to call for assist;RUE elevated;LUE elevated;RLE elevated;LLE elevated;with family/caregiver;patient within staff view;with nsg  B SCD  -SERGEY               User Key  (r) = Recorded By, (t) = Taken By, (c) = Cosigned By      Initials Name Provider Type    Jackson Woods, PT DPT Physical Therapist                   Outcome Measures       Row Name 05/22/25 0855 05/22/25 0759       How much help from another person do you currently need...    Turning from your back to your side while in flat bed without using bedrails? 2  -SERGEY 2  -SP    Moving from lying on back to sitting on the side of a flat bed without bedrails? 2  -SERGEY 2  -SP    Moving to and from a bed to a chair (including a wheelchair)? 3  -SERGEY 2  -SP    Standing up from a chair using your arms (e.g., wheelchair, bedside chair)? 3  -SERGEY 3  -SP    Climbing 3-5 steps with a railing? 2  -SERGEY 2  -SP    To walk in hospital room? 3  -SERGEY 2  -SP    AM-PAC 6 Clicks Score (PT) 15  -SERGEY 13  -SP    Highest Level of Mobility Goal Move to Chair/Commode-4  -SERGEY Move to Chair/Commode-4  -SP      Row Name 05/22/25 0000          How much help from another person do you currently need...    Turning from your back to your side while in flat bed without using bedrails? 2  -MM     Moving from lying on back to sitting on the side of a flat bed without bedrails? 2  -MM     Moving to and from a bed to a chair (including a wheelchair)? 2  -MM     Standing up from a chair using your arms (e.g., wheelchair, bedside chair)? 3  -MM     Climbing 3-5 steps with a railing? 2  -MM     To walk in hospital room? 2  -MM     AM-PAC 6 Clicks Score (PT) 13  -MM     Highest Level of Mobility Goal Move to Chair/Commode-4  -MM       Row Name 05/22/25 0855          Functional Assessment    Outcome Measure Options AM-PAC 6 Clicks Basic Mobility (PT)  -SERGEY               User Moon  (r) = Recorded  By, (t) = Taken By, (c) = Cosigned By      Initials Name Provider Type    Jackson Woods, PT DPT Physical Therapist    Liliane Edgar, RN Registered Nurse    Laisha Lino, RN Registered Nurse                                 Physical Therapy Education       Title: PT OT SLP Therapies (In Progress)       Topic: Physical Therapy (In Progress)       Point: Mobility training (In Progress)       Learning Progress Summary            Patient Acceptance, E, NR by SERGEY at 5/22/2025 0855    Comment: Benefits of activity, progression of PT, sternal prec                      Point: Home exercise program (Not Started)       Learner Progress:  Not documented in this visit.              Point: Body mechanics (Not Started)       Learner Progress:  Not documented in this visit.              Point: Precautions (In Progress)       Learning Progress Summary            Patient Acceptance, E, NR by SERGEY at 5/22/2025 0855    Comment: Benefits of activity, progression of PT, sternal prec                                      User Key       Initials Effective Dates Name Provider Type Discipline    SERGEY 02/03/23 -  Jackson Ramon PT DPT Physical Therapist PT                  PT Recommendation and Plan  Planned Therapy Interventions (PT): bed mobility training, transfer training, gait training, balance training, home exercise program, patient/family education, postural re-education, stair training, strengthening  Outcome Evaluation: PT eval complete. She was found lethargic in the chair but oriented, son in room. Ms. Pino lives alone where she was independent in her mobiilty and ADL's. She was educated on sternal precautions and will need continued training. Today she stands and ambulates with min assist. Was able to ambulate 100 ft total. Demos pain, weakness, SOB and lethargy with activity. Needs cues to keep her eyes open and for deep breathing. PT will cont with mobility training and strengthening. I am hopeful she will  recover and d.c home w family assist. If needed, consider short term placement for rehab.     Time Calculation:         PT Charges       Row Name 05/22/25 0933             Time Calculation    Start Time 0855  -SERGEY      Stop Time 0933  -SERGEY      Time Calculation (min) 38 min  -SERGEY      PT Received On 05/22/25  -SERGEY      PT Goal Re-Cert Due Date 06/01/25  -SERGEY                User Key  (r) = Recorded By, (t) = Taken By, (c) = Cosigned By      Initials Name Provider Type    Jackson Woods, PT DPT Physical Therapist                  Therapy Charges for Today       Code Description Service Date Service Provider Modifiers Qty    97959408961 HC PT EVAL MOD COMPLEXITY 3 5/22/2025 Jackson Ramon, PT DPT GP 1            PT G-Codes  Outcome Measure Options: AM-PAC 6 Clicks Basic Mobility (PT)  AM-PAC 6 Clicks Score (PT): 15  PT Discharge Summary  Anticipated Discharge Disposition (PT): home with assist, sub acute care setting    Jackson Ramon, PT DPT  5/22/2025

## 2025-05-22 NOTE — PROGRESS NOTES
"Patient name: Gladys Pino  Patient : 1955  VISIT # [id number]  MR #[id number]    Procedure:Procedure(s):  AORTIC VALVE REPLACEMENT 23MM, MITRAL VALVE REPAIR 30MM RING, LEFT ATRIAL APPENDAGE EXCLUSION 35MM, TRANSESOPHAGEAL ECHOCARDIOGRAM  MITRAL VALVE REPAIR/REPLACEMENT  ATRIAL APPENDAGE EXCLUSION LEFT WITH TRANSESOPHAGEAL ECHOCARDIOGRAM  Procedure Date:2025  POD:1 Day Post-Op    Subjective     Extubated overnight and on 2 L nasal cannula.  Weight is up 6 pounds from baseline.  Intermittently on and off Cardene during the night.  Due to walk with physical therapy.    Telemetry: Paced at 90  IV drips: IV fluids, insulin       Objective     Visit Vitals  /66 (BP Location: Left arm, Patient Position: Sitting)   Pulse 87   Temp 98.8 °F (37.1 °C) (Oral)   Resp 20   Ht 155 cm (61.02\")   Wt 77.4 kg (170 lb 9.6 oz)   SpO2 95%   BMI 32.21 kg/m²   Baseline weight 164 pounds    Intake/Output Summary (Last 24 hours) at 2025 0826  Last data filed at 2025 0749  Gross per 24 hour   Intake 6024.15 ml   Output 5053 ml   Net 971.15 ml     MCT: 408 mL since surgery, serosanguineous, no airleak    Lab:     CBC:  Results from last 7 days   Lab Units 25  0355 25  1649 25  1240   WBC 10*3/mm3 8.71 7.61 11.90*   HEMATOCRIT % 29.6* 29.6* 32.3*   PLATELETS 10*3/mm3 87* 89* 132*          BMP:  Results from last 7 days   Lab Units 25  0355 25  1649 25  1245 25  1240   SODIUM mmol/L 141 142  --  143   SODIUM, ARTERIAL mmol/L  --   --  143  --    POTASSIUM mmol/L 4.1 5.0  --  4.1   CHLORIDE mmol/L 109* 110*  --  111*   CO2 mmol/L 23.0 21.0*  --  21.0*   GLUCOSE mg/dL 151* 183*  --  126*   BUN mg/dL 16 18  --  18   CREATININE mg/dL 0.80 0.78  --  0.82          COAG:  Results from last 7 days   Lab Units 25  0355 25  1240   INR  1.13* 1.22*   APTT seconds  --  41.7*       IMAGES:       Imaging Results (Last 24 Hours)       Procedure Component Value Units Date/Time "    XR Chest 1 View [[id number]] Collected: 05/22/25 0656     Updated: 05/22/25 0701    Narrative:      EXAMINATION: XR CHEST 1 VW-        HISTORY: Post-Op Heart Surgery     A frontal projection of the chest is compared with the previous study  dated 5/21/2025.     The lungs are moderately well-expanded.     There is interval removal of the endotracheal tube and [place]-Souleymane  catheter. The left internal jugular vascular sheath is in place.  Mediastinal drain is in place.     There are atelectatic changes in the right lower lung medially.     There is no pleural effusion, pulmonary congestion or pneumothorax.     The heart size is not optimally visualized due to the AP projection.  There are findings of prior cardiac surgery. Left atrial appendage clamp  and prosthetic valves are in place.     No acute bony abnormality.       Impression:      1. The interval removal of the [place]-Souleymane catheter and endotracheal tube.  The remaining support lines and tubes are in place.  2. Atelectatic change in the right lower lung. The remaining lungs are  unremarkable. Post cardiac surgery changes.        This report was signed and finalized on 5/22/2025 6:58 AM by Dr. Estrella Gerard MD.       XR Chest 1 View [[id number]] Collected: 05/21/25 1336     Updated: 05/21/25 1341    Narrative:      EXAMINATION: XR CHEST 1 VW-        HISTORY: Post-Op Check Line & Tube Placement; I35.1-Nonrheumatic  aortic (valve) insufficiency; I34.0-Nonrheumatic mitral (valve)  insufficiency     A frontal projection of the chest is compared with the previous study  dated 5/19/2025.     The lungs are poorly expanded.     There are atelectatic change in the lungs bilaterally.     There is no evidence of recent infiltrate, pleural effusion, pulmonary  congestion or pneumothorax.     The heart size is not optimally evaluated due to the AP projection.  There is evidence of prior cardiac surgery. Left atrial appendage clamp  and valvular prostheses are in place.      Endotracheal tube is in place with distal end 3 cm from tracheal  bifurcation. A [place]-Souleymane catheter is seen through the right internal  jugular vein with distal end in the distal right main pulmonary artery.  Mediastinal drain is in place.     No acute bony abnormality.       Impression:      1. Post cardiac surgery changes with support  tubes and lines in place.  2. Poor lung expansion. No active cardiopulmonary disease.        This report was signed and finalized on 5/21/2025 1:38 PM by Dr. Estrella Gerard MD.             CXR:Chest tubes in stable position with no pneumothorax.  Right basilar atelectasis.    Physical Exam:  General: Alert, oriented. No apparent distress.   Cardiovascular: Regular rate and rhythm without murmur, rubs, or gallops.    Pulmonary: Clear to auscultation bilaterally without wheezing, rubs, or rales.  Chest: Sternotomy incision clean, dry, and intact. Sternum stable. No clicks. Chest tubes to 20 cm suction. No air leak. Fluid is serosanguineous.  Abdomen: Soft, nondistended, and nontender.  Extremities: Warm, moves all extremities. No edema.   Neurologic:  Grossly intact with no focal deficits.            Impression:  Severe mitral valve regurgitation  Severe aortic valve insufficiency  Hypertension  Hyperlipidemia  Obesity, BMI 31.1  GERD        Plan:   Begin bowel regimen  DC insulin gtt  Stop pacing  Replace calcium  Routine postcardiac surgery care  Encourage pulmonary toilet and ambulation  Keep chest tubes on today  Discussed with patient and nursing          LYNNE Ye  05/22/25  08:26 CDT

## 2025-05-22 NOTE — PLAN OF CARE
Goal Outcome Evaluation:  Plan of Care Reviewed With: patient, family        Progress: improving  Outcome Evaluation: Drowsiness throughout day. 2LNC. Emesis x1 after am pills. Zofran x1. SR with PACs, extra dose of 12.5 metroprolol given this am. Blood sugars < 150 throughout day. UOP 300ml. MST 150ml.       Problem: Adult Inpatient Plan of Care  Goal: Plan of Care Review  Outcome: Progressing  Flowsheets (Taken 5/22/2025 1700)  Progress: improving  Outcome Evaluation: Drowsiness throughout day. 2LNC. Emesis x1 after am pills. Zofran x1. SR with PACs, extra dose of 12.5 metroprolol given this am. Blood sugars < 150 throughout day. UOP 300ml. MST 150ml.  Plan of Care Reviewed With:   patient   family  Goal: Patient-Specific Goal (Individualized)  Outcome: Progressing  Goal: Absence of Hospital-Acquired Illness or Injury  Outcome: Progressing  Intervention: Identify and Manage Fall Risk  Recent Flowsheet Documentation  Taken 5/22/2025 1600 by Laisha Pino RN  Safety Promotion/Fall Prevention: safety round/check completed  Taken 5/22/2025 1500 by Laisha Pino RN  Safety Promotion/Fall Prevention: safety round/check completed  Taken 5/22/2025 1400 by Laisha Pino RN  Safety Promotion/Fall Prevention: safety round/check completed  Taken 5/22/2025 1300 by Laisha Pino RN  Safety Promotion/Fall Prevention: safety round/check completed  Taken 5/22/2025 1200 by Laisha Pino RN  Safety Promotion/Fall Prevention: safety round/check completed  Taken 5/22/2025 1100 by Laisha Pino RN  Safety Promotion/Fall Prevention: safety round/check completed  Taken 5/22/2025 1000 by Laisha Pino RN  Safety Promotion/Fall Prevention: safety round/check completed  Taken 5/22/2025 0900 by Laisha Pino RN  Safety Promotion/Fall Prevention: safety round/check completed  Taken 5/22/2025 0759 by Laisha Pino RN  Safety Promotion/Fall Prevention: safety round/check completed  Taken 5/22/2025 0700 by Alvarez  ERIKA Interiano  Safety Promotion/Fall Prevention: safety round/check completed  Intervention: Prevent Skin Injury  Recent Flowsheet Documentation  Taken 5/22/2025 1500 by Laisha Pino RN  Body Position:   tilted   right  Taken 5/22/2025 1300 by Laisha Pino RN  Body Position:   tilted   left  Taken 5/22/2025 1100 by Laisha Pino RN  Body Position:   tilted   right  Taken 5/22/2025 0900 by Laisha Pino RN  Body Position:   tilted   left  Taken 5/22/2025 0759 by Laisha Pino RN  Skin Protection:   silicone foam dressing in place   incontinence pads utilized  Taken 5/22/2025 0700 by Laisha Pino RN  Body Position:   tilted   right  Intervention: Prevent and Manage VTE (Venous Thromboembolism) Risk  Recent Flowsheet Documentation  Taken 5/22/2025 1600 by Laisha Pino RN  VTE Prevention/Management:   bilateral   SCDs (sequential compression devices) on  Taken 5/22/2025 1200 by Laisha Pino RN  VTE Prevention/Management:   bilateral   SCDs (sequential compression devices) on  Taken 5/22/2025 0759 by Laisha Pino RN  VTE Prevention/Management:   bilateral   SCDs (sequential compression devices) on  Goal: Optimal Comfort and Wellbeing  Outcome: Progressing  Intervention: Monitor Pain and Promote Comfort  Recent Flowsheet Documentation  Taken 5/22/2025 0920 by Laisha Pino RN  Pain Management Interventions: pain medication given  Taken 5/22/2025 0759 by Laisha Pino RN  Pain Management Interventions:   pain medication given   pain management plan reviewed with patient/caregiver   pillow support provided   position adjusted  Intervention: Provide Person-Centered Care  Recent Flowsheet Documentation  Taken 5/22/2025 1600 by Laisha Pino RN  Trust Relationship/Rapport: care explained  Taken 5/22/2025 1200 by Laisha Pino RN  Trust Relationship/Rapport: care explained  Taken 5/22/2025 0759 by Laisha Pino RN  Trust Relationship/Rapport:   care explained   questions  answered  Goal: Readiness for Transition of Care  Outcome: Progressing

## 2025-05-22 NOTE — PLAN OF CARE
Goal Outcome Evaluation: Extubated to nasal cannula 2L       Problem: Mechanical Ventilation Invasive  Goal: Effective Communication  Outcome: Met  Goal: Optimal Device Function  Outcome: Met  Intervention: Optimize Device Care and Function  Recent Flowsheet Documentation  Taken 5/21/2025 1926 by Katiuska Hester CRT  Airway/Ventilation Management:   airway patency maintained   oxygen therapy provided   positive pressure ventilation provided  Airway Safety Measures:   mask valve resuscitator at bedside   manual resuscitator/mask at bedside   oxygen flowmeter at bedside   suction at bedside  Goal: Mechanical Ventilation Liberation  Outcome: Met  Goal: Absence of Device-Related Skin and Tissue Injury  Outcome: Met  Intervention: Maintain Skin and Tissue Health  Recent Flowsheet Documentation  Taken 5/21/2025 1926 by Katiuska Hester CRT  Device Skin Pressure Protection:   tubing/devices free from skin contact   skin-to-device areas padded   absorbent pad utilized/changed  Goal: Absence of Ventilator-Induced Lung Injury  Outcome: Met  Intervention: Prevent Ventilator-Associated Pneumonia  Recent Flowsheet Documentation  Taken 5/21/2025 1926 by Katiuska Hester CRT  Head of Bed (HOB) Positioning: HOB at 30-45 degrees     Problem: Skin Injury Risk Increased  Goal: Skin Health and Integrity  Intervention: Optimize Skin Protection  Recent Flowsheet Documentation  Taken 5/21/2025 1926 by Katiuska Hester CRT  Head of Bed (HOB) Positioning: HOB at 30-45 degrees     Problem: Cardiovascular Surgery  Goal: Optimal Cerebral Tissue Perfusion  Intervention: Protect and Optimize Cerebral Perfusion  Recent Flowsheet Documentation  Taken 5/21/2025 1926 by Katiuska Hester CRT  Head of Bed (HOB) Positioning: HOB at 30-45 degrees  Goal: Effective Oxygenation and Ventilation  Intervention: Promote Airway Secretion Clearance  Recent Flowsheet Documentation  Taken 5/21/2025 1926 by Katiuska Hester CRT  Airway/Ventilation Management:   airway patency  maintained   oxygen therapy provided   positive pressure ventilation provided

## 2025-05-22 NOTE — CASE MANAGEMENT/SOCIAL WORK
Discharge Planning Assessment   [facility]     Patient Name: Gladys Pino  MRN: [id number]  Today's Date: 5/22/2025    Admit Date: 5/21/2025        Discharge Needs Assessment       Row Name 05/22/25 1113       Living Environment    People in Home alone    Current Living Arrangements home    Primary Care Provided by child(analilia)    Provides Primary Care For no one    Family Caregiver if Needed child(analilia), adult    Family Caregiver Names Betito    Able to Return to Prior Arrangements yes       Resource/Environmental Concerns    Resource/Environmental Concerns none       Transition Planning    Patient/Family Anticipates Transition to home with family    Transportation Anticipated family or friend will provide       Discharge Needs Assessment    Readmission Within the Last 30 Days no previous admission in last 30 days    Equipment Currently Used at Home none    Concerns to be Addressed no discharge needs identified    Equipment Needed After Discharge none    Discharge Coordination/Progress spoke to patient who lives alone has a son that is available to assist if needed patient works so is independent at home prior to illness; has RX coverage and PCP; will follow for DC needs                   Discharge Plan    No documentation.                      Demographic Summary    No documentation.                  Functional Status    No documentation.                  Psychosocial    No documentation.                  Abuse/Neglect    No documentation.                  Legal    No documentation.                  Substance Abuse    No documentation.                  Patient Forms    No documentation.                     Jayda Smith RN

## 2025-05-23 ENCOUNTER — APPOINTMENT (OUTPATIENT)
Dept: GENERAL RADIOLOGY | Facility: HOSPITAL | Age: 70
End: 2025-05-23
Payer: MEDICARE

## 2025-05-23 LAB
ALBUMIN SERPL-MCNC: 3.8 G/DL (ref 3.5–5.2)
ANION GAP SERPL CALCULATED.3IONS-SCNC: 9 MMOL/L (ref 5–15)
BASOPHILS # BLD AUTO: 0.02 10*3/MM3 (ref 0–0.2)
BASOPHILS NFR BLD AUTO: 0.2 % (ref 0–1.5)
BH BB BLOOD EXPIRATION DATE: NORMAL
BH BB BLOOD EXPIRATION DATE: NORMAL
BH BB BLOOD TYPE BARCODE: 6200
BH BB BLOOD TYPE BARCODE: 6200
BH BB DISPENSE STATUS: NORMAL
BH BB DISPENSE STATUS: NORMAL
BH BB PRODUCT CODE: NORMAL
BH BB PRODUCT CODE: NORMAL
BH BB UNIT NUMBER: NORMAL
BH BB UNIT NUMBER: NORMAL
BUN SERPL-MCNC: 20 MG/DL (ref 8–23)
BUN/CREAT SERPL: 27 (ref 7–25)
CALCIUM SPEC-SCNC: 9 MG/DL (ref 8.6–10.5)
CHLORIDE SERPL-SCNC: 106 MMOL/L (ref 98–107)
CO2 SERPL-SCNC: 22 MMOL/L (ref 22–29)
CREAT SERPL-MCNC: 0.74 MG/DL (ref 0.57–1)
CROSSMATCH INTERPRETATION: NORMAL
CROSSMATCH INTERPRETATION: NORMAL
DEPRECATED RDW RBC AUTO: 43.8 FL (ref 37–54)
EGFRCR SERPLBLD CKD-EPI 2021: 87.2 ML/MIN/1.73
EOSINOPHIL # BLD AUTO: 0.04 10*3/MM3 (ref 0–0.4)
EOSINOPHIL NFR BLD AUTO: 0.3 % (ref 0.3–6.2)
ERYTHROCYTE [DISTWIDTH] IN BLOOD BY AUTOMATED COUNT: 13.2 % (ref 12.3–15.4)
GLUCOSE BLDC GLUCOMTR-MCNC: 116 MG/DL (ref 70–130)
GLUCOSE BLDC GLUCOMTR-MCNC: 129 MG/DL (ref 70–130)
GLUCOSE BLDC GLUCOMTR-MCNC: 134 MG/DL (ref 70–130)
GLUCOSE SERPL-MCNC: 127 MG/DL (ref 65–99)
HCT VFR BLD AUTO: 30.7 % (ref 34–46.6)
HGB BLD-MCNC: 10 G/DL (ref 12–15.9)
IMM GRANULOCYTES # BLD AUTO: 0.06 10*3/MM3 (ref 0–0.05)
IMM GRANULOCYTES NFR BLD AUTO: 0.5 % (ref 0–0.5)
INR PPP: 1.1 (ref 0.91–1.09)
LYMPHOCYTES # BLD AUTO: 1.2 10*3/MM3 (ref 0.7–3.1)
LYMPHOCYTES NFR BLD AUTO: 10.1 % (ref 19.6–45.3)
MAGNESIUM SERPL-MCNC: 1.9 MG/DL (ref 1.6–2.4)
MCH RBC QN AUTO: 29.5 PG (ref 26.6–33)
MCHC RBC AUTO-ENTMCNC: 32.6 G/DL (ref 31.5–35.7)
MCV RBC AUTO: 90.6 FL (ref 79–97)
MONOCYTES # BLD AUTO: 1.32 10*3/MM3 (ref 0.1–0.9)
MONOCYTES NFR BLD AUTO: 11.1 % (ref 5–12)
NEUTROPHILS NFR BLD AUTO: 77.8 % (ref 42.7–76)
NEUTROPHILS NFR BLD AUTO: 9.25 10*3/MM3 (ref 1.7–7)
PHOSPHATE SERPL-MCNC: 3.4 MG/DL (ref 2.5–4.5)
PLATELET # BLD AUTO: 77 10*3/MM3 (ref 140–450)
PMV BLD AUTO: 11.7 FL (ref 6–12)
POTASSIUM SERPL-SCNC: 4.8 MMOL/L (ref 3.5–5.2)
PROTHROMBIN TIME: 14.8 SECONDS (ref 11.8–14.8)
RBC # BLD AUTO: 3.39 10*6/MM3 (ref 3.77–5.28)
SODIUM SERPL-SCNC: 137 MMOL/L (ref 136–145)
UNIT  ABO: NORMAL
UNIT  ABO: NORMAL
UNIT  RH: NORMAL
UNIT  RH: NORMAL
WBC NRBC COR # BLD AUTO: 11.89 10*3/MM3 (ref 3.4–10.8)

## 2025-05-23 PROCEDURE — 93005 ELECTROCARDIOGRAM TRACING: CPT | Performed by: SURGERY

## 2025-05-23 PROCEDURE — 93010 ELECTROCARDIOGRAM REPORT: CPT | Performed by: INTERNAL MEDICINE

## 2025-05-23 PROCEDURE — 85610 PROTHROMBIN TIME: CPT | Performed by: SURGERY

## 2025-05-23 PROCEDURE — 25010000002 METOCLOPRAMIDE PER 10 MG: Performed by: NURSE PRACTITIONER

## 2025-05-23 PROCEDURE — 80069 RENAL FUNCTION PANEL: CPT | Performed by: SURGERY

## 2025-05-23 PROCEDURE — 25010000002 ONDANSETRON PER 1 MG: Performed by: SURGERY

## 2025-05-23 PROCEDURE — 25010000002 FUROSEMIDE PER 20 MG: Performed by: NURSE PRACTITIONER

## 2025-05-23 PROCEDURE — 97116 GAIT TRAINING THERAPY: CPT

## 2025-05-23 PROCEDURE — 25010000002 MAGNESIUM SULFATE 4 GM/100ML SOLUTION: Performed by: SURGERY

## 2025-05-23 PROCEDURE — 25010000002 KETOROLAC TROMETHAMINE PER 15 MG: Performed by: NURSE PRACTITIONER

## 2025-05-23 PROCEDURE — 85025 COMPLETE CBC W/AUTO DIFF WBC: CPT | Performed by: SURGERY

## 2025-05-23 PROCEDURE — 71045 X-RAY EXAM CHEST 1 VIEW: CPT

## 2025-05-23 PROCEDURE — 83735 ASSAY OF MAGNESIUM: CPT | Performed by: SURGERY

## 2025-05-23 PROCEDURE — 82948 REAGENT STRIP/BLOOD GLUCOSE: CPT

## 2025-05-23 PROCEDURE — 25010000002 ENOXAPARIN PER 10 MG: Performed by: SURGERY

## 2025-05-23 RX ORDER — MAGNESIUM SULFATE HEPTAHYDRATE 40 MG/ML
4 INJECTION, SOLUTION INTRAVENOUS ONCE
Status: COMPLETED | OUTPATIENT
Start: 2025-05-23 | End: 2025-05-23

## 2025-05-23 RX ORDER — BISACODYL 10 MG
10 SUPPOSITORY, RECTAL RECTAL ONCE
Status: COMPLETED | OUTPATIENT
Start: 2025-05-23 | End: 2025-05-23

## 2025-05-23 RX ORDER — TRAMADOL HYDROCHLORIDE 50 MG/1
25 TABLET ORAL EVERY 6 HOURS PRN
Status: DISCONTINUED | OUTPATIENT
Start: 2025-05-23 | End: 2025-05-29 | Stop reason: HOSPADM

## 2025-05-23 RX ORDER — METOCLOPRAMIDE HYDROCHLORIDE 5 MG/ML
10 INJECTION INTRAMUSCULAR; INTRAVENOUS EVERY 6 HOURS
Status: COMPLETED | OUTPATIENT
Start: 2025-05-23 | End: 2025-05-24

## 2025-05-23 RX ORDER — POTASSIUM CHLORIDE 1500 MG/1
20 TABLET, EXTENDED RELEASE ORAL DAILY
Status: DISCONTINUED | OUTPATIENT
Start: 2025-05-23 | End: 2025-05-28

## 2025-05-23 RX ORDER — KETOROLAC TROMETHAMINE 15 MG/ML
15 INJECTION, SOLUTION INTRAMUSCULAR; INTRAVENOUS ONCE
Status: COMPLETED | OUTPATIENT
Start: 2025-05-23 | End: 2025-05-23

## 2025-05-23 RX ORDER — FUROSEMIDE 10 MG/ML
20 INJECTION INTRAMUSCULAR; INTRAVENOUS
Status: DISCONTINUED | OUTPATIENT
Start: 2025-05-23 | End: 2025-05-28

## 2025-05-23 RX ORDER — CALCIUM CARBONATE 500 MG/1
2 TABLET, CHEWABLE ORAL 3 TIMES DAILY PRN
Status: DISCONTINUED | OUTPATIENT
Start: 2025-05-23 | End: 2025-05-29 | Stop reason: HOSPADM

## 2025-05-23 RX ADMIN — Medication 1 APPLICATION: at 18:18

## 2025-05-23 RX ADMIN — BISACODYL 10 MG: 5 TABLET, COATED ORAL at 20:04

## 2025-05-23 RX ADMIN — TRAMADOL HYDROCHLORIDE 25 MG: 50 TABLET, COATED ORAL at 13:07

## 2025-05-23 RX ADMIN — CHLORHEXIDINE GLUCONATE 1 APPLICATION: 500 CLOTH TOPICAL at 04:25

## 2025-05-23 RX ADMIN — ANTACID TABLETS 2 TABLET: 500 TABLET, CHEWABLE ORAL at 08:32

## 2025-05-23 RX ADMIN — MAGNESIUM SULFATE HEPTAHYDRATE 4 G: 40 INJECTION, SOLUTION INTRAVENOUS at 07:51

## 2025-05-23 RX ADMIN — LIDOCAINE 2 PATCH: 4 PATCH TOPICAL at 04:07

## 2025-05-23 RX ADMIN — ONDANSETRON 4 MG: 2 INJECTION INTRAMUSCULAR; INTRAVENOUS at 08:11

## 2025-05-23 RX ADMIN — PANTOPRAZOLE SODIUM 40 MG: 40 TABLET, DELAYED RELEASE ORAL at 05:49

## 2025-05-23 RX ADMIN — POTASSIUM CHLORIDE 20 MEQ: 1500 TABLET, EXTENDED RELEASE ORAL at 12:24

## 2025-05-23 RX ADMIN — METOCLOPRAMIDE 10 MG: 5 INJECTION, SOLUTION INTRAMUSCULAR; INTRAVENOUS at 16:12

## 2025-05-23 RX ADMIN — Medication 1 APPLICATION: at 05:49

## 2025-05-23 RX ADMIN — ACETAMINOPHEN 650 MG: 325 TABLET, FILM COATED ORAL at 01:03

## 2025-05-23 RX ADMIN — KETOROLAC TROMETHAMINE 15 MG: 15 INJECTION, SOLUTION INTRAMUSCULAR; INTRAVENOUS at 09:20

## 2025-05-23 RX ADMIN — METOPROLOL TARTRATE 25 MG: 25 TABLET, FILM COATED ORAL at 20:06

## 2025-05-23 RX ADMIN — MONTELUKAST SODIUM 10 MG: 10 TABLET, COATED ORAL at 08:05

## 2025-05-23 RX ADMIN — ASPIRIN 81 MG: 81 TABLET, COATED ORAL at 08:05

## 2025-05-23 RX ADMIN — FUROSEMIDE 20 MG: 10 INJECTION, SOLUTION INTRAMUSCULAR; INTRAVENOUS at 18:18

## 2025-05-23 RX ADMIN — BISACODYL 10 MG: 5 TABLET, COATED ORAL at 08:05

## 2025-05-23 RX ADMIN — ACETAMINOPHEN 650 MG: 325 TABLET, FILM COATED ORAL at 08:05

## 2025-05-23 RX ADMIN — ATORVASTATIN CALCIUM 20 MG: 10 TABLET, FILM COATED ORAL at 20:02

## 2025-05-23 RX ADMIN — OXYCODONE HYDROCHLORIDE 5 MG: 5 TABLET ORAL at 07:50

## 2025-05-23 RX ADMIN — ACETAMINOPHEN 650 MG: 325 TABLET, FILM COATED ORAL at 13:07

## 2025-05-23 RX ADMIN — ENOXAPARIN SODIUM 40 MG: 100 INJECTION SUBCUTANEOUS at 08:04

## 2025-05-23 RX ADMIN — METOCLOPRAMIDE 10 MG: 5 INJECTION, SOLUTION INTRAMUSCULAR; INTRAVENOUS at 09:20

## 2025-05-23 RX ADMIN — METOPROLOL TARTRATE 25 MG: 25 TABLET, FILM COATED ORAL at 08:05

## 2025-05-23 RX ADMIN — METHOCARBAMOL TABLETS 500 MG: 500 TABLET, COATED ORAL at 13:07

## 2025-05-23 RX ADMIN — METOCLOPRAMIDE 10 MG: 5 INJECTION, SOLUTION INTRAMUSCULAR; INTRAVENOUS at 20:15

## 2025-05-23 RX ADMIN — FLUTICASONE PROPIONATE 1 SPRAY: 50 SPRAY, METERED NASAL at 08:06

## 2025-05-23 RX ADMIN — FUROSEMIDE 20 MG: 10 INJECTION, SOLUTION INTRAMUSCULAR; INTRAVENOUS at 12:24

## 2025-05-23 RX ADMIN — METHOCARBAMOL TABLETS 500 MG: 500 TABLET, COATED ORAL at 22:19

## 2025-05-23 RX ADMIN — POLYETHYLENE GLYCOL 3350 17 G: 17 POWDER, FOR SOLUTION ORAL at 08:06

## 2025-05-23 RX ADMIN — ACETAMINOPHEN 650 MG: 325 TABLET, FILM COATED ORAL at 20:01

## 2025-05-23 RX ADMIN — BISACODYL 10 MG: 10 SUPPOSITORY RECTAL at 13:51

## 2025-05-23 RX ADMIN — Medication 1 TABLET: at 08:06

## 2025-05-23 NOTE — PAYOR COMM NOTE
"5/22 CLINICAL    Gladys Pino (69 y.o. Female)       Date of Birth   1955    Social Security Number       Address   [address] [place] [id number]    Home Phone   [phone]    MRN   [id number]       Adventism   [facility]    Marital Status                               Admission Date   5/21/2025    Admission Type   Elective    Admitting Provider   Dilshad Keenan MD    Attending Provider   Dilshad Keenan MD    Department, Room/Bed   [facility] INTENSIVE CARE, I007/1       Discharge Date       Discharge Disposition       Discharge Destination                                 Attending Provider: Dilshad Keenan MD    Allergies: Lisinopril, Sulfa Antibiotics    Isolation: None   Infection: None   Code Status: CPR    Ht: 155 cm (61.02\")   Wt: 78.6 kg (173 lb 3.2 oz)    Admission Cmt: None   Principal Problem: Aortic valve insufficiency [I35.1]                   Active Insurance as of 5/21/2025       Primary Coverage       Payor Plan Insurance Group Employer/Plan Group    ANTHEM MEDICARE REPLACEMENT ANTHEM MEDICARE ADVANTAGE PPO [id number]       Payor Plan Address Payor Plan Phone Number Payor Plan Fax Number Effective Dates    [address] [phone]  1/1/2024 - None Entered    [facility] [id number]         Subscriber Name Subscriber Birth Date Member ID       GLADYS PINO 1955 [id number]                     Emergency Contacts        (Rel.) Home Phone Work Phone Mobile Phone    ROHIT PINO (Son) -- -- [phone]    kayla pino (Relative) -- -- [phone]              Current Facility-Administered Medications   Medication Dose Route Frequency Provider Last Rate Last Admin    acetaminophen (TYLENOL) tablet 650 mg  650 mg Oral Q4H PRN Dilshad Keenan MD        Or    acetaminophen (TYLENOL) 160 MG/5ML oral solution 650 mg  650 mg Oral Q4H PRN Dilshad Keenan MD        Or    acetaminophen (TYLENOL) suppository 650 mg  650 mg Rectal Q4H PRN Keenan, " Dilshad GLEZ MD        acetaminophen (TYLENOL) tablet 650 mg  650 mg Oral Q6H Jayda Bravo APRN   650 mg at 05/23/25 0103    albumin human 5 % solution 500 mL  500 mL Intravenous PRN Keenan, Dilshad GLEZ MD   500 mL at 05/21/25 1339    aspirin EC tablet 81 mg  81 mg Oral Daily Keenan, Dilshad GLEZ MD        atorvastatin (LIPITOR) tablet 20 mg  20 mg Oral Nightly Keenan, Dilshad GLEZ MD   20 mg at 05/22/25 2035    bisacodyl (DULCOLAX) EC tablet 10 mg  10 mg Oral BID Keenan, Dilshad GLEZ MD   10 mg at 05/22/25 2034    Calcium Replacement - Follow Nurse / BPA Driven Protocol   Not Applicable PRN Keenan, Dilshad GLEZ MD        Chlorhexidine Gluconate Cloth 2 % pads 1 Application  1 Application Topical Q24H Keenan, Dilshad GLEZ MD   1 Application at 05/23/25 0425    clevidipine (CLEVIPREX) infusion 0.5 mg/mL  2-32 mg/hr Intravenous Continuous PRN Keenan, Dilshad GLEZ MD        dexmedetomidine (PRECEDEX) 400 mcg in 100 mL NS infusion  0.2-1.5 mcg/kg/hr Intravenous Titrated Keenan, Dilshad GLEZ MD   Stopped at 05/21/25 1508    dextrose (D50W) (25 g/50 mL) IV injection 10-50 mL  10-50 mL Intravenous Q15 Min PRN Keenan, Dilshad GLEZ MD        dextrose (GLUTOSE) oral gel 15 g  15 g Oral Q15 Min PRN Keenan, Dilshad GLEZ MD        dextrose 5 % and sodium chloride 0.45 % with KCl 20 mEq/L infusion  30 mL/hr Intravenous Continuous Keenan, Dilshad GLEZ MD 30 mL/hr at 05/21/25 1403 30 mL/hr at 05/21/25 1403    enoxaparin sodium (LOVENOX) syringe 40 mg  40 mg Subcutaneous Daily Keenan, Dilshad GLEZ MD   40 mg at 05/22/25 0921    fluticasone (FLONASE) 50 MCG/ACT nasal spray 1 spray  1 spray Each Nare Daily Keenan, Dilshad GLEZ MD   1 spray at 05/22/25 0922    glucagon (GLUCAGEN) injection 1 mg  1 mg Intramuscular Q15 Min PRN Keenan, Dilshad GLEZ MD        insulin regular 1 unit/mL in 0.9% sodium chloride (Glucommander)  0-100 Units/hr Intravenous Titrated Keenan, Dilshad GLEZ MD   Stopped at 05/22/25 0754    Lidocaine 4 % 2 patch  2 patch Transdermal Q24H Dilshad Keenan MD   2 patch at 05/23/25 0407     Magnesium Cardiology Dose Replacement - Follow Nurse / BPA Driven Protocol   Not Applicable PRN Keenan, Dilshad GLEZ MD        methocarbamol (ROBAXIN) tablet 500 mg  500 mg Oral Q8H Jayda Bravo APRN   500 mg at 05/22/25 0754    metoprolol tartrate (LOPRESSOR) tablet 25 mg  25 mg Oral Q12H Jayda Bravo APRN   25 mg at 05/22/25 2035    montelukast (SINGULAIR) tablet 10 mg  10 mg Oral Daily Keenan, Dilshad GLEZ MD   10 mg at 05/22/25 0920    Morphine sulfate (PF) injection 2 mg  2 mg Intravenous Q30 Min PRN Keenan, Dilshad GLEZ MD        multivitamin with minerals 1 tablet  1 tablet Oral Daily Keenan, Dilshad GLEZ MD        mupirocin (BACTROBAN) 2 % nasal ointment 1 Application  1 Application Each Nare BID Keenan, Dilshad GLEZ MD   1 Application at 05/22/25 1757    niCARdipine (CARDENE) 20 mg in 200 mL NS infusion  2.5-15 mg/hr Intravenous Continuous PRN Keenan, Dilshad GLEZ MD   Held at 05/22/25 0520    norepinephrine (LEVOPHED) 8 mg in 250 mL NS infusion (premix)  0.02-0.3 mcg/kg/min Intravenous Continuous PRN Keenan, Dilshad GLEZ MD        ondansetron (ZOFRAN) injection 4 mg  4 mg Intravenous Q6H PRN Keenan, Dilshad GLEZ MD   4 mg at 05/22/25 0931    oxyCODONE (ROXICODONE) immediate release tablet 10 mg  10 mg Oral Q4H PRN Keenan, Dilshad GLEZ MD   10 mg at 05/22/25 0920    oxyCODONE (ROXICODONE) immediate release tablet 5 mg  5 mg Oral Q4H PRN Keenan, Dilshad GLEZ MD        pantoprazole (PROTONIX) EC tablet 40 mg  40 mg Oral Q AM Keenan, Dilshad GLEZ MD   40 mg at 05/22/25 0621    Pharmacy to dose vancomycin   Not Applicable Continuous PRN Keenan, Dilshad GLEZ MD        Phosphorus Replacement - Follow Nurse / BPA Driven Protocol   Not Applicable PRN Keenan, Dilshad GLEZ MD        polyethylene glycol (MIRALAX) packet 17 g  17 g Oral Daily Keenan, Dilshad GLEZ MD   17 g at 05/22/25 0920    Potassium Replacement - Follow Nurse / BPA Driven Protocol   Not Applicable PRN Keenan, Dilshad GLEZ MD        propofol (DIPRIVAN) infusion 10 mg/mL 100 mL  5-50 mcg/kg/min Intravenous  "Continuous PRN Keenan, Dilshad GLEZ MD         Orders (last 24 hrs)        Start     Ordered    05/24/25 0600  XR Chest PA & Lateral  1 Time Imaging         05/21/25 1239    05/23/25 1200  Remove All Dressings 48 Hours Post-Op  Once         05/21/25 1239    05/23/25 1200  Leave Incisions Open to Air Unless Draining or Edges Are Not Approximated  Continuous         05/21/25 1239    05/23/25 0900  aspirin EC tablet 81 mg  Daily         05/21/25 1239    05/23/25 0600  Wound Care  Daily       05/21/25 1239    05/23/25 0600  CBC (No Diff)  Daily       05/21/25 1239    05/23/25 0600  Basic Metabolic Panel  Daily       05/21/25 1239    05/23/25 0600  CBC Auto Differential  PROCEDURE ONCE         05/22/25 2201    05/23/25 0423  Manual Differential  Once,   Status:  Canceled         05/23/25 0422    05/23/25 0400  Lidocaine 4 % 2 patch  Every 24 Hours         05/22/25 1147    05/22/25 2100  atorvastatin (LIPITOR) tablet 20 mg  Nightly         05/21/25 1239    05/22/25 2100  metoprolol tartrate (LOPRESSOR) tablet 25 mg  Every 12 Hours Scheduled         05/22/25 1044    05/22/25 1648  POC Glucose Once  PROCEDURE ONCE        Comments: Complete no more than 45 minutes prior to patient eating      05/22/25 1636    05/22/25 1400  Intake & Output  Every Shift       05/21/25 1239    05/22/25 1237  acetaminophen (TYLENOL) tablet 650 mg  Every 4 Hours PRN        Placed in \"Or\" Linked Group    05/21/25 1239    05/22/25 1237  acetaminophen (TYLENOL) 160 MG/5ML oral solution 650 mg  Every 4 Hours PRN        Placed in \"Or\" Linked Group    05/21/25 1239    05/22/25 1237  acetaminophen (TYLENOL) suppository 650 mg  Every 4 Hours PRN        Placed in \"Or\" Linked Group    05/21/25 1239    05/22/25 1230  Lidocaine 4 % 2 patch  Every 24 Hours Scheduled,   Status:  Discontinued         05/22/25 1130    05/22/25 1200  Vital Signs  Every 4 Hours      Comments: Perform Vitals Less Frequently Only if Patient Stable      05/21/25 1239    05/22/25 1146  " POC Glucose Once  PROCEDURE ONCE        Comments: Complete no more than 45 minutes prior to patient eating      25 1120    25 1130  metoprolol tartrate (LOPRESSOR) tablet 12.5 mg  Once         25 1044    25 1118  POC Glucose Finger 4x Daily Before Meals & at Bedtime  4 Times Daily Before Meals & at Bedtime      Comments: Complete no more than 45 minutes prior to patient eating      25 1117    25 0935  PT Plan of Care Cert / Re-Cert  Once        Comments: Physical Therapy Plan of Care  Initial Certification  Certification Period: 2025 - 2025    Patient Name: Gladys Pnio  : 1955    (Z74.09) Impaired mobility [Z74.09]  (primary encounter diagnosis)  (I35.1) Severe aortic valve regurgitation  (I34.0) Severe mitral valve regurgitation                  Assessment  PT Assessment  Rehab Potential (PT): good  Criteria for Skilled Interventions Met (PT): yes, meets criteria, skilled treatment is necessary         PT Rehab Goals     Row Name 25 0855             Bed Mobility Goal 1 (PT)    Activity/Assistive Device (Bed Mobility Goal 1, PT) sit to supine/supine   to sit  -SERGEY      [place] Level/Cues Needed (Bed Mobility Goal 1, PT) supervision   required  -SERGEY      Time Frame (Bed Mobility Goal 1, PT) long term goal (LTG);10 days  -SERGEY      Progress/Outcomes (Bed Mobility Goal 1, PT) new goal  -SERGEY         Transfer Goal 1 (PT)    Activity/Assistive Device (Transfer Goal 1, PT)   sit-to-stand/stand-to-sit;bed-to-chair/chair-to-bed  -SERGEY      [place] Level/Cues Needed (Transfer Goal 1, PT) supervision required    -SERGEY      Time Frame (Transfer Goal 1, PT) long term goal (LTG);10 days  -SERGEY      Progress/Outcome (Transfer Goal 1, PT) new goal  -SERGEY         Gait Training Goal 1 (PT)    Activity/Assistive Device (Gait Training Goal 1, PT) gait (walking   locomotion);decrease fall risk;improve balance and speed;increase   endurance/gait distance  -SERGEY      [place]  Level (Gait Training Goal 1, PT) supervision required  -SERGEY        Distance (Gait Training Goal 1, PT) 200 ft  -SERGEY      Time Frame (Gait Training Goal 1, PT) long term goal (LTG);10 days  -SERGEY        Progress/Outcome (Gait Training Goal 1, PT) new goal  -SERGEY            User Key  (r) = Recorded By, (t) = Taken By, (c) = Cosigned By    Initials Name Provider Type Discipline    Jackson Woods, RICHARD DPT Physical Therapist PT               PT OP Goals     Row Name 05/22/25 0933          Time Calculation    PT Goal Re-Cert Due Date 06/01/25  -SERGEY           User Key  (r) = Recorded By, (t) = Taken By, (c) = Cosigned By    Initials Name Provider Type    Jackson Woods PT DPT Physical Therapist                  Plan  PT Plan  Therapy Frequency (PT): 2 times/day  Predicted Duration of Therapy Intervention (PT): until d.c  Planned Therapy Interventions (PT): bed mobility training, transfer training, gait training, balance training, home exercise program, patient/family education, postural re-education, stair training, strengthening  Outcome Evaluation: PT eval complete. She was found lethargic in the chair but oriented, son in room. Ms. Pino lives alone where she was independent in her mobiilty and ADL's. She was educated on sternal precautions and will need continued training. Today she stands and ambulates with min assist. Was able to ambulate 100 ft total. Demos pain, weakness, SOB and lethargy with activity. Needs cues to keep her eyes open and for deep breathing. PT will cont with mobility training and strengthening. I am hopeful she will recover and d.c home w family assist. If needed, consider short term placement for rehab.        Jackson Ramon, PT DPT  5/22/2025            By cosigning this order, either electronically or physically, I certify that the therapy services are furnished while this patient is under my care, the services outline above are required by this patient, and will be reviewed every 90  days.        M.D.:__________________________________________ Date: ______________    05/22/25 0935    05/22/25 0900  fluticasone (FLONASE) 50 MCG/ACT nasal spray 1 spray  Daily         05/21/25 1238    05/22/25 0900  enoxaparin sodium (LOVENOX) syringe 40 mg  Daily         05/21/25 1239    05/22/25 0900  aspirin chewable tablet 162 mg  Once         05/21/25 1239    05/22/25 0900  metoprolol tartrate (LOPRESSOR) tablet 12.5 mg  Every 12 Hours Scheduled,   Status:  Discontinued         05/21/25 1239    05/22/25 0900  bisacodyl (DULCOLAX) EC tablet 10 mg  2 Times Daily         05/21/25 1239    05/22/25 0900  polyethylene glycol (MIRALAX) packet 17 g  Daily         05/21/25 1239    05/22/25 0900  montelukast (SINGULAIR) tablet 10 mg  Daily         05/21/25 1241    05/22/25 0900  multivitamin with minerals 1 tablet  Daily         05/21/25 1241    05/22/25 0830  calcium chloride 1,000 mg in sodium chloride 0.9 % 100 mL IVPB  Once         05/22/25 0740    05/22/25 0800  Wean & Extubate Per Rapid Wean and Extubation Protocol  Every Morning,   Status:  Canceled       05/21/25 1239    05/22/25 0800  Wean FiO2 per Respiratory Therapist for SpO2  for SpO2 >92%, until at 30 % FiO2  Every Morning,   Status:  Canceled      Comments: Wean FiO2 per Respiratory Therapist for SpO2  for SpO2 >92%, until at 30 % FiO2    05/21/25 1239    05/22/25 0800  Wean Respiratory Rate by a minimum of 2 every hour if indicated until the rate of 4 is achieved.  Every Morning,   Status:  Canceled      Comments: Wean Respiratory Rate by a minimum of 2 every hour if indicated until the rate of 4 is achieved.    05/21/25 1239    05/22/25 0756  Diet: Cardiac; Healthy Heart (2-3 Na+); Fluid Consistency: Thin (IDDSI 0)  Diet Effective Now         05/22/25 0756    05/22/25 0745  acetaminophen (TYLENOL) tablet 650 mg  Every 6 Hours         05/22/25 0651    05/22/25 0745  methocarbamol (ROBAXIN) tablet 500 mg  Every 8 Hours Scheduled         05/22/25 0651     05/22/25 0641  POC Glucose Once  PROCEDURE ONCE        Comments: Complete no more than 45 minutes prior to patient eating      05/22/25 0630    05/22/25 0600  pantoprazole (PROTONIX) EC tablet 40 mg  Every Early Morning         05/21/25 1238    05/22/25 0600  CBC & Differential  Daily       05/21/25 1239    05/22/25 0600  Renal Function Panel  Daily       05/21/25 1239    05/22/25 0600  Magnesium  Daily       05/21/25 1239    05/22/25 0600  Protime-INR  Daily       05/21/25 1239    05/22/25 0600  XR Chest 1 View  Daily       05/21/25 1239    05/22/25 0600  ECG 12 Lead Other; s/p AVR and MV Repair  Daily       05/21/25 1239    05/22/25 0600  CBC Auto Differential  PROCEDURE ONCE         05/21/25 2201    05/22/25 0445  Lidocaine 4 % 2 patch  Every 24 Hours Scheduled,   Status:  Discontinued         05/22/25 0354    05/22/25 0400  Chlorhexidine Gluconate Cloth 2 % pads 1 Application  Every 24 Hours         05/21/25 1239 05/21/25 2000  vancomycin (VANCOCIN) 1,000 mg in sodium chloride 0.9 % 250 mL IVPB-VTB  Every 12 Hours         05/21/25 1246    05/21/25 2000  dextrose 5 % and sodium chloride 0.45 % infusion  Continuous         05/21/25 1903    05/21/25 2000  dextrose 5 % and sodium chloride 0.45 % infusion  Continuous         05/21/25 1905    05/21/25 1800  mupirocin (BACTROBAN) 2 % nasal ointment 1 Application  2 Times Daily         05/21/25 1239    05/21/25 1600  Check Peripheral Pulses Every 4 Hours  Every 4 Hours       05/21/25 1239    05/21/25 1500  ipratropium-albuterol (DUO-NEB) nebulizer solution 3 mL  4 Times Daily - RT,   Status:  Discontinued         05/21/25 1239 05/21/25 1430  dextrose 5 % and sodium chloride 0.45 % with KCl 20 mEq/L infusion  Continuous         05/21/25 1341    05/21/25 1400  Cardiac Output Parameters Every 2 Hours Until 24 Hours Post Op  Every 2 Hours       05/21/25 1239    05/21/25 1330  insulin regular 1 unit/mL in 0.9% sodium chloride (Glucommander)  Titrated        Note to  Pharmacy: Upon initiation of Glucommander insulin drip, make sure all other insulin orders and anti-diabetic medications have been discontinued.    05/21/25 1239    05/21/25 1330  dexmedetomidine (PRECEDEX) 400 mcg in 100 mL NS infusion  Titrated         05/21/25 1239    05/21/25 1330  dextrose 5 % and sodium chloride 0.45 % with KCl 20 mEq/L infusion  Continuous         05/21/25 1239    05/21/25 1300  Vital Signs Every Hour Until 24 Hours Post Op  Every Hour      Comments: Perform Vitals Less Frequently Only if Patient Stable      05/21/25 1239    05/21/25 1300  Intake & Output Every 15 Minutes x2, Every 30 Minutes x4  Every Hour       05/21/25 1239    05/21/25 1300  Intake & Output Every Hour Until 24 Hours Post Op  Every Hour       05/21/25 1239    05/21/25 1300  Incentive Spirometry  Every Hour While Awake       05/21/25 1239    05/21/25 1240  Daily Weights  Daily       05/21/25 1239    05/21/25 1239  oxyCODONE (ROXICODONE) immediate release tablet 5 mg  Every 4 Hours PRN         05/21/25 1239    05/21/25 1239  oxyCODONE (ROXICODONE) immediate release tablet 10 mg  Every 4 Hours PRN         05/21/25 1239    05/21/25 1239  Morphine sulfate (PF) injection 2 mg  Every 30 Minutes PRN         05/21/25 1239    05/21/25 1239  niCARdipine (CARDENE) 20 mg in 200 mL NS infusion  Continuous PRN         05/21/25 1239    05/21/25 1239  norepinephrine (LEVOPHED) 8 mg in 250 mL NS infusion (premix)  Continuous PRN         05/21/25 1239    05/21/25 1239  clevidipine (CLEVIPREX) infusion 0.5 mg/mL  Continuous PRN         05/21/25 1239    05/21/25 1239  dextrose (GLUTOSE) oral gel 15 g  Every 15 Minutes PRN         05/21/25 1239    05/21/25 1239  dextrose (D50W) (25 g/50 mL) IV injection 10-50 mL  Every 15 Minutes PRN         05/21/25 1239    05/21/25 1239  glucagon (GLUCAGEN) injection 1 mg  Every 15 Minutes PRN         05/21/25 1239    05/21/25 1239  albumin human 5 % solution 500 mL  As Needed         05/21/25 1239    05/21/25  "1239  propofol (DIPRIVAN) infusion 10 mg/mL 100 mL  Continuous PRN         05/21/25 1239    05/21/25 1239  meperidine (DEMEROL) injection 25 mg  Every 1 Hour PRN,   Status:  Discontinued         05/21/25 1239    05/21/25 1239  Potassium Replacement - Follow Nurse / BPA Driven Protocol  As Needed         05/21/25 1239    05/21/25 1239  Magnesium Cardiology Dose Replacement - Follow Nurse / BPA Driven Protocol  As Needed         05/21/25 1239    05/21/25 1239  Phosphorus Replacement - Follow Nurse / BPA Driven Protocol  As Needed         05/21/25 1239    05/21/25 1239  Calcium Replacement - Follow Nurse / BPA Driven Protocol  As Needed         05/21/25 1239    05/21/25 1239  ondansetron (ZOFRAN) injection 4 mg  Every 6 Hours PRN         05/21/25 1239    05/21/25 1239  Pharmacy to dose vancomycin  Continuous PRN         05/21/25 1239    05/21/25 1239  albuterol (PROVENTIL) nebulizer solution 0.083% 2.5 mg/3mL  Every 4 Hours PRN         05/21/25 1239    05/21/25 1234  Urinary Catheter Care  Every Shift      Placed in \"And\" Linked Group    05/21/25 1238    Unscheduled  Check Peripheral Pulses As Needed  As Needed       05/21/25 1239    Unscheduled  Cardiac Output Parameters PRN Until 24 Hours Post-Op  As Needed       05/21/25 1239    Unscheduled  Insert Nasogastric Tube If Indicated & Not Already in Place  As Needed      Comments: Indications: Nausea, Vomiting, Prolonged Intubation or to Administer Medications  Attach to Low Wall Suction if Any Residual    05/21/25 1239    Unscheduled  Wound Care  As Needed       05/21/25 1239    Unscheduled  Dangle at Bedside After Extubation  As Needed       05/21/25 1239    Unscheduled  Up in Chair As Tolerated After Extubation  As Needed       05/21/25 1239    Unscheduled  Oxygen Therapy- Nasal Cannula; 2 LPM  Continuous PRN       05/21/25 1239    Unscheduled  Blood Gas, Arterial -  As Needed      Comments: Obtain ABG as needed for ventilator changes      05/21/25 1239    Unscheduled "  Blood Gas, Arterial -  As Needed      Comments: Prior to Extubation      05/21/25 1239    Unscheduled  CBC & Differential  As Needed        Comments: Chest Tube Drainage Greater Than 100mL/hr      05/21/25 1239    Unscheduled  aPTT  As Needed        Comments: Chest Tube Drainage Greater Than 100mL/hr      05/21/25 1239    Unscheduled  Protime-INR  As Needed        Comments: Chest Tube Drainage Greater Than 100mL/hr      05/21/25 1239    Unscheduled  Fibrin Split Products  As Needed        Comments: Chest Tube Drainage Greater Than 100mL/hr      05/21/25 1239    Unscheduled  Fibrinogen  As Needed        Comments: Chest Tube Drainage Greater Than 100mL/hr      05/21/25 1239    Unscheduled  Treat Hypoglycemia As Recommended By Glucommander™ & Notify Provider of Treatment  As Needed      Comments: Follow Hypoglycemia Orders As Outlined in Process Instructions (Open Order Report to View Full Instructions)  Notify Provider Any Time Hypoglycemia Treatment is Administered    05/21/25 1239    Unscheduled  If Insulin Infusion is Paused - Follow Glucommander Instructions  As Needed       05/21/25 1239    Unscheduled  POC Glucose PRN  As Needed      Comments: Glucommander recommended POC testing will vary between 15 minutes and 2 hours.      05/21/25 1239    Signed and Held  Inpatient Admission  Once,   Status:  Canceled         Signed and Held    Signed and Held  Insert Indwelling Urinary Catheter  Once,   Status:  Canceled        Comments: Follow Protocol As Outlined in Process Instructions (Open Order Report to View Full Instructions)    Signed and Held    Signed and Held  Assess Need for Indwelling Urinary Catheter - Follow Removal Protocol  Continuous,   Status:  Canceled        Comments: Follow Protocol As Outlined in Process Instructions (Open Order Report to View Full Instructions)    Signed and Held    Signed and Held  Urinary Catheter Care  Every Shift,   Status:  Canceled       Signed and Held    --  furosemide  (LASIX) 20 MG tablet  Daily         25 1658    --  hydrALAZINE (APRESOLINE) 25 MG tablet  3 Times Daily         25 1702                  Ventilator/Non-Invasive Ventilation Settings (From admission, onward)       Start     Ordered    25 1240  Ventilator - Vent Mode: Alternate (Custom) Mode: See Comments  (Initial Vent Settings upon arrival to ICU - PAD)  Continuous,   Status:  Canceled        Comments: Initial Mechanical Ventilation Settings upon Arrival to the ICU: SIMV, Tidal Volume 5-7 mL/kg ideal body weight based on height formula, Respiratory Rate 14-16/min, PEEP 5 cmH2O if <90kg, PEEP 8 cmH2O if >90kg, FiO2 60%.   Question:  Vent Mode  Answer:  Alternate (Custom) Mode: See Comments    25 1239    25 1240  Ventilator - Vent Mode: Alternate (Custom) Mode: See Comments  (Within 30 Minutes Arrival to ICU - PAD)  Continuous,   Status:  Canceled        Comments: RT adjusts Mechanical Ventilation as needed to achieve: pH 7.35-7.45, pCO2 34-45 mmHg, PaO2 > 90 mmHg, SpO2 > 92%.   Question:  Vent Mode  Answer:  Alternate (Custom) Mode: See Comments    25 1239                     Physician Progress Notes (last 24 hours)        Jayda Bravo APRN at 25 0704       Attestation signed by Dilshad Keenan MD at 25 1209      I have personally seen and examined Gladys Pino and reviewed the record. Agree with the aforementioned plan rendered jointly with Jayda Bravo.    Ms. Pino is doing really well postop day 1.  Going to DC her insulin drip out of bed walking start multimodality pain regimen replace calcium.  Can stop pacing she is sinus 70s to 80s.  Keep chest tubes today.    Dilshad Keenan M.D.  Cardiothoracic Surgeon                        Patient name: Gladys Pino  Patient : 1955  VISIT # [id number]  MR #[id number]    Procedure:Procedure(s):  AORTIC VALVE REPLACEMENT 23MM, MITRAL VALVE REPAIR 30MM RING, LEFT ATRIAL APPENDAGE EXCLUSION 35MM, TRANSESOPHAGEAL  "ECHOCARDIOGRAM  MITRAL VALVE REPAIR/REPLACEMENT  ATRIAL APPENDAGE EXCLUSION LEFT WITH TRANSESOPHAGEAL ECHOCARDIOGRAM  Procedure Date:5/21/2025  POD:1 Day Post-Op    Subjective     Extubated overnight and on 2 L nasal cannula.  Weight is up 6 pounds from baseline.  Intermittently on and off Cardene during the night.  Due to walk with physical therapy.    Telemetry: Paced at 90  IV drips: IV fluids, insulin      Objective     Visit Vitals  /66 (BP Location: Left arm, Patient Position: Sitting)   Pulse 87   Temp 98.8 °F (37.1 °C) (Oral)   Resp 20   Ht 155 cm (61.02\")   Wt 77.4 kg (170 lb 9.6 oz)   SpO2 95%   BMI 32.21 kg/m²   Baseline weight 164 pounds    Intake/Output Summary (Last 24 hours) at 5/22/2025 0826  Last data filed at 5/22/2025 0749  Gross per 24 hour   Intake 6024.15 ml   Output 5053 ml   Net 971.15 ml     MCT: 408 mL since surgery, serosanguineous, no airleak    Lab:     CBC:  Results from last 7 days   Lab Units 05/22/25  0355 05/21/25  1649 05/21/25  1240   WBC 10*3/mm3 8.71 7.61 11.90*   HEMATOCRIT % 29.6* 29.6* 32.3*   PLATELETS 10*3/mm3 87* 89* 132*          BMP:  Results from last 7 days   Lab Units 05/22/25  0355 05/21/25  1649 05/21/25  1245 05/21/25  1240   SODIUM mmol/L 141 142  --  143   SODIUM, ARTERIAL mmol/L  --   --  143  --    POTASSIUM mmol/L 4.1 5.0  --  4.1   CHLORIDE mmol/L 109* 110*  --  111*   CO2 mmol/L 23.0 21.0*  --  21.0*   GLUCOSE mg/dL 151* 183*  --  126*   BUN mg/dL 16 18  --  18   CREATININE mg/dL 0.80 0.78  --  0.82          COAG:  Results from last 7 days   Lab Units 05/22/25  0355 05/21/25  1240   INR  1.13* 1.22*   APTT seconds  --  41.7*       IMAGES:       Imaging Results (Last 24 Hours)       Procedure Component Value Units Date/Time    XR Chest 1 View [[id number]] Collected: 05/22/25 0656     Updated: 05/22/25 0701    Narrative:      EXAMINATION: XR CHEST 1 VW-        HISTORY: Post-Op Heart Surgery     A frontal projection of the chest is compared with the " previous study  dated 5/21/2025.     The lungs are moderately well-expanded.     There is interval removal of the endotracheal tube and [place]-Souleymane  catheter. The left internal jugular vascular sheath is in place.  Mediastinal drain is in place.     There are atelectatic changes in the right lower lung medially.     There is no pleural effusion, pulmonary congestion or pneumothorax.     The heart size is not optimally visualized due to the AP projection.  There are findings of prior cardiac surgery. Left atrial appendage clamp  and prosthetic valves are in place.     No acute bony abnormality.       Impression:      1. The interval removal of the [place]-Souleymane catheter and endotracheal tube.  The remaining support lines and tubes are in place.  2. Atelectatic change in the right lower lung. The remaining lungs are  unremarkable. Post cardiac surgery changes.        This report was signed and finalized on 5/22/2025 6:58 AM by Dr. Estrella Gerard MD.       XR Chest 1 View [[id number]] Collected: 05/21/25 1336     Updated: 05/21/25 1341    Narrative:      EXAMINATION: XR CHEST 1 VW-        HISTORY: Post-Op Check Line & Tube Placement; I35.1-Nonrheumatic  aortic (valve) insufficiency; I34.0-Nonrheumatic mitral (valve)  insufficiency     A frontal projection of the chest is compared with the previous study  dated 5/19/2025.     The lungs are poorly expanded.     There are atelectatic change in the lungs bilaterally.     There is no evidence of recent infiltrate, pleural effusion, pulmonary  congestion or pneumothorax.     The heart size is not optimally evaluated due to the AP projection.  There is evidence of prior cardiac surgery. Left atrial appendage clamp  and valvular prostheses are in place.     Endotracheal tube is in place with distal end 3 cm from tracheal  bifurcation. A [place]-Souleymane catheter is seen through the right internal  jugular vein with distal end in the distal right main pulmonary artery.  Mediastinal drain  is in place.     No acute bony abnormality.       Impression:      1. Post cardiac surgery changes with support  tubes and lines in place.  2. Poor lung expansion. No active cardiopulmonary disease.        This report was signed and finalized on 5/21/2025 1:38 PM by Dr. Estrella Gerard MD.             CXR:Chest tubes in stable position with no pneumothorax.  Right basilar atelectasis.    Physical Exam:  General: Alert, oriented. No apparent distress.   Cardiovascular: Regular rate and rhythm without murmur, rubs, or gallops.    Pulmonary: Clear to auscultation bilaterally without wheezing, rubs, or rales.  Chest: Sternotomy incision clean, dry, and intact. Sternum stable. No clicks. Chest tubes to 20 cm suction. No air leak. Fluid is serosanguineous.  Abdomen: Soft, nondistended, and nontender.  Extremities: Warm, moves all extremities. No edema.   Neurologic:  Grossly intact with no focal deficits.           Impression:  Severe mitral valve regurgitation  Severe aortic valve insufficiency  Hypertension  Hyperlipidemia  Obesity, BMI 31.1  GERD        Plan:   Begin bowel regimen  DC insulin gtt  Stop pacing  Replace calcium  Routine postcardiac surgery care  Encourage pulmonary toilet and ambulation  Keep chest tubes on today  Discussed with patient and nursing          LYNNE Ye  05/22/25  08:26 CDT      Electronically signed by Dilshad Keenan MD at 05/22/25 1204       Consult Notes (last 24 hours)  Notes from 05/22/25 0535 through 05/23/25 0535   No notes of this type exist for this encounter.

## 2025-05-23 NOTE — THERAPY TREATMENT NOTE
Acute Care - Physical Therapy Treatment Note  [facility]     Patient Name: Gladys Pino  : 1955  MRN: [id number]  Today's Date: 2025      Visit Dx:     ICD-10-CM ICD-9-CM   1. Impaired mobility [Z74.09]  Z74.09 799.89   2. Severe aortic valve regurgitation  I35.1 424.1   3. Severe mitral valve regurgitation  I34.0 424.0     Patient Active Problem List   Diagnosis    Severe aortic valve regurgitation    Severe mitral valve regurgitation    Aortic valve insufficiency     Past Medical History:   Diagnosis Date    Allergic rhinitis     Aortic valve disease     Arrhythmia     Elevated cholesterol     GERD (gastroesophageal reflux disease)     Hyperlipidemia     Hypertension      Past Surgical History:   Procedure Laterality Date    AORTIC VALVE REPAIR/REPLACEMENT N/A 2025    Procedure: AORTIC VALVE REPLACEMENT 23MM, MITRAL VALVE REPAIR 30MM RING, LEFT ATRIAL APPENDAGE EXCLUSION 35MM, TRANSESOPHAGEAL ECHOCARDIOGRAM;  Surgeon: Dilshad Keenan MD;  Location: [facility];  Service: Cardiothoracic;  Laterality: N/A;    ATRIAL APPENDAGE EXCLUSION LEFT WITH TRANSESOPHAGEAL ECHOCARDIOGRAM N/A 2025    Procedure: ATRIAL APPENDAGE EXCLUSION LEFT WITH TRANSESOPHAGEAL ECHOCARDIOGRAM;  Surgeon: Dilshad Keenan MD;  Location: [facility];  Service: Cardiothoracic;  Laterality: N/A;    CATARACT EXTRACTION       SECTION      METATARSAL OSTEOTOMY Bilateral     MITRAL VALVE REPAIR/REPLACEMENT N/A 2025    Procedure: MITRAL VALVE REPAIR/REPLACEMENT;  Surgeon: Dilshad Keenan MD;  Location: [facility] OR;  Service: Cardiothoracic;  Laterality: N/A;     PT Assessment (Last 12 Hours)       PT Evaluation and Treatment       Row Name 25 0954          Physical Therapy Time and Intention    Subjective Information complains of;weakness;fatigue;pain  -AB     Document Type therapy note (daily note)  -AB     Mode of Treatment physical therapy  -AB       Row Name 25 0954          General Information    Existing  Precautions/Restrictions fall;oxygen therapy device and L/min;sternal  chest tube  -AB       Row Name 05/23/25 0954          Pain    Pretreatment Pain Rating 9/10  -AB     Posttreatment Pain Rating 10/10  -AB     Pain Location chest  -AB     Pain Management Interventions exercise or physical activity utilized  -AB     Response to Pain Interventions activity participation with increased pain  -AB     Additional Documentation Pain Scale 2: Numbers Pre/Post-Treatment (Group)  -AB       Row Name 05/23/25 0954          Bed Mobility    Comment, (Bed Mobility) in chair  -AB       Row Name 05/23/25 0954          Sit-Stand Transfer    Sit-Stand [place] (Transfers) verbal cues;minimum assist (75% patient effort)  -AB       Row Name 05/23/25 0954          Gait/Stairs (Locomotion)    [place] Level (Gait) contact guard  -AB     Distance in Feet (Gait) 200  -AB     Deviations/Abnormal Patterns (Gait) mendoza decreased;gait speed decreased;antalgic  -AB     Bilateral Gait Deviations forward flexed posture  -AB       Row Name 05/23/25 0954          Motor Skills    Comments, Therapeutic Exercise Cardiac protocol  -AB     Additional Documentation Comments, Therapeutic Exercise (Row)  -AB       Row Name             Wound 05/21/25 0725 sternal Surgical    Wound - Properties Group Placement Date: 05/21/25  [facility] Placement Time: 0725 - Present on Original Admission: N  -LH Location: sternal  -LH Primary Wound Type: Surgical  -LH Additional Comments: mastisol, steri strip, mepilex  -LH    Retired Wound - Properties Group Placement Date: 05/21/25  [facility] Placement Time: 0725 - Present on Original Admission: N  -LH Location: sternal  -LH Additional Comments: mastisol, steri strip, mepilex  -LH    Retired Wound - Properties Group Placement Date: 05/21/25  - Placement Time: 0725 - Present on Original Admission: N  -LH Location: sternal  -LH Additional Comments: mastisol, steri strip, mepilex  -LH    Retired Wound - Properties  Group Date first assessed: 05/21/25  - Time first assessed: 0725 - Present on Original Admission: N  -LH Location: sternal  - Additional Comments: mastisol, steri strip, mepilex  -      Row Name 05/23/25 0954          Vital Signs    Pre Systolic BP Rehab 120  -AB     Pre Treatment Diastolic BP 77  -AB     Post Systolic BP Rehab 93  -AB     Post Treatment Diastolic BP 70  -AB     Pretreatment Heart Rate (beats/min) 72  -AB     Posttreatment Heart Rate (beats/min) 73  -AB     Pre SpO2 (%) 91  -AB     O2 Delivery Pre Treatment nasal cannula  1L  -AB     Intra SpO2 (%) 95  -AB     O2 Delivery Intra Treatment nasal cannula  1L  -AB     Post SpO2 (%) 91  -AB     O2 Delivery Post Treatment nasal cannula  1L  -AB       Row Name 05/23/25 0954          Positioning and Restraints    Pre-Treatment Position sitting in chair/recliner  -AB     Post Treatment Position chair  -AB     In Chair reclined;sitting;call light within reach  -AB               User Key  (r) = Recorded By, (t) = Taken By, (c) = Cosigned By      Initials Name Provider Type    AB Ani Lamb, PTA Physical Therapist Assistant     Yeny Sullivan, RN Registered Nurse                    Physical Therapy Education       Title: PT OT SLP Therapies (In Progress)       Topic: Physical Therapy (In Progress)       Point: Mobility training (In Progress)       Learning Progress Summary            Patient Acceptance, E, NR by SERGEY at 5/22/2025 1400    Comment: gait progression, PT POC    Acceptance, E, NR by SERGEY at 5/22/2025 0855    Comment: Benefits of activity, progression of PT, sternal prec                      Point: Home exercise program (Not Started)       Learner Progress:  Not documented in this visit.              Point: Body mechanics (Not Started)       Learner Progress:  Not documented in this visit.              Point: Precautions (In Progress)       Learning Progress Summary            Patient Acceptance, E, NR by SERGEY at 5/22/2025 0855    Comment:  Benefits of activity, progression of PT, sternal prec                                      User Key       Initials Effective Dates Name Provider Type Discipline    SERGEY 02/03/23 -  Jackson Ramon, PT DPT Physical Therapist PT                  PT Recommendation and Plan             Time Calculation:    PT Charges       Row Name 05/23/25 0954             Time Calculation    Start Time 0954  -AB      Stop Time 1022  -AB      Time Calculation (min) 28 min  -AB      PT Received On 05/23/25  -AB         Time Calculation- PT    Total Timed Code Minutes- PT 28 minute(s)  -AB                User Key  (r) = Recorded By, (t) = Taken By, (c) = Cosigned By      Initials Name Provider Type    AB Ani Lamb, PTA Physical Therapist Assistant                      PT G-Codes  Outcome Measure Options: AM-PAC 6 Clicks Basic Mobility (PT)  AM-PAC 6 Clicks Score (PT): 15    Ani Lamb PTA  5/23/2025

## 2025-05-23 NOTE — PROGRESS NOTES
Enter Query Response Below      Query Response:   Chronic diastolic heart failure     Electronically signed by Dilshad Keenan MD, 05/23/25, 11:39 AM CDT.               If applicable, please update the problem list.

## 2025-05-23 NOTE — PROGRESS NOTES
"Patient name: Gladys Pino  Patient : 1955  VISIT # [id number]  MR #[id number]    Procedure:Procedure(s):  AORTIC VALVE REPLACEMENT 23MM, MITRAL VALVE REPAIR 30MM RING, LEFT ATRIAL APPENDAGE EXCLUSION 35MM, TRANSESOPHAGEAL ECHOCARDIOGRAM  MITRAL VALVE REPAIR/REPLACEMENT  ATRIAL APPENDAGE EXCLUSION LEFT WITH TRANSESOPHAGEAL ECHOCARDIOGRAM  Procedure Date:2025  POD:2 Days Post-Op    Subjective     On 2 L nasal cannula.  Weight is up 3 pounds and she is 9 pounds above her baseline.  No bowel movement.  Walking 200 feet with physical therapy.  This morning she complains of pain and nausea as well as heartburn.  She is very drowsy this morning.    Telemetry: Sinus arrhythmia with first-degree block 80s  IV drips: None       Objective     Visit Vitals  /86   Pulse 78   Temp 98.2 °F (36.8 °C) (Oral)   Resp 17   Ht 155 cm (61.02\")   Wt 78.6 kg (173 lb 3.2 oz)   SpO2 94%   BMI 32.70 kg/m²   Baseline weight 164 pounds    Intake/Output Summary (Last 24 hours) at 2025 0819  Last data filed at 2025 0400  Gross per 24 hour   Intake 880 ml   Output 805 ml   Net 75 ml     MCT: 270 mL in 24 hours, serosanguineous, no airleak    Lab:     CBC:  Results from last 7 days   Lab Units 25  0405 25  0355 25  1649   WBC 10*3/mm3 11.89* 8.71 7.61   HEMATOCRIT % 30.7* 29.6* 29.6*   PLATELETS 10*3/mm3 77* 87* 89*          BMP:  Results from last 7 days   Lab Units 25  0405 25  0355 25  1649   SODIUM mmol/L 137 141 142   POTASSIUM mmol/L 4.8 4.1 5.0   CHLORIDE mmol/L 106 109* 110*   CO2 mmol/L 22.0 23.0 21.0*   GLUCOSE mg/dL 127* 151* 183*   BUN mg/dL 20 16 18   CREATININE mg/dL 0.74 0.80 0.78          COAG:  Results from last 7 days   Lab Units 25  0405 25  0355 25  1240   INR  1.10*   < > 1.22*   APTT seconds  --   --  41.7*    < > = values in this interval not displayed.       IMAGES:       Imaging Results (Last 24 Hours)       Procedure Component Value Units " Date/Time    XR Chest 1 View [[id number]] Collected: 05/23/25 0659     Updated: 05/23/25 0704    Narrative:      EXAMINATION: XR CHEST 1 VW-        HISTORY: Post-Op Heart Surgery     A frontal projection of the chest is compared with the previous study  dated 5/22/2025.     The lungs are moderately well-expanded.     There are radiopaque artifacts/metallic wires and leads projected over  the lung fields and mediastinum and partly obscuring the structures.     The right lower lung infiltrate persist. This may represent an  inflammatory process or atelectasis. No significant change in the  previous study.     The right internal jugular vascular sheath is in place. The mediastinal  drain is in place.     There is no pleural effusion, pulmonary congestion or pneumothorax.     There is moderate cardiomegaly. There is evidence of prior cardiac  surgery. Left atrial appendage clamp and prosthetic valves are in place  similar to the previous study.       Impression:      1. No significant change in the cardiopulmonary status since the  previous study 1 day ago.              This report was signed and finalized on 5/23/2025 7:01 AM by Dr. Estrella Gerard MD.             CXR:Chest tubes in stable position with no pneumothorax.  Right basilar atelectasis is ongoing.  Stable exam    Physical Exam:  General: Alert, oriented. No apparent distress. Drowsy  Cardiovascular: Regular rate and rhythm without murmur, rubs, or gallops.    Pulmonary: Clear to auscultation bilaterally without wheezing, rubs, or rales.  Chest: Sternotomy incision clean, dry, and intact. Sternum stable. No clicks. Chest tubes to 20 cm suction. No air leak. Fluid is serosanguineous.  Abdomen: Soft, nondistended, and nontender.  Extremities: Warm, moves all extremities.  Mild lower extremity edema  Neurologic:  Grossly intact with no focal deficits.            Impression:  Severe mitral valve regurgitation  Severe aortic valve  insufficiency  Hypertension  Hyperlipidemia  Obesity, BMI 31.1  GERD        Plan:   Continue bowel regimen, suppository today  Reglan 10 mg IV every 6 hours x 1 day  Okay for tums as needed for heartburn  Toradol 15 mg IV x 1 dose now  Likely remove mediastinal chest tubes later today  Routine postcardiac surgery care  Encourage pulmonary toilet and ambulation  Remain in ICU for now  Discussed with patient and nursing          LYNNE Ye  05/23/25  08:19 CDT

## 2025-05-23 NOTE — PLAN OF CARE
Problem: Adult Inpatient Plan of Care  Goal: Plan of Care Review  Outcome: Progressing  Goal: Patient-Specific Goal (Individualized)  Outcome: Progressing  Goal: Absence of Hospital-Acquired Illness or Injury  Outcome: Progressing  Intervention: Identify and Manage Fall Risk  Recent Flowsheet Documentation  Taken 5/23/2025 0600 by Wendy Velasquez RN  Safety Promotion/Fall Prevention: safety round/check completed  Taken 5/23/2025 0500 by Wendy Velasquez RN  Safety Promotion/Fall Prevention: safety round/check completed  Taken 5/23/2025 0400 by Wendy Velasquez RN  Safety Promotion/Fall Prevention: safety round/check completed  Taken 5/23/2025 0300 by Wendy Velasquez RN  Safety Promotion/Fall Prevention: safety round/check completed  Taken 5/23/2025 0200 by Wendy Velasquez RN  Safety Promotion/Fall Prevention: safety round/check completed  Taken 5/23/2025 0103 by Wendy Velasquez RN  Safety Promotion/Fall Prevention: safety round/check completed  Taken 5/23/2025 0000 by Wendy Velasquez RN  Safety Promotion/Fall Prevention: safety round/check completed  Taken 5/22/2025 2300 by Wendy Velasquez RN  Safety Promotion/Fall Prevention: safety round/check completed  Taken 5/22/2025 2200 by Wendy Velasquez RN  Safety Promotion/Fall Prevention: safety round/check completed  Taken 5/22/2025 2100 by Wendy Velasquez RN  Safety Promotion/Fall Prevention: safety round/check completed  Taken 5/22/2025 2000 by Wendy Velasquez RN  Safety Promotion/Fall Prevention: safety round/check completed  Taken 5/22/2025 1900 by Wendy Velasquez RN  Safety Promotion/Fall Prevention: safety round/check completed  Intervention: Prevent Skin Injury  Recent Flowsheet Documentation  Taken 5/23/2025 0500 by Wendy Velasquez RN  Body Position: (sitting in chair) other (see comments)  Taken 5/23/2025 0400 by Wendy Velasquez RN  Skin Protection: silicone foam dressing in place  Taken 5/23/2025 0300 by Wendy Velasquez RN  Body Position:  position changed independently  Taken 5/23/2025 0103 by Wendy Velasquez RN  Body Position:   neutral head position   neutral body alignment   supine  Taken 5/23/2025 0000 by Wendy Velasquez RN  Body Position:   neutral body alignment   neutral head position  Skin Protection: silicone foam dressing in place  Taken 5/22/2025 2300 by Wendy Velasquez RN  Body Position:   position changed independently   position maintained  Taken 5/22/2025 2100 by Wendy Velasquez RN  Body Position:   supine   neutral head position   neutral body alignment  Taken 5/22/2025 2000 by Wendy Velasquez RN  Body Position:   neutral body alignment   neutral head position  Skin Protection: silicone foam dressing in place  Taken 5/22/2025 1900 by Wendy Velasquez RN  Body Position: (sitting in chair) other (see comments)  Intervention: Prevent and Manage VTE (Venous Thromboembolism) Risk  Recent Flowsheet Documentation  Taken 5/23/2025 0400 by Wendy Velasquez RN  VTE Prevention/Management: SCDs (sequential compression devices) on  Taken 5/23/2025 0000 by Wendy Velasquez RN  VTE Prevention/Management: SCDs (sequential compression devices) on  Taken 5/22/2025 2000 by Wendy Velasquez RN  VTE Prevention/Management: SCDs (sequential compression devices) on  Goal: Optimal Comfort and Wellbeing  Outcome: Progressing  Intervention: Monitor Pain and Promote Comfort  Recent Flowsheet Documentation  Taken 5/23/2025 0400 by Wendy Velasquez RN  Pain Management Interventions:   position adjusted   pillow support provided  Taken 5/23/2025 0103 by Wendy Velasquez RN  Pain Management Interventions: pain medication given  Taken 5/23/2025 0000 by Wendy Velasquez RN  Pain Management Interventions: position adjusted  Intervention: Provide Person-Centered Care  Recent Flowsheet Documentation  Taken 5/23/2025 0400 by Wendy Velasquez RN  Trust Relationship/Rapport: care explained  Taken 5/23/2025 0000 by Wendy Velasquez RN  Trust Relationship/Rapport: care  explained  Taken 5/22/2025 2000 by Wendy Velasquez RN  Trust Relationship/Rapport: care explained  Goal: Readiness for Transition of Care  Outcome: Progressing     Problem: Skin Injury Risk Increased  Goal: Skin Health and Integrity  Outcome: Progressing  Intervention: Optimize Skin Protection  Recent Flowsheet Documentation  Taken 5/23/2025 0500 by Wendy Velasquez RN  Head of Bed (HOB) Positioning: HOB at 30-45 degrees  Taken 5/23/2025 0400 by Wendy Velasquez RN  Activity Management: up in chair  Pressure Reduction Techniques: weight shift assistance provided  Head of Bed (HOB) Positioning: HOB at 30-45 degrees  Pressure Reduction Devices: pressure-redistributing mattress utilized  Skin Protection: silicone foam dressing in place  Taken 5/23/2025 0300 by Wendy Velasquez RN  Head of Bed (HOB) Positioning: HOB at 30-45 degrees  Taken 5/23/2025 0103 by Wendy Velasquez RN  Head of Bed (HOB) Positioning: HOB at 30-45 degrees  Taken 5/23/2025 0000 by Wendy Velasquez RN  Activity Management: up in chair  Pressure Reduction Techniques: weight shift assistance provided  Pressure Reduction Devices: pressure-redistributing mattress utilized  Skin Protection: silicone foam dressing in place  Taken 5/22/2025 2300 by Wendy Velasquez RN  Head of Bed (HOB) Positioning: HOB at 30-45 degrees  Taken 5/22/2025 2100 by Wendy Velasquez RN  Activity Management: back to bed  Head of Bed (HOB) Positioning: HOB at 30-45 degrees  Taken 5/22/2025 2000 by Wendy Velasquez RN  Activity Management: up in chair  Pressure Reduction Techniques: weight shift assistance provided  Pressure Reduction Devices: pressure-redistributing mattress utilized  Skin Protection: silicone foam dressing in place     Problem: Cardiovascular Surgery  Goal: Improved Activity Tolerance  Outcome: Progressing  Goal: Optimal Coping with Heart Surgery  Outcome: Progressing  Intervention: Support Psychosocial Response to Surgery  Recent Flowsheet  Documentation  Taken 5/23/2025 0400 by Wendy Velasquez RN  Family/Support System Care: support provided  Taken 5/23/2025 0000 by Wendy Velasquez RN  Family/Support System Care: support provided  Taken 5/22/2025 2000 by Wendy Velasquez RN  Family/Support System Care: support provided  Goal: Absence of Bleeding  Outcome: Progressing  Goal: Effective Bowel Elimination  Outcome: Progressing  Goal: Effective Cardiac Function  Outcome: Progressing  Goal: Optimal Cerebral Tissue Perfusion  Outcome: Progressing  Intervention: Protect and Optimize Cerebral Perfusion  Recent Flowsheet Documentation  Taken 5/23/2025 0500 by Wendy Velasquez RN  Head of Bed (HOB) Positioning: HOB at 30-45 degrees  Taken 5/23/2025 0400 by Wendy Velasquez RN  Glycemic Management: blood glucose monitored  Head of Bed (HOB) Positioning: HOB at 30-45 degrees  Taken 5/23/2025 0300 by Wendy Velasquez RN  Head of Bed (HOB) Positioning: HOB at 30-45 degrees  Taken 5/23/2025 0103 by Wendy Velasquez RN  Head of Bed (HOB) Positioning: HOB at 30-45 degrees  Taken 5/23/2025 0000 by Wendy Velasquez RN  Glycemic Management: blood glucose monitored  Taken 5/22/2025 2300 by Wendy Velasquez RN  Head of Bed (HOB) Positioning: HOB at 30-45 degrees  Taken 5/22/2025 2100 by Wendy Velasquez RN  Head of Bed (HOB) Positioning: HOB at 30-45 degrees  Taken 5/22/2025 2000 by Wendy Velasquez RN  Glycemic Management: blood glucose monitored  Goal: Fluid and Electrolyte Balance  Outcome: Progressing  Goal: Blood Glucose Level Within Target Range  Outcome: Progressing  Intervention: Optimize Glycemic Control  Recent Flowsheet Documentation  Taken 5/23/2025 0400 by Wendy Velasquez RN  Glycemic Management: blood glucose monitored  Taken 5/23/2025 0000 by Wendy Velasquez RN  Glycemic Management: blood glucose monitored  Taken 5/22/2025 2000 by Wendy Velasquez RN  Glycemic Management: blood glucose monitored  Goal: Absence of Infection Signs and Symptoms  Outcome:  Progressing  Goal: Anesthesia/Sedation Recovery  Outcome: Progressing  Intervention: Optimize Anesthesia Recovery  Recent Flowsheet Documentation  Taken 5/23/2025 0600 by Wendy Velasquez RN  Safety Promotion/Fall Prevention: safety round/check completed  Taken 5/23/2025 0528 by Wendy Velasquez RN  Patient Tolerance (IS): fair  Administration (IS):   mouthpiece utilized   proper technique demonstrated  Level Incentive Spirometer (mL): 1000  Incentive Spirometer Predicted Level (mL): 1200  Number of Repetitions (IS): 6  Taken 5/23/2025 0500 by Wendy Velasquez RN  Safety Promotion/Fall Prevention: safety round/check completed  Taken 5/23/2025 0400 by Wendy Velasquez RN  Patient Tolerance (IS): good  Safety Promotion/Fall Prevention: safety round/check completed  Level Incentive Spirometer (mL): 1100  Number of Repetitions (IS): 4  Taken 5/23/2025 0300 by Wendy Velasquez RN  Safety Promotion/Fall Prevention: safety round/check completed  Taken 5/23/2025 0200 by Wendy Velasquez RN  Safety Promotion/Fall Prevention: safety round/check completed  Taken 5/23/2025 0103 by Wendy Velasquez RN  Safety Promotion/Fall Prevention: safety round/check completed  Taken 5/23/2025 0000 by Wendy Velasquez RN  Patient Tolerance (IS): good  Safety Promotion/Fall Prevention: safety round/check completed  Level Incentive Spirometer (mL): 1100  Number of Repetitions (IS): 4  Taken 5/22/2025 2300 by Wendy Velasquez RN  Safety Promotion/Fall Prevention: safety round/check completed  Taken 5/22/2025 2200 by Wendy Velasquez RN  Safety Promotion/Fall Prevention: safety round/check completed  Taken 5/22/2025 2100 by Wendy Velasquez RN  Safety Promotion/Fall Prevention: safety round/check completed  Taken 5/22/2025 2000 by Wendy Velasquez RN  Patient Tolerance (IS): good  Safety Promotion/Fall Prevention: safety round/check completed  Level Incentive Spirometer (mL): 1100  Number of Repetitions (IS): 4  Taken 5/22/2025 1900 by Eva  ERIKA Nguyen  Patient Tolerance (IS): good  Safety Promotion/Fall Prevention: safety round/check completed  Administration (IS):   mouthpiece utilized   proper technique demonstrated  Level Incentive Spirometer (mL): 1100  Number of Repetitions (IS): 4  Goal: Acceptable Pain Control  Outcome: Progressing  Intervention: Prevent or Manage Pain  Recent Flowsheet Documentation  Taken 5/23/2025 0400 by Wendy Velasquez RN  Pain Management Interventions:   position adjusted   pillow support provided  Diversional Activities: television  Taken 5/23/2025 0103 by Wendy Velasquez RN  Pain Management Interventions: pain medication given  Taken 5/23/2025 0000 by Wendy Velasquez RN  Pain Management Interventions: position adjusted  Diversional Activities: television  Taken 5/22/2025 2000 by Wendy Velasquez RN  Diversional Activities: television  Goal: Nausea and Vomiting Relief  Outcome: Progressing  Goal: Effective Urinary Elimination  Outcome: Progressing  Goal: Effective Oxygenation and Ventilation  Outcome: Progressing  Intervention: Promote Airway Secretion Clearance  Recent Flowsheet Documentation  Taken 5/23/2025 0528 by Wendy Velasquez RN  Patient Tolerance (IS): fair  Administration (IS):   mouthpiece utilized   proper technique demonstrated  Level Incentive Spirometer (mL): 1000  Incentive Spirometer Predicted Level (mL): 1200  Number of Repetitions (IS): 6  Taken 5/23/2025 0400 by Wendy Velasquez RN  Patient Tolerance (IS): good  Airway/Ventilation Management:   airway patency maintained   oxygen therapy provided  Level Incentive Spirometer (mL): 1100  Cough And Deep Breathing: done with encouragement  Number of Repetitions (IS): 4  Taken 5/23/2025 0000 by Wendy Velasquez RN  Patient Tolerance (IS): good  Airway/Ventilation Management:   airway patency maintained   oxygen therapy provided  Level Incentive Spirometer (mL): 1100  Cough And Deep Breathing: done with encouragement  Number of Repetitions (IS): 4  Taken  5/22/2025 2000 by Wendy Velasquez RN  Patient Tolerance (IS): good  Airway/Ventilation Management:   airway patency maintained   oxygen therapy provided  Level Incentive Spirometer (mL): 1100  Cough And Deep Breathing: done with encouragement  Number of Repetitions (IS): 4  Taken 5/22/2025 1900 by Wendy Velasquez RN  Patient Tolerance (IS): good  Administration (IS):   mouthpiece utilized   proper technique demonstrated  Level Incentive Spirometer (mL): 1100  Number of Repetitions (IS): 4  Intervention: Optimize Oxygenation and Ventilation  Recent Flowsheet Documentation  Taken 5/23/2025 0400 by Wendy Velasquez RN  Chest Tube Safety:   all connections secured   suction checked  Taken 5/23/2025 0000 by Wendy Velasquez RN  Chest Tube Safety:   all connections secured   suction checked  Taken 5/22/2025 2000 by Wendy Velasquez RN  Chest Tube Safety:   all connections secured   suction checked     Problem: Pain Acute  Goal: Optimal Pain Control and Function  Outcome: Progressing  Intervention: Optimize Psychosocial Wellbeing  Recent Flowsheet Documentation  Taken 5/23/2025 0400 by Wendy Velasquez RN  Diversional Activities: television  Taken 5/23/2025 0000 by Wedny Velasquez RN  Diversional Activities: television  Taken 5/22/2025 2000 by Wendy Velasquez RN  Diversional Activities: television  Intervention: Develop Pain Management Plan  Recent Flowsheet Documentation  Taken 5/23/2025 0400 by Wendy Velasquez RN  Pain Management Interventions:   position adjusted   pillow support provided  Taken 5/23/2025 0103 by Wendy Velasquez RN  Pain Management Interventions: pain medication given  Taken 5/23/2025 0000 by Wendy Velasquez RN  Pain Management Interventions: position adjusted  Intervention: Prevent or Manage Pain  Recent Flowsheet Documentation  Taken 5/23/2025 0400 by Wendy Velasquez RN  Bowel Elimination Promotion: adequate fluid intake promoted  Medication Review/Management: medications reviewed  Taken  5/23/2025 0000 by Wendy Velasquez RN  Bowel Elimination Promotion: adequate fluid intake promoted  Medication Review/Management: medications reviewed  Taken 5/22/2025 2000 by Wendy Velasquez RN  Bowel Elimination Promotion: adequate fluid intake promoted  Medication Review/Management: medications reviewed     Problem: Fall Injury Risk  Goal: Absence of Fall and Fall-Related Injury  Outcome: Progressing  Intervention: Identify and Manage Contributors  Recent Flowsheet Documentation  Taken 5/23/2025 0400 by Wendy Velasquez RN  Medication Review/Management: medications reviewed  Taken 5/23/2025 0000 by Wendy Velasquez RN  Medication Review/Management: medications reviewed  Taken 5/22/2025 2000 by Wendy Velasquez RN  Medication Review/Management: medications reviewed  Intervention: Promote Injury-Free Environment  Recent Flowsheet Documentation  Taken 5/23/2025 0600 by Wendy Velasquez RN  Safety Promotion/Fall Prevention: safety round/check completed  Taken 5/23/2025 0500 by Wendy Velasquez RN  Safety Promotion/Fall Prevention: safety round/check completed  Taken 5/23/2025 0400 by Wendy Velasquez RN  Safety Promotion/Fall Prevention: safety round/check completed  Taken 5/23/2025 0300 by Wendy Velasquez RN  Safety Promotion/Fall Prevention: safety round/check completed  Taken 5/23/2025 0200 by Wendy Velasquez RN  Safety Promotion/Fall Prevention: safety round/check completed  Taken 5/23/2025 0103 by Wendy Velasquez RN  Safety Promotion/Fall Prevention: safety round/check completed  Taken 5/23/2025 0000 by Wendy Velasquez RN  Safety Promotion/Fall Prevention: safety round/check completed  Taken 5/22/2025 2300 by Wendy Velasquez RN  Safety Promotion/Fall Prevention: safety round/check completed  Taken 5/22/2025 2200 by Wendy Velasquez RN  Safety Promotion/Fall Prevention: safety round/check completed  Taken 5/22/2025 2100 by Wendy Velasquez RN  Safety Promotion/Fall Prevention: safety round/check  completed  Taken 5/22/2025 2000 by Wendy Velasquez, RN  Safety Promotion/Fall Prevention: safety round/check completed  Taken 5/22/2025 1900 by Wendy Velasquez, RN  Safety Promotion/Fall Prevention: safety round/check completed   Goal Outcome Evaluation:

## 2025-05-23 NOTE — THERAPY TREATMENT NOTE
Acute Care - Physical Therapy Treatment Note  [facility]     Patient Name: Gladys Pino  : 1955  MRN: [id number]  Today's Date: 2025      Visit Dx:     ICD-10-CM ICD-9-CM   1. Impaired mobility [Z74.09]  Z74.09 799.89   2. Severe aortic valve regurgitation  I35.1 424.1   3. Severe mitral valve regurgitation  I34.0 424.0     Patient Active Problem List   Diagnosis    Severe aortic valve regurgitation    Severe mitral valve regurgitation    Aortic valve insufficiency     Past Medical History:   Diagnosis Date    Allergic rhinitis     Aortic valve disease     Arrhythmia     Elevated cholesterol     GERD (gastroesophageal reflux disease)     Hyperlipidemia     Hypertension      Past Surgical History:   Procedure Laterality Date    AORTIC VALVE REPAIR/REPLACEMENT N/A 2025    Procedure: AORTIC VALVE REPLACEMENT 23MM, MITRAL VALVE REPAIR 30MM RING, LEFT ATRIAL APPENDAGE EXCLUSION 35MM, TRANSESOPHAGEAL ECHOCARDIOGRAM;  Surgeon: Dilshad Keenan MD;  Location: [facility];  Service: Cardiothoracic;  Laterality: N/A;    ATRIAL APPENDAGE EXCLUSION LEFT WITH TRANSESOPHAGEAL ECHOCARDIOGRAM N/A 2025    Procedure: ATRIAL APPENDAGE EXCLUSION LEFT WITH TRANSESOPHAGEAL ECHOCARDIOGRAM;  Surgeon: Dilshad Keenan MD;  Location: [facility] OR;  Service: Cardiothoracic;  Laterality: N/A;    CATARACT EXTRACTION       SECTION      METATARSAL OSTEOTOMY Bilateral     MITRAL VALVE REPAIR/REPLACEMENT N/A 2025    Procedure: MITRAL VALVE REPAIR/REPLACEMENT;  Surgeon: Dilshad Keenan MD;  Location: [facility] OR;  Service: Cardiothoracic;  Laterality: N/A;     PT Assessment (Last 12 Hours)       PT Evaluation and Treatment       Row Name 25 1322 25 0954       Physical Therapy Time and Intention    Subjective Information complains of;weakness;fatigue;dizziness  -AB complains of;weakness;fatigue;pain  -AB    Document Type therapy note (daily note)  -AB therapy note (daily note)  -AB    Mode of Treatment physical  therapy  -AB physical therapy  -AB      Row Name 05/23/25 1322 05/23/25 0954       General Information    Existing Precautions/Restrictions fall;sternal  chest tube  -AB fall;oxygen therapy device and L/min;sternal  chest tube  -AB      Row Name 05/23/25 1322 05/23/25 0954       Pain    Pretreatment Pain Rating 7/10  -AB 9/10  -AB    Posttreatment Pain Rating 8/10  -AB 10/10  -AB    Pain Location chest  -AB chest  -AB    Pain Management Interventions exercise or physical activity utilized  -AB exercise or physical activity utilized  -AB    Response to Pain Interventions activity participation with increased pain  -AB activity participation with increased pain  -AB    Additional Documentation -- Pain Scale 2: Numbers Pre/Post-Treatment (Group)  -AB      Row Name 05/23/25 1322 05/23/25 0954       Bed Mobility    Comment, (Bed Mobility) in chair  -AB in chair  -AB      Row Name 05/23/25 1322 05/23/25 0954       Sit-Stand Transfer    Sit-Stand Browns (Transfers) verbal cues;minimum assist (75% patient effort)  -AB verbal cues;minimum assist (75% patient effort)  -AB      Row Name 05/23/25 1322 05/23/25 0954       Gait/Stairs (Locomotion)    Browns Level (Gait) contact guard;minimum assist (75% patient effort)  -AB contact guard  -AB    Distance in Feet (Gait) 200  with 2 standing rest  -  -AB    Deviations/Abnormal Patterns (Gait) mendoza decreased;gait speed decreased;antalgic  -AB mendoza decreased;gait speed decreased;antalgic  -AB    Bilateral Gait Deviations forward flexed posture  -AB forward flexed posture  -AB      Row Name 05/23/25 1322 05/23/25 0954       Motor Skills    Comments, Therapeutic Exercise Cardiac Protocol  -AB Cardiac protocol  -AB    Additional Documentation -- Comments, Therapeutic Exercise (Row)  -AB      Row Name             Wound 05/21/25 0725 sternal Surgical    Wound - Properties Group Placement Date: 05/21/25  - Placement Time: 0725 -LH Present on Original Admission: N   -LH Location: sternal  -LH Primary Wound Type: Surgical  -LH Additional Comments: mastisol, steri strip, mepilex  -LH    Retired Wound - Properties Group Placement Date: 05/21/25  - Placement Time: 0725 -LH Present on Original Admission: N  -LH Location: sternal  -LH Additional Comments: mastisol, steri strip, mepilex  -LH    Retired Wound - Properties Group Placement Date: 05/21/25  - Placement Time: 0725 -LH Present on Original Admission: N  -LH Location: sternal  -LH Additional Comments: mastisol, steri strip, mepilex  -LH    Retired Wound - Properties Group Date first assessed: 05/21/25  - Time first assessed: 0725 -LH Present on Original Admission: N  -LH Location: sternal  -LH Additional Comments: mastisol, steri strip, mepilex  -LH      Row Name 05/23/25 1322 05/23/25 0954       Vital Signs    Pre Systolic BP Rehab 102  -  -AB    Pre Treatment Diastolic BP 82  -AB 77  -AB    Post Systolic BP Rehab 100  -AB 93  -AB    Post Treatment Diastolic BP 73  -AB 70  -AB    Pretreatment Heart Rate (beats/min) -- 72  -AB    Posttreatment Heart Rate (beats/min) 70  -AB 73  -AB    Pre SpO2 (%) 95  -AB 91  -AB    O2 Delivery Pre Treatment nasal cannula  1L  -AB nasal cannula  1L  -AB    Intra SpO2 (%) -- 95  -AB    O2 Delivery Intra Treatment room air  -AB nasal cannula  1L  -AB    Post SpO2 (%) 97  -AB 91  -AB    O2 Delivery Post Treatment room air  -AB nasal cannula  1L  -AB      Row Name 05/23/25 1322 05/23/25 0954       Positioning and Restraints    Pre-Treatment Position sitting in chair/recliner  -AB sitting in chair/recliner  -AB    Post Treatment Position chair  -AB chair  -AB    In Chair reclined;sitting;call light within reach  -AB reclined;sitting;call light within reach  -AB              User Key  (r) = Recorded By, (t) = Taken By, (c) = Cosigned By      Initials Name Provider Type    AB Ain Lamb, PTA Physical Therapist Assistant    Yeny Dougherty RN Registered Nurse                     Physical Therapy Education       Title: PT OT SLP Therapies (In Progress)       Topic: Physical Therapy (In Progress)       Point: Mobility training (In Progress)       Learning Progress Summary            Patient Acceptance, E, NR by SERGEY at 5/22/2025 1400    Comment: gait progression, PT POC    Acceptance, E, NR by SERGEY at 5/22/2025 0855    Comment: Benefits of activity, progression of PT, sternal prec                      Point: Home exercise program (Not Started)       Learner Progress:  Not documented in this visit.              Point: Body mechanics (Not Started)       Learner Progress:  Not documented in this visit.              Point: Precautions (In Progress)       Learning Progress Summary            Patient Acceptance, E, NR by SERGEY at 5/22/2025 0855    Comment: Benefits of activity, progression of PT, sternal prec                                      User Key       Initials Effective Dates Name Provider Type Discipline    SERGEY 02/03/23 -  Jackson Ramon, PT DPT Physical Therapist PT                  PT Recommendation and Plan             Time Calculation:    PT Charges       Row Name 05/23/25 1322 05/23/25 0954          Time Calculation    Start Time 1322  -AB 0954  -AB     Stop Time 1354  -AB 1022  -AB     Time Calculation (min) 32 min  -AB 28 min  -AB     PT Received On 05/23/25  -AB 05/23/25  -AB        Time Calculation- PT    Total Timed Code Minutes- PT 32 minute(s)  -AB 28 minute(s)  -AB               User Key  (r) = Recorded By, (t) = Taken By, (c) = Cosigned By      Initials Name Provider Type    AB Ani Lamb PTA Physical Therapist Assistant                  Therapy Charges for Today       Code Description Service Date Service Provider Modifiers Qty    [id number] HC GAIT TRAINING EA 15 MIN 5/23/2025 Ani Lamb PTA GP 2            PT G-Codes  Outcome Measure Options: AM-PAC 6 Clicks Basic Mobility (PT)  AM-PAC 6 Clicks Score (PT): 16    Ani Lamb PTA  5/23/2025

## 2025-05-24 ENCOUNTER — APPOINTMENT (OUTPATIENT)
Dept: GENERAL RADIOLOGY | Facility: HOSPITAL | Age: 70
End: 2025-05-24
Payer: MEDICARE

## 2025-05-24 LAB
ALBUMIN SERPL-MCNC: 3.4 G/DL (ref 3.5–5.2)
ANION GAP SERPL CALCULATED.3IONS-SCNC: 12 MMOL/L (ref 5–15)
BASOPHILS # BLD AUTO: 0.03 10*3/MM3 (ref 0–0.2)
BASOPHILS NFR BLD AUTO: 0.3 % (ref 0–1.5)
BUN SERPL-MCNC: 25 MG/DL (ref 8–23)
BUN/CREAT SERPL: 34.2 (ref 7–25)
CALCIUM SPEC-SCNC: 8.3 MG/DL (ref 8.6–10.5)
CHLORIDE SERPL-SCNC: 100 MMOL/L (ref 98–107)
CO2 SERPL-SCNC: 24 MMOL/L (ref 22–29)
CREAT SERPL-MCNC: 0.73 MG/DL (ref 0.57–1)
DEPRECATED RDW RBC AUTO: 41.7 FL (ref 37–54)
EGFRCR SERPLBLD CKD-EPI 2021: 88.6 ML/MIN/1.73
EOSINOPHIL # BLD AUTO: 0.14 10*3/MM3 (ref 0–0.4)
EOSINOPHIL NFR BLD AUTO: 1.4 % (ref 0.3–6.2)
ERYTHROCYTE [DISTWIDTH] IN BLOOD BY AUTOMATED COUNT: 12.8 % (ref 12.3–15.4)
GLUCOSE BLDC GLUCOMTR-MCNC: 119 MG/DL (ref 70–130)
GLUCOSE SERPL-MCNC: 107 MG/DL (ref 65–99)
HCT VFR BLD AUTO: 29.2 % (ref 34–46.6)
HGB BLD-MCNC: 9.6 G/DL (ref 12–15.9)
IMM GRANULOCYTES # BLD AUTO: 0.05 10*3/MM3 (ref 0–0.05)
IMM GRANULOCYTES NFR BLD AUTO: 0.5 % (ref 0–0.5)
INR PPP: 1.04 (ref 0.91–1.09)
LYMPHOCYTES # BLD AUTO: 1.36 10*3/MM3 (ref 0.7–3.1)
LYMPHOCYTES NFR BLD AUTO: 13.5 % (ref 19.6–45.3)
MAGNESIUM SERPL-MCNC: 2.2 MG/DL (ref 1.6–2.4)
MCH RBC QN AUTO: 29.5 PG (ref 26.6–33)
MCHC RBC AUTO-ENTMCNC: 32.9 G/DL (ref 31.5–35.7)
MCV RBC AUTO: 89.8 FL (ref 79–97)
MONOCYTES # BLD AUTO: 1.09 10*3/MM3 (ref 0.1–0.9)
MONOCYTES NFR BLD AUTO: 10.8 % (ref 5–12)
NEUTROPHILS NFR BLD AUTO: 7.39 10*3/MM3 (ref 1.7–7)
NEUTROPHILS NFR BLD AUTO: 73.5 % (ref 42.7–76)
NRBC BLD AUTO-RTO: 0 /100 WBC (ref 0–0.2)
PHOSPHATE SERPL-MCNC: 2.3 MG/DL (ref 2.5–4.5)
PLATELET # BLD AUTO: 93 10*3/MM3 (ref 140–450)
PMV BLD AUTO: 11.9 FL (ref 6–12)
POTASSIUM SERPL-SCNC: 4.3 MMOL/L (ref 3.5–5.2)
PROTHROMBIN TIME: 14.1 SECONDS (ref 11.8–14.8)
RBC # BLD AUTO: 3.25 10*6/MM3 (ref 3.77–5.28)
SODIUM SERPL-SCNC: 136 MMOL/L (ref 136–145)
WBC NRBC COR # BLD AUTO: 10.06 10*3/MM3 (ref 3.4–10.8)

## 2025-05-24 PROCEDURE — 85610 PROTHROMBIN TIME: CPT | Performed by: SURGERY

## 2025-05-24 PROCEDURE — 25010000002 FUROSEMIDE PER 20 MG: Performed by: NURSE PRACTITIONER

## 2025-05-24 PROCEDURE — 25010000002 METOCLOPRAMIDE PER 10 MG: Performed by: NURSE PRACTITIONER

## 2025-05-24 PROCEDURE — 97116 GAIT TRAINING THERAPY: CPT

## 2025-05-24 PROCEDURE — 83735 ASSAY OF MAGNESIUM: CPT | Performed by: SURGERY

## 2025-05-24 PROCEDURE — 25010000002 ENOXAPARIN PER 10 MG: Performed by: SURGERY

## 2025-05-24 PROCEDURE — 85025 COMPLETE CBC W/AUTO DIFF WBC: CPT | Performed by: SURGERY

## 2025-05-24 PROCEDURE — 82948 REAGENT STRIP/BLOOD GLUCOSE: CPT

## 2025-05-24 PROCEDURE — 71046 X-RAY EXAM CHEST 2 VIEWS: CPT

## 2025-05-24 PROCEDURE — 80069 RENAL FUNCTION PANEL: CPT | Performed by: SURGERY

## 2025-05-24 RX ADMIN — METHOCARBAMOL TABLETS 500 MG: 500 TABLET, COATED ORAL at 13:27

## 2025-05-24 RX ADMIN — PANTOPRAZOLE SODIUM 40 MG: 40 TABLET, DELAYED RELEASE ORAL at 05:03

## 2025-05-24 RX ADMIN — FLUTICASONE PROPIONATE 1 SPRAY: 50 SPRAY, METERED NASAL at 08:54

## 2025-05-24 RX ADMIN — MONTELUKAST SODIUM 10 MG: 10 TABLET, COATED ORAL at 08:53

## 2025-05-24 RX ADMIN — ATORVASTATIN CALCIUM 20 MG: 10 TABLET, FILM COATED ORAL at 20:40

## 2025-05-24 RX ADMIN — ASPIRIN 81 MG: 81 TABLET, COATED ORAL at 08:54

## 2025-05-24 RX ADMIN — Medication 1 TABLET: at 08:54

## 2025-05-24 RX ADMIN — METOPROLOL TARTRATE 25 MG: 25 TABLET, FILM COATED ORAL at 08:53

## 2025-05-24 RX ADMIN — METOPROLOL TARTRATE 25 MG: 25 TABLET, FILM COATED ORAL at 20:40

## 2025-05-24 RX ADMIN — ACETAMINOPHEN 650 MG: 325 TABLET, FILM COATED ORAL at 13:27

## 2025-05-24 RX ADMIN — CHLORHEXIDINE GLUCONATE 1 APPLICATION: 500 CLOTH TOPICAL at 03:45

## 2025-05-24 RX ADMIN — FUROSEMIDE 20 MG: 10 INJECTION, SOLUTION INTRAMUSCULAR; INTRAVENOUS at 17:17

## 2025-05-24 RX ADMIN — Medication 1 APPLICATION: at 17:17

## 2025-05-24 RX ADMIN — FUROSEMIDE 20 MG: 10 INJECTION, SOLUTION INTRAMUSCULAR; INTRAVENOUS at 08:53

## 2025-05-24 RX ADMIN — LIDOCAINE 2 PATCH: 4 PATCH TOPICAL at 03:46

## 2025-05-24 RX ADMIN — ENOXAPARIN SODIUM 40 MG: 100 INJECTION SUBCUTANEOUS at 08:54

## 2025-05-24 RX ADMIN — POTASSIUM CHLORIDE 20 MEQ: 1500 TABLET, EXTENDED RELEASE ORAL at 08:53

## 2025-05-24 RX ADMIN — METHOCARBAMOL TABLETS 500 MG: 500 TABLET, COATED ORAL at 20:40

## 2025-05-24 RX ADMIN — ACETAMINOPHEN 650 MG: 325 TABLET, FILM COATED ORAL at 08:54

## 2025-05-24 RX ADMIN — METOCLOPRAMIDE 10 MG: 5 INJECTION, SOLUTION INTRAMUSCULAR; INTRAVENOUS at 02:49

## 2025-05-24 RX ADMIN — ACETAMINOPHEN 650 MG: 325 TABLET, FILM COATED ORAL at 20:40

## 2025-05-24 RX ADMIN — ACETAMINOPHEN 650 MG: 325 TABLET, FILM COATED ORAL at 02:48

## 2025-05-24 RX ADMIN — METHOCARBAMOL TABLETS 500 MG: 500 TABLET, COATED ORAL at 05:03

## 2025-05-24 RX ADMIN — Medication 1 APPLICATION: at 05:03

## 2025-05-24 RX ADMIN — TRAMADOL HYDROCHLORIDE 25 MG: 50 TABLET, COATED ORAL at 08:53

## 2025-05-24 NOTE — PLAN OF CARE
Goal Outcome Evaluation:  Plan of Care Reviewed With: patient        Progress: improving  Outcome Evaluation: PT tx completed. Pt. with 4/10 pain with activity. She was CGA for transfers and gait. She walked 250' with 1 standing rest. Her O2 sats remained in the low 90's while on RA with activity. Pt. is progressing well. Will continue to work on progressive ambulation and independence.

## 2025-05-24 NOTE — THERAPY TREATMENT NOTE
Acute Care - Physical Therapy Treatment Note  [facility]     Patient Name: Gladys Pino  : 1955  MRN: [id number]  Today's Date: 2025      Visit Dx:     ICD-10-CM ICD-9-CM   1. Impaired mobility [Z74.09]  Z74.09 799.89   2. Severe aortic valve regurgitation  I35.1 424.1   3. Severe mitral valve regurgitation  I34.0 424.0     Patient Active Problem List   Diagnosis    Severe aortic valve regurgitation    Severe mitral valve regurgitation    Aortic valve insufficiency     Past Medical History:   Diagnosis Date    Allergic rhinitis     Aortic valve disease     Arrhythmia     Elevated cholesterol     GERD (gastroesophageal reflux disease)     Hyperlipidemia     Hypertension      Past Surgical History:   Procedure Laterality Date    AORTIC VALVE REPAIR/REPLACEMENT N/A 2025    Procedure: AORTIC VALVE REPLACEMENT 23MM, MITRAL VALVE REPAIR 30MM RING, LEFT ATRIAL APPENDAGE EXCLUSION 35MM, TRANSESOPHAGEAL ECHOCARDIOGRAM;  Surgeon: Dilshad Keenan MD;  Location: [facility] OR;  Service: Cardiothoracic;  Laterality: N/A;    ATRIAL APPENDAGE EXCLUSION LEFT WITH TRANSESOPHAGEAL ECHOCARDIOGRAM N/A 2025    Procedure: ATRIAL APPENDAGE EXCLUSION LEFT WITH TRANSESOPHAGEAL ECHOCARDIOGRAM;  Surgeon: Dilshad Keenan MD;  Location: [facility];  Service: Cardiothoracic;  Laterality: N/A;    CATARACT EXTRACTION       SECTION      METATARSAL OSTEOTOMY Bilateral     MITRAL VALVE REPAIR/REPLACEMENT N/A 2025    Procedure: MITRAL VALVE REPAIR/REPLACEMENT;  Surgeon: Dilshad Keenan MD;  Location: [facility] OR;  Service: Cardiothoracic;  Laterality: N/A;     PT Assessment (Last 12 Hours)       PT Evaluation and Treatment       Row Name 25 0941          Physical Therapy Time and Intention    Subjective Information complains of;pain  -     Document Type therapy note (daily note)  -     Mode of Treatment physical therapy  -       Row Name 25 0941          General Information    Existing  Precautions/Restrictions fall;sternal  -       Row Name 05/24/25 0941          Pain    Pretreatment Pain Rating 2/10  -     Posttreatment Pain Rating 4/10  -     Pain Location chest  -     Pain Side/Orientation generalized  -     Pain Management Interventions exercise or physical activity utilized  -     Response to Pain Interventions activity participation with tolerable pain  -       Row Name 05/24/25 0941          Bed Mobility    Comment, (Bed Mobility) up in chair  -       Row Name 05/24/25 0941          Sit-Stand Transfer    Sit-Stand [place] (Transfers) contact guard;verbal cues  -       Row Name 05/24/25 0941          Stand-Sit Transfer    Stand-Sit [place] (Transfers) contact guard  -       Row Name 05/24/25 0941          Gait/Stairs (Locomotion)    [place] Level (Gait) contact guard;verbal cues  -     Distance in Feet (Gait) 250  1 standing rest  -     Deviations/Abnormal Patterns (Gait) mendoza decreased;stride length decreased  -       Row Name 05/24/25 0941          Motor Skills    Comments, Therapeutic Exercise cardiac warm ups x 15  -       Row Name             Wound 05/21/25 0725 sternal Surgical    Wound - Properties Group Placement Date: 05/21/25  - Placement Time: 0725 - Present on Original Admission: N  -LH Location: sternal  -LH Primary Wound Type: Surgical  - Additional Comments: mastisol, steri strip, mepilex  -LH    Retired Wound - Properties Group Placement Date: 05/21/25  - Placement Time: 0725 - Present on Original Admission: N  -LH Location: sternal  -LH Additional Comments: mastisol, steri strip, mepilex  -LH    Retired Wound - Properties Group Placement Date: 05/21/25  - Placement Time: 0725 - Present on Original Admission: N  -LH Location: sternal  -LH Additional Comments: mastisol, steri strip, mepilex  -LH    Retired Wound - Properties Group Date first assessed: 05/21/25  - Time first assessed: 0725 - Present on Original  Admission: N  - Location: sternal  - Additional Comments: mastisol, steri strip, mepilex  -      Row Name 05/24/25 0941          Plan of Care Review    Plan of Care Reviewed With patient  -MF     Progress improving  -     Outcome Evaluation PT tx completed. Pt. with 4/10 pain with activity. She was CGA for transfers and gait. She walked 250' with 1 standing rest. Her O2 sats remained in the low 90's while on RA with activity. Pt. is progressing well. Will continue to work on progressive ambulation and independence.  -       Row Name 05/24/25 0941          Vital Signs    Pre SpO2 (%) 94  -MF     O2 Delivery Pre Treatment room air  -MF     O2 Delivery Intra Treatment room air  -MF     Post SpO2 (%) 92  -MF     O2 Delivery Post Treatment room air  -MF     Pre Patient Position Sitting  -     Intra Patient Position Standing  -     Post Patient Position Sitting  -       Row Name 05/24/25 0941          Positioning and Restraints    Pre-Treatment Position sitting in chair/recliner  -     Post Treatment Position chair  -     In Chair reclined;call light within reach;encouraged to call for assist;notified nsg;RUE elevated;LUE elevated  -               User Key  (r) = Recorded By, (t) = Taken By, (c) = Cosigned By      Initials Name Provider Type     Yeny Sullivan, RN Registered Nurse    Monique Downs, PTA Physical Therapist Assistant                    Physical Therapy Education       Title: PT OT SLP Therapies (In Progress)       Topic: Physical Therapy (In Progress)       Point: Mobility training (In Progress)       Learning Progress Summary            Patient Acceptance, E, NR by SERGEY at 5/22/2025 1400    Comment: gait progression, PT POC    Acceptance, E, NR by SERGEY at 5/22/2025 0855    Comment: Benefits of activity, progression of PT, sternal prec                      Point: Home exercise program (Not Started)       Learner Progress:  Not documented in this visit.              Point: Body  mechanics (Not Started)       Learner Progress:  Not documented in this visit.              Point: Precautions (In Progress)       Learning Progress Summary            Patient Acceptance, E, NR by SERGEY at 5/22/2025 0882    Comment: Benefits of activity, progression of PT, sternal prec                                      User Key       Initials Effective Dates Name Provider Type Magui RINCON 02/03/23 -  Jackson Ramon, PT DPT Physical Therapist PT                  PT Recommendation and Plan     Plan of Care Reviewed With: patient  Progress: improving  Outcome Evaluation: PT tx completed. Pt. with 4/10 pain with activity. She was CGA for transfers and gait. She walked 250' with 1 standing rest. Her O2 sats remained in the low 90's while on RA with activity. Pt. is progressing well. Will continue to work on progressive ambulation and independence.   Outcome Measures       Row Name 05/24/25 0941             How much help from another person do you currently need...    Turning from your back to your side while in flat bed without using bedrails? 2  -MF      Moving from lying on back to sitting on the side of a flat bed without bedrails? 2  -MF      Moving to and from a bed to a chair (including a wheelchair)? 3  -MF      Standing up from a chair using your arms (e.g., wheelchair, bedside chair)? 3  -MF      Climbing 3-5 steps with a railing? 3  -MF      To walk in hospital room? 3  -MF      AM-PAC 6 Clicks Score (PT) 16  -MF         Functional Assessment    Outcome Measure Options AM-PAC 6 Clicks Basic Mobility (PT)  -MF                User Key  (r) = Recorded By, (t) = Taken By, (c) = Cosigned By      Initials Name Provider Type    Monique Downs PTA Physical Therapist Assistant                     Time Calculation:    PT Charges       Row Name 05/24/25 1004             Time Calculation    Start Time 0941  -MF      Stop Time 1004  -      Time Calculation (min) 23 min  -MF      PT Received On 05/24/25   -MF         Time Calculation- PT    Total Timed Code Minutes- PT 23 minute(s)  -MF         Timed Charges    59505 - Gait Training Minutes  23  -MF         Total Minutes    Timed Charges Total Minutes 23  -MF       Total Minutes 23  -MF                User Key  (r) = Recorded By, (t) = Taken By, (c) = Cosigned By      Initials Name Provider Type    Monique Downs PTA Physical Therapist Assistant                  Therapy Charges for Today       Code Description Service Date Service Provider Modifiers Qty    85800606278 HC GAIT TRAINING EA 15 MIN 5/24/2025 Monique Ha PTA GP 2            PT G-Codes  Outcome Measure Options: AM-PAC 6 Clicks Basic Mobility (PT)  AM-PAC 6 Clicks Score (PT): 16    Monique Ha PTA  5/24/2025

## 2025-05-24 NOTE — PROGRESS NOTES
"Patient name: Gladys Pion  Patient : 1955  VISIT # [id number]  MR #[id number]    Procedure:Procedure(s):  AORTIC VALVE REPLACEMENT 23MM, MITRAL VALVE REPAIR 30MM RING, LEFT ATRIAL APPENDAGE EXCLUSION 35MM, TRANSESOPHAGEAL ECHOCARDIOGRAM  MITRAL VALVE REPAIR/REPLACEMENT  ATRIAL APPENDAGE EXCLUSION LEFT WITH TRANSESOPHAGEAL ECHOCARDIOGRAM  Procedure Date:2025  POD:3 Days Post-Op    Subjective     Up and resting in the recliner.  Has ambulated well around the unit.  Remains on 2 L per nasal cannula.  No weight change over the past 24 hours and remains 9 pounds above baseline.  Has had bowel movements.  Reports pain is overall controlled.      Telemetry: Sinus arrhythmia with first-degree block 74-87  IV drips: None       Objective     Visit Vitals  /62   Pulse 72   Temp 98.2 °F (36.8 °C) (Oral)   Resp 14   Ht 155 cm (61.02\")   Wt 78.7 kg (173 lb 6.4 oz)   SpO2 98%   BMI 32.74 kg/m²   Baseline weight 164 pounds    Intake/Output Summary (Last 24 hours) at 2025 0836  Last data filed at 2025 0400  Gross per 24 hour   Intake 720 ml   Output 1785 ml   Net -1065 ml   Lab:     CBC:  Results from last 7 days   Lab Units 25  0355   WBC 10*3/mm3 10.06 11.89* 8.71   HEMATOCRIT % 29.2* 30.7* 29.6*   PLATELETS 10*3/mm3 93* 77* 87*          BMP:  Results from last 7 days   Lab Units 25  0355   SODIUM mmol/L 136 137 141   POTASSIUM mmol/L 4.3 4.8 4.1   CHLORIDE mmol/L 100 106 109*   CO2 mmol/L 24.0 22.0 23.0   GLUCOSE mg/dL 107* 127* 151*   BUN mg/dL 25* 20 16   CREATININE mg/dL 0.73 0.74 0.80          COAG:  Results from last 7 days   Lab Units 25  0355 25  1240   INR  1.04   < > 1.22*   APTT seconds  --   --  41.7*    < > = values in this interval not displayed.       IMAGES:       Imaging Results (Last 24 Hours)       Procedure Component Value Units Date/Time    XR Chest PA & Lateral [[id number]] " Collected: 05/24/25 0753     Updated: 05/24/25 0757    Narrative:      EXAM: XR CHEST PA AND LATERAL-      HISTORY: s/p AVR and MV Repair       COMPARISON: 5/23/2025.     TECHNIQUE: Frontal and lateral radiographs of the chest was obtained.     FINDINGS:     Support Devices: Stable right IJ CVC catheter sheath. Prior aortic and  mitral valve replacement. Left atrial appendage clip.     Cardiac and Mediastinal Silhouettes: Unchanged.     Lungs/Pleura: Mild perihilar bibasilar streaky opacities. No sizable  pleural effusion. No visible pneumothorax.     Osseous structures: No acute osseous finding.     Other: None.       Impression:         Stable exam with mild perihilar and bibasilar opacities, favor  atelectasis.        This report was signed and finalized on 5/24/2025 7:54 AM by Dilshad Alarcon.             CXR: No pneumothorax.  Mild perihilar and bibasilar atelectasis, stable.      Physical Exam:  General: Alert, oriented. No apparent distress.  Alert and interactive  Cardiovascular: Regular rate and rhythm without murmur, rubs, or gallops.    Pulmonary: Clear to auscultation bilaterally without wheezing, rubs, or rales.  Chest: Sternotomy incision clean, dry, and intact. Sternum stable. No clicks.   Abdomen: Soft, nondistended, and nontender.  Extremities: Warm, moves all extremities.  Mild lower extremity edema  Neurologic:  Grossly intact with no focal deficits.            Impression:  Severe mitral valve regurgitation  Severe aortic valve insufficiency  Hypertension  Hyperlipidemia  Obesity, BMI 31.1  GERD        Plan:   Continue multimodality pain control strategies  Encourage pulmonary toileting and ambulation  Routine post cardiac surgery regimen  Remove Thomas catheter      Okay to transfer to     Possible discharge home on Monday    Discussed with patient and nursing      Jazmyne Mendez, LYNNE  05/24/25  08:36 CDT

## 2025-05-24 NOTE — PLAN OF CARE
Goal Outcome Evaluation:        Problem: Adult Inpatient Plan of Care  Goal: Plan of Care Review  Outcome: Progressing  Goal: Patient-Specific Goal (Individualized)  Outcome: Progressing  Goal: Absence of Hospital-Acquired Illness or Injury  Outcome: Progressing  Intervention: Identify and Manage Fall Risk  Recent Flowsheet Documentation  Taken 5/24/2025 0600 by Jace Cantu RN  Safety Promotion/Fall Prevention: safety round/check completed  Taken 5/24/2025 0500 by Jace Cantu RN  Safety Promotion/Fall Prevention: safety round/check completed  Taken 5/24/2025 0400 by Jace Cantu RN  Safety Promotion/Fall Prevention: safety round/check completed  Taken 5/24/2025 0300 by Jace Cantu RN  Safety Promotion/Fall Prevention: safety round/check completed  Taken 5/24/2025 0200 by Jace Cantu RN  Safety Promotion/Fall Prevention: safety round/check completed  Taken 5/24/2025 0100 by Jace Cantu RN  Safety Promotion/Fall Prevention: safety round/check completed  Taken 5/24/2025 0000 by Jace Cantu RN  Safety Promotion/Fall Prevention: safety round/check completed  Taken 5/23/2025 2300 by Jace Cantu RN  Safety Promotion/Fall Prevention: safety round/check completed  Taken 5/23/2025 2200 by Jace Cantu RN  Safety Promotion/Fall Prevention: safety round/check completed  Taken 5/23/2025 2100 by Jace Cantu RN  Safety Promotion/Fall Prevention: safety round/check completed  Taken 5/23/2025 2001 by Jace Cantu RN  Safety Promotion/Fall Prevention: safety round/check completed  Intervention: Prevent Skin Injury  Recent Flowsheet Documentation  Taken 5/24/2025 0600 by Jace Cantu RN  Body Position: position maintained  Taken 5/24/2025 0500 by Jace Cantu RN  Body Position:   turned   tilted   left  Taken 5/24/2025 0400 by Jace Cantu RN  Body Position: position maintained  Taken 5/24/2025 0300 by Jace Cantu RN  Body Position:   turned   right  Taken 5/24/2025  0200 by Jace Cantu RN  Body Position: position maintained  Taken 5/24/2025 0100 by Jace Cantu RN  Body Position:   turned   left  Taken 5/24/2025 0000 by Jace Cantu RN  Body Position: position maintained  Skin Protection: silicone foam dressing in place  Taken 5/23/2025 2300 by Jace Cantu RN  Body Position:   turned   supine  Taken 5/23/2025 2200 by Jace Cantu RN  Body Position: position maintained  Taken 5/23/2025 2100 by Jace Cantu RN  Body Position:   weight shifting   tilted   supine  Taken 5/23/2025 2001 by Jace Cantu RN  Body Position: position maintained  Skin Protection: silicone foam dressing in place  Taken 5/23/2025 1900 by Jace Cantu RN  Body Position:   weight shifting   tilted   right  Intervention: Prevent and Manage VTE (Venous Thromboembolism) Risk  Recent Flowsheet Documentation  Taken 5/24/2025 0000 by Jace Cantu RN  VTE Prevention/Management: SCDs (sequential compression devices) on  Taken 5/23/2025 2001 by Jace Cantu RN  VTE Prevention/Management: SCDs (sequential compression devices) on  Goal: Optimal Comfort and Wellbeing  Outcome: Progressing  Intervention: Monitor Pain and Promote Comfort  Recent Flowsheet Documentation  Taken 5/24/2025 0000 by Jace Cantu RN  Pain Management Interventions: position adjusted  Taken 5/23/2025 2200 by Jace Cantu RN  Pain Management Interventions: medication offered but refused  Taken 5/23/2025 2100 by Jace Cantu RN  Pain Management Interventions:   medication offered but refused   position adjusted  Taken 5/23/2025 2001 by Jace Cantu RN  Pain Management Interventions: medication offered but refused  Taken 5/23/2025 1900 by Jace Cantu RN  Pain Management Interventions:   position adjusted   medication offered but refused  Intervention: Provide Person-Centered Care  Recent Flowsheet Documentation  Taken 5/24/2025 0000 by Jace Cantu RN  Trust Relationship/Rapport:    care explained   choices provided  Taken 5/23/2025 2001 by Jace Cantu RN  Trust Relationship/Rapport:   care explained   choices provided  Goal: Readiness for Transition of Care  Outcome: Progressing     Problem: Skin Injury Risk Increased  Goal: Skin Health and Integrity  Outcome: Progressing  Intervention: Optimize Skin Protection  Recent Flowsheet Documentation  Taken 5/24/2025 0600 by Jace Cantu RN  Activity Management: up in chair  Taken 5/24/2025 0500 by Jace Cantu RN  Activity Management: up in chair  Head of Bed (HOB) Positioning: HOB at 60 degrees  Taken 5/24/2025 0400 by Jace Cantu RN  Activity Management: up in chair  Taken 5/24/2025 0300 by Jace Cantu RN  Activity Management: bedrest  Head of Bed (HOB) Positioning: HOB at 20-30 degrees  Taken 5/24/2025 0200 by Jace Cantu RN  Activity Management: bedrest  Taken 5/24/2025 0100 by Jace Cantu RN  Activity Management: bedrest  Head of Bed (HOB) Positioning: HOB at 30 degrees  Taken 5/24/2025 0000 by Jace Cantu RN  Activity Management: bedrest  Pressure Reduction Techniques: weight shift assistance provided  Head of Bed (HOB) Positioning: HOB at 30 degrees  Pressure Reduction Devices:   pressure-redistributing mattress utilized   positioning supports utilized   heel offloading device utilized   foam padding utilized  Skin Protection: silicone foam dressing in place  Taken 5/23/2025 2300 by Jace Cantu RN  Activity Management: bedrest  Head of Bed (HOB) Positioning: HOB at 30 degrees  Taken 5/23/2025 2200 by Jace Cantu RN  Activity Management:   ambulated outside room   back to bed  Taken 5/23/2025 2100 by Jace Cantu RN  Activity Management: up in chair  Head of Bed (HOB) Positioning: HOB at 60 degrees  Taken 5/23/2025 2001 by Jace Cantu RN  Activity Management: up in chair  Pressure Reduction Techniques: weight shift assistance provided  Head of Bed (HOB) Positioning: HOB at 60  degrees  Pressure Reduction Devices:   pressure-redistributing mattress utilized   foam padding utilized   positioning supports utilized  Skin Protection: silicone foam dressing in place  Taken 5/23/2025 1930 by Jace Cantu RN  Activity Management: up to bedside commode  Taken 5/23/2025 1900 by Jace Cantu RN  Activity Management: up in chair  Head of Bed (HOB) Positioning: HOB at 60 degrees     Problem: Cardiovascular Surgery  Goal: Improved Activity Tolerance  Outcome: Progressing  Goal: Optimal Coping with Heart Surgery  Outcome: Progressing  Intervention: Support Psychosocial Response to Surgery  Recent Flowsheet Documentation  Taken 5/24/2025 0000 by Jace Cantu RN  Family/Support System Care: support provided  Taken 5/23/2025 2001 by Jace Cantu RN  Family/Support System Care: support provided  Goal: Absence of Bleeding  Outcome: Progressing  Goal: Effective Bowel Elimination  Outcome: Progressing  Goal: Effective Cardiac Function  Outcome: Progressing  Goal: Optimal Cerebral Tissue Perfusion  Outcome: Progressing  Intervention: Protect and Optimize Cerebral Perfusion  Recent Flowsheet Documentation  Taken 5/24/2025 0500 by Jace Cantu RN  Head of Bed (HOB) Positioning: HOB at 60 degrees  Taken 5/24/2025 0300 by Jace Cantu RN  Head of Bed (HOB) Positioning: HOB at 20-30 degrees  Taken 5/24/2025 0100 by Jace Cantu RN  Head of Bed (HOB) Positioning: HOB at 30 degrees  Taken 5/24/2025 0000 by Jace Cantu RN  Head of Bed (HOB) Positioning: HOB at 30 degrees  Taken 5/23/2025 2300 by Jace Cantu RN  Head of Bed (HOB) Positioning: HOB at 30 degrees  Taken 5/23/2025 2100 by Jace Cantu RN  Head of Bed (HOB) Positioning: HOB at 60 degrees  Taken 5/23/2025 2001 by Jace Cantu RN  Head of Bed (HOB) Positioning: HOB at 60 degrees  Taken 5/23/2025 1900 by Jcae Cantu RN  Head of Bed (HOB) Positioning: HOB at 60 degrees  Goal: Fluid and Electrolyte  Balance  Outcome: Progressing  Goal: Blood Glucose Level Within Target Range  Outcome: Progressing  Goal: Absence of Infection Signs and Symptoms  Outcome: Progressing  Goal: Anesthesia/Sedation Recovery  Outcome: Progressing  Intervention: Optimize Anesthesia Recovery  Recent Flowsheet Documentation  Taken 5/24/2025 0600 by Jace Cantu RN  Safety Promotion/Fall Prevention: safety round/check completed  Taken 5/24/2025 0500 by Jace Cantu RN  Safety Promotion/Fall Prevention: safety round/check completed  Taken 5/24/2025 0400 by Jace Cantu RN  Patient Tolerance (IS): good  Safety Promotion/Fall Prevention: safety round/check completed  Administration (IS): instruction provided, follow-up  Level Incentive Spirometer (mL): 1100  Number of Repetitions (IS): 10  Taken 5/24/2025 0300 by Jace Cantu RN  Safety Promotion/Fall Prevention: safety round/check completed  Taken 5/24/2025 0200 by Jace Cantu RN  Safety Promotion/Fall Prevention: safety round/check completed  Taken 5/24/2025 0100 by Jace Cantu RN  Safety Promotion/Fall Prevention: safety round/check completed  Taken 5/24/2025 0000 by Jace Cantu RN  Patient Tolerance (IS): good  Safety Promotion/Fall Prevention: safety round/check completed  Taken 5/23/2025 2300 by Jace Cantu RN  Safety Promotion/Fall Prevention: safety round/check completed  Taken 5/23/2025 2200 by Jace Cantu RN  Safety Promotion/Fall Prevention: safety round/check completed  Taken 5/23/2025 2100 by Jace Cantu RN  Safety Promotion/Fall Prevention: safety round/check completed  Taken 5/23/2025 2001 by Jace Cantu RN  Patient Tolerance (IS): good  Safety Promotion/Fall Prevention: safety round/check completed  Administration (IS): instruction provided, follow-up  Level Incentive Spirometer (mL): 800  Number of Repetitions (IS): 10  Goal: Acceptable Pain Control  Outcome: Progressing  Intervention: Prevent or Manage Pain  Recent Flowsheet  Documentation  Taken 5/24/2025 0000 by Jace Cantu RN  Pain Management Interventions: position adjusted  Diversional Activities: television  Taken 5/23/2025 2200 by Jace Cantu RN  Pain Management Interventions: medication offered but refused  Taken 5/23/2025 2100 by Jace Cantu RN  Pain Management Interventions:   medication offered but refused   position adjusted  Taken 5/23/2025 2001 by Jace Cantu RN  Pain Management Interventions: medication offered but refused  Diversional Activities: television  Taken 5/23/2025 1900 by Jace Cantu RN  Pain Management Interventions:   position adjusted   medication offered but refused  Goal: Nausea and Vomiting Relief  Outcome: Progressing  Goal: Effective Urinary Elimination  Outcome: Progressing  Goal: Effective Oxygenation and Ventilation  Outcome: Progressing  Intervention: Promote Airway Secretion Clearance  Recent Flowsheet Documentation  Taken 5/24/2025 0400 by Jace Cantu RN  Patient Tolerance (IS): good  Administration (IS): instruction provided, follow-up  Level Incentive Spirometer (mL): 1100  Number of Repetitions (IS): 10  Taken 5/24/2025 0000 by Jace Cantu RN  Patient Tolerance (IS): good  Cough And Deep Breathing: done independently per patient  Taken 5/23/2025 2001 by Jace Cantu RN  Patient Tolerance (IS): good  Administration (IS): instruction provided, follow-up  Level Incentive Spirometer (mL): 800  Cough And Deep Breathing: done independently per patient  Number of Repetitions (IS): 10     Problem: Pain Acute  Goal: Optimal Pain Control and Function  Outcome: Progressing  Intervention: Optimize Psychosocial Wellbeing  Recent Flowsheet Documentation  Taken 5/24/2025 0000 by Jace Cantu RN  Diversional Activities: television  Taken 5/23/2025 2001 by Jace Cantu RN  Diversional Activities: television  Intervention: Develop Pain Management Plan  Recent Flowsheet Documentation  Taken 5/24/2025 0000 by Alondra  ERIKA Mccormick  Pain Management Interventions: position adjusted  Taken 5/23/2025 2200 by Jace Cantu RN  Pain Management Interventions: medication offered but refused  Taken 5/23/2025 2100 by Jace Cantu RN  Pain Management Interventions:   medication offered but refused   position adjusted  Taken 5/23/2025 2001 by Jace Cantu RN  Pain Management Interventions: medication offered but refused  Taken 5/23/2025 1900 by Jace Cantu RN  Pain Management Interventions:   position adjusted   medication offered but refused  Intervention: Prevent or Manage Pain  Recent Flowsheet Documentation  Taken 5/24/2025 0400 by Jace Cantu RN  Medication Review/Management: medications reviewed  Taken 5/24/2025 0000 by Jace Cantu RN  Bowel Elimination Promotion:   adequate fluid intake promoted   commode/bedpan at bedside  Medication Review/Management: medications reviewed  Taken 5/23/2025 2001 by Jace Cantu RN  Bowel Elimination Promotion:   commode/bedpan at bedside   ambulation promoted   adequate fluid intake promoted  Medication Review/Management: medications reviewed     Problem: Fall Injury Risk  Goal: Absence of Fall and Fall-Related Injury  Outcome: Progressing  Intervention: Identify and Manage Contributors  Recent Flowsheet Documentation  Taken 5/24/2025 0400 by Jace Cantu RN  Medication Review/Management: medications reviewed  Taken 5/24/2025 0000 by Jace Cantu RN  Medication Review/Management: medications reviewed  Taken 5/23/2025 2001 by Jace Cantu RN  Medication Review/Management: medications reviewed  Intervention: Promote Injury-Free Environment  Recent Flowsheet Documentation  Taken 5/24/2025 0600 by Jace Cantu RN  Safety Promotion/Fall Prevention: safety round/check completed  Taken 5/24/2025 0500 by Jace Cantu RN  Safety Promotion/Fall Prevention: safety round/check completed  Taken 5/24/2025 0400 by Jace Cantu RN  Safety Promotion/Fall Prevention:  safety round/check completed  Taken 5/24/2025 0300 by Jace Cantu RN  Safety Promotion/Fall Prevention: safety round/check completed  Taken 5/24/2025 0200 by Jace Cantu RN  Safety Promotion/Fall Prevention: safety round/check completed  Taken 5/24/2025 0100 by Jace Cantu RN  Safety Promotion/Fall Prevention: safety round/check completed  Taken 5/24/2025 0000 by Jace Cantu RN  Safety Promotion/Fall Prevention: safety round/check completed  Taken 5/23/2025 2300 by Jace Cantu RN  Safety Promotion/Fall Prevention: safety round/check completed  Taken 5/23/2025 2200 by Jace Cantu RN  Safety Promotion/Fall Prevention: safety round/check completed  Taken 5/23/2025 2100 by Jace Cantu RN  Safety Promotion/Fall Prevention: safety round/check completed  Taken 5/23/2025 2001 by Jace Cantu RN  Safety Promotion/Fall Prevention: safety round/check completed

## 2025-05-24 NOTE — THERAPY TREATMENT NOTE
Acute Care - Physical Therapy Treatment Note  [facility]     Patient Name: Gladys Pino  : 1955  MRN: [id number]  Today's Date: 2025      Visit Dx:     ICD-10-CM ICD-9-CM   1. Impaired mobility [Z74.09]  Z74.09 799.89   2. Severe aortic valve regurgitation  I35.1 424.1   3. Severe mitral valve regurgitation  I34.0 424.0     Patient Active Problem List   Diagnosis    Severe aortic valve regurgitation    Severe mitral valve regurgitation    Aortic valve insufficiency     Past Medical History:   Diagnosis Date    Allergic rhinitis     Aortic valve disease     Arrhythmia     Elevated cholesterol     GERD (gastroesophageal reflux disease)     Hyperlipidemia     Hypertension      Past Surgical History:   Procedure Laterality Date    AORTIC VALVE REPAIR/REPLACEMENT N/A 2025    Procedure: AORTIC VALVE REPLACEMENT 23MM, MITRAL VALVE REPAIR 30MM RING, LEFT ATRIAL APPENDAGE EXCLUSION 35MM, TRANSESOPHAGEAL ECHOCARDIOGRAM;  Surgeon: Dilshad Keenan MD;  Location: [facility] OR;  Service: Cardiothoracic;  Laterality: N/A;    ATRIAL APPENDAGE EXCLUSION LEFT WITH TRANSESOPHAGEAL ECHOCARDIOGRAM N/A 2025    Procedure: ATRIAL APPENDAGE EXCLUSION LEFT WITH TRANSESOPHAGEAL ECHOCARDIOGRAM;  Surgeon: Dilshad Keenan MD;  Location: [facility] OR;  Service: Cardiothoracic;  Laterality: N/A;    CATARACT EXTRACTION       SECTION      METATARSAL OSTEOTOMY Bilateral     MITRAL VALVE REPAIR/REPLACEMENT N/A 2025    Procedure: MITRAL VALVE REPAIR/REPLACEMENT;  Surgeon: Dilshad Keenan MD;  Location: [facility] OR;  Service: Cardiothoracic;  Laterality: N/A;     PT Assessment (Last 12 Hours)       PT Evaluation and Treatment       Row Name 25 1359 25 0941       Physical Therapy Time and Intention    Subjective Information complains of;pain;dyspnea  - complains of;pain  -    Document Type therapy note (daily note)  - therapy note (daily note)  -    Mode of Treatment physical therapy  - physical therapy   -      Row Name 05/24/25 [facility]9 05/24/25 0941       General Information    Existing Precautions/Restrictions fall;sternal;oxygen therapy device and L/min  - fall;sternal  -      Row Name 05/24/25 [facility]9 05/24/25 0941       Pain    Pretreatment Pain Rating 6/10  - 2/10  -    Posttreatment Pain Rating 6/10  - 4/10  -MF    Pain Location chest  - chest  -    Pain Side/Orientation left  - generalized  -    Pain Management Interventions exercise or physical activity utilized;premedicated for activity  - exercise or physical activity utilized  -    Response to Pain Interventions activity participation with tolerable pain  - activity participation with tolerable pain  -      Row Name 05/24/25 1359 05/24/25 0941       Bed Mobility    Comment, (Bed Mobility) up in chair  - up in chair  -      Row Name 05/24/25 [facility] 05/24/25 0941       Sit-Stand Transfer    Sit-Stand [place] (Transfers) verbal cues;contact guard  - contact guard;verbal cues  -      Row Name 05/24/25 [facility] 05/24/25 0941       Stand-Sit Transfer    Stand-Sit [place] (Transfers) verbal cues;contact guard  - contact guard  -      Row Name 05/24/25 [facility]9 05/24/25 0941       Gait/Stairs (Locomotion)    [place] Level (Gait) verbal cues;contact guard  - contact guard;verbal cues  -    Distance in Feet (Gait) 350  2 standing rests  - 250  1 standing rest  -    Deviations/Abnormal Patterns (Gait) mendoza decreased;stride length decreased  - mendoza decreased;stride length decreased  -      Row Name 05/24/25 [facility]9 05/24/25 0941       Motor Skills    Comments, Therapeutic Exercise cardiac warm ups x 15  -MF cardiac warm ups x 15  -MF      Row Name             Wound 05/21/25 0725 sternal Surgical    Wound - Properties Group Placement Date: 05/21/25  - Placement Time: 0725  -LH Present on Original Admission: N  -LH Location: sternal  - Primary Wound Type: Surgical  - Additional Comments: estevan rodriguez  strip, mepilex  -LH    Retired Wound - Properties Group Placement Date: 05/21/25  - Placement Time: 0725 -LH Present on Original Admission: N  -LH Location: sternal  -LH Additional Comments: mastisol, steri strip, mepilex  -LH    Retired Wound - Properties Group Placement Date: 05/21/25  - Placement Time: 0725 -LH Present on Original Admission: N  -LH Location: sternal  -LH Additional Comments: mastisol, steri strip, mepilex  -LH    Retired Wound - Properties Group Date first assessed: 05/21/25  - Time first assessed: 0725 -LH Present on Original Admission: N  -LH Location: sternal  -LH Additional Comments: mastisol, steri strip, mepilex  -LH      Row Name 05/24/25 0941          Plan of Care Review    Plan of Care Reviewed With patient  -MF     Progress improving  -MF     Outcome Evaluation PT tx completed. Pt. with 4/10 pain with activity. She was CGA for transfers and gait. She walked 250' with 1 standing rest. Her O2 sats remained in the low 90's while on RA with activity. Pt. is progressing well. Will continue to work on progressive ambulation and independence.  -MF       Row Name 05/24/25 1359 05/24/25 0941       Vital Signs    Pre SpO2 (%) 96  -MF 94  -MF    O2 Delivery Pre Treatment supplemental O2  2l  -MF room air  -MF    O2 Delivery Intra Treatment supplemental O2  -MF room air  -MF    Post SpO2 (%) 94  -MF 92  -MF    O2 Delivery Post Treatment supplemental O2  -MF room air  -MF    Pre Patient Position Sitting  -MF Sitting  -MF    Intra Patient Position Standing  -MF Standing  -MF    Post Patient Position Sitting  -MF Sitting  -MF      Row Name 05/24/25 1359 05/24/25 0941       Positioning and Restraints    Pre-Treatment Position sitting in chair/recliner  -MF sitting in chair/recliner  -MF    Post Treatment Position chair  -MF chair  -MF    In Chair reclined;call light within reach;encouraged to call for assist;with family/caregiver;with nsg;RUE elevated;LUE elevated  -MF reclined;call light  within reach;encouraged to call for assist;notified nsg;RUE elevated;LUE elevated  -              User Key  (r) = Recorded By, (t) = Taken By, (c) = Cosigned By      Initials Name Provider Type    Yeny Dougherty, RN Registered Nurse    Monique Downs, PTA Physical Therapist Assistant                    Physical Therapy Education       Title: PT OT SLP Therapies (In Progress)       Topic: Physical Therapy (In Progress)       Point: Mobility training (In Progress)       Learning Progress Summary            Patient Acceptance, E, NR by SERGEY at 5/22/2025 1400    Comment: gait progression, PT POC    Acceptance, E, NR by SERGEY at 5/22/2025 0855    Comment: Benefits of activity, progression of PT, sternal prec                      Point: Home exercise program (Not Started)       Learner Progress:  Not documented in this visit.              Point: Body mechanics (Not Started)       Learner Progress:  Not documented in this visit.              Point: Precautions (In Progress)       Learning Progress Summary            Patient Acceptance, E, NR by SERGEY at 5/22/2025 0855    Comment: Benefits of activity, progression of PT, sternal prec                                      User Key       Initials Effective Dates Name Provider Type Discipline    SERGEY 02/03/23 -  Jackson Ramon, PT DPT Physical Therapist PT                  PT Recommendation and Plan     Plan of Care Reviewed With: patient  Progress: improving  Outcome Evaluation: PT tx completed. Pt. with 4/10 pain with activity. She was CGA for transfers and gait. She walked 250' with 1 standing rest. Her O2 sats remained in the low 90's while on RA with activity. Pt. is progressing well. Will continue to work on progressive ambulation and independence.   Outcome Measures       Row Name 05/24/25 0941             How much help from another person do you currently need...    Turning from your back to your side while in flat bed without using bedrails? 2  -MF      Moving  from lying on back to sitting on the side of a flat bed without bedrails? 2  -MF      Moving to and from a bed to a chair (including a wheelchair)? 3  -MF      Standing up from a chair using your arms (e.g., wheelchair, bedside chair)? 3  -MF      Climbing 3-5 steps with a railing? 3  -MF      To walk in hospital room? 3  -MF      AM-PAC 6 Clicks Score (PT) 16  -MF         Functional Assessment    Outcome Measure Options AM-PAC 6 Clicks Basic Mobility (PT)  -MF                User Key  (r) = Recorded By, (t) = Taken By, (c) = Cosigned By      Initials Name Provider Type    Monique Downs PTA Physical Therapist Assistant                     Time Calculation:    PT Charges       Row Name 05/24/25 1428 05/24/25 1004          Time Calculation    Start Time 1359  -MF 0941  -MF     Stop Time 1429  -MF 1004  -MF     Time Calculation (min) 30 min  -MF 23 min  -MF     PT Received On -- 05/24/25  -MF        Time Calculation- PT    Total Timed Code Minutes- PT 30 minute(s)  -MF 23 minute(s)  -MF        Timed Charges    25564 - Gait Training Minutes  30  -MF 23  -MF        Total Minutes    Timed Charges Total Minutes 30  -MF 23  -MF      Total Minutes 30  -MF 23  -MF               User Key  (r) = Recorded By, (t) = Taken By, (c) = Cosigned By      Initials Name Provider Type    Monique Downs PTA Physical Therapist Assistant                  Therapy Charges for Today       Code Description Service Date Service Provider Modifiers Qty    11185097199 HC GAIT TRAINING EA 15 MIN 5/24/2025 Monique Ha PTA GP 2    95337794419 HC GAIT TRAINING EA 15 MIN 5/24/2025 Monique Ha PTA GP 2            PT G-Codes  Outcome Measure Options: AM-PAC 6 Clicks Basic Mobility (PT)  AM-PAC 6 Clicks Score (PT): 16    Monique Ha PTA  5/24/2025

## 2025-05-25 ENCOUNTER — APPOINTMENT (OUTPATIENT)
Dept: GENERAL RADIOLOGY | Facility: HOSPITAL | Age: 70
End: 2025-05-25
Payer: MEDICARE

## 2025-05-25 LAB
ALBUMIN SERPL-MCNC: 3.7 G/DL (ref 3.5–5.2)
ANION GAP SERPL CALCULATED.3IONS-SCNC: 11 MMOL/L (ref 5–15)
BASOPHILS # BLD AUTO: 0.04 10*3/MM3 (ref 0–0.2)
BASOPHILS NFR BLD AUTO: 0.5 % (ref 0–1.5)
BUN SERPL-MCNC: 20 MG/DL (ref 8–23)
BUN/CREAT SERPL: 34.5 (ref 7–25)
CALCIUM SPEC-SCNC: 8.2 MG/DL (ref 8.6–10.5)
CHLORIDE SERPL-SCNC: 100 MMOL/L (ref 98–107)
CO2 SERPL-SCNC: 26 MMOL/L (ref 22–29)
CREAT SERPL-MCNC: 0.58 MG/DL (ref 0.57–1)
DEPRECATED RDW RBC AUTO: 41 FL (ref 37–54)
EGFRCR SERPLBLD CKD-EPI 2021: 97.5 ML/MIN/1.73
EOSINOPHIL # BLD AUTO: 0.15 10*3/MM3 (ref 0–0.4)
EOSINOPHIL NFR BLD AUTO: 1.8 % (ref 0.3–6.2)
ERYTHROCYTE [DISTWIDTH] IN BLOOD BY AUTOMATED COUNT: 12.6 % (ref 12.3–15.4)
GLUCOSE BLDC GLUCOMTR-MCNC: 104 MG/DL (ref 70–130)
GLUCOSE BLDC GLUCOMTR-MCNC: 104 MG/DL (ref 70–130)
GLUCOSE BLDC GLUCOMTR-MCNC: 117 MG/DL (ref 70–130)
GLUCOSE BLDC GLUCOMTR-MCNC: 96 MG/DL (ref 70–130)
GLUCOSE SERPL-MCNC: 103 MG/DL (ref 65–99)
HCT VFR BLD AUTO: 28.3 % (ref 34–46.6)
HGB BLD-MCNC: 9.2 G/DL (ref 12–15.9)
IMM GRANULOCYTES # BLD AUTO: 0.05 10*3/MM3 (ref 0–0.05)
IMM GRANULOCYTES NFR BLD AUTO: 0.6 % (ref 0–0.5)
INR PPP: 1.03 (ref 0.91–1.09)
LYMPHOCYTES # BLD AUTO: 1.73 10*3/MM3 (ref 0.7–3.1)
LYMPHOCYTES NFR BLD AUTO: 21.1 % (ref 19.6–45.3)
MAGNESIUM SERPL-MCNC: 2 MG/DL (ref 1.6–2.4)
MCH RBC QN AUTO: 28.9 PG (ref 26.6–33)
MCHC RBC AUTO-ENTMCNC: 32.5 G/DL (ref 31.5–35.7)
MCV RBC AUTO: 89 FL (ref 79–97)
MONOCYTES # BLD AUTO: 0.85 10*3/MM3 (ref 0.1–0.9)
MONOCYTES NFR BLD AUTO: 10.4 % (ref 5–12)
NEUTROPHILS NFR BLD AUTO: 5.36 10*3/MM3 (ref 1.7–7)
NEUTROPHILS NFR BLD AUTO: 65.6 % (ref 42.7–76)
NRBC BLD AUTO-RTO: 0 /100 WBC (ref 0–0.2)
PHOSPHATE SERPL-MCNC: 2.6 MG/DL (ref 2.5–4.5)
PLATELET # BLD AUTO: 108 10*3/MM3 (ref 140–450)
PMV BLD AUTO: 11.6 FL (ref 6–12)
POTASSIUM SERPL-SCNC: 3.8 MMOL/L (ref 3.5–5.2)
POTASSIUM SERPL-SCNC: 3.9 MMOL/L (ref 3.5–5.2)
POTASSIUM SERPL-SCNC: 4.5 MMOL/L (ref 3.5–5.2)
PROTHROMBIN TIME: 14 SECONDS (ref 11.8–14.8)
QT INTERVAL: 386 MS
QTC INTERVAL: 448 MS
RBC # BLD AUTO: 3.18 10*6/MM3 (ref 3.77–5.28)
SODIUM SERPL-SCNC: 137 MMOL/L (ref 136–145)
WBC NRBC COR # BLD AUTO: 8.18 10*3/MM3 (ref 3.4–10.8)

## 2025-05-25 PROCEDURE — 97116 GAIT TRAINING THERAPY: CPT

## 2025-05-25 PROCEDURE — 71045 X-RAY EXAM CHEST 1 VIEW: CPT

## 2025-05-25 PROCEDURE — 25010000002 ENOXAPARIN PER 10 MG: Performed by: SURGERY

## 2025-05-25 PROCEDURE — 85610 PROTHROMBIN TIME: CPT | Performed by: SURGERY

## 2025-05-25 PROCEDURE — 83735 ASSAY OF MAGNESIUM: CPT | Performed by: SURGERY

## 2025-05-25 PROCEDURE — 82948 REAGENT STRIP/BLOOD GLUCOSE: CPT

## 2025-05-25 PROCEDURE — 84132 ASSAY OF SERUM POTASSIUM: CPT | Performed by: SURGERY

## 2025-05-25 PROCEDURE — 85025 COMPLETE CBC W/AUTO DIFF WBC: CPT | Performed by: SURGERY

## 2025-05-25 PROCEDURE — 80069 RENAL FUNCTION PANEL: CPT | Performed by: SURGERY

## 2025-05-25 PROCEDURE — 25010000002 FUROSEMIDE PER 20 MG: Performed by: NURSE PRACTITIONER

## 2025-05-25 RX ORDER — POTASSIUM CHLORIDE 1500 MG/1
20 TABLET, EXTENDED RELEASE ORAL ONCE
Status: COMPLETED | OUTPATIENT
Start: 2025-05-25 | End: 2025-05-25

## 2025-05-25 RX ADMIN — FUROSEMIDE 20 MG: 10 INJECTION, SOLUTION INTRAMUSCULAR; INTRAVENOUS at 08:16

## 2025-05-25 RX ADMIN — TRAMADOL HYDROCHLORIDE 25 MG: 50 TABLET, COATED ORAL at 11:25

## 2025-05-25 RX ADMIN — METHOCARBAMOL TABLETS 500 MG: 500 TABLET, COATED ORAL at 05:54

## 2025-05-25 RX ADMIN — POTASSIUM CHLORIDE 20 MEQ: 1500 TABLET, EXTENDED RELEASE ORAL at 13:18

## 2025-05-25 RX ADMIN — ACETAMINOPHEN 650 MG: 325 TABLET, FILM COATED ORAL at 03:26

## 2025-05-25 RX ADMIN — METHOCARBAMOL TABLETS 500 MG: 500 TABLET, COATED ORAL at 20:22

## 2025-05-25 RX ADMIN — Medication 1 TABLET: at 08:15

## 2025-05-25 RX ADMIN — BISACODYL 10 MG: 5 TABLET, COATED ORAL at 08:15

## 2025-05-25 RX ADMIN — POLYETHYLENE GLYCOL 3350 17 G: 17 POWDER, FOR SOLUTION ORAL at 08:15

## 2025-05-25 RX ADMIN — LIDOCAINE 2 PATCH: 4 PATCH TOPICAL at 03:28

## 2025-05-25 RX ADMIN — TRAMADOL HYDROCHLORIDE 25 MG: 50 TABLET, COATED ORAL at 03:27

## 2025-05-25 RX ADMIN — ENOXAPARIN SODIUM 40 MG: 100 INJECTION SUBCUTANEOUS at 08:15

## 2025-05-25 RX ADMIN — METOPROLOL TARTRATE 25 MG: 25 TABLET, FILM COATED ORAL at 20:22

## 2025-05-25 RX ADMIN — MONTELUKAST SODIUM 10 MG: 10 TABLET, COATED ORAL at 08:15

## 2025-05-25 RX ADMIN — POTASSIUM CHLORIDE 20 MEQ: 1500 TABLET, EXTENDED RELEASE ORAL at 08:15

## 2025-05-25 RX ADMIN — METOPROLOL TARTRATE 25 MG: 25 TABLET, FILM COATED ORAL at 08:15

## 2025-05-25 RX ADMIN — Medication 1 APPLICATION: at 17:53

## 2025-05-25 RX ADMIN — ACETAMINOPHEN 650 MG: 325 TABLET, FILM COATED ORAL at 08:15

## 2025-05-25 RX ADMIN — ACETAMINOPHEN 650 MG: 325 TABLET, FILM COATED ORAL at 13:19

## 2025-05-25 RX ADMIN — ASPIRIN 81 MG: 81 TABLET, COATED ORAL at 08:15

## 2025-05-25 RX ADMIN — ATORVASTATIN CALCIUM 20 MG: 10 TABLET, FILM COATED ORAL at 20:21

## 2025-05-25 RX ADMIN — FUROSEMIDE 20 MG: 10 INJECTION, SOLUTION INTRAMUSCULAR; INTRAVENOUS at 17:53

## 2025-05-25 RX ADMIN — POTASSIUM CHLORIDE 20 MEQ: 1500 TABLET, EXTENDED RELEASE ORAL at 05:55

## 2025-05-25 RX ADMIN — Medication 1 APPLICATION: at 05:55

## 2025-05-25 RX ADMIN — TRAMADOL HYDROCHLORIDE 25 MG: 50 TABLET, COATED ORAL at 20:21

## 2025-05-25 RX ADMIN — PANTOPRAZOLE SODIUM 40 MG: 40 TABLET, DELAYED RELEASE ORAL at 05:54

## 2025-05-25 RX ADMIN — ACETAMINOPHEN 650 MG: 325 TABLET, FILM COATED ORAL at 20:21

## 2025-05-25 RX ADMIN — FLUTICASONE PROPIONATE 1 SPRAY: 50 SPRAY, METERED NASAL at 11:25

## 2025-05-25 RX ADMIN — METHOCARBAMOL TABLETS 500 MG: 500 TABLET, COATED ORAL at 13:18

## 2025-05-25 NOTE — THERAPY TREATMENT NOTE
Acute Care - Physical Therapy Treatment Note  [facility]     Patient Name: Gladys Pino  : 1955  MRN: [id number]  Today's Date: 2025      Visit Dx:     ICD-10-CM ICD-9-CM   1. Impaired mobility [Z74.09]  Z74.09 799.89   2. Severe aortic valve regurgitation  I35.1 424.1   3. Severe mitral valve regurgitation  I34.0 424.0     Patient Active Problem List   Diagnosis    Severe aortic valve regurgitation    Severe mitral valve regurgitation    Aortic valve insufficiency     Past Medical History:   Diagnosis Date    Allergic rhinitis     Aortic valve disease     Arrhythmia     Elevated cholesterol     GERD (gastroesophageal reflux disease)     Hyperlipidemia     Hypertension      Past Surgical History:   Procedure Laterality Date    AORTIC VALVE REPAIR/REPLACEMENT N/A 2025    Procedure: AORTIC VALVE REPLACEMENT 23MM, MITRAL VALVE REPAIR 30MM RING, LEFT ATRIAL APPENDAGE EXCLUSION 35MM, TRANSESOPHAGEAL ECHOCARDIOGRAM;  Surgeon: Dilshad Keenan MD;  Location: [facility];  Service: Cardiothoracic;  Laterality: N/A;    ATRIAL APPENDAGE EXCLUSION LEFT WITH TRANSESOPHAGEAL ECHOCARDIOGRAM N/A 2025    Procedure: ATRIAL APPENDAGE EXCLUSION LEFT WITH TRANSESOPHAGEAL ECHOCARDIOGRAM;  Surgeon: Dilshad Keenan MD;  Location: [facility] OR;  Service: Cardiothoracic;  Laterality: N/A;    CATARACT EXTRACTION       SECTION      METATARSAL OSTEOTOMY Bilateral     MITRAL VALVE REPAIR/REPLACEMENT N/A 2025    Procedure: MITRAL VALVE REPAIR/REPLACEMENT;  Surgeon: Dilshad Keenan MD;  Location: [facility] OR;  Service: Cardiothoracic;  Laterality: N/A;     PT Assessment (Last 12 Hours)       PT Evaluation and Treatment       Row Name 25 1504 25 1108       Physical Therapy Time and Intention    Subjective Information complains of;fatigue;pain  - complains of;pain  -MF    Document Type therapy note (daily note)  - therapy note (daily note)  -    Mode of Treatment physical therapy  - physical therapy   -    Comment educated pt and family on the importance of incentive spirometer.  - discussed progression of activity and sternal percautions.  -      Row Name 05/25/25 1504 05/25/25 1108       General Information    Existing Precautions/Restrictions fall;sternal  - fall;sternal  -      Row Name 05/25/25 1504 05/25/25 1108       Pain    Pretreatment Pain Rating 5/10  - 6/10  -MF    Posttreatment Pain Rating 5/10  - 7/10  -    Pain Location chest  - chest  -    Pain Side/Orientation generalized  - generalized  -    Pain Management Interventions exercise or physical activity utilized  - exercise or physical activity utilized;nursing notified  -    Response to Pain Interventions activity participation with tolerable pain  - activity participation with increased pain  -      Row Name 05/25/25 1504 05/25/25 1108       Bed Mobility    Supine-Sit [place] (Bed Mobility) verbal cues;contact guard;minimum assist (75% patient effort)  - --    Assistive Device (Bed Mobility) head of bed elevated  - --    Comment, (Bed Mobility) -- up in chair  -      Row Name 05/25/25 1504 05/25/25 1108       Sit-Stand Transfer    Sit-Stand [place] (Transfers) verbal cues;contact guard  - contact guard  -      Row Name 05/25/25 1504 05/25/25 1108       Stand-Sit Transfer    Stand-Sit [place] (Transfers) verbal cues;contact guard  - contact guard  -      Row Name 05/25/25 1504 05/25/25 1108       Gait/Stairs (Locomotion)    [place] Level (Gait) verbal cues;contact guard  - contact guard;verbal cues  -    Distance in Feet (Gait) 350  2 standing rests  - 350  2 standing rests  -    Deviations/Abnormal Patterns (Gait) [place] decreased;stride length decreased  - [place] decreased;stride length decreased  -      Row Name 05/25/25 1504 05/25/25 1108       Motor Skills    Comments, Therapeutic Exercise cardiac warm ups x 15  - cardiac warm ups x 15  -      Row Name              Wound 05/21/25 0725 sternal Surgical    Wound - Properties Group Placement Date: 05/21/25  - Placement Time: 0725  -LH Present on Original Admission: N  -LH Location: sternal  -LH Primary Wound Type: Surgical  -LH Additional Comments: mastisol, steri strip, mepilex  -LH    Retired Wound - Properties Group Placement Date: 05/21/25  - Placement Time: 0725  -LH Present on Original Admission: N  -LH Location: sternal  -LH Additional Comments: mastisol, steri strip, mepilex  -LH    Retired Wound - Properties Group Placement Date: 05/21/25  - Placement Time: 0725  -LH Present on Original Admission: N  -LH Location: sternal  -LH Additional Comments: mastisol, steri strip, mepilex  -LH    Retired Wound - Properties Group Date first assessed: 05/21/25  - Time first assessed: 0725 -LH Present on Original Admission: N  -LH Location: sternal  -LH Additional Comments: mastisol, steri strip, mepilex  -LH      Row Name 05/25/25 1108          Plan of Care Review    Plan of Care Reviewed With patient  -MF     Progress no change  -     Outcome Evaluation PT tx completed. Pt reports 5-6/10 in her chest. States she has been coughing quite a bit this morning. Nsg able to bring pain meds after walk. Pt. stood and ambulated with CGA. She walked 350' with 2 standing rests. Pt's O2 sat was 90% while on RA during walk. Cues for pursed lip breathing. Discussed sternal precautions and progression of activity with pt and son. Will continue to work on ambulation and independence.  -       Row Name 05/25/25 1504 05/25/25 1108       Vital Signs    Pre SpO2 (%) 98  -MF 95  -MF    O2 Delivery Pre Treatment supplemental O2  1l  -MF room air  -MF    Intra SpO2 (%) -- 90  -MF    O2 Delivery Intra Treatment room air  -MF room air  -MF    Post SpO2 (%) 96  -MF 95  -MF    O2 Delivery Post Treatment room air  -MF room air  -MF    Pre Patient Position Sitting  -MF Sitting  -MF    Intra Patient Position Standing  -MF Standing  -MF    Post  Patient Position Sitting  -MF Sitting  -      Row Name 05/25/25 1504 05/25/25 1108       Positioning and Restraints    Pre-Treatment Position in bed  - sitting in chair/recliner  -MF    Post Treatment Position chair  -MF chair  -MF    In Chair reclined;call light within reach;encouraged to call for assist;with family/caregiver;RUE elevated;LUE elevated  -MF reclined;call light within reach;encouraged to call for assist;with family/caregiver;notified nsg  -              User Key  (r) = Recorded By, (t) = Taken By, (c) = Cosigned By      Initials Name Provider Type    Yeny Dougherty, RN Registered Nurse    Monique Downs, MAYANK Physical Therapist Assistant                    Physical Therapy Education       Title: PT OT SLP Therapies (In Progress)       Topic: Physical Therapy (In Progress)       Point: Mobility training (In Progress)       Learning Progress Summary            Patient Acceptance, E, NR by SERGEY at 5/22/2025 1400    Comment: gait progression, PT POC    Acceptance, E, NR by SERGEY at 5/22/2025 0855    Comment: Benefits of activity, progression of PT, sternal prec                      Point: Home exercise program (Not Started)       Learner Progress:  Not documented in this visit.              Point: Body mechanics (Not Started)       Learner Progress:  Not documented in this visit.              Point: Precautions (In Progress)       Learning Progress Summary            Patient Acceptance, E, NR by SERGEY at 5/22/2025 0855    Comment: Benefits of activity, progression of PT, sternal prec                                      User Key       Initials Effective Dates Name Provider Type Discipline    SERGEY 02/03/23 -  Jackson Ramon, PT DPT Physical Therapist PT                  PT Recommendation and Plan     Plan of Care Reviewed With: patient  Progress: no change  Outcome Evaluation: PT tx completed. Pt reports 5-6/10 in her chest. States she has been coughing quite a bit this morning. Nsg able to bring  pain meds after walk. Pt. stood and ambulated with CGA. She walked 350' with 2 standing rests. Pt's O2 sat was 90% while on RA during walk. Cues for pursed lip breathing. Discussed sternal precautions and progression of activity with pt and son. Will continue to work on ambulation and independence.   Outcome Measures       Row Name 05/25/25 1108 05/24/25 0941          How much help from another person do you currently need...    Turning from your back to your side while in flat bed without using bedrails? 3  -MF 2  -MF     Moving from lying on back to sitting on the side of a flat bed without bedrails? 3  -MF 2  -MF     Moving to and from a bed to a chair (including a wheelchair)? 3  -MF 3  -MF     Standing up from a chair using your arms (e.g., wheelchair, bedside chair)? 3  -MF 3  -MF     Climbing 3-5 steps with a railing? 3  -MF 3  -MF     To walk in hospital room? 3  -MF 3  -MF     AM-PAC 6 Clicks Score (PT) 18  -MF 16  -MF        Functional Assessment    Outcome Measure Options AM-PAC 6 Clicks Basic Mobility (PT)  -MF AM-PAC 6 Clicks Basic Mobility (PT)  -MF               User Key  (r) = Recorded By, (t) = Taken By, (c) = Cosigned By      Initials Name Provider Type    Monique Downs PTA Physical Therapist Assistant                     Time Calculation:    PT Charges       Row Name 05/25/25 1529 05/25/25 1310          Time Calculation    Start Time 1504  -MF 1108  -MF     Stop Time 1529  -MF 1138  -MF     Time Calculation (min) 25 min  -MF 30 min  -MF     PT Received On -- 05/25/25  -MF        Time Calculation- PT    Total Timed Code Minutes- PT 25 minute(s)  -MF 30 minute(s)  -MF        Timed Charges    40811 - Gait Training Minutes  25  -MF 30  -MF        Total Minutes    Timed Charges Total Minutes 25  -MF 30  -MF      Total Minutes 25  -MF 30  -MF               User Key  (r) = Recorded By, (t) = Taken By, (c) = Cosigned By      Initials Name Provider Type    Monique Downs PTA Physical  Therapist Assistant                  Therapy Charges for Today       Code Description Service Date Service Provider Modifiers Qty    31564882182 HC GAIT TRAINING EA 15 MIN 5/24/2025 Monique Ha, PTA GP 2    51982737940 HC GAIT TRAINING EA 15 MIN 5/24/2025 Monique Ha, PTA GP 2    40149943337 HC GAIT TRAINING EA 15 MIN 5/25/2025 Monique Ha, PTA GP 2    58420454946 HC GAIT TRAINING EA 15 MIN 5/25/2025 Monique Ha, PTA GP 2            PT G-Codes  Outcome Measure Options: AM-PAC 6 Clicks Basic Mobility (PT)  AM-PAC 6 Clicks Score (PT): 18    Monique Ha PTA  5/25/2025

## 2025-05-25 NOTE — PLAN OF CARE
Goal Outcome Evaluation:  Plan of Care Reviewed With: patient        Progress: no change  Outcome Evaluation: PT tx completed. Pt reports 5-6/10 in her chest. States she has been coughing quite a bit this morning. Nsg able to bring pain meds after walk. Pt. stood and ambulated with CGA. She walked 350' with 2 standing rests. Pt's O2 sat was 90% while on RA during walk. Cues for pursed lip breathing. Discussed sternal precautions and progression of activity with pt and son. Will continue to work on ambulation and independence.

## 2025-05-25 NOTE — PLAN OF CARE
Goal Outcome Evaluation:         Pt. A&Ox4. VSS. 2L NC. Pt. Has had c/o pain, prn meds given. Good UOP. Pt. Requires frequent reminders and education on sternal precautions, as pt tries to ambulate in room by herself and frequently tries to put weight on her arms. NSR on tele 70-80, pvcs. Safety maintained.

## 2025-05-25 NOTE — PLAN OF CARE
Goal Outcome Evaluation:  Plan of Care Reviewed With: patient           Outcome Evaluation: S 72-79 per tele. Pt c/o pain x2, relieved with meds. Reminded pt of sternal precautions and to call out when needing to toilet. She has rested well and states she's starting to feel better. Safety maintained.

## 2025-05-25 NOTE — THERAPY TREATMENT NOTE
Acute Care - Physical Therapy Treatment Note  [facility]     Patient Name: Gladys Pino  : 1955  MRN: [id number]  Today's Date: 2025      Visit Dx:     ICD-10-CM ICD-9-CM   1. Impaired mobility [Z74.09]  Z74.09 799.89   2. Severe aortic valve regurgitation  I35.1 424.1   3. Severe mitral valve regurgitation  I34.0 424.0     Patient Active Problem List   Diagnosis    Severe aortic valve regurgitation    Severe mitral valve regurgitation    Aortic valve insufficiency     Past Medical History:   Diagnosis Date    Allergic rhinitis     Aortic valve disease     Arrhythmia     Elevated cholesterol     GERD (gastroesophageal reflux disease)     Hyperlipidemia     Hypertension      Past Surgical History:   Procedure Laterality Date    AORTIC VALVE REPAIR/REPLACEMENT N/A 2025    Procedure: AORTIC VALVE REPLACEMENT 23MM, MITRAL VALVE REPAIR 30MM RING, LEFT ATRIAL APPENDAGE EXCLUSION 35MM, TRANSESOPHAGEAL ECHOCARDIOGRAM;  Surgeon: Dilshad Keenan MD;  Location: [facility];  Service: Cardiothoracic;  Laterality: N/A;    ATRIAL APPENDAGE EXCLUSION LEFT WITH TRANSESOPHAGEAL ECHOCARDIOGRAM N/A 2025    Procedure: ATRIAL APPENDAGE EXCLUSION LEFT WITH TRANSESOPHAGEAL ECHOCARDIOGRAM;  Surgeon: Dilshad Keenan MD;  Location: [facility];  Service: Cardiothoracic;  Laterality: N/A;    CATARACT EXTRACTION       SECTION      METATARSAL OSTEOTOMY Bilateral     MITRAL VALVE REPAIR/REPLACEMENT N/A 2025    Procedure: MITRAL VALVE REPAIR/REPLACEMENT;  Surgeon: Dilshad Keenan MD;  Location: [facility] OR;  Service: Cardiothoracic;  Laterality: N/A;     PT Assessment (Last 12 Hours)       PT Evaluation and Treatment       Row Name 25 0031          Physical Therapy Time and Intention    Subjective Information complains of;pain  -     Document Type therapy note (daily note)  -     Mode of Treatment physical therapy  -     Comment discussed progression of activity and sternal percautions.  -       Row  Name 05/25/25 1108          General Information    Existing Precautions/Restrictions fall;sternal  -       Row Name 05/25/25 1108          Pain    Pretreatment Pain Rating 6/10  -     Posttreatment Pain Rating 7/10  -     Pain Location chest  -MF     Pain Side/Orientation generalized  -     Pain Management Interventions exercise or physical activity utilized;nursing notified  -     Response to Pain Interventions activity participation with increased pain  -       Row Name 05/25/25 1108          Bed Mobility    Comment, (Bed Mobility) up in chair  -       Row Name 05/25/25 1108          Sit-Stand Transfer    Sit-Stand [place] (Transfers) contact guard  -       Row Name 05/25/25 1108          Stand-Sit Transfer    Stand-Sit [place] (Transfers) contact guard  -       Row Name 05/25/25 1108          Gait/Stairs (Locomotion)    [place] Level (Gait) contact guard;verbal cues  -     Distance in Feet (Gait) 350  2 standing rests  -     Deviations/Abnormal Patterns (Gait) mendoza decreased;stride length decreased  -       Row Name 05/25/25 1108          Motor Skills    Comments, Therapeutic Exercise cardiac warm ups x 15  -       Row Name             Wound 05/21/25 0725 sternal Surgical    Wound - Properties Group Placement Date: 05/21/25  [facility] Placement Time: 0725 - Present on Original Admission: N  -LH Location: sternal  -LH Primary Wound Type: Surgical  -LH Additional Comments: mastisol, steri strip, mepilex  -LH    Retired Wound - Properties Group Placement Date: 05/21/25  [facility] Placement Time: 0725 - Present on Original Admission: N  -LH Location: sternal  -LH Additional Comments: mastisol, steri strip, mepilex  -LH    Retired Wound - Properties Group Placement Date: 05/21/25  - Placement Time: 0725 - Present on Original Admission: N  -LH Location: sternal  -LH Additional Comments: mastisol, steri strip, mepilex  -LH    Retired Wound - Properties Group Date first  assessed: 05/21/25  [facility] Time first assessed: 0725  - Present on Original Admission: N  - Location: sternal  - Additional Comments: mastisol, steri strip, mepilex  -      Row Name 05/25/25 1108          Plan of Care Review    Plan of Care Reviewed With patient  -MF     Progress no change  -     Outcome Evaluation PT tx completed. Pt reports 5-6/10 in her chest. States she has been coughing quite a bit this morning. Nsg able to bring pain meds after walk. Pt. stood and ambulated with CGA. She walked 350' with 2 standing rests. Pt's O2 sat was 90% while on RA during walk. Cues for pursed lip breathing. Discussed sternal precautions and progression of activity with pt and son. Will continue to work on ambulation and independence.  -       Row Name 05/25/25 1108          Vital Signs    Pre SpO2 (%) 95  -MF     O2 Delivery Pre Treatment room air  -MF     Intra SpO2 (%) 90  -MF     O2 Delivery Intra Treatment room air  -MF     Post SpO2 (%) 95  -MF     O2 Delivery Post Treatment room air  -     Pre Patient Position Sitting  -     Intra Patient Position Standing  -     Post Patient Position Sitting  -       Row Name 05/25/25 1108          Positioning and Restraints    Pre-Treatment Position sitting in chair/recliner  -     Post Treatment Position chair  -MF     In Chair reclined;call light within reach;encouraged to call for assist;with family/caregiver;notified [facility]  -               User Key  (r) = Recorded By, (t) = Taken By, (c) = Cosigned By      Initials Name Provider Type     Yeny Sullivan RN Registered Nurse    Monique Downs PTA Physical Therapist Assistant                    Physical Therapy Education       Title: PT OT SLP Therapies (In Progress)       Topic: Physical Therapy (In Progress)       Point: Mobility training (In Progress)       Learning Progress Summary            Patient Acceptance, E, NR by SERGEY at 5/22/2025 1400    Comment: gait progression, PT POC    Acceptance, E,  NR by SERGEY at 5/22/2025 0887    Comment: Benefits of activity, progression of PT, sternal prec                      Point: Home exercise program (Not Started)       Learner Progress:  Not documented in this visit.              Point: Body mechanics (Not Started)       Learner Progress:  Not documented in this visit.              Point: Precautions (In Progress)       Learning Progress Summary            Patient Acceptance, E, NR by SERGEY at 5/22/2025 0855    Comment: Benefits of activity, progression of PT, sternal prec                                      User Key       Initials Effective Dates Name Provider Type Discipline    SERGEY 02/03/23 -  Jackson Ramon, PT DPT Physical Therapist PT                  PT Recommendation and Plan     Plan of Care Reviewed With: patient  Progress: no change  Outcome Evaluation: PT tx completed. Pt reports 5-6/10 in her chest. States she has been coughing quite a bit this morning. Nsg able to bring pain meds after walk. Pt. stood and ambulated with CGA. She walked 350' with 2 standing rests. Pt's O2 sat was 90% while on RA during walk. Cues for pursed lip breathing. Discussed sternal precautions and progression of activity with pt and son. Will continue to work on ambulation and independence.   Outcome Measures       Row Name 05/25/25 1108 05/24/25 0941          How much help from another person do you currently need...    Turning from your back to your side while in flat bed without using bedrails? 3  -MF 2  -MF     Moving from lying on back to sitting on the side of a flat bed without bedrails? 3  -MF 2  -MF     Moving to and from a bed to a chair (including a wheelchair)? 3  -MF 3  -MF     Standing up from a chair using your arms (e.g., wheelchair, bedside chair)? 3  -MF 3  -MF     Climbing 3-5 steps with a railing? 3  -MF 3  -MF     To walk in hospital room? 3  -MF 3  -MF     AM-PAC 6 Clicks Score (PT) 18  -MF 16  -MF        Functional Assessment    Outcome Measure Options AM-PAC 6  Clicks Basic Mobility (PT)  -MF AM-PAC 6 Clicks Basic Mobility (PT)  -MF               User Key  (r) = Recorded By, (t) = Taken By, (c) = Cosigned By      Initials Name Provider Type    Monique Downs PTA Physical Therapist Assistant                     Time Calculation:    PT Charges       Row Name 05/25/25 1310             Time Calculation    Start Time 1108  -MF      Stop Time 1138  -MF      Time Calculation (min) 30 min  -MF      PT Received On 05/25/25  -MF         Time Calculation- PT    Total Timed Code Minutes- PT 30 minute(s)  -MF         Timed Charges    65346 - Gait Training Minutes  30  -MF         Total Minutes    Timed Charges Total Minutes 30  -MF       Total Minutes 30  -MF                User Key  (r) = Recorded By, (t) = Taken By, (c) = Cosigned By      Initials Name Provider Type    Monique Downs PTA Physical Therapist Assistant                  Therapy Charges for Today       Code Description Service Date Service Provider Modifiers Qty    08354733037 HC GAIT TRAINING EA 15 MIN 5/24/2025 Monique Ha, MAYANK GP 2    55332183371 HC GAIT TRAINING EA 15 MIN 5/24/2025 Monique Ha, MAYANK GP 2    20688427941 HC GAIT TRAINING EA 15 MIN 5/25/2025 Monique Ha, MAYANK GP 2            PT G-Codes  Outcome Measure Options: AM-PAC 6 Clicks Basic Mobility (PT)  AM-PAC 6 Clicks Score (PT): 18    Monique Ha PTA  5/25/2025

## 2025-05-25 NOTE — PROGRESS NOTES
"Patient name: Gladys Pino  Patient : 1955  VISIT # [id number]  MR #[id number]    Procedure:Procedure(s):  AORTIC VALVE REPLACEMENT 23MM, MITRAL VALVE REPAIR 30MM RING, LEFT ATRIAL APPENDAGE EXCLUSION 35MM, TRANSESOPHAGEAL ECHOCARDIOGRAM  MITRAL VALVE REPAIR/REPLACEMENT  ATRIAL APPENDAGE EXCLUSION LEFT WITH TRANSESOPHAGEAL ECHOCARDIOGRAM  Procedure Date:2025  POD:4 Days Post-Op    Subjective       Transferred to  yesterday.  Resting in recliner, ambulated only to the nursing desk this morning.  Remains on 2 L per nasal cannula with SpO2 of 97%. Weight is down 5 pounds and remains 4 pounds above baseline.  Creatinine 0.58, potassium 3.8, calcium 8.2 and hemoglobin stable at 9.2 with a hematocrit of 28.3.  Is having bowel movements.  Reports pain is overall controlled.        Telemetry: Sinus arrhythmia with first-degree block 70-80  IV drips: None       Objective     Visit Vitals  /69 (BP Location: Right arm, Patient Position: Sitting)   Pulse 70   Temp 97.7 °F (36.5 °C) (Oral)   Resp 18   Ht 155 cm (61.02\")   Wt 76.5 kg (168 lb 9.6 oz)   SpO2 97%   BMI 31.83 kg/m²   Baseline weight 164 pounds    Intake/Output Summary (Last 24 hours) at 2025 0754  Last data filed at 2025 0543  Gross per 24 hour   Intake 840 ml   Output 1650 ml   Net -810 ml   Lab:     CBC:  Results from last 7 days   Lab Units 25  0525  0405   WBC 10*3/mm3 8.18 10.06 11.89*   HEMATOCRIT % 28.3* 29.2* 30.7*   PLATELETS 10*3/mm3 108* 93* 77*          BMP:  Results from last 7 days   Lab Units 25  0525  0405   SODIUM mmol/L 137 136 137   POTASSIUM mmol/L 3.8 4.3 4.8   CHLORIDE mmol/L 100 100 106   CO2 mmol/L 26.0 24.0 22.0   GLUCOSE mg/dL 103* 107* 127*   BUN mg/dL 20 25* 20   CREATININE mg/dL 0.58 0.73 0.74          COAG:  Results from last 7 days   Lab Units 25  0318 25  0355 25  1240   INR  1.03   < > 1.22*   APTT seconds  --   --  41.7*    " < > = values in this interval not displayed.       IMAGES:       Imaging Results (Last 24 Hours)       Procedure Component Value Units Date/Time    XR Chest 1 View [[id number]] Collected: 05/25/25 0748     Updated: 05/25/25 0753    Narrative:      EXAM: XR CHEST 1 VW-      HISTORY: Post-Op Heart Surgery       COMPARISON: 5/24/2025.     TECHNIQUE: Single frontal radiograph of the chest was obtained.     FINDINGS:     Support Devices: Prior aortic and mitral valve replacement. Left atrial  appendage clip. Removal of the right IJ catheter sheath.     Cardiac and Mediastinal Silhouettes: Unchanged.     Lungs/Pleura: Stable mild left basilar pleural-parenchymal masses. No  visible pneumothorax.     Osseous structures: No acute osseous finding.     Other: None.       Impression:         Interval removal of right IJ catheter sheath. Otherwise no significant  change.           This report was signed and finalized on 5/25/2025 7:50 AM by Dilshad Alarcon.       XR Chest PA & Lateral [[id number]] Collected: 05/24/25 0753     Updated: 05/24/25 0757    Narrative:      EXAM: XR CHEST PA AND LATERAL-      HISTORY: s/p AVR and MV Repair       COMPARISON: 5/23/2025.     TECHNIQUE: Frontal and lateral radiographs of the chest was obtained.     FINDINGS:     Support Devices: Stable right IJ CVC catheter sheath. Prior aortic and  mitral valve replacement. Left atrial appendage clip.     Cardiac and Mediastinal Silhouettes: Unchanged.     Lungs/Pleura: Mild perihilar bibasilar streaky opacities. No sizable  pleural effusion. No visible pneumothorax.     Osseous structures: No acute osseous finding.     Other: None.       Impression:         Stable exam with mild perihilar and bibasilar opacities, favor  atelectasis.        This report was signed and finalized on 5/24/2025 7:54 AM by Dilshad Alarcon.             CXR: No pneumothorax.  Mild bibasilar atelectasis, stable.       Physical Exam:  General: Alert, oriented. No apparent  distress.  Cardiovascular: Regular rate and rhythm without murmur, rubs, or gallops.    Pulmonary: Clear to auscultation bilaterally without wheezing, rubs, or rales.  Chest: Sternotomy incision clean, dry, and intact. Sternum stable. No clicks.   Abdomen: Soft, nondistended, and nontender.  Extremities: Warm, moves all extremities.  Mild lower extremity edema  Neurologic:  Grossly intact with no focal deficits.            Impression:  Severe mitral valve regurgitation  Severe aortic valve insufficiency  Hypertension  Hyperlipidemia  Obesity, BMI 31.1  GERD        Plan:   Continue multimodality pain control strategies  Encourage pulmonary toileting and ambulation  Routine post cardiac surgery regimen  Wean O2, decreased to 1 L      Possible discharge home on Monday    Discussed with patient and nursing      Jazmyne Mendez, LYNNE  05/25/25  07:54 CDT

## 2025-05-26 ENCOUNTER — APPOINTMENT (OUTPATIENT)
Dept: GENERAL RADIOLOGY | Facility: HOSPITAL | Age: 70
End: 2025-05-26
Payer: MEDICARE

## 2025-05-26 LAB
ALBUMIN SERPL-MCNC: 3.5 G/DL (ref 3.5–5.2)
ANION GAP SERPL CALCULATED.3IONS-SCNC: 10 MMOL/L (ref 5–15)
BASOPHILS # BLD MANUAL: 0.06 10*3/MM3 (ref 0–0.2)
BASOPHILS NFR BLD MANUAL: 1 % (ref 0–1.5)
BUN SERPL-MCNC: 15 MG/DL (ref 8–23)
BUN/CREAT SERPL: 23.8 (ref 7–25)
CALCIUM SPEC-SCNC: 8.6 MG/DL (ref 8.6–10.5)
CHLORIDE SERPL-SCNC: 98 MMOL/L (ref 98–107)
CO2 SERPL-SCNC: 25 MMOL/L (ref 22–29)
CREAT SERPL-MCNC: 0.63 MG/DL (ref 0.57–1)
DEPRECATED RDW RBC AUTO: 40.2 FL (ref 37–54)
EGFRCR SERPLBLD CKD-EPI 2021: 95.6 ML/MIN/1.73
EOSINOPHIL # BLD MANUAL: 0.06 10*3/MM3 (ref 0–0.4)
EOSINOPHIL NFR BLD MANUAL: 1 % (ref 0.3–6.2)
ERYTHROCYTE [DISTWIDTH] IN BLOOD BY AUTOMATED COUNT: 12.5 % (ref 12.3–15.4)
GIANT PLATELETS: ABNORMAL
GLUCOSE BLDC GLUCOMTR-MCNC: 104 MG/DL (ref 70–130)
GLUCOSE BLDC GLUCOMTR-MCNC: 111 MG/DL (ref 70–130)
GLUCOSE BLDC GLUCOMTR-MCNC: 128 MG/DL (ref 70–130)
GLUCOSE BLDC GLUCOMTR-MCNC: 92 MG/DL (ref 70–130)
GLUCOSE SERPL-MCNC: 101 MG/DL (ref 65–99)
HCT VFR BLD AUTO: 30.6 % (ref 34–46.6)
HGB BLD-MCNC: 10 G/DL (ref 12–15.9)
INR PPP: 0.96 (ref 0.91–1.09)
LYMPHOCYTES # BLD MANUAL: 1.22 10*3/MM3 (ref 0.7–3.1)
LYMPHOCYTES NFR BLD MANUAL: 11 % (ref 5–12)
MAGNESIUM SERPL-MCNC: 2 MG/DL (ref 1.6–2.4)
MCH RBC QN AUTO: 29 PG (ref 26.6–33)
MCHC RBC AUTO-ENTMCNC: 32.7 G/DL (ref 31.5–35.7)
MCV RBC AUTO: 88.7 FL (ref 79–97)
MONOCYTES # BLD: 0.71 10*3/MM3 (ref 0.1–0.9)
NEUTROPHILS # BLD AUTO: 4.37 10*3/MM3 (ref 1.7–7)
NEUTROPHILS NFR BLD MANUAL: 68 % (ref 42.7–76)
PHOSPHATE SERPL-MCNC: 3.3 MG/DL (ref 2.5–4.5)
PLATELET # BLD AUTO: 126 10*3/MM3 (ref 140–450)
PMV BLD AUTO: 11.2 FL (ref 6–12)
POLYCHROMASIA BLD QL SMEAR: ABNORMAL
POTASSIUM SERPL-SCNC: 4.4 MMOL/L (ref 3.5–5.2)
PROTHROMBIN TIME: 13.2 SECONDS (ref 11.8–14.8)
QT INTERVAL: 410 MS
QT INTERVAL: 444 MS
QT INTERVAL: 452 MS
QT INTERVAL: 468 MS
QTC INTERVAL: 451 MS
QTC INTERVAL: 494 MS
QTC INTERVAL: 501 MS
QTC INTERVAL: 540 MS
RBC # BLD AUTO: 3.45 10*6/MM3 (ref 3.77–5.28)
SMALL PLATELETS BLD QL SMEAR: ABNORMAL
SODIUM SERPL-SCNC: 133 MMOL/L (ref 136–145)
VARIANT LYMPHS NFR BLD MANUAL: 1 % (ref 0–5)
VARIANT LYMPHS NFR BLD MANUAL: 18 % (ref 19.6–45.3)
WBC MORPH BLD: NORMAL
WBC NRBC COR # BLD AUTO: 6.43 10*3/MM3 (ref 3.4–10.8)

## 2025-05-26 PROCEDURE — 25010000002 ENOXAPARIN PER 10 MG: Performed by: SURGERY

## 2025-05-26 PROCEDURE — 85007 BL SMEAR W/DIFF WBC COUNT: CPT | Performed by: SURGERY

## 2025-05-26 PROCEDURE — 82948 REAGENT STRIP/BLOOD GLUCOSE: CPT

## 2025-05-26 PROCEDURE — 80069 RENAL FUNCTION PANEL: CPT | Performed by: SURGERY

## 2025-05-26 PROCEDURE — 71045 X-RAY EXAM CHEST 1 VIEW: CPT

## 2025-05-26 PROCEDURE — 97116 GAIT TRAINING THERAPY: CPT

## 2025-05-26 PROCEDURE — 85610 PROTHROMBIN TIME: CPT | Performed by: SURGERY

## 2025-05-26 PROCEDURE — 83735 ASSAY OF MAGNESIUM: CPT | Performed by: SURGERY

## 2025-05-26 PROCEDURE — 85025 COMPLETE CBC W/AUTO DIFF WBC: CPT | Performed by: SURGERY

## 2025-05-26 PROCEDURE — 25010000002 FUROSEMIDE PER 20 MG: Performed by: NURSE PRACTITIONER

## 2025-05-26 RX ADMIN — METHOCARBAMOL TABLETS 500 MG: 500 TABLET, COATED ORAL at 20:08

## 2025-05-26 RX ADMIN — METHOCARBAMOL TABLETS 500 MG: 500 TABLET, COATED ORAL at 14:32

## 2025-05-26 RX ADMIN — ACETAMINOPHEN 650 MG: 325 TABLET, FILM COATED ORAL at 20:07

## 2025-05-26 RX ADMIN — Medication 1 TABLET: at 09:14

## 2025-05-26 RX ADMIN — ATORVASTATIN CALCIUM 20 MG: 10 TABLET, FILM COATED ORAL at 20:08

## 2025-05-26 RX ADMIN — MONTELUKAST SODIUM 10 MG: 10 TABLET, COATED ORAL at 09:14

## 2025-05-26 RX ADMIN — ENOXAPARIN SODIUM 40 MG: 100 INJECTION SUBCUTANEOUS at 09:15

## 2025-05-26 RX ADMIN — ACETAMINOPHEN 650 MG: 325 TABLET, FILM COATED ORAL at 14:30

## 2025-05-26 RX ADMIN — TRAMADOL HYDROCHLORIDE 25 MG: 50 TABLET, COATED ORAL at 20:08

## 2025-05-26 RX ADMIN — Medication 1 APPLICATION: at 05:35

## 2025-05-26 RX ADMIN — FUROSEMIDE 20 MG: 10 INJECTION, SOLUTION INTRAMUSCULAR; INTRAVENOUS at 18:00

## 2025-05-26 RX ADMIN — METHOCARBAMOL TABLETS 500 MG: 500 TABLET, COATED ORAL at 05:35

## 2025-05-26 RX ADMIN — ACETAMINOPHEN 650 MG: 325 TABLET, FILM COATED ORAL at 02:40

## 2025-05-26 RX ADMIN — ACETAMINOPHEN 650 MG: 325 TABLET, FILM COATED ORAL at 09:14

## 2025-05-26 RX ADMIN — METOPROLOL TARTRATE 25 MG: 25 TABLET, FILM COATED ORAL at 09:14

## 2025-05-26 RX ADMIN — BISACODYL 10 MG: 5 TABLET, COATED ORAL at 09:14

## 2025-05-26 RX ADMIN — LIDOCAINE 2 PATCH: 4 PATCH TOPICAL at 05:35

## 2025-05-26 RX ADMIN — FLUTICASONE PROPIONATE 1 SPRAY: 50 SPRAY, METERED NASAL at 09:14

## 2025-05-26 RX ADMIN — ASPIRIN 81 MG: 81 TABLET, COATED ORAL at 09:14

## 2025-05-26 RX ADMIN — FUROSEMIDE 20 MG: 10 INJECTION, SOLUTION INTRAMUSCULAR; INTRAVENOUS at 09:14

## 2025-05-26 RX ADMIN — POLYETHYLENE GLYCOL 3350 17 G: 17 POWDER, FOR SOLUTION ORAL at 09:15

## 2025-05-26 RX ADMIN — PANTOPRAZOLE SODIUM 40 MG: 40 TABLET, DELAYED RELEASE ORAL at 05:35

## 2025-05-26 RX ADMIN — POTASSIUM CHLORIDE 20 MEQ: 1500 TABLET, EXTENDED RELEASE ORAL at 09:14

## 2025-05-26 RX ADMIN — METOPROLOL TARTRATE 25 MG: 25 TABLET, FILM COATED ORAL at 20:08

## 2025-05-26 NOTE — THERAPY TREATMENT NOTE
Acute Care - Physical Therapy Treatment Note  [facility]     Patient Name: Gladys Pino  : 1955  MRN: [id number]  Today's Date: 2025      Visit Dx:     ICD-10-CM ICD-9-CM   1. Impaired mobility [Z74.09]  Z74.09 799.89   2. Severe aortic valve regurgitation  I35.1 424.1   3. Severe mitral valve regurgitation  I34.0 424.0     Patient Active Problem List   Diagnosis    Severe aortic valve regurgitation    Severe mitral valve regurgitation    Aortic valve insufficiency     Past Medical History:   Diagnosis Date    Allergic rhinitis     Aortic valve disease     Arrhythmia     Elevated cholesterol     GERD (gastroesophageal reflux disease)     Hyperlipidemia     Hypertension      Past Surgical History:   Procedure Laterality Date    AORTIC VALVE REPAIR/REPLACEMENT N/A 2025    Procedure: AORTIC VALVE REPLACEMENT 23MM, MITRAL VALVE REPAIR 30MM RING, LEFT ATRIAL APPENDAGE EXCLUSION 35MM, TRANSESOPHAGEAL ECHOCARDIOGRAM;  Surgeon: Dilshad Keenan MD;  Location: [facility] OR;  Service: Cardiothoracic;  Laterality: N/A;    ATRIAL APPENDAGE EXCLUSION LEFT WITH TRANSESOPHAGEAL ECHOCARDIOGRAM N/A 2025    Procedure: ATRIAL APPENDAGE EXCLUSION LEFT WITH TRANSESOPHAGEAL ECHOCARDIOGRAM;  Surgeon: Dilshad Keenan MD;  Location: [facility] OR;  Service: Cardiothoracic;  Laterality: N/A;    CATARACT EXTRACTION       SECTION      METATARSAL OSTEOTOMY Bilateral     MITRAL VALVE REPAIR/REPLACEMENT N/A 2025    Procedure: MITRAL VALVE REPAIR/REPLACEMENT;  Surgeon: Dilshad Keenan MD;  Location: [facility] OR;  Service: Cardiothoracic;  Laterality: N/A;     PT Assessment (Last 12 Hours)       PT Evaluation and Treatment       Row Name 25 1451 25 1049       Physical Therapy Time and Intention    Subjective Information complains of;fatigue;pain  - complains of;pain;fatigue  -    Document Type therapy note (daily note)  - therapy note (daily note)  -    Mode of Treatment physical therapy  - physical  therapy  -      Row Name 05/26/25 1451 05/26/25 1049       General Information    Existing Precautions/Restrictions fall;sternal  - fall;sternal  -      Row Name 05/26/25 1451 05/26/25 1049       Pain    Pretreatment Pain Rating 5/10  - 5/10  -MF    Posttreatment Pain Rating 5/10  - 6/10  -MF    Pain Location chest  - chest  -    Pain Side/Orientation medial  - medial  -    Pain Management Interventions exercise or physical activity utilized  - exercise or physical activity utilized;premedicated for activity  -    Response to Pain Interventions activity participation with tolerable pain  - activity participation with tolerable pain  -      Row Name 05/26/25 1451 05/26/25 1049       Bed Mobility    Comment, (Bed Mobility) up in chair  - up in chair  -      Row Name 05/26/25 1451 05/26/25 1049       Sit-Stand Transfer    Sit-Stand [place] (Transfers) standby assist  - standby assist  -      Row Name 05/26/25 1451 05/26/25 1049       Stand-Sit Transfer    Stand-Sit [place] (Transfers) standby assist  - standby assist  -      Row Name 05/26/25 1451          Toilet Transfer    Type (Toilet Transfer) sit-stand;stand-sit  -     [place] Level (Toilet Transfer) standby assist  -     Assistive Device (Toilet Transfer) commode  -       Row Name 05/26/25 1451 05/26/25 1049       Gait/Stairs (Locomotion)    [place] Level (Gait) contact guard;standby assist  - contact guard;standby assist  -    Distance in Feet (Gait) 425  4 standing rests  - 350  3 standing rests  -    Deviations/Abnormal Patterns (Gait) mendoza decreased;stride length decreased  - mendoza decreased;stride length decreased  -      Row Name 05/26/25 1049          Motor Skills    Comments, Therapeutic Exercise cardiac warm ups x 15  -       Row Name             Wound 05/21/25 0725 sternal Surgical    Wound - Properties Group Placement Date: 05/21/25  - Placement Time: 0725 - Present  on Original Admission: N  -LH Location: sternal  -LH Primary Wound Type: Surgical  -LH Additional Comments: mastisol, steri strip, mepilex  -LH    Retired Wound - Properties Group Placement Date: 05/21/25  - Placement Time: 0725 -LH Present on Original Admission: N  -LH Location: sternal  -LH Additional Comments: mastisol, steri strip, mepilex  -LH    Retired Wound - Properties Group Placement Date: 05/21/25  - Placement Time: 0725 -LH Present on Original Admission: N  -LH Location: sternal  -LH Additional Comments: mastisol, steri strip, mepilex  -LH    Retired Wound - Properties Group Date first assessed: 05/21/25  - Time first assessed: 0725 - Present on Original Admission: N  -LH Location: sternal  -LH Additional Comments: mastisol, steri strip, mepilex  -LH      Row Name 05/26/25 1049          Plan of Care Review    Plan of Care Reviewed With patient  -     Progress no change  -     Outcome Evaluation PT tx completed. Pt reports 5/10 pain in chest. Feeling fatigued this morning. Pt. was SBA for transfers. CGA-SBA for gait-350' with 3 standing rests. Ambulated on RA and returned to room at 93%. Will continue to work on progressive ambulation and independence.  -       Row Name 05/26/25 1451 05/26/25 1049       Vital Signs    Pre SpO2 (%) 96  -MF 96  -MF    O2 Delivery Pre Treatment room air  -MF room air  -MF    O2 Delivery Intra Treatment room air  - room air  -MF    Post SpO2 (%) 94  -MF 93  -MF    O2 Delivery Post Treatment room air  - room air  -MF    Pre Patient Position Sitting  -MF Sitting  -MF    Intra Patient Position Standing  -MF Standing  -MF    Post Patient Position Sitting  -MF Sitting  -MF      Row Name 05/26/25 1451 05/26/25 1049       Positioning and Restraints    Pre-Treatment Position sitting in chair/recliner  -MF sitting in chair/recliner  -MF    Post Treatment Position chair  - chair  -MF    In Chair reclined;call light within reach;with family/caregiver;ALEX  elevated;LUE elevated  -MF reclined;call light within reach;encouraged to call for assist  -MF              User Key  (r) = Recorded By, (t) = Taken By, (c) = Cosigned By      Initials Name Provider Type    Yeny Dougherty, RN Registered Nurse    Monique Downs, PTA Physical Therapist Assistant                    Physical Therapy Education       Title: PT OT SLP Therapies (In Progress)       Topic: Physical Therapy (In Progress)       Point: Mobility training (In Progress)       Learning Progress Summary            Patient Acceptance, E, NR by SERGEY at 5/22/2025 1400    Comment: gait progression, PT POC    Acceptance, E, NR by SERGEY at 5/22/2025 0855    Comment: Benefits of activity, progression of PT, sternal prec                      Point: Home exercise program (Not Started)       Learner Progress:  Not documented in this visit.              Point: Body mechanics (Not Started)       Learner Progress:  Not documented in this visit.              Point: Precautions (In Progress)       Learning Progress Summary            Patient Acceptance, E, NR by SERGEY at 5/22/2025 0855    Comment: Benefits of activity, progression of PT, sternal prec                                      User Key       Initials Effective Dates Name Provider Type Discipline    SERGEY 02/03/23 -  Jackson Ramon, PT DPT Physical Therapist PT                  PT Recommendation and Plan     Plan of Care Reviewed With: patient  Progress: no change  Outcome Evaluation: PT tx completed. Pt reports 5/10 pain in chest. Feeling fatigued this morning. Pt. was SBA for transfers. CGA-SBA for gait-350' with 3 standing rests. Ambulated on RA and returned to room at 93%. Will continue to work on progressive ambulation and independence.   Outcome Measures       Row Name 05/26/25 1049 05/25/25 1108 05/24/25 0941       How much help from another person do you currently need...    Turning from your back to your side while in flat bed without using bedrails? 3  - 3   -MF 2  -MF    Moving from lying on back to sitting on the side of a flat bed without bedrails? 3  -MF 3  -MF 2  -MF    Moving to and from a bed to a chair (including a wheelchair)? 3  -MF 3  -MF 3  -MF    Standing up from a chair using your arms (e.g., wheelchair, bedside chair)? 4  -MF 3  -MF 3  -MF    Climbing 3-5 steps with a railing? 3  -MF 3  -MF 3  -MF    To walk in hospital room? 3  -MF 3  -MF 3  -MF    AM-PAC 6 Clicks Score (PT) 19  -MF 18  -MF 16  -MF       Functional Assessment    Outcome Measure Options AM-PAC 6 Clicks Basic Mobility (PT)  -MF AM-PAC 6 Clicks Basic Mobility (PT)  -MF AM-PAC 6 Clicks Basic Mobility (PT)  -MF              User Key  (r) = Recorded By, (t) = Taken By, (c) = Cosigned By      Initials Name Provider Type    Monique Downs PTA Physical Therapist Assistant                     Time Calculation:    PT Charges       Row Name 05/26/25 1517 05/26/25 1107          Time Calculation    Start Time 1451  -MF 1049  -MF     Stop Time 1514  -MF 1106  -MF     Time Calculation (min) 23 min  -MF 17 min  -MF     PT Received On -- 05/26/25  -MF        Time Calculation- PT    Total Timed Code Minutes- PT 23 minute(s)  -MF 17 minute(s)  -MF        Timed Charges    09810 - Gait Training Minutes  23  -MF 17  -MF        Total Minutes    Timed Charges Total Minutes 23  -MF 17  -MF      Total Minutes 23  -MF 17  -MF               User Key  (r) = Recorded By, (t) = Taken By, (c) = Cosigned By      Initials Name Provider Type    Monique Downs PTA Physical Therapist Assistant                  Therapy Charges for Today       Code Description Service Date Service Provider Modifiers Qty    11056642582 HC GAIT TRAINING EA 15 MIN 5/25/2025 Monique Ha PTA GP 2    69186457335 HC GAIT TRAINING EA 15 MIN 5/25/2025 Monique Ha PTA GP 2    03990164760 HC GAIT TRAINING EA 15 MIN 5/26/2025 Monique Ha PTA GP 1    76082881976 HC GAIT TRAINING EA 15 MIN 5/26/2025 Dilcia  Monique CONTI, PTA GP 2            PT G-Codes  Outcome Measure Options: AM-PAC 6 Clicks Basic Mobility (PT)  AM-PAC 6 Clicks Score (PT): 19    Monique Ha, PTA  5/26/2025

## 2025-05-26 NOTE — PLAN OF CARE
Goal Outcome Evaluation:            Patient progressing well, ambulated in halls x 4  with minor complaints of pain and SOB on exertion. Patient offered tramadol but refused, pain controlled with tylenol. Plan to continue to diuresis patient and possible discharge tomorrow if she continues to progress. HR S 75-85 PVC/ coup 3 R  per tele. Care and safety maintained.

## 2025-05-26 NOTE — PLAN OF CARE
Goal Outcome Evaluation:  Plan of Care Reviewed With: patient        Progress: improving  Outcome Evaluation: Sinus HR: 71-84 on tele. Pt c/o pain, PRN po pain medicine and scheduled PO Tylenol  given as ordered with some relief. Patient is on room air when awake and during walks. Patient is on 1L of O2 while resting. Up x1 to BR. Safety maintained. Possible discharge home soon.

## 2025-05-26 NOTE — PLAN OF CARE
Goal Outcome Evaluation:  Plan of Care Reviewed With: patient        Progress: no change  Outcome Evaluation: PT tx completed. Pt reports 5/10 pain in chest. Feeling fatigued this morning. Pt. was SBA for transfers. CGA-SBA for gait-350' with 3 standing rests. Ambulated on RA and returned to room at 93%. Will continue to work on progressive ambulation and independence.

## 2025-05-26 NOTE — PAYOR COMM NOTE
"REF:  [id number]     [facility]  MARIO,   [phone]  OR  FAX   [phone]       Gladys Chong (69 y.o. Female)       Date of Birth   1955    Social Security Number       Address   [address] [place] [address]    Home Phone   [phone]    MRN   [id number]       Denominational   Jehovah's witness    Marital Status                               Admission Date   5/21/2025    Admission Type   Elective    Admitting Provider   Dilshad Keenan MD    Attending Provider   Dilshad Keenan MD    Department, Room/Bed   [facility] 4B, 435/1       Discharge Date       Discharge Disposition       Discharge Destination                                 Attending Provider: Dilshad Keenan MD    Allergies: Lisinopril, Sulfa Antibiotics    Isolation: None   Infection: None   Code Status: CPR    Ht: 155 cm (61.02\")   Wt: 74 kg (163 lb 3.2 oz)    Admission Cmt: None   Principal Problem: Aortic valve insufficiency [I35.1]                   Active Insurance as of 5/21/2025       Primary Coverage       Payor Plan Insurance Group Employer/Plan Group    ANTHEM MEDICARE REPLACEMENT ANTHEM MEDICARE ADVANTAGE PPO [id number]       Payor Plan Address Payor Plan Phone Number Payor Plan Fax Number Effective Dates    [address] [phone]  1/1/2024 - None Entered    [facility] [id number]         Subscriber Name Subscriber Birth Date Member ID       GLADYS CHONG 1955 [id number]                     Emergency Contacts        (Rel.) Home Phone Work Phone Mobile Phone    ROHIT CHONG (Son) -- -- [phone]    kayla chong (Relative) -- -- [phone]        Clover Moon, RN   Registered Nurse  Nursing     Plan of Care      Signed     Date of Service: 05/25/25 0346  Creation Time: 05/25/25 0346     Signed         Goal Outcome Evaluation:   Pt. A&Ox4. VSS. 2L NC. Pt. Has had c/o pain, prn meds given. Good UOP. Pt. Requires frequent reminders and education on sternal " precautions, as pt tries to ambulate in room by herself and frequently tries to put weight on her arms. NSR on tele 70-80, pvcs. Safety maintained.                     Monique Ha PTA   Physical Therapist Assistant  Specialty: Physical Therapy     Plan of Care      Signed     Date of Service: 05/25/25 1310  Creation Time: 05/25/25 1310     Signed         Goal Outcome Evaluation:  Plan of Care Reviewed With: patient  Progress: no change  Outcome Evaluation: PT tx completed. Pt reports 5-6/10 in her chest. States she has been coughing quite a bit this morning. Nsg able to bring pain meds after walk. Pt. stood and ambulated with CGA. She walked 350' with 2 standing rests. Pt's O2 sat was 90% while on RA during walk. Cues for pursed lip breathing. Discussed sternal precautions and progression of activity with pt and son. Will continue to work on ambulation and independence.                  Tova Anrdt, RN   Registered Nurse  Nursing     Plan of Care      Signed     Date of Service: 05/25/25 1737  Creation Time: 05/25/25 1737     Signed         Goal Outcome Evaluation:  Plan of Care Reviewed With: patient  Outcome Evaluation: S 72-79 per tele. Pt c/o pain x2, relieved with meds. Reminded pt of sternal precautions and to call out when needing to toilet. She has rested well and states she's starting to feel better. Safety maintained.               Irma Robles, RN   Registered Nurse  Nursing     Plan of Care      Signed     Date of Service: 05/26/25 0518  Creation Time: 05/26/25 0518     Signed         Goal Outcome Evaluation:  Plan of Care Reviewed With: patient  Progress: improving  Outcome Evaluation: Sinus HR: 71-84 on tele. Pt c/o pain, PRN po pain medicine and scheduled PO Tylenol  given as ordered with some relief. Patient is on room air when awake and during walks. Patient is on 1L of O2 while resting. Up x1 to BR. Safety maintained. Possible discharge home soon.                   Vital Signs  (last 2 days)       Date/Time Temp Temp src Pulse Resp BP Patient Position SpO2    05/26/25 0808 98.1 (36.7) Oral 73 18 136/80 Lying 97    05/26/25 0315 97.8 (36.6) Oral 77 20 138/82 Lying 96    05/25/25 2356 98 (36.7) Oral 84 20 136/80 Lying 96    05/25/25 2022 -- -- 86 -- -- -- --    05/25/25 1932 98.3 (36.8) Oral 83 18 145/78 Lying 96    05/25/25 1634 98.1 (36.7) Oral 75 18 138/64 Lying 97    05/25/25 1157 98 (36.7) Oral 75 18 137/74 Sitting 94    05/25/25 0738 97.7 (36.5) Oral 70 18 147/69 Sitting 97    05/25/25 0325 97.6 (36.4) Oral 76 16 144/75 Sitting 97    05/24/25 2320 98.2 (36.8) Oral 71 16 118/64 Lying 96    05/24/25 1941 98.2 (36.8) Oral 73 16 134/73 Lying 95    05/24/25 1507 98.2 (36.8) Oral 75 18 131/73 Sitting 99    05/24/25 1300 -- -- 75 -- 111/80 -- 97    05/24/25 1200 -- -- 74 -- 108/67 -- 90    05/24/25 1100 -- -- 74 -- 105/58 -- 93    05/24/25 1000 -- -- 76 -- 109/59 -- 86    05/24/25 0900 98.5 (36.9) Oral 80 -- 105/61 -- 95    05/24/25 0800 -- -- 80 -- 118/74 -- 95    05/24/25 0700 -- -- 72 -- 115/62 -- 98    05/24/25 0600 -- -- 67 14 116/66 Lying 96    05/24/25 0500 -- -- 87 17 104/71 Lying 97    05/24/25 0400 98.2 (36.8) Oral 90 13 112/65 Lying 96    05/24/25 0300 -- -- 74 16 107/68 Lying 96    05/24/25 0200 -- -- 71 13 107/68 Lying 97    05/24/25 0100 -- -- 70 12 123/59 Lying 97    05/24/25 0000 97.9 (36.6) Oral 68 13 119/64 Lying 95          Oxygen Therapy (last 2 days)       Date/Time SpO2 Device (Oxygen Therapy) Flow (L/min) (Oxygen Therapy) Oxygen Concentration (%) ETCO2 (mmHg)    05/26/25 0808 97 nasal cannula 1.5 -- --    05/26/25 0315 96 room air -- -- --    05/25/25 2356 96 room air -- -- --    05/25/25 2021 -- room air;other (see comments) -- -- --    05/25/25 1932 96 room air -- -- --    05/25/25 1634 97 nasal cannula 1 -- --    05/25/25 1319 -- nasal cannula 1 -- --    05/25/25 1157 94 room air -- -- --    05/25/25 0738 97 nasal cannula 2 -- --    05/25/25 0325 97 nasal cannula 2  -- --    05/24/25 2320 96 nasal cannula 2 -- --    05/24/25 1941 95 nasal cannula 2 -- --    05/24/25 1507 99 nasal cannula 2 -- --    05/24/25 1455 -- nasal cannula 2 -- --    05/24/25 1300 97 -- -- -- --    05/24/25 1200 90 nasal cannula 2 -- --    05/24/25 1100 93 -- -- -- --    05/24/25 1000 86 -- -- -- --    05/24/25 0900 95 -- -- -- --    05/24/25 0800 95 nasal cannula 2 -- --    05/24/25 0700 98 -- -- -- --    05/24/25 0600 96 nasal cannula 2 -- --    05/24/25 0500 97 nasal cannula 2 -- --    05/24/25 0400 96 nasal cannula 2 -- --    05/24/25 0300 96 nasal cannula 2 -- --    05/24/25 0200 97 nasal cannula 2 -- --    05/24/25 0100 97 nasal cannula 2 -- --    05/24/25 0000 95 nasal cannula 2 -- --          Intake & Output (last 2 days)         05/24 0701  05/25 0700 05/25 0701  05/26 0700 05/26 0701  05/27 0700    P.O. 840 560     Total Intake(mL/kg) 840 (11) 560 (7.6)     Urine (mL/kg/hr) 1650 (0.9) 2900 (1.6)     Stool 0 0     Chest Tube       Total Output 1650 2900     Net -810 -2340            Urine Unmeasured Occurrence 403 x 2 x     Stool Unmeasured Occurrence 3 x 4 x           Current Facility-Administered Medications   Medication Dose Route Frequency Provider Last Rate Last Admin    [Held by provider] acetaminophen (TYLENOL) tablet 650 mg  650 mg Oral Q4H PRN Dilshad Keenan MD        Or    [Held by provider] acetaminophen (TYLENOL) 160 MG/5ML oral solution 650 mg  650 mg Oral Q4H PRN Dilshad Keenan MD        Or    [Held by provider] acetaminophen (TYLENOL) suppository 650 mg  650 mg Rectal Q4H PRN Dilshad Keenan MD        acetaminophen (TYLENOL) tablet 650 mg  650 mg Oral Q6H Jayda Bravo APRN   650 mg at 05/26/25 0914    aspirin EC tablet 81 mg  81 mg Oral Daily Keenan, Dilshad GLEZ MD   81 mg at 05/26/25 0914    atorvastatin (LIPITOR) tablet 20 mg  20 mg Oral Nightly Keenan, Dilshad GLEZ MD   20 mg at 05/25/25 2021    bisacodyl (DULCOLAX) EC tablet 10 mg  10 mg Oral BID Shlomo, Dilshad GLEZ MD   10 mg at  05/26/25 0914    calcium carbonate (TUMS) chewable tablet 500 mg (200 mg elemental)  2 tablet Oral TID PRN Jayda Bravo APRN   2 tablet at 05/23/25 0832    Calcium Replacement - Follow Nurse / BPA Driven Protocol   Not Applicable PRN Keenan, Dilshad GLEZ MD        dextrose (D50W) (25 g/50 mL) IV injection 10-50 mL  10-50 mL Intravenous Q15 Min PRN Keenan, Dilshad GLEZ MD        dextrose (GLUTOSE) oral gel 15 g  15 g Oral Q15 Min PRN Keenan, Dilshad GLEZ MD        enoxaparin sodium (LOVENOX) syringe 40 mg  40 mg Subcutaneous Daily Keenan, Dilshad GLEZ MD   40 mg at 05/26/25 0915    fluticasone (FLONASE) 50 MCG/ACT nasal spray 1 spray  1 spray Each Nare Daily Keenan, Dilshad GLEZ MD   1 spray at 05/26/25 0914    furosemide (LASIX) injection 20 mg  20 mg Intravenous BID Diuretics Jayda Bravo APRN   20 mg at 05/26/25 0914    glucagon (GLUCAGEN) injection 1 mg  1 mg Intramuscular Q15 Min PRN Keenan, Dilshad GLEZ MD        Lidocaine 4 % 2 patch  2 patch Transdermal Q24H Keenan, Dilshad GLEZ MD   2 patch at 05/26/25 0535    Magnesium Cardiology Dose Replacement - Follow Nurse / BPA Driven Protocol   Not Applicable PRN Keenan, Dilshad GLEZ MD        methocarbamol (ROBAXIN) tablet 500 mg  500 mg Oral Q8H Jayda Bravo APRN   500 mg at 05/26/25 0535    metoprolol tartrate (LOPRESSOR) tablet 25 mg  25 mg Oral Q12H Jayda Bravo APRN   25 mg at 05/26/25 0914    montelukast (SINGULAIR) tablet 10 mg  10 mg Oral Daily Keenan, Dilshad GLEZ MD   10 mg at 05/26/25 0914    multivitamin with minerals 1 tablet  1 tablet Oral Daily Keenan, Dilshad GLEZ MD   1 tablet at 05/26/25 0914    ondansetron (ZOFRAN) injection 4 mg  4 mg Intravenous Q6H PRN Keenan, Dilshad GLEZ MD   4 mg at 05/23/25 0811    pantoprazole (PROTONIX) EC tablet 40 mg  40 mg Oral Q AM Keenan, Dilshad GLEZ MD   40 mg at 05/26/25 0535    Phosphorus Replacement - Follow Nurse / BPA Driven Protocol   Not Applicable PRN Keenan, Dilshad GLEZ MD        polyethylene glycol (MIRALAX) packet 17 g  17 g Oral Daily Keenan,  Dilshad GLEZ MD   17 g at 05/26/25 0915    potassium chloride (KLOR-CON M20) CR tablet 20 mEq  20 mEq Oral Daily Jayda Bravo APRN   20 mEq at 05/26/25 0914    Potassium Replacement - Follow Nurse / BPA Driven Protocol   Not Applicable PRN Keenan, Dilshad GLEZ MD        traMADol (ULTRAM) tablet 25 mg  25 mg Oral Q6H PRN Jayda Bravo APRN   25 mg at 05/25/25 2021     Orders (last 48 hrs)        Start     Ordered    05/26/25 0824  POC Glucose Once  PROCEDURE ONCE        Comments: Complete no more than 45 minutes prior to patient eating      05/26/25 0805    05/26/25 0600  CBC Auto Differential  PROCEDURE ONCE         05/25/25 2201    05/26/25 0505  Manual Differential  Once         05/26/25 0504    05/25/25 1945  POC Glucose Once  PROCEDURE ONCE        Comments: Complete no more than 45 minutes prior to patient eating      05/25/25 1934    05/25/25 1724  Potassium  Timed         05/25/25 1123    05/25/25 1709  POC Glucose Once  PROCEDURE ONCE        Comments: Complete no more than 45 minutes prior to patient eating      05/25/25 1641    05/25/25 1252  POC Glucose Once  PROCEDURE ONCE        Comments: Complete no more than 45 minutes prior to patient eating      05/25/25 1154    05/25/25 1215  potassium chloride (KLOR-CON M20) CR tablet 20 mEq  Once         05/25/25 1123    05/25/25 1115  Potassium  Timed         05/25/25 0514    05/25/25 0805  POC Glucose Once  PROCEDURE ONCE        Comments: Complete no more than 45 minutes prior to patient eating      05/25/25 0740    05/25/25 0600  CBC Auto Differential  PROCEDURE ONCE         05/24/25 2201    05/25/25 0600  potassium chloride (KLOR-CON M20) CR tablet 20 mEq  Once         05/25/25 0514    05/25/25 0412  Manual Differential  Once,   Status:  Canceled         05/25/25 0411    05/24/25 1955  POC Glucose Once  PROCEDURE ONCE        Comments: Complete no more than 45 minutes prior to patient eating      05/24/25 1943    05/24/25 1338  Transfer Patient  Once          "05/24/25 1337    05/23/25 1230  furosemide (LASIX) injection 20 mg  2 Times Daily (Diuretics)         05/23/25 1131    05/23/25 1230  potassium chloride (KLOR-CON M20) CR tablet 20 mEq  Daily         05/23/25 1131    05/23/25 1208  traMADol (ULTRAM) tablet 25 mg  Every 6 Hours PRN         05/23/25 1208    05/23/25 0900  aspirin EC tablet 81 mg  Daily         05/21/25 1239    05/23/25 0811  calcium carbonate (TUMS) chewable tablet 500 mg (200 mg elemental)  3 Times Daily PRN         05/23/25 0811    05/23/25 0600  Wound Care  Daily       05/21/25 1239    05/23/25 0600  CBC (No Diff)  Daily       05/21/25 1239    05/23/25 0600  Basic Metabolic Panel  Daily       05/21/25 1239    05/23/25 0400  Lidocaine 4 % 2 patch  Every 24 Hours         05/22/25 1147    05/22/25 2100  atorvastatin (LIPITOR) tablet 20 mg  Nightly         05/21/25 1239    05/22/25 2100  metoprolol tartrate (LOPRESSOR) tablet 25 mg  Every 12 Hours Scheduled         05/22/25 1044    05/22/25 1400  Intake & Output  Every Shift       05/21/25 1239    05/22/25 1237  [Held by provider]  acetaminophen (TYLENOL) tablet 650 mg  Every 4 Hours PRN        (On hold since Fri 5/23/2025 at 1026 until manually unheld; held by Dilshad Keenan MDHold Reason: Other (Comment Required))   Placed in \"Or\" Linked Group    05/21/25 1239    05/22/25 1237  [Held by provider]  acetaminophen (TYLENOL) 160 MG/5ML oral solution 650 mg  Every 4 Hours PRN        (On hold since Fri 5/23/2025 at 1026 until manually unheld; held by Dilshad Keenan MDHold Reason: Other (Comment Required))   Placed in \"Or\" Linked Group    05/21/25 1239    05/22/25 1237  [Held by provider]  acetaminophen (TYLENOL) suppository 650 mg  Every 4 Hours PRN        (On hold since Fri 5/23/2025 at 1026 until manually unheld; held by Dilshad Keenan MDHold Reason: Other (Comment Required))   Placed in \"Or\" Linked Group    05/21/25 1239    05/22/25 1200  Vital Signs  Every 4 Hours      Comments: Perform Vitals Less " Frequently Only if Patient Stable      05/21/25 1239    05/22/25 1118  POC Glucose Finger 4x Daily Before Meals & at Bedtime  4 Times Daily Before Meals & at Bedtime      Comments: Complete no more than 45 minutes prior to patient eating      05/22/25 1117    05/22/25 0900  fluticasone (FLONASE) 50 MCG/ACT nasal spray 1 spray  Daily         05/21/25 1238    05/22/25 0900  enoxaparin sodium (LOVENOX) syringe 40 mg  Daily         05/21/25 1239    05/22/25 0900  bisacodyl (DULCOLAX) EC tablet 10 mg  2 Times Daily         05/21/25 1239    05/22/25 0900  polyethylene glycol (MIRALAX) packet 17 g  Daily         05/21/25 1239    05/22/25 0900  montelukast (SINGULAIR) tablet 10 mg  Daily         05/21/25 1241    05/22/25 0900  multivitamin with minerals 1 tablet  Daily         05/21/25 1241    05/22/25 0745  acetaminophen (TYLENOL) tablet 650 mg  Every 6 Hours         05/22/25 0651    05/22/25 0745  methocarbamol (ROBAXIN) tablet 500 mg  Every 8 Hours Scheduled         05/22/25 0651    05/22/25 0600  pantoprazole (PROTONIX) EC tablet 40 mg  Every Early Morning         05/21/25 1238    05/22/25 0600  CBC & Differential  Daily       05/21/25 1239    05/22/25 0600  Renal Function Panel  Daily       05/21/25 1239    05/22/25 0600  Magnesium  Daily       05/21/25 1239    05/22/25 0600  Protime-INR  Daily       05/21/25 1239    05/22/25 0600  XR Chest 1 View  Daily       05/21/25 1239    05/22/25 0400  Chlorhexidine Gluconate Cloth 2 % pads 1 Application  Every 24 Hours,   Status:  Discontinued         05/21/25 1239    05/21/25 1800  mupirocin (BACTROBAN) 2 % nasal ointment 1 Application  2 Times Daily         05/21/25 1239    05/21/25 1600  Check Peripheral Pulses Every 4 Hours  Every 4 Hours       05/21/25 1239    05/21/25 1300  Intake & Output Every 15 Minutes x2, Every 30 Minutes x4  Every Hour       05/21/25 1239    05/21/25 1300  Incentive Spirometry  Every Hour While Awake       05/21/25 1239    05/21/25 1240  Daily  "Weights  Daily       05/21/25 1239    05/21/25 1239  dextrose (GLUTOSE) oral gel 15 g  Every 15 Minutes PRN         05/21/25 1239    05/21/25 1239  dextrose (D50W) (25 g/50 mL) IV injection 10-50 mL  Every 15 Minutes PRN         05/21/25 1239    05/21/25 1239  glucagon (GLUCAGEN) injection 1 mg  Every 15 Minutes PRN         05/21/25 1239    05/21/25 1239  albumin human 5 % solution 500 mL  As Needed         05/21/25 1239    05/21/25 1239  Potassium Replacement - Follow Nurse / BPA Driven Protocol  As Needed         05/21/25 1239    05/21/25 1239  Magnesium Cardiology Dose Replacement - Follow Nurse / BPA Driven Protocol  As Needed         05/21/25 1239    05/21/25 1239  Phosphorus Replacement - Follow Nurse / BPA Driven Protocol  As Needed         05/21/25 1239    05/21/25 1239  Calcium Replacement - Follow Nurse / BPA Driven Protocol  As Needed         05/21/25 1239    05/21/25 1239  ondansetron (ZOFRAN) injection 4 mg  Every 6 Hours PRN         05/21/25 1239    05/21/25 1234  Urinary Catheter Care  Every Shift      Placed in \"And\" Linked Group    05/21/25 1238    Unscheduled  Check Peripheral Pulses As Needed  As Needed       05/21/25 1239    Unscheduled  Insert Nasogastric Tube If Indicated & Not Already in Place  As Needed      Comments: Indications: Nausea, Vomiting, Prolonged Intubation or to Administer Medications  Attach to Low Wall Suction if Any Residual    05/21/25 1239    Unscheduled  Wound Care  As Needed       05/21/25 1239    Unscheduled  Dangle at Bedside After Extubation  As Needed       05/21/25 1239    Unscheduled  Oxygen Therapy- Nasal Cannula; 2 LPM  Continuous PRN       05/21/25 1239    Unscheduled  Blood Gas, Arterial -  As Needed      Comments: Obtain ABG as needed for ventilator changes      05/21/25 1239    Unscheduled  Blood Gas, Arterial -  As Needed      Comments: Prior to Extubation      05/21/25 1239    Unscheduled  CBC & Differential  As Needed        Comments: Chest Tube Drainage " Greater Than 100mL/hr      25 1239    Unscheduled  aPTT  As Needed        Comments: Chest Tube Drainage Greater Than 100mL/hr      25 1239    Unscheduled  Protime-INR  As Needed        Comments: Chest Tube Drainage Greater Than 100mL/hr      25 1239    Unscheduled  Fibrin Split Products  As Needed        Comments: Chest Tube Drainage Greater Than 100mL/hr      25 1239    Unscheduled  Fibrinogen  As Needed        Comments: Chest Tube Drainage Greater Than 100mL/hr      25 1239    Unscheduled  Treat Hypoglycemia As Recommended By [facility]™ & Notify Provider of Treatment  As Needed      Comments: Follow Hypoglycemia Orders As Outlined in Process Instructions (Open Order Report to View Full Instructions)  Notify Provider Any Time Hypoglycemia Treatment is Administered    25 1239    Unscheduled  If Insulin Infusion is Paused - Follow [facility] Instructions  As Needed       25 123    Unscheduled  POC Glucose PRN  As Needed      Comments: [employer] recommended POC testing will vary between 15 minutes and 2 hours.      25 1239    --  furosemide (LASIX) 20 MG tablet  Daily         25 1658    --  hydrALAZINE (APRESOLINE) 25 MG tablet  3 Times Daily         25 1702                     Physician Progress Notes (last 48 hours)        Jazmyne Mendez APRN at 25 0754       Attestation signed by Dilshad Keenan MD at 25 1252      I have personally seen and examined Gladys Pino and reviewed the record. Agree with the aforementioned plan rendered jointly with Jazmyne Mendez.    Doing okay today, remains on oxygen going to continue diuresis and hopefully wean O2.  Out of bed walking is much as possible.  If continues to improve possibly home in the next 1 to 2 days.    Dilshad Keenan M.D.  Cardiothoracic Surgeon                        Patient name: Gladys Pino  Patient : 1955  VISIT # [id number]  MR #[id number]    Procedure:Procedure(s):  AORTIC  "VALVE REPLACEMENT 23MM, MITRAL VALVE REPAIR 30MM RING, LEFT ATRIAL APPENDAGE EXCLUSION 35MM, TRANSESOPHAGEAL ECHOCARDIOGRAM  MITRAL VALVE REPAIR/REPLACEMENT  ATRIAL APPENDAGE EXCLUSION LEFT WITH TRANSESOPHAGEAL ECHOCARDIOGRAM  Procedure Date:5/21/2025  POD:4 Days Post-Op    Subjective       Transferred to  yesterday.  Resting in recliner, ambulated only to the nursing desk this morning.  Remains on 2 L per nasal cannula with SpO2 of 97%. Weight is down 5 pounds and remains 4 pounds above baseline.  Creatinine 0.58, potassium 3.8, calcium 8.2 and hemoglobin stable at 9.2 with a hematocrit of 28.3.  Is having bowel movements.  Reports pain is overall controlled.        Telemetry: Sinus arrhythmia with first-degree block 70-80  IV drips: None      Objective     Visit Vitals  /69 (BP Location: Right arm, Patient Position: Sitting)   Pulse 70   Temp 97.7 °F (36.5 °C) (Oral)   Resp 18   Ht 155 cm (61.02\")   Wt 76.5 kg (168 lb 9.6 oz)   SpO2 97%   BMI 31.83 kg/m²   Baseline weight 164 pounds    Intake/Output Summary (Last 24 hours) at 5/25/2025 0754  Last data filed at 5/25/2025 0543  Gross per 24 hour   Intake 840 ml   Output 1650 ml   Net -810 ml   Lab:     CBC:  Results from last 7 days   Lab Units 05/25/25 0318 05/24/25  0522 05/23/25  0405   WBC 10*3/mm3 8.18 10.06 11.89*   HEMATOCRIT % 28.3* 29.2* 30.7*   PLATELETS 10*3/mm3 108* 93* 77*          BMP:  Results from last 7 days   Lab Units 05/25/25 0318 05/24/25  0522 05/23/25  0405   SODIUM mmol/L 137 136 137   POTASSIUM mmol/L 3.8 4.3 4.8   CHLORIDE mmol/L 100 100 106   CO2 mmol/L 26.0 24.0 22.0   GLUCOSE mg/dL 103* 107* 127*   BUN mg/dL 20 25* 20   CREATININE mg/dL 0.58 0.73 0.74          COAG:  Results from last 7 days   Lab Units 05/25/25  0318 05/22/25  0355 05/21/25  1240   INR  1.03   < > 1.22*   APTT seconds  --   --  41.7*    < > = values in this interval not displayed.       IMAGES:       Imaging Results (Last 24 Hours)       Procedure Component " Value Units Date/Time    XR Chest 1 View [[id number]] Collected: 05/25/25 0748     Updated: 05/25/25 0753    Narrative:      EXAM: XR CHEST 1 VW-      HISTORY: Post-Op Heart Surgery       COMPARISON: 5/24/2025.     TECHNIQUE: Single frontal radiograph of the chest was obtained.     FINDINGS:     Support Devices: Prior aortic and mitral valve replacement. Left atrial  appendage clip. Removal of the right IJ catheter sheath.     Cardiac and Mediastinal Silhouettes: Unchanged.     Lungs/Pleura: Stable mild left basilar pleural-parenchymal masses. No  visible pneumothorax.     Osseous structures: No acute osseous finding.     Other: None.       Impression:         Interval removal of right IJ catheter sheath. Otherwise no significant  change.           This report was signed and finalized on 5/25/2025 7:50 AM by Dilshad Alarcon.       XR Chest PA & Lateral [[id number]] Collected: 05/24/25 0753     Updated: 05/24/25 0757    Narrative:      EXAM: XR CHEST PA AND LATERAL-      HISTORY: s/p AVR and MV Repair       COMPARISON: 5/23/2025.     TECHNIQUE: Frontal and lateral radiographs of the chest was obtained.     FINDINGS:     Support Devices: Stable right IJ CVC catheter sheath. Prior aortic and  mitral valve replacement. Left atrial appendage clip.     Cardiac and Mediastinal Silhouettes: Unchanged.     Lungs/Pleura: Mild perihilar bibasilar streaky opacities. No sizable  pleural effusion. No visible pneumothorax.     Osseous structures: No acute osseous finding.     Other: None.       Impression:         Stable exam with mild perihilar and bibasilar opacities, favor  atelectasis.        This report was signed and finalized on 5/24/2025 7:54 AM by Dilshad Alarcon.             CXR: No pneumothorax.  Mild bibasilar atelectasis, stable.       Physical Exam:  General: Alert, oriented. No apparent distress.  Cardiovascular: Regular rate and rhythm without murmur, rubs, or gallops.    Pulmonary: Clear to auscultation bilaterally  without wheezing, rubs, or rales.  Chest: Sternotomy incision clean, dry, and intact. Sternum stable. No clicks.   Abdomen: Soft, nondistended, and nontender.  Extremities: Warm, moves all extremities.  Mild lower extremity edema  Neurologic:  Grossly intact with no focal deficits.           Impression:  Severe mitral valve regurgitation  Severe aortic valve insufficiency  Hypertension  Hyperlipidemia  Obesity, BMI 31.1  GERD        Plan:   Continue multimodality pain control strategies  Encourage pulmonary toileting and ambulation  Routine post cardiac surgery regimen  Wean O2, decreased to 1 L      Possible discharge home on Monday    Discussed with patient and nursing      LYNNE Lamb  05/25/25  07:54 CDT      Electronically signed by Dilshad Keenan MD at 05/25/25 5148

## 2025-05-26 NOTE — CASE MANAGEMENT/SOCIAL WORK
Continued Stay Note  [facility] [facility]     Patient Name: Gladys Pino  MRN: [id number]  Today's Date: 5/26/2025    Admit Date: 5/21/2025    Plan: Home   Discharge Plan       Row Name 05/26/25 0913       Plan    Plan Home    Patient/Family in Agreement with Plan yes    Plan Comments Pt lives alone, family able to assist if needed.  Pt has been getting around well, ambulating 350' feet last.  Please inform if SW assist needed. Will follow.                   Discharge Codes    No documentation.                       EFREN GallagherW

## 2025-05-26 NOTE — THERAPY TREATMENT NOTE
Acute Care - Physical Therapy Treatment Note  [facility]     Patient Name: Gladys Pino  : 1955  MRN: [id number]  Today's Date: 2025      Visit Dx:     ICD-10-CM ICD-9-CM   1. Impaired mobility [Z74.09]  Z74.09 799.89   2. Severe aortic valve regurgitation  I35.1 424.1   3. Severe mitral valve regurgitation  I34.0 424.0     Patient Active Problem List   Diagnosis    Severe aortic valve regurgitation    Severe mitral valve regurgitation    Aortic valve insufficiency     Past Medical History:   Diagnosis Date    Allergic rhinitis     Aortic valve disease     Arrhythmia     Elevated cholesterol     GERD (gastroesophageal reflux disease)     Hyperlipidemia     Hypertension      Past Surgical History:   Procedure Laterality Date    AORTIC VALVE REPAIR/REPLACEMENT N/A 2025    Procedure: AORTIC VALVE REPLACEMENT 23MM, MITRAL VALVE REPAIR 30MM RING, LEFT ATRIAL APPENDAGE EXCLUSION 35MM, TRANSESOPHAGEAL ECHOCARDIOGRAM;  Surgeon: Dilshad Keenan MD;  Location: [facility];  Service: Cardiothoracic;  Laterality: N/A;    ATRIAL APPENDAGE EXCLUSION LEFT WITH TRANSESOPHAGEAL ECHOCARDIOGRAM N/A 2025    Procedure: ATRIAL APPENDAGE EXCLUSION LEFT WITH TRANSESOPHAGEAL ECHOCARDIOGRAM;  Surgeon: Dilshad Keenan MD;  Location: [facility];  Service: Cardiothoracic;  Laterality: N/A;    CATARACT EXTRACTION       SECTION      METATARSAL OSTEOTOMY Bilateral     MITRAL VALVE REPAIR/REPLACEMENT N/A 2025    Procedure: MITRAL VALVE REPAIR/REPLACEMENT;  Surgeon: Dilshad Keenan MD;  Location: [facility];  Service: Cardiothoracic;  Laterality: N/A;     PT Assessment (Last 12 Hours)       PT Evaluation and Treatment       Row Name 25 1049          Physical Therapy Time and Intention    Subjective Information complains of;pain;fatigue  -     Document Type therapy note (daily note)  -     Mode of Treatment physical therapy  -       Row Name 25 1044          General Information    Existing  Precautions/Restrictions fall;sternal  -       Row Name 05/26/25 1049          Pain    Pretreatment Pain Rating 5/10  -     Posttreatment Pain Rating 6/10  -     Pain Location chest  -     Pain Side/Orientation medial  -     Pain Management Interventions exercise or physical activity utilized;premedicated for activity  -     Response to Pain Interventions activity participation with tolerable pain  -       Row Name 05/26/25 1049          Bed Mobility    Comment, (Bed Mobility) up in chair  -       Row Name 05/26/25 1049          Sit-Stand Transfer    Sit-Stand [place] (Transfers) standby assist  -       Row Name 05/26/25 1049          Stand-Sit Transfer    Stand-Sit [place] (Transfers) standby assist  -       Row Name 05/26/25 1049          Gait/Stairs (Locomotion)    [place] Level (Gait) contact guard;standby assist  -     Distance in Feet (Gait) 350  3 standing rests  -     Deviations/Abnormal Patterns (Gait) mendoza decreased;stride length decreased  -       Row Name 05/26/25 1049          Motor Skills    Comments, Therapeutic Exercise cardiac warm ups x 15  -       Row Name             Wound 05/21/25 0725 sternal Surgical    Wound - Properties Group Placement Date: 05/21/25 - Placement Time: 0725 - Present on Original Admission: N  -LH Location: sternal  -LH Primary Wound Type: Surgical  -LH Additional Comments: mastisol, steri strip, mepilex  -LH    Retired Wound - Properties Group Placement Date: 05/21/25 - Placement Time: 0725 - Present on Original Admission: N  -LH Location: sternal  -LH Additional Comments: mastisol, steri strip, mepilex  -LH    Retired Wound - Properties Group Placement Date: 05/21/25 - Placement Time: 0725 - Present on Original Admission: N  -LH Location: sternal  -LH Additional Comments: mastisol, steri strip, mepilex  -LH    Retired Wound - Properties Group Date first assessed: 05/21/25  - Time first assessed: 0725 -  Present on Original Admission: N  -LH Location: sternal  - Additional Comments: mastisol, steri strip, mepilex  -      Row Name 05/26/25 1049          Plan of Care Review    Plan of Care Reviewed With patient  -MF     Progress no change  -MF     Outcome Evaluation PT tx completed. Pt reports 5/10 pain in chest. Feeling fatigued this morning. Pt. was SBA for transfers. CGA-SBA for gait-350' with 3 standing rests. Ambulated on RA and returned to room at 93%. Will continue to work on progressive ambulation and independence.  -       Row Name 05/26/25 1049          Vital Signs    Pre SpO2 (%) 96  -MF     O2 Delivery Pre Treatment room air  -MF     O2 Delivery Intra Treatment room air  -MF     Post SpO2 (%) 93  -MF     O2 Delivery Post Treatment room air  -MF     Pre Patient Position Sitting  -MF     Intra Patient Position Standing  -MF     Post Patient Position Sitting  -       Row Name 05/26/25 1049          Positioning and Restraints    Pre-Treatment Position sitting in chair/recliner  -MF     Post Treatment Position chair  -MF     In Chair reclined;call light within reach;encouraged to call for assist  -MF               User Key  (r) = Recorded By, (t) = Taken By, (c) = Cosigned By      Initials Name Provider Type     Yeny Sullivan RN Registered Nurse    Monique Downs, PTA Physical Therapist Assistant                    Physical Therapy Education       Title: PT OT SLP Therapies (In Progress)       Topic: Physical Therapy (In Progress)       Point: Mobility training (In Progress)       Learning Progress Summary            Patient Acceptance, E, NR by SERGEY at 5/22/2025 1400    Comment: gait progression, PT POC    Acceptance, E, NR by SERGEY at 5/22/2025 0855    Comment: Benefits of activity, progression of PT, sternal prec                      Point: Home exercise program (Not Started)       Learner Progress:  Not documented in this visit.              Point: Body mechanics (Not Started)       Learner  Progress:  Not documented in this visit.              Point: Precautions (In Progress)       Learning Progress Summary            Patient Acceptance, E, NR by SERGEY at 5/22/2025 0855    Comment: Benefits of activity, progression of PT, sternal prec                                      User Key       Initials Effective Dates Name Provider Type Discipline    SERGEY 02/03/23 -  Jackson Ramon, PT DPT Physical Therapist PT                  PT Recommendation and Plan     Plan of Care Reviewed With: patient  Progress: no change  Outcome Evaluation: PT tx completed. Pt reports 5/10 pain in chest. Feeling fatigued this morning. Pt. was SBA for transfers. CGA-SBA for gait-350' with 3 standing rests. Ambulated on RA and returned to room at 93%. Will continue to work on progressive ambulation and independence.   Outcome Measures       Row Name 05/26/25 1049 05/25/25 1108 05/24/25 0941       How much help from another person do you currently need...    Turning from your back to your side while in flat bed without using bedrails? 3  -MF 3  -MF 2  -MF    Moving from lying on back to sitting on the side of a flat bed without bedrails? 3  -MF 3  -MF 2  -MF    Moving to and from a bed to a chair (including a wheelchair)? 3  -MF 3  -MF 3  -MF    Standing up from a chair using your arms (e.g., wheelchair, bedside chair)? 4  -MF 3  -MF 3  -MF    Climbing 3-5 steps with a railing? 3  -MF 3  -MF 3  -MF    To walk in hospital room? 3  -MF 3  -MF 3  -MF    AM-PAC 6 Clicks Score (PT) 19  -MF 18  -MF 16  -MF       Functional Assessment    Outcome Measure Options AM-PAC 6 Clicks Basic Mobility (PT)  -MF AM-PAC 6 Clicks Basic Mobility (PT)  -MF AM-PAC 6 Clicks Basic Mobility (PT)  -MF              User Key  (r) = Recorded By, (t) = Taken By, (c) = Cosigned By      Initials Name Provider Type    Monique Downs, PTA Physical Therapist Assistant                     Time Calculation:    PT Charges       Row Name 05/26/25 1107             Time  Calculation    Start Time 1049  -MF      Stop Time 1106  -MF      Time Calculation (min) 17 min  -MF      PT Received On 05/26/25  -MF         Time Calculation- PT    Total Timed Code Minutes- PT 17 minute(s)  -MF         Timed Charges    49827 - Gait Training Minutes  17  -MF         Total Minutes    Timed Charges Total Minutes 17  -MF       Total Minutes 17  -MF                User Key  (r) = Recorded By, (t) = Taken By, (c) = Cosigned By      Initials Name Provider Type    Monique Downs PTA Physical Therapist Assistant                  Therapy Charges for Today       Code Description Service Date Service Provider Modifiers Qty    31740482663 HC GAIT TRAINING EA 15 MIN 5/25/2025 Monique Ha, MAYANK GP 2    75849700299 HC GAIT TRAINING EA 15 MIN 5/25/2025 Monique Ha, MAYANK GP 2    41199896199 HC GAIT TRAINING EA 15 MIN 5/26/2025 Monique Ha, MAYANK GP 1            PT G-Codes  Outcome Measure Options: AM-PAC 6 Clicks Basic Mobility (PT)  AM-PAC 6 Clicks Score (PT): 19    Monique Ha PTA  5/26/2025

## 2025-05-26 NOTE — PROGRESS NOTES
"Patient name: Gladys Pino  Patient : 1955  VISIT # [id number]  MR #[id number]    Procedure:Procedure(s):  AORTIC VALVE REPLACEMENT 23MM, MITRAL VALVE REPAIR 30MM RING, LEFT ATRIAL APPENDAGE EXCLUSION 35MM, TRANSESOPHAGEAL ECHOCARDIOGRAM  MITRAL VALVE REPAIR/REPLACEMENT  ATRIAL APPENDAGE EXCLUSION LEFT WITH TRANSESOPHAGEAL ECHOCARDIOGRAM  Procedure Date:2025  POD:5 Days Post-Op    Subjective   The patient is up in chair on room air.  The patient states that she feels like she cannot breathe when she is sleeping.  She is down 5 pounds from yesterday and 1 pound below baseline weight.  She is ambulating 350 feet with physical therapy. (+BM).  Chest x-ray from this morning shows no pneumothorax, stable mild left basilar pleural opacities in the right lung base.    Telemetry: Sinus arrhythmia with first-degree block 70-80  IV drips: None       Objective     Visit Vitals  /76 (BP Location: Right arm, Patient Position: Lying)   Pulse 75   Temp 98 °F (36.7 °C) (Oral)   Resp 18   Ht 155 cm (61.02\")   Wt 74 kg (163 lb 3.2 oz)   SpO2 93%   BMI 30.81 kg/m²   Baseline weight 164 pounds    Intake/Output Summary (Last 24 hours) at 2025 1157  Last data filed at 2025 1127  Gross per 24 hour   Intake 680 ml   Output 2900 ml   Net -2220 ml   Lab:     CBC:  Results from last 7 days   Lab Units 25  0442 25  03125  0522   WBC 10*3/mm3 6.43 8.18 10.06   HEMATOCRIT % 30.6* 28.3* 29.2*   PLATELETS 10*3/mm3 126* 108* 93*          BMP:  Results from last 7 days   Lab Units 25  0442 25  1734 25  1049 25  0318 25  0522   SODIUM mmol/L 133*  --   --  137 136   POTASSIUM mmol/L 4.4 4.5 3.9 3.8 4.3   CHLORIDE mmol/L 98  --   --  100 100   CO2 mmol/L 25.0  --   --  26.0 24.0   GLUCOSE mg/dL 101*  --   --  103* 107*   BUN mg/dL 15  --   --  20 25*   CREATININE mg/dL 0.63  --   --  0.58 0.73          COAG:  Results from last 7 days   Lab Units 25  0442 " 05/22/25  0355 05/21/25  1240   INR  0.96   < > 1.22*   APTT seconds  --   --  41.7*    < > = values in this interval not displayed.       IMAGES:       Imaging Results (Last 24 Hours)       Procedure Component Value Units Date/Time    XR Chest 1 View [[id number]] Collected: 05/26/25 0755     Updated: 05/26/25 0759    Narrative:      EXAM: XR CHEST 1 VW-      HISTORY: Post-Op Heart Surgery       COMPARISON: 5/25/2025.     TECHNIQUE: Single frontal radiograph of the chest was obtained.     FINDINGS:     Support Devices: Prior aortic and mitral valve replacement. Left atrial  pinch clipping.     Cardiac and Mediastinal Silhouettes: Normal.     Lungs/Pleura: Stable mild left basilar pleural-parenchymal opacities and  confluent opacities in the right lung base. No visible pneumothorax.     Osseous structures: No acute osseous finding.     Other: None.       Impression:         No significant change.           This report was signed and finalized on 5/26/2025 7:56 AM by Dilshad Alarcon.               Physical Exam:  General: Alert, oriented. No apparent distress.  Cardiovascular: Regular rate and rhythm without murmur, rubs, or gallops.    Pulmonary: Clear to auscultation bilaterally without wheezing, rubs, or rales.  Chest: Sternotomy incision clean, dry, and intact. Sternum stable. No clicks.   Abdomen: Soft, nondistended, and nontender.  Extremities: Warm, moves all extremities.  Mild lower extremity edema  Neurologic:  Grossly intact with no focal deficits.          Impression:  Severe mitral valve regurgitation  Severe aortic valve insufficiency  Hypertension  Hyperlipidemia  Obesity, BMI 31.1  GERD      Plan:   Continue Lasix 20 mg IV twice daily, potassium chloride 20 mEQ orally daily  Continue to titrate off oxygen  Continue multimodality pain control strategies  Encourage pulmonary toileting and ambulation  Routine post cardiac surgery regimen    Possible discharge home tomorrow    Discussed with patient and  nursing      Jayda Peterson, APRN  05/26/25  11:57 CDT

## 2025-05-27 ENCOUNTER — APPOINTMENT (OUTPATIENT)
Dept: GENERAL RADIOLOGY | Facility: HOSPITAL | Age: 70
End: 2025-05-27
Payer: MEDICARE

## 2025-05-27 LAB
ALBUMIN SERPL-MCNC: 3.9 G/DL (ref 3.5–5.2)
ANION GAP SERPL CALCULATED.3IONS-SCNC: 15 MMOL/L (ref 5–15)
BASOPHILS # BLD AUTO: 0.05 10*3/MM3 (ref 0–0.2)
BASOPHILS NFR BLD AUTO: 0.8 % (ref 0–1.5)
BUN SERPL-MCNC: 15 MG/DL (ref 8–23)
BUN/CREAT SERPL: 23.1 (ref 7–25)
CALCIUM SPEC-SCNC: 8.8 MG/DL (ref 8.6–10.5)
CHLORIDE SERPL-SCNC: 97 MMOL/L (ref 98–107)
CO2 SERPL-SCNC: 24 MMOL/L (ref 22–29)
CREAT SERPL-MCNC: 0.65 MG/DL (ref 0.57–1)
DEPRECATED RDW RBC AUTO: 38.8 FL (ref 37–54)
EGFRCR SERPLBLD CKD-EPI 2021: 94.9 ML/MIN/1.73
EOSINOPHIL # BLD AUTO: 0.16 10*3/MM3 (ref 0–0.4)
EOSINOPHIL NFR BLD AUTO: 2.5 % (ref 0.3–6.2)
ERYTHROCYTE [DISTWIDTH] IN BLOOD BY AUTOMATED COUNT: 12.4 % (ref 12.3–15.4)
GLUCOSE BLDC GLUCOMTR-MCNC: 102 MG/DL (ref 70–130)
GLUCOSE BLDC GLUCOMTR-MCNC: 116 MG/DL (ref 70–130)
GLUCOSE BLDC GLUCOMTR-MCNC: 122 MG/DL (ref 70–130)
GLUCOSE BLDC GLUCOMTR-MCNC: 94 MG/DL (ref 70–130)
GLUCOSE SERPL-MCNC: 114 MG/DL (ref 65–99)
HCT VFR BLD AUTO: 33.6 % (ref 34–46.6)
HGB BLD-MCNC: 11.2 G/DL (ref 12–15.9)
IMM GRANULOCYTES # BLD AUTO: 0.06 10*3/MM3 (ref 0–0.05)
IMM GRANULOCYTES NFR BLD AUTO: 0.9 % (ref 0–0.5)
INR PPP: 0.98 (ref 0.91–1.09)
LYMPHOCYTES # BLD AUTO: 1.75 10*3/MM3 (ref 0.7–3.1)
LYMPHOCYTES NFR BLD AUTO: 27.3 % (ref 19.6–45.3)
MAGNESIUM SERPL-MCNC: 1.9 MG/DL (ref 1.6–2.4)
MCH RBC QN AUTO: 28.8 PG (ref 26.6–33)
MCHC RBC AUTO-ENTMCNC: 33.3 G/DL (ref 31.5–35.7)
MCV RBC AUTO: 86.4 FL (ref 79–97)
MONOCYTES # BLD AUTO: 0.69 10*3/MM3 (ref 0.1–0.9)
MONOCYTES NFR BLD AUTO: 10.8 % (ref 5–12)
NEUTROPHILS NFR BLD AUTO: 3.7 10*3/MM3 (ref 1.7–7)
NEUTROPHILS NFR BLD AUTO: 57.7 % (ref 42.7–76)
NRBC BLD AUTO-RTO: 0 /100 WBC (ref 0–0.2)
PHOSPHATE SERPL-MCNC: 3.9 MG/DL (ref 2.5–4.5)
PLATELET # BLD AUTO: 167 10*3/MM3 (ref 140–450)
PMV BLD AUTO: 10.6 FL (ref 6–12)
POTASSIUM SERPL-SCNC: 3.9 MMOL/L (ref 3.5–5.2)
POTASSIUM SERPL-SCNC: 4.5 MMOL/L (ref 3.5–5.2)
PROTHROMBIN TIME: 13.5 SECONDS (ref 11.8–14.8)
RBC # BLD AUTO: 3.89 10*6/MM3 (ref 3.77–5.28)
SODIUM SERPL-SCNC: 136 MMOL/L (ref 136–145)
WBC NRBC COR # BLD AUTO: 6.41 10*3/MM3 (ref 3.4–10.8)

## 2025-05-27 PROCEDURE — 25010000002 FUROSEMIDE PER 20 MG: Performed by: NURSE PRACTITIONER

## 2025-05-27 PROCEDURE — 85610 PROTHROMBIN TIME: CPT | Performed by: SURGERY

## 2025-05-27 PROCEDURE — 80069 RENAL FUNCTION PANEL: CPT | Performed by: SURGERY

## 2025-05-27 PROCEDURE — 84132 ASSAY OF SERUM POTASSIUM: CPT | Performed by: SURGERY

## 2025-05-27 PROCEDURE — 71045 X-RAY EXAM CHEST 1 VIEW: CPT

## 2025-05-27 PROCEDURE — 25010000002 MAGNESIUM SULFATE 4 GM/100ML SOLUTION: Performed by: SURGERY

## 2025-05-27 PROCEDURE — 97116 GAIT TRAINING THERAPY: CPT

## 2025-05-27 PROCEDURE — 82948 REAGENT STRIP/BLOOD GLUCOSE: CPT

## 2025-05-27 PROCEDURE — 25010000002 ENOXAPARIN PER 10 MG: Performed by: SURGERY

## 2025-05-27 PROCEDURE — 83735 ASSAY OF MAGNESIUM: CPT | Performed by: SURGERY

## 2025-05-27 PROCEDURE — 85025 COMPLETE CBC W/AUTO DIFF WBC: CPT | Performed by: SURGERY

## 2025-05-27 RX ORDER — MAGNESIUM SULFATE HEPTAHYDRATE 40 MG/ML
4 INJECTION, SOLUTION INTRAVENOUS ONCE
Status: COMPLETED | OUTPATIENT
Start: 2025-05-27 | End: 2025-05-27

## 2025-05-27 RX ORDER — POTASSIUM CHLORIDE 1500 MG/1
20 TABLET, EXTENDED RELEASE ORAL ONCE
Status: COMPLETED | OUTPATIENT
Start: 2025-05-27 | End: 2025-05-27

## 2025-05-27 RX ORDER — LOSARTAN POTASSIUM 25 MG/1
25 TABLET ORAL
Status: DISCONTINUED | OUTPATIENT
Start: 2025-05-27 | End: 2025-05-29 | Stop reason: HOSPADM

## 2025-05-27 RX ADMIN — TRAMADOL HYDROCHLORIDE 25 MG: 50 TABLET, COATED ORAL at 15:54

## 2025-05-27 RX ADMIN — METHOCARBAMOL TABLETS 500 MG: 500 TABLET, COATED ORAL at 21:48

## 2025-05-27 RX ADMIN — FLUTICASONE PROPIONATE 1 SPRAY: 50 SPRAY, METERED NASAL at 08:53

## 2025-05-27 RX ADMIN — METOPROLOL TARTRATE 25 MG: 25 TABLET, FILM COATED ORAL at 20:20

## 2025-05-27 RX ADMIN — ATORVASTATIN CALCIUM 20 MG: 10 TABLET, FILM COATED ORAL at 20:21

## 2025-05-27 RX ADMIN — MONTELUKAST SODIUM 10 MG: 10 TABLET, COATED ORAL at 08:52

## 2025-05-27 RX ADMIN — ACETAMINOPHEN 650 MG: 325 TABLET, FILM COATED ORAL at 08:52

## 2025-05-27 RX ADMIN — Medication 1 TABLET: at 08:52

## 2025-05-27 RX ADMIN — LIDOCAINE 2 PATCH: 4 PATCH TOPICAL at 04:22

## 2025-05-27 RX ADMIN — MAGNESIUM SULFATE HEPTAHYDRATE 4 G: 40 INJECTION, SOLUTION INTRAVENOUS at 08:52

## 2025-05-27 RX ADMIN — METHOCARBAMOL TABLETS 500 MG: 500 TABLET, COATED ORAL at 05:40

## 2025-05-27 RX ADMIN — ASPIRIN 81 MG: 81 TABLET, COATED ORAL at 08:52

## 2025-05-27 RX ADMIN — METOPROLOL TARTRATE 25 MG: 25 TABLET, FILM COATED ORAL at 08:52

## 2025-05-27 RX ADMIN — FUROSEMIDE 20 MG: 10 INJECTION, SOLUTION INTRAMUSCULAR; INTRAVENOUS at 08:52

## 2025-05-27 RX ADMIN — PANTOPRAZOLE SODIUM 40 MG: 40 TABLET, DELAYED RELEASE ORAL at 05:40

## 2025-05-27 RX ADMIN — POLYETHYLENE GLYCOL 3350 17 G: 17 POWDER, FOR SOLUTION ORAL at 08:52

## 2025-05-27 RX ADMIN — FUROSEMIDE 20 MG: 10 INJECTION, SOLUTION INTRAMUSCULAR; INTRAVENOUS at 17:30

## 2025-05-27 RX ADMIN — POTASSIUM CHLORIDE 20 MEQ: 1500 TABLET, EXTENDED RELEASE ORAL at 08:59

## 2025-05-27 RX ADMIN — ENOXAPARIN SODIUM 40 MG: 100 INJECTION SUBCUTANEOUS at 08:52

## 2025-05-27 RX ADMIN — ACETAMINOPHEN 650 MG: 325 TABLET, FILM COATED ORAL at 13:45

## 2025-05-27 RX ADMIN — POTASSIUM CHLORIDE 20 MEQ: 1500 TABLET, EXTENDED RELEASE ORAL at 05:40

## 2025-05-27 RX ADMIN — METHOCARBAMOL TABLETS 500 MG: 500 TABLET, COATED ORAL at 13:45

## 2025-05-27 RX ADMIN — TRAMADOL HYDROCHLORIDE 25 MG: 50 TABLET, COATED ORAL at 04:22

## 2025-05-27 RX ADMIN — ACETAMINOPHEN 650 MG: 325 TABLET, FILM COATED ORAL at 20:20

## 2025-05-27 RX ADMIN — LOSARTAN POTASSIUM 25 MG: 25 TABLET, FILM COATED ORAL at 11:01

## 2025-05-27 NOTE — PLAN OF CARE
Goal Outcome Evaluation:  Plan of Care Reviewed With: patient        Progress: improving  Outcome Evaluation: PT tx completed. Pt stands with Supervision. She walked 450' SBA with 3 standing rests due to fatigue. She continues to report 5/10 chest pain and increased fatigue/weakness. Pt. plans to stay with son following discharge. States she has an electric recliner to sleep in. Her O2 levels remain in the 90's while on RA. Pt. progressing well.

## 2025-05-27 NOTE — THERAPY TREATMENT NOTE
Acute Care - Physical Therapy Treatment Note  Psychiatric     Patient Name: Gladys Pino  : 1955  MRN: [id number]  Today's Date: 2025      Visit Dx:     ICD-10-CM ICD-9-CM   1. Impaired mobility [Z74.09]  Z74.09 799.89   2. Severe aortic valve regurgitation  I35.1 424.1   3. Severe mitral valve regurgitation  I34.0 424.0     Patient Active Problem List   Diagnosis    Severe aortic valve regurgitation    Severe mitral valve regurgitation    Aortic valve insufficiency     Past Medical History:   Diagnosis Date    Allergic rhinitis     Aortic valve disease     Arrhythmia     Elevated cholesterol     GERD (gastroesophageal reflux disease)     Hyperlipidemia     Hypertension      Past Surgical History:   Procedure Laterality Date    AORTIC VALVE REPAIR/REPLACEMENT N/A 2025    Procedure: AORTIC VALVE REPLACEMENT 23MM, MITRAL VALVE REPAIR 30MM RING, LEFT ATRIAL APPENDAGE EXCLUSION 35MM, TRANSESOPHAGEAL ECHOCARDIOGRAM;  Surgeon: Dilshad Keenan MD;  Location: [facility] OR;  Service: Cardiothoracic;  Laterality: N/A;    ATRIAL APPENDAGE EXCLUSION LEFT WITH TRANSESOPHAGEAL ECHOCARDIOGRAM N/A 2025    Procedure: ATRIAL APPENDAGE EXCLUSION LEFT WITH TRANSESOPHAGEAL ECHOCARDIOGRAM;  Surgeon: Dilshad Keenan MD;  Location: [facility] OR;  Service: Cardiothoracic;  Laterality: N/A;    CATARACT EXTRACTION       SECTION      METATARSAL OSTEOTOMY Bilateral     MITRAL VALVE REPAIR/REPLACEMENT N/A 2025    Procedure: MITRAL VALVE REPAIR/REPLACEMENT;  Surgeon: Dilshad Keenan MD;  Location: [facility] OR;  Service: Cardiothoracic;  Laterality: N/A;     PT Assessment (Last 12 Hours)       PT Evaluation and Treatment       Row Name 25 1532 25 1106       Physical Therapy Time and Intention    Subjective Information complains of;pain  - complains of;fatigue;weakness;pain  -    Document Type therapy note (daily note)  - therapy note (daily note)  -    Mode of Treatment physical therapy  - physical  therapy  -      Row Name 05/27/25 1052 05/27/25 0951       Physical Therapy Time and Intention    Document Type --  - therapy note (daily note)  -    Session Not Performed -- patient unavailable for treatment  -    Comment, Session Not Performed -- pt having IV placed. Will check back.  -      Row Name 05/27/25 1532 05/27/25 1106       General Information    Existing Precautions/Restrictions fall;sternal  - fall;sternal  -      Row Name 05/27/25 1532 05/27/25 1106       Pain    Pretreatment Pain Rating 5/10  - 5/10  -    Posttreatment Pain Rating 6/10  - 5/10  -MF    Pain Location chest  - chest  -    Pain Side/Orientation generalized  - generalized  -    Pain Management Interventions exercise or physical activity utilized;nursing notified  - exercise or physical activity utilized;premedicated for activity  -    Response to Pain Interventions activity participation with increased pain  - activity participation with tolerable pain  -      Row Name 05/27/25 1532 05/27/25 1106       Bed Mobility    Comment, (Bed Mobility) up in chair  - up in chair-pt reports having an electric recliner to sleep in at home.  -      Row Name 05/27/25 1532          Transfers    Comment, (Transfers) requires assist being scooted back in chair  -       Row Name 05/27/25 1532 05/27/25 1106       Sit-Stand Transfer    Sit-Stand [place] (Transfers) supervision  - supervision  -      Row Name 05/27/25 1532 05/27/25 1106       Stand-Sit Transfer    Stand-Sit [place] (Transfers) supervision  - supervision  -      Row Name 05/27/25 1532 05/27/25 1106       Gait/Stairs (Locomotion)    [place] Level (Gait) standby assist  - standby assist  -    Distance in Feet (Gait) 450  3 standing rests  - 450  3 standing rests  -    Deviations/Abnormal Patterns (Gait) mendoza decreased;stride length decreased  - mendoza decreased;stride length decreased  -    [place] Level  (Stairs) verbal cues;contact guard;minimum assist (75% patient effort)  - --    Assistive Device (Stairs) --  HHA  -MF --    Handrail Location (Stairs) right side (ascending);left side (descending)  - --    Number of Steps (Stairs) 4  -MF --    Ascending Technique (Stairs) step-over-step  -MF --    Descending Technique (Stairs) step-to-step  -MF --      Row Name 05/27/25 1532 05/27/25 1106       Motor Skills    Comments, Therapeutic Exercise cardiac warm ups x 15  -MF cardiac warm ups x 15  -MF      Row Name             Wound 05/21/25 0725 sternal Surgical    Wound - Properties Group Placement Date: 05/21/25  - Placement Time: 0725  - Present on Original Admission: N  - Location: sternal  -LH Primary Wound Type: Surgical  -LH Additional Comments: mastisol, steri strip, mepilex  -LH    Retired Wound - Properties Group Placement Date: 05/21/25  - Placement Time: 0725  - Present on Original Admission: N  -LH Location: sternal  -LH Additional Comments: mastisol, steri strip, mepilex  -LH    Retired Wound - Properties Group Placement Date: 05/21/25  - Placement Time: 0725  - Present on Original Admission: N  -LH Location: sternal  -LH Additional Comments: mastisol, steri strip, mepilex  -LH    Retired Wound - Properties Group Date first assessed: 05/21/25  - Time first assessed: 0725  - Present on Original Admission: N  - Location: sternal  -LH Additional Comments: mastisol, steri strip, mepilex  -LH      Row Name 05/27/25 1106          Plan of Care Review    Plan of Care Reviewed With patient  -MF     Progress improving  -     Outcome Evaluation PT tx completed. Pt stands with Supervision. She walked 450' SBA with 3 standing rests due to fatigue. She continues to report 5/10 chest pain and increased fatigue/weakness. Pt. plans to stay with son following discharge. States she has an electric recliner to sleep in. Her O2 levels remain in the 90's while on RA. Pt. progressing well.  -       Row  Name 05/27/25 1106          Vital Signs    Pre SpO2 (%) 93  -MF     O2 Delivery Pre Treatment room air  -MF     O2 Delivery Intra Treatment room air  -MF     Post SpO2 (%) 95  -MF     O2 Delivery Post Treatment room air  -MF     Pre Patient Position Sitting  -MF     Intra Patient Position Standing  -MF     Post Patient Position Sitting  -MF       Row Name 05/27/25 1532 05/27/25 1106       Positioning and Restraints    Pre-Treatment Position sitting in chair/recliner  -MF sitting in chair/recliner  -MF    Post Treatment Position chair  -MF chair  -MF    In Chair reclined;call light within reach;encouraged to call for assist;with family/caregiver  -MF reclined;call light within reach;encouraged to call for assist  -MF              User Key  (r) = Recorded By, (t) = Taken By, (c) = Cosigned By      Initials Name Provider Type    Yeny Dougherty, RN Registered Nurse    Monique Downs, PTA Physical Therapist Assistant                    Physical Therapy Education       Title: PT OT SLP Therapies (In Progress)       Topic: Physical Therapy (In Progress)       Point: Mobility training (In Progress)       Learning Progress Summary            Patient Acceptance, E, NR by SERGEY at 5/22/2025 1400    Comment: gait progression, PT POC    Acceptance, E, NR by SERGEY at 5/22/2025 0855    Comment: Benefits of activity, progression of PT, sternal prec                      Point: Home exercise program (Not Started)       Learner Progress:  Not documented in this visit.              Point: Body mechanics (Not Started)       Learner Progress:  Not documented in this visit.              Point: Precautions (In Progress)       Learning Progress Summary            Patient Acceptance, E, NR by SERGEY at 5/22/2025 0855    Comment: Benefits of activity, progression of PT, sternal prec                                      User Key       Initials Effective Dates Name Provider Type Discipline    SERGEY 02/03/23 -  Jackson Ramon, PT DPT Physical  Therapist PT                  PT Recommendation and Plan     Plan of Care Reviewed With: patient  Progress: improving  Outcome Evaluation: PT tx completed. Pt stands with Supervision. She walked 450' SBA with 3 standing rests due to fatigue. She continues to report 5/10 chest pain and increased fatigue/weakness. Pt. plans to stay with son following discharge. States she has an electric recliner to sleep in. Her O2 levels remain in the 90's while on RA. Pt. progressing well.   Outcome Measures       Row Name 05/27/25 1106 05/26/25 1049 05/25/25 1108       How much help from another person do you currently need...    Turning from your back to your side while in flat bed without using bedrails? 3  -MF 3  -MF 3  -MF    Moving from lying on back to sitting on the side of a flat bed without bedrails? 3  -MF 3  -MF 3  -MF    Moving to and from a bed to a chair (including a wheelchair)? 4  -MF 3  -MF 3  -MF    Standing up from a chair using your arms (e.g., wheelchair, bedside chair)? 4  -MF 4  -MF 3  -MF    Climbing 3-5 steps with a railing? 3  -MF 3  -MF 3  -MF    To walk in hospital room? 3  -MF 3  -MF 3  -MF    AM-PAC 6 Clicks Score (PT) 20  -MF 19  -MF 18  -MF       Functional Assessment    Outcome Measure Options AM-PAC 6 Clicks Basic Mobility (PT)  -MF AM-PAC 6 Clicks Basic Mobility (PT)  -MF AM-PAC 6 Clicks Basic Mobility (PT)  -MF              User Key  (r) = Recorded By, (t) = Taken By, (c) = Cosigned By      Initials Name Provider Type    Monique Donws PTA Physical Therapist Assistant                     Time Calculation:    PT Charges       Row Name 05/27/25 1556 05/27/25 1522          Time Calculation    Start Time 1532  -MF 1106  -MF     Stop Time 1555  -MF 1125  -MF     Time Calculation (min) 23 min  -MF 19 min  -MF     PT Received On -- 05/27/25  -MF        Time Calculation- PT    Total Timed Code Minutes- PT 23 minute(s)  -MF 19 minute(s)  -MF        Timed Charges    05190 - Gait Training Minutes   23  -MF 19  -MF        Total Minutes    Timed Charges Total Minutes 23  -MF 19  -MF      Total Minutes 23  -MF 19  -MF               User Key  (r) = Recorded By, (t) = Taken By, (c) = Cosigned By      Initials Name Provider Type    Monique Downs PTA Physical Therapist Assistant                  Therapy Charges for Today       Code Description Service Date Service Provider Modifiers Qty    71491637505 HC GAIT TRAINING EA 15 MIN 5/26/2025 Monique Ha, MAYANK GP 1    77024777094 HC GAIT TRAINING EA 15 MIN 5/26/2025 Monique Ha, PTA GP 2    66723382321 HC GAIT TRAINING EA 15 MIN 5/27/2025 Monique Ha, MAYANK GP 1    32766595853 HC GAIT TRAINING EA 15 MIN 5/27/2025 Monique Ha, PTA GP 2            PT G-Codes  Outcome Measure Options: AM-PAC 6 Clicks Basic Mobility (PT)  AM-PAC 6 Clicks Score (PT): 20    Monique Ha PTA  5/27/2025

## 2025-05-27 NOTE — PROGRESS NOTES
"Patient name: Gladys Pino  Patient : 1955  VISIT # [id number]  MR #[id number]    Procedure:Procedure(s):  AORTIC VALVE REPLACEMENT 23MM, MITRAL VALVE REPAIR 30MM RING, LEFT ATRIAL APPENDAGE EXCLUSION 35MM, TRANSESOPHAGEAL ECHOCARDIOGRAM  MITRAL VALVE REPAIR/REPLACEMENT  ATRIAL APPENDAGE EXCLUSION LEFT WITH TRANSESOPHAGEAL ECHOCARDIOGRAM  Procedure Date:2025  POD:6 Days Post-Op    Subjective   The patient is up in chair on room air.  The patient reports shortness of breath is mildly improved. She is c/o of pain in chest with burning sensation. Pain 5/10. She is down 4 pounds from yesterday and 5 pounds below baseline weight.  She is ambulating 350' with physical therapy. (+BM).  AM CXR with mildly decreasing left basilar opacity.     Telemetry: Sinus arrhythmia with first-degree block 70-80  IV drips: None       Objective     Visit Vitals  /93 (BP Location: Right arm, Patient Position: Sitting)   Pulse 82   Temp 98.5 °F (36.9 °C) (Oral)   Resp 16   Ht 155 cm (61.02\")   Wt 72.4 kg (159 lb 9.6 oz)   SpO2 91%   BMI 30.13 kg/m²   Baseline weight 164 pounds    Intake/Output Summary (Last 24 hours) at 2025 0859  Last data filed at 2025 0718  Gross per 24 hour   Intake 1100 ml   Output 3050 ml   Net -1950 ml   Lab:     CBC:  Results from last 7 days   Lab Units 25  0413 25  0442 25  0318   WBC 10*3/mm3 6.41 6.43 8.18   HEMATOCRIT % 33.6* 30.6* 28.3*   PLATELETS 10*3/mm3 167 126* 108*          BMP:  Results from last 7 days   Lab Units 25  0413 25  0442 25  1734 25  1049 25  0318   SODIUM mmol/L 136 133*  --   --  137   POTASSIUM mmol/L 3.9 4.4 4.5   < > 3.8   CHLORIDE mmol/L 97* 98  --   --  100   CO2 mmol/L 24.0 25.0  --   --  26.0   GLUCOSE mg/dL 114* 101*  --   --  103*   BUN mg/dL 15 15  --   --  20   CREATININE mg/dL 0.65 0.63  --   --  0.58    < > = values in this interval not displayed.          COAG:  Results from last 7 days   Lab Units " 05/27/25  0413 05/22/25  0355 05/21/25  1240   INR  0.98   < > 1.22*   APTT seconds  --   --  41.7*    < > = values in this interval not displayed.       IMAGES:       Imaging Results (Last 24 Hours)       Procedure Component Value Units Date/Time    XR Chest 1 View [[id number]] Collected: 05/27/25 0707     Updated: 05/27/25 0713    Narrative:      EXAM: XR CHEST 1 VW-      DATE: 5/27/2025 2:30 AM     HISTORY: Post-Op Heart Surgery       COMPARISON: 5/26/2025.     TECHNIQUE:  Single view. Frontal view of the chest. 1 images.       FINDINGS:    No pneumothorax or pleural effusion. Similar to mildly decreased LEFT  basilar opacity.     Changes of aortic and mitral valve replacement. LEFT atrial appendage  occlusion device. Similar prominent cardiac silhouette. Upper  mediastinum appears similar to prior.     Degenerative changes of the spine. Sternotomy wires.          Impression:      1. Similar to mildly decreased LEFT basilar opacity.     This report was signed and finalized on 5/27/2025 7:10 AM by Dr Dacia Alaniz MD.               Physical Exam:  General: Alert, oriented. No apparent distress.  Cardiovascular: Regular rate and rhythm without murmur, rubs, or gallops.    Pulmonary: Clear to auscultation bilaterally without wheezing, rubs, or rales.  Chest: Sternotomy incision clean, dry, and intact. Sternum stable. No clicks.   Abdomen: Soft, nondistended, and nontender.  Extremities: Warm, moves all extremities.  Mild lower extremity edema  Neurologic:  Grossly intact with no focal deficits.          Impression:  Severe mitral valve regurgitation  Severe aortic valve insufficiency  Hypertension  Hyperlipidemia  Obesity, BMI 31.1  GERD      Plan:   Continue Lasix 20 mg IV twice daily, potassium chloride 20 mEQ orally daily.   Add losartan 25mg po daily.  Overnight pulse ox on RA tonight.   Continue multimodality pain control strategies.  Encourage pulmonary toileting and ambulation.  Routine post cardiac surgery  regimen.  Discussed with patient and nursing.      Jayda Todd Nicholas, APRN  05/27/25  08:59 CDT

## 2025-05-27 NOTE — PLAN OF CARE
Goal Outcome Evaluation:                    Patient progressing well,minor complaints of pain or discomfort throughout shift, controlled with routine order of tylenol and tramadol, HR S 71-90 BBB/ PVC/ COUP per tele. Ambulated x 4 in halls without any complications or complaints of SOB or fatigue. Over night Sleep study scheduled, possible d/c tomorrow after results of study. Care and safety maintained.

## 2025-05-27 NOTE — THERAPY TREATMENT NOTE
Acute Care - Physical Therapy Treatment Note  [facility]     Patient Name: Gladys Pino  : 1955  MRN: [id number]  Today's Date: 2025      Visit Dx:     ICD-10-CM ICD-9-CM   1. Impaired mobility [Z74.09]  Z74.09 799.89   2. Severe aortic valve regurgitation  I35.1 424.1   3. Severe mitral valve regurgitation  I34.0 424.0     Patient Active Problem List   Diagnosis    Severe aortic valve regurgitation    Severe mitral valve regurgitation    Aortic valve insufficiency     Past Medical History:   Diagnosis Date    Allergic rhinitis     Aortic valve disease     Arrhythmia     Elevated cholesterol     GERD (gastroesophageal reflux disease)     Hyperlipidemia     Hypertension      Past Surgical History:   Procedure Laterality Date    AORTIC VALVE REPAIR/REPLACEMENT N/A 2025    Procedure: AORTIC VALVE REPLACEMENT 23MM, MITRAL VALVE REPAIR 30MM RING, LEFT ATRIAL APPENDAGE EXCLUSION 35MM, TRANSESOPHAGEAL ECHOCARDIOGRAM;  Surgeon: Dilshad Keenan MD;  Location: [facility] OR;  Service: Cardiothoracic;  Laterality: N/A;    ATRIAL APPENDAGE EXCLUSION LEFT WITH TRANSESOPHAGEAL ECHOCARDIOGRAM N/A 2025    Procedure: ATRIAL APPENDAGE EXCLUSION LEFT WITH TRANSESOPHAGEAL ECHOCARDIOGRAM;  Surgeon: Dilshad Keenan MD;  Location: [facility] OR;  Service: Cardiothoracic;  Laterality: N/A;    CATARACT EXTRACTION       SECTION      METATARSAL OSTEOTOMY Bilateral     MITRAL VALVE REPAIR/REPLACEMENT N/A 2025    Procedure: MITRAL VALVE REPAIR/REPLACEMENT;  Surgeon: Dilshad Keenan MD;  Location: [facility] OR;  Service: Cardiothoracic;  Laterality: N/A;     PT Assessment (Last 12 Hours)       PT Evaluation and Treatment       Row Name 25 1106 25 1052       Physical Therapy Time and Intention    Subjective Information complains of;fatigue;weakness;pain  - --    Document Type therapy note (daily note)  -MF --  -MF    Mode of Treatment physical therapy  - --      Row Name 25 0951          Physical  Therapy Time and Intention    Document Type therapy note (daily note)  -     Session Not Performed patient unavailable for treatment  -     Comment, Session Not Performed pt having IV placed. Will check back.  -       Row Name 05/27/25 1106          General Information    Existing Precautions/Restrictions fall;sternal  -       Row Name 05/27/25 1106          Pain    Pretreatment Pain Rating 5/10  -     Posttreatment Pain Rating 5/10  -     Pain Location chest  -     Pain Side/Orientation generalized  -     Pain Management Interventions exercise or physical activity utilized;premedicated for activity  -     Response to Pain Interventions activity participation with tolerable pain  -       Row Name 05/27/25 1106          Bed Mobility    Comment, (Bed Mobility) up in chair-pt reports having an electric recliner to sleep in at home.  -       Row Name 05/27/25 1106          Sit-Stand Transfer    Sit-Stand [place] (Transfers) supervision  -       Row Name 05/27/25 1106          Stand-Sit Transfer    Stand-Sit [place] (Transfers) supervision  -       Row Name 05/27/25 1106          Gait/Stairs (Locomotion)    [place] Level (Gait) standby assist  -     Distance in Feet (Gait) 450  3 standing rests  -     Deviations/Abnormal Patterns (Gait) mendoza decreased;stride length decreased  -       Row Name 05/27/25 1106          Motor Skills    Comments, Therapeutic Exercise cardiac warm ups x 15  -       Row Name             Wound 05/21/25 0725 sternal Surgical    Wound - Properties Group Placement Date: 05/21/25  [facility] Placement Time: 0725 -LH Present on Original Admission: N  -LH Location: sternal  -LH Primary Wound Type: Surgical  - Additional Comments: mastisol, steri strip, mepilex  -    Retired Wound - Properties Group Placement Date: 05/21/25  - Placement Time: 0725 - Present on Original Admission: N  -LH Location: sternal  -LH Additional Comments: mastisol, steri  strip, mepilex  -LH    Retired Wound - Properties Group Placement Date: 05/21/25  - Placement Time: 0725 - Present on Original Admission: N  - Location: sternal  -LH Additional Comments: estevan rodriguez strip, mepilex  -LH    Retired Wound - Properties Group Date first assessed: 05/21/25  - Time first assessed: 0725 - Present on Original Admission: N  - Location: sternal  - Additional Comments: diandra rodriguezi strip, mepilex  -      Row Name 05/27/25 1106          Plan of Care Review    Plan of Care Reviewed With patient  -MF     Progress improving  -MF     Outcome Evaluation PT tx completed. Pt stands with Supervision. She walked 450' SBA with 3 standing rests due to fatigue. She continues to report 5/10 chest pain and increased fatigue/weakness. Pt. plans to stay with son following discharge. States she has an electric recliner to sleep in. Her O2 levels remain in the 90's while on RA. Pt. progressing well.  -       Row Name 05/27/25 1106          Vital Signs    Pre SpO2 (%) 93  -MF     O2 Delivery Pre Treatment room air  -MF     O2 Delivery Intra Treatment room air  -MF     Post SpO2 (%) 95  -MF     O2 Delivery Post Treatment room air  -MF     Pre Patient Position Sitting  -MF     Intra Patient Position Standing  -MF     Post Patient Position Sitting  -MF       Row Name 05/27/25 1106          Positioning and Restraints    Pre-Treatment Position sitting in chair/recliner  -MF     Post Treatment Position chair  -MF     In Chair reclined;call light within reach;encouraged to call for assist  -MF               User Key  (r) = Recorded By, (t) = Taken By, (c) = Cosigned By      Initials Name Provider Type     Yeny Sullivan, RN Registered Nurse    Monique Downs PTA Physical Therapist Assistant                    Physical Therapy Education       Title: PT OT SLP Therapies (In Progress)       Topic: Physical Therapy (In Progress)       Point: Mobility training (In Progress)       Learning  Progress Summary            Patient Acceptance, E, NR by SERGEY at 5/22/2025 1400    Comment: gait progression, PT POC    Acceptance, E, NR by SERGEY at 5/22/2025 0855    Comment: Benefits of activity, progression of PT, sternal prec                      Point: Home exercise program (Not Started)       Learner Progress:  Not documented in this visit.              Point: Body mechanics (Not Started)       Learner Progress:  Not documented in this visit.              Point: Precautions (In Progress)       Learning Progress Summary            Patient Acceptance, E, NR by SERGEY at 5/22/2025 0855    Comment: Benefits of activity, progression of PT, sternal prec                                      User Key       Initials Effective Dates Name Provider Type Discipline    SERGEY 02/03/23 -  Jackson Ramon, PT DPT Physical Therapist PT                  PT Recommendation and Plan     Plan of Care Reviewed With: patient  Progress: improving  Outcome Evaluation: PT tx completed. Pt stands with Supervision. She walked 450' SBA with 3 standing rests due to fatigue. She continues to report 5/10 chest pain and increased fatigue/weakness. Pt. plans to stay with son following discharge. States she has an electric recliner to sleep in. Her O2 levels remain in the 90's while on RA. Pt. progressing well.   Outcome Measures       Row Name 05/27/25 1106 05/26/25 1049 05/25/25 1108       How much help from another person do you currently need...    Turning from your back to your side while in flat bed without using bedrails? 3  -MF 3  -MF 3  -MF    Moving from lying on back to sitting on the side of a flat bed without bedrails? 3  -MF 3  -MF 3  -MF    Moving to and from a bed to a chair (including a wheelchair)? 4  -MF 3  -MF 3  -MF    Standing up from a chair using your arms (e.g., wheelchair, bedside chair)? 4  -MF 4  -MF 3  -MF    Climbing 3-5 steps with a railing? 3  -MF 3  -MF 3  -MF    To walk in hospital room? 3  -MF 3  -MF 3  -MF    AM-PAC 6  Clicks Score (PT) 20  -MF 19  -MF 18  -MF       Functional Assessment    Outcome Measure Options AM-PAC 6 Clicks Basic Mobility (PT)  -MF AM-PAC 6 Clicks Basic Mobility (PT)  -MF AM-PAC 6 Clicks Basic Mobility (PT)  -MF              User Key  (r) = Recorded By, (t) = Taken By, (c) = Cosigned By      Initials Name Provider Type    Monique Downs PTA Physical Therapist Assistant                     Time Calculation:    PT Charges       Row Name 05/27/25 1522             Time Calculation    Start Time 1106  -MF      Stop Time 1125  -MF      Time Calculation (min) 19 min  -MF      PT Received On 05/27/25  -MF         Time Calculation- PT    Total Timed Code Minutes- PT 19 minute(s)  -MF         Timed Charges    86090 - Gait Training Minutes  19  -MF         Total Minutes    Timed Charges Total Minutes 19  -MF       Total Minutes 19  -MF                User Key  (r) = Recorded By, (t) = Taken By, (c) = Cosigned By      Initials Name Provider Type     Monique Ha PTA Physical Therapist Assistant                  Therapy Charges for Today       Code Description Service Date Service Provider Modifiers Qty    36185230413 HC GAIT TRAINING EA 15 MIN 5/26/2025 Monique Ha, PTA GP 1    61827833070 HC GAIT TRAINING EA 15 MIN 5/26/2025 Monique Ha, MAYANK GP 2    34987736884 HC GAIT TRAINING EA 15 MIN 5/27/2025 Monique Ha, MAYANK GP 1            PT G-Codes  Outcome Measure Options: AM-PAC 6 Clicks Basic Mobility (PT)  AM-PAC 6 Clicks Score (PT): 20    Monique Ha PTA  5/27/2025

## 2025-05-27 NOTE — PLAN OF CARE
Goal Outcome Evaluation:    Problem: Adult Inpatient Plan of Care  Goal: Plan of Care Review  Outcome: Not Progressing  Flowsheets (Taken 5/27/2025 0430)  Progress: no change  Outcome Evaluation: Pain treated with PRN ultram x2 with relief. Patient has been on room air throughout night, O2 sat WNL. S 74-88 per tele. Plan of care ongoing.   Plan of Care Reviewed With: patient

## 2025-05-28 ENCOUNTER — APPOINTMENT (OUTPATIENT)
Dept: GENERAL RADIOLOGY | Facility: HOSPITAL | Age: 70
End: 2025-05-28
Payer: MEDICARE

## 2025-05-28 LAB
ALBUMIN SERPL-MCNC: 3.7 G/DL (ref 3.5–5.2)
ANION GAP SERPL CALCULATED.3IONS-SCNC: 11 MMOL/L (ref 5–15)
BASOPHILS # BLD AUTO: 0.05 10*3/MM3 (ref 0–0.2)
BASOPHILS NFR BLD AUTO: 0.8 % (ref 0–1.5)
BUN SERPL-MCNC: 13.8 MG/DL (ref 8–23)
BUN/CREAT SERPL: 20.3 (ref 7–25)
CALCIUM SPEC-SCNC: 8.8 MG/DL (ref 8.6–10.5)
CHLORIDE SERPL-SCNC: 96 MMOL/L (ref 98–107)
CO2 SERPL-SCNC: 26 MMOL/L (ref 22–29)
CREAT SERPL-MCNC: 0.68 MG/DL (ref 0.57–1)
DEPRECATED RDW RBC AUTO: 38.8 FL (ref 37–54)
EGFRCR SERPLBLD CKD-EPI 2021: 93.8 ML/MIN/1.73
EOSINOPHIL # BLD AUTO: 0.21 10*3/MM3 (ref 0–0.4)
EOSINOPHIL NFR BLD AUTO: 3.3 % (ref 0.3–6.2)
ERYTHROCYTE [DISTWIDTH] IN BLOOD BY AUTOMATED COUNT: 12.5 % (ref 12.3–15.4)
GLUCOSE BLDC GLUCOMTR-MCNC: 111 MG/DL (ref 70–130)
GLUCOSE BLDC GLUCOMTR-MCNC: 112 MG/DL (ref 70–130)
GLUCOSE BLDC GLUCOMTR-MCNC: 118 MG/DL (ref 70–130)
GLUCOSE BLDC GLUCOMTR-MCNC: 121 MG/DL (ref 70–130)
GLUCOSE SERPL-MCNC: 105 MG/DL (ref 65–99)
HCT VFR BLD AUTO: 33.6 % (ref 34–46.6)
HGB BLD-MCNC: 11.4 G/DL (ref 12–15.9)
IMM GRANULOCYTES # BLD AUTO: 0.07 10*3/MM3 (ref 0–0.05)
IMM GRANULOCYTES NFR BLD AUTO: 1.1 % (ref 0–0.5)
INR PPP: 1 (ref 0.91–1.09)
LYMPHOCYTES # BLD AUTO: 1.79 10*3/MM3 (ref 0.7–3.1)
LYMPHOCYTES NFR BLD AUTO: 27.9 % (ref 19.6–45.3)
MAGNESIUM SERPL-MCNC: 2.5 MG/DL (ref 1.6–2.4)
MCH RBC QN AUTO: 28.9 PG (ref 26.6–33)
MCHC RBC AUTO-ENTMCNC: 33.9 G/DL (ref 31.5–35.7)
MCV RBC AUTO: 85.3 FL (ref 79–97)
MONOCYTES # BLD AUTO: 0.72 10*3/MM3 (ref 0.1–0.9)
MONOCYTES NFR BLD AUTO: 11.2 % (ref 5–12)
NEUTROPHILS NFR BLD AUTO: 3.57 10*3/MM3 (ref 1.7–7)
NEUTROPHILS NFR BLD AUTO: 55.7 % (ref 42.7–76)
NRBC BLD AUTO-RTO: 0 /100 WBC (ref 0–0.2)
PHOSPHATE SERPL-MCNC: 4.3 MG/DL (ref 2.5–4.5)
PLATELET # BLD AUTO: 171 10*3/MM3 (ref 140–450)
PMV BLD AUTO: 10.7 FL (ref 6–12)
POTASSIUM SERPL-SCNC: 4.6 MMOL/L (ref 3.5–5.2)
PROTHROMBIN TIME: 13.7 SECONDS (ref 11.8–14.8)
RBC # BLD AUTO: 3.94 10*6/MM3 (ref 3.77–5.28)
SODIUM SERPL-SCNC: 133 MMOL/L (ref 136–145)
WBC NRBC COR # BLD AUTO: 6.41 10*3/MM3 (ref 3.4–10.8)

## 2025-05-28 PROCEDURE — 82948 REAGENT STRIP/BLOOD GLUCOSE: CPT

## 2025-05-28 PROCEDURE — 25010000002 FUROSEMIDE PER 20 MG: Performed by: NURSE PRACTITIONER

## 2025-05-28 PROCEDURE — 83735 ASSAY OF MAGNESIUM: CPT | Performed by: SURGERY

## 2025-05-28 PROCEDURE — 97116 GAIT TRAINING THERAPY: CPT

## 2025-05-28 PROCEDURE — 80069 RENAL FUNCTION PANEL: CPT | Performed by: SURGERY

## 2025-05-28 PROCEDURE — 71045 X-RAY EXAM CHEST 1 VIEW: CPT

## 2025-05-28 PROCEDURE — 85610 PROTHROMBIN TIME: CPT | Performed by: SURGERY

## 2025-05-28 PROCEDURE — 25010000002 ENOXAPARIN PER 10 MG: Performed by: SURGERY

## 2025-05-28 PROCEDURE — 94762 N-INVAS EAR/PLS OXIMTRY CONT: CPT

## 2025-05-28 PROCEDURE — 85025 COMPLETE CBC W/AUTO DIFF WBC: CPT | Performed by: SURGERY

## 2025-05-28 RX ORDER — NAPROXEN 250 MG/1
250 TABLET ORAL 2 TIMES DAILY WITH MEALS
Status: DISCONTINUED | OUTPATIENT
Start: 2025-05-28 | End: 2025-05-29 | Stop reason: HOSPADM

## 2025-05-28 RX ORDER — FUROSEMIDE 10 MG/ML
20 INJECTION INTRAMUSCULAR; INTRAVENOUS 3 TIMES DAILY
Status: DISCONTINUED | OUTPATIENT
Start: 2025-05-28 | End: 2025-05-29 | Stop reason: HOSPADM

## 2025-05-28 RX ORDER — POTASSIUM CHLORIDE 1500 MG/1
20 TABLET, EXTENDED RELEASE ORAL
Status: DISCONTINUED | OUTPATIENT
Start: 2025-05-28 | End: 2025-05-29 | Stop reason: HOSPADM

## 2025-05-28 RX ADMIN — POTASSIUM CHLORIDE 20 MEQ: 1500 TABLET, EXTENDED RELEASE ORAL at 18:19

## 2025-05-28 RX ADMIN — TRAMADOL HYDROCHLORIDE 25 MG: 50 TABLET, COATED ORAL at 09:46

## 2025-05-28 RX ADMIN — FUROSEMIDE 20 MG: 10 INJECTION, SOLUTION INTRAMUSCULAR; INTRAVENOUS at 20:23

## 2025-05-28 RX ADMIN — POLYETHYLENE GLYCOL 3350 17 G: 17 POWDER, FOR SOLUTION ORAL at 09:47

## 2025-05-28 RX ADMIN — PANTOPRAZOLE SODIUM 40 MG: 40 TABLET, DELAYED RELEASE ORAL at 05:47

## 2025-05-28 RX ADMIN — METHOCARBAMOL TABLETS 500 MG: 500 TABLET, COATED ORAL at 05:47

## 2025-05-28 RX ADMIN — NAPROXEN 250 MG: 250 TABLET ORAL at 18:19

## 2025-05-28 RX ADMIN — TRAMADOL HYDROCHLORIDE 25 MG: 50 TABLET, COATED ORAL at 16:11

## 2025-05-28 RX ADMIN — METOPROLOL TARTRATE 25 MG: 25 TABLET, FILM COATED ORAL at 09:46

## 2025-05-28 RX ADMIN — ACETAMINOPHEN 650 MG: 325 TABLET, FILM COATED ORAL at 20:22

## 2025-05-28 RX ADMIN — ACETAMINOPHEN 650 MG: 325 TABLET, FILM COATED ORAL at 12:50

## 2025-05-28 RX ADMIN — ASPIRIN 81 MG: 81 TABLET, COATED ORAL at 09:45

## 2025-05-28 RX ADMIN — MONTELUKAST SODIUM 10 MG: 10 TABLET, COATED ORAL at 09:46

## 2025-05-28 RX ADMIN — TRAMADOL HYDROCHLORIDE 25 MG: 50 TABLET, COATED ORAL at 22:14

## 2025-05-28 RX ADMIN — POTASSIUM CHLORIDE 20 MEQ: 1500 TABLET, EXTENDED RELEASE ORAL at 09:46

## 2025-05-28 RX ADMIN — POTASSIUM CHLORIDE 20 MEQ: 1500 TABLET, EXTENDED RELEASE ORAL at 12:24

## 2025-05-28 RX ADMIN — FUROSEMIDE 20 MG: 10 INJECTION, SOLUTION INTRAMUSCULAR; INTRAVENOUS at 15:59

## 2025-05-28 RX ADMIN — FUROSEMIDE 20 MG: 10 INJECTION, SOLUTION INTRAMUSCULAR; INTRAVENOUS at 09:47

## 2025-05-28 RX ADMIN — ATORVASTATIN CALCIUM 20 MG: 10 TABLET, FILM COATED ORAL at 20:23

## 2025-05-28 RX ADMIN — ENOXAPARIN SODIUM 40 MG: 100 INJECTION SUBCUTANEOUS at 09:47

## 2025-05-28 RX ADMIN — BISACODYL 10 MG: 5 TABLET, COATED ORAL at 09:46

## 2025-05-28 RX ADMIN — METOPROLOL TARTRATE 25 MG: 25 TABLET, FILM COATED ORAL at 20:23

## 2025-05-28 RX ADMIN — ACETAMINOPHEN 650 MG: 325 TABLET, FILM COATED ORAL at 09:45

## 2025-05-28 RX ADMIN — Medication 1 TABLET: at 09:46

## 2025-05-28 RX ADMIN — FLUTICASONE PROPIONATE 1 SPRAY: 50 SPRAY, METERED NASAL at 09:47

## 2025-05-28 RX ADMIN — LOSARTAN POTASSIUM 25 MG: 25 TABLET, FILM COATED ORAL at 09:45

## 2025-05-28 RX ADMIN — NAPROXEN 250 MG: 250 TABLET ORAL at 12:24

## 2025-05-28 RX ADMIN — LIDOCAINE 2 PATCH: 4 PATCH TOPICAL at 05:47

## 2025-05-28 NOTE — PLAN OF CARE
Problem: Adult Inpatient Plan of Care  Goal: Absence of Hospital-Acquired Illness or Injury  Intervention: Identify and Manage Fall Risk  Recent Flowsheet Documentation  Taken 5/28/2025 1611 by Anny Saini RN  Safety Promotion/Fall Prevention: safety round/check completed  Taken 5/28/2025 1145 by Anny Saini RN  Safety Promotion/Fall Prevention:   safety round/check completed   fall prevention program maintained  Taken 5/28/2025 1045 by Anny Saini RN  Safety Promotion/Fall Prevention:   safety round/check completed   fall prevention program maintained  Taken 5/28/2025 0930 by Anny Saini RN  Safety Promotion/Fall Prevention:   safety round/check completed   fall prevention program maintained  Taken 5/28/2025 0845 by Anny Saini RN  Safety Promotion/Fall Prevention:   safety round/check completed   fall prevention program maintained  Taken 5/28/2025 0745 by Anny Saini RN  Safety Promotion/Fall Prevention:   safety round/check completed   fall prevention program maintained  Intervention: Prevent Skin Injury  Recent Flowsheet Documentation  Taken 5/28/2025 1611 by Anny Saini RN  Body Position: position changed independently  Taken 5/28/2025 1145 by Anny Saini RN  Body Position: position changed independently  Taken 5/28/2025 1045 by Anny Saini RN  Body Position: position changed independently  Taken 5/28/2025 0930 by Anny Saini RN  Body Position: position changed independently  Taken 5/28/2025 0845 by Anny Saini RN  Body Position: position changed independently  Taken 5/28/2025 0745 by Anny Saini RN  Body Position:   position changed independently   weight shifting  Intervention: Prevent and Manage VTE (Venous Thromboembolism) Risk  Recent Flowsheet Documentation  Taken 5/28/2025 0745 by Anny Saini RN  VTE Prevention/Management: (see MAR) other (see comments)  Goal: Optimal Comfort and Wellbeing  Intervention: Monitor Pain and Promote Comfort  Recent Flowsheet Documentation  Taken  5/28/2025 1641 by Anny Saini RN  Pain Management Interventions: relaxation techniques promoted  Taken 5/28/2025 0930 by Anny Saini RN  Pain Management Interventions: pain management plan reviewed with patient/caregiver  Intervention: Provide Person-Centered Care  Recent Flowsheet Documentation  Taken 5/28/2025 0745 by Anny Saini RN  Trust Relationship/Rapport:   care explained   choices provided   reassurance provided     Problem: Mechanical Ventilation Invasive  Goal: Effective Communication  Intervention: Ensure Effective Communication  Recent Flowsheet Documentation  Taken 5/28/2025 0745 by Anny Saini RN  Trust Relationship/Rapport:   care explained   choices provided   reassurance provided  Diversional Activities:   smartphone   television  Family/Support System Care:   support provided   self-care encouraged  Communication Enhancement Strategies: call light answered in person  Goal: Mechanical Ventilation Liberation  Intervention: Promote Extubation and Mechanical Ventilation Liberation  Recent Flowsheet Documentation  Taken 5/28/2025 1611 by Anny Saini RN  Medication Review/Management: medications reviewed  Taken 5/28/2025 0745 by Anny Saini RN  Medication Review/Management: medications reviewed  Goal: Absence of Ventilator-Induced Lung Injury  Intervention: Prevent Ventilator-Associated Pneumonia  Recent Flowsheet Documentation  Taken 5/28/2025 0745 by Anny Saini RN  Head of Bed ([facility]) Positioning: HOB elevated     Problem: Skin Injury Risk Increased  Goal: Skin Health and Integrity  Intervention: Optimize Skin Protection  Recent Flowsheet Documentation  Taken 5/28/2025 0745 by Anny Saini RN  Head of Bed ([facility]) Positioning: HOB elevated     Problem: Cardiovascular Surgery  Goal: Optimal Coping with Heart Surgery  Intervention: Support Psychosocial Response to Surgery  Recent Flowsheet Documentation  Taken 5/28/2025 0745 by Anny Saini RN  Family/Support System Care:   support  provided   self-care encouraged  Goal: Optimal Cerebral Tissue Perfusion  Intervention: Protect and Optimize Cerebral Perfusion  Recent Flowsheet Documentation  Taken 5/28/2025 0745 by Anny Saini RN  Sensory Stimulation Regulation: care clustered  Head of Bed (HOB) Positioning: HOB elevated  Goal: Anesthesia/Sedation Recovery  Intervention: Optimize Anesthesia Recovery  Recent Flowsheet Documentation  Taken 5/28/2025 1611 by Anny Saini RN  Safety Promotion/Fall Prevention: safety round/check completed  Taken 5/28/2025 1145 by Anny Saini RN  Safety Promotion/Fall Prevention:   safety round/check completed   fall prevention program maintained  Taken 5/28/2025 1045 by Anny Saini RN  Safety Promotion/Fall Prevention:   safety round/check completed   fall prevention program maintained  Taken 5/28/2025 0930 by Anny Saini RN  Safety Promotion/Fall Prevention:   safety round/check completed   fall prevention program maintained  Taken 5/28/2025 0845 by Anny Saini RN  Safety Promotion/Fall Prevention:   safety round/check completed   fall prevention program maintained  Taken 5/28/2025 0745 by Anny Saini RN  Safety Promotion/Fall Prevention:   safety round/check completed   fall prevention program maintained  Goal: Acceptable Pain Control  Intervention: Prevent or Manage Pain  Recent Flowsheet Documentation  Taken 5/28/2025 1641 by Anny Saini RN  Pain Management Interventions: relaxation techniques promoted  Taken 5/28/2025 0930 by Anny Saini RN  Pain Management Interventions: pain management plan reviewed with patient/caregiver  Taken 5/28/2025 0745 by Anny Saini RN  Diversional Activities:   smartphone   television  Goal: Effective Oxygenation and Ventilation  Intervention: Promote Airway Secretion Clearance  Recent Flowsheet Documentation  Taken 5/28/2025 0745 by Anny Saini RN  Cough And Deep Breathing: done independently per patient     Problem: Pain Acute  Goal: Optimal Pain Control and  Function  Intervention: Optimize Psychosocial Wellbeing  Recent Flowsheet Documentation  Taken 5/28/2025 0745 by Anny Saini RN  Diversional Activities:   smartphone   television  Intervention: Develop Pain Management Plan  Recent Flowsheet Documentation  Taken 5/28/2025 1641 by Anny Saini RN  Pain Management Interventions: relaxation techniques promoted  Taken 5/28/2025 0930 by Anny Saini RN  Pain Management Interventions: pain management plan reviewed with patient/caregiver  Intervention: Prevent or Manage Pain  Recent Flowsheet Documentation  Taken 5/28/2025 1611 by Anny Saini RN  Medication Review/Management: medications reviewed  Taken 5/28/2025 0745 by Anny Saini RN  Sensory Stimulation Regulation: care clustered  Medication Review/Management: medications reviewed     Problem: Fall Injury Risk  Goal: Absence of Fall and Fall-Related Injury  Intervention: Identify and Manage Contributors  Recent Flowsheet Documentation  Taken 5/28/2025 1611 by Anny Saini RN  Medication Review/Management: medications reviewed  Taken 5/28/2025 0745 by Anny Saini RN  Medication Review/Management: medications reviewed  Intervention: Promote Injury-Free Environment  Recent Flowsheet Documentation  Taken 5/28/2025 1611 by Anny Saini RN  Safety Promotion/Fall Prevention: safety round/check completed  Taken 5/28/2025 1145 by Anny Saini RN  Safety Promotion/Fall Prevention:   safety round/check completed   fall prevention program maintained  Taken 5/28/2025 1045 by Anny Saini RN  Safety Promotion/Fall Prevention:   safety round/check completed   fall prevention program maintained  Taken 5/28/2025 0930 by Anny Saini RN  Safety Promotion/Fall Prevention:   safety round/check completed   fall prevention program maintained  Taken 5/28/2025 0845 by Anny Saini RN  Safety Promotion/Fall Prevention:   safety round/check completed   fall prevention program maintained  Taken 5/28/2025 0745 by Anny Saini  RN  Safety Promotion/Fall Prevention:   safety round/check completed   fall prevention program maintained   Goal Outcome Evaluation:  VS stable today. Tele S 88-98 BBB. Pain managed and medications reviewed with PT. Wires dc'd and pulled. Pt had BM today. Safety maintained, call light within reach.

## 2025-05-28 NOTE — THERAPY TREATMENT NOTE
Acute Care - Physical Therapy Treatment Note  [facility]     Patient Name: Gladys Pino  : 1955  MRN: [id number]  Today's Date: 2025      Visit Dx:     ICD-10-CM ICD-9-CM   1. Impaired mobility [Z74.09]  Z74.09 799.89   2. Severe aortic valve regurgitation  I35.1 424.1   3. Severe mitral valve regurgitation  I34.0 424.0     Patient Active Problem List   Diagnosis    Severe aortic valve regurgitation    Severe mitral valve regurgitation    Aortic valve insufficiency     Past Medical History:   Diagnosis Date    Allergic rhinitis     Aortic valve disease     Arrhythmia     Elevated cholesterol     GERD (gastroesophageal reflux disease)     Hyperlipidemia     Hypertension      Past Surgical History:   Procedure Laterality Date    AORTIC VALVE REPAIR/REPLACEMENT N/A 2025    Procedure: AORTIC VALVE REPLACEMENT 23MM, MITRAL VALVE REPAIR 30MM RING, LEFT ATRIAL APPENDAGE EXCLUSION 35MM, TRANSESOPHAGEAL ECHOCARDIOGRAM;  Surgeon: Dilshad Keenan MD;  Location: [facility] OR;  Service: Cardiothoracic;  Laterality: N/A;    ATRIAL APPENDAGE EXCLUSION LEFT WITH TRANSESOPHAGEAL ECHOCARDIOGRAM N/A 2025    Procedure: ATRIAL APPENDAGE EXCLUSION LEFT WITH TRANSESOPHAGEAL ECHOCARDIOGRAM;  Surgeon: Dilshad Keenan MD;  Location: [facility] OR;  Service: Cardiothoracic;  Laterality: N/A;    CATARACT EXTRACTION       SECTION      METATARSAL OSTEOTOMY Bilateral     MITRAL VALVE REPAIR/REPLACEMENT N/A 2025    Procedure: MITRAL VALVE REPAIR/REPLACEMENT;  Surgeon: Dilshad Keenan MD;  Location: [facility] OR;  Service: Cardiothoracic;  Laterality: N/A;     PT Assessment (Last 12 Hours)       PT Evaluation and Treatment       Row Name 25 1440 25 0945       Physical Therapy Time and Intention    Subjective Information complains of;pain  -TB complains of;pain  -TB    Document Type therapy note (daily note)  -TB therapy note (daily note)  -TB    Mode of Treatment physical therapy  -TB physical therapy  -TB       Row Name 05/28/25 1440 05/28/25 0945       General Information    Existing Precautions/Restrictions fall;sternal  -TB fall;sternal  -TB      Row Name 05/28/25 1440 05/28/25 0945       Pain    Pretreatment Pain Rating 3/10  -TB 5/10  -TB    Posttreatment Pain Rating 3/10  -TB 6/10  -TB    Pain Location chest  -TB chest  -TB    Pain Side/Orientation medial  -TB --      Row Name 05/28/25 1440 05/28/25 0945       Bed Mobility    Bed Mobility -- sit-supine  -TB    Sit-Supine [place] (Bed Mobility) -- contact guard;verbal cues  -TB    Comment, (Bed Mobility) Bathroom  -TB --      Row Name 05/28/25 1440 05/28/25 0945       Transfers    Transfers sit-stand transfer;stand-sit transfer  -TB sit-stand transfer;stand-sit transfer  -TB      Row Name 05/28/25 1440 05/28/25 0945       Sit-Stand Transfer    Sit-Stand [place] (Transfers) supervision  -TB supervision  -TB      Row Name 05/28/25 1440 05/28/25 0945       Stand-Sit Transfer    Stand-Sit [place] (Transfers) supervision  -TB supervision  -TB      Row Name 05/28/25 1440 05/28/25 0945       Gait/Stairs (Locomotion)    [place] Level (Gait) standby assist  -TB standby assist  -TB    Distance in Feet (Gait) 450  -  x2 standing rest  -TB    Deviations/Abnormal Patterns (Gait) mendoza decreased;stride length decreased  -TB mendoza decreased;stride length decreased  -TB    [place] Level (Stairs) -- --  -TB    Assistive Device (Stairs) -- --  -TB    Handrail Location (Stairs) -- --  -TB    Ascending Technique (Stairs) -- --  -TB    Descending Technique (Stairs) -- --  -TB      Row Name 05/28/25 0945          Motor Skills    Comments, Therapeutic Exercise cardiac warm ups  -TB       Row Name             Wound 05/21/25 0725 sternal Surgical    Wound - Properties Group Placement Date: 05/21/25  - Placement Time: 0725 -LH Present on Original Admission: N  -LH Location: sternal  -LH Primary Wound Type: Surgical  -LH Additional Comments: mastisol,  steri strip, mepilex  -LH    Retired Wound - Properties Group Placement Date: 05/21/25  - Placement Time: 0725 - Present on Original Admission: N  - Location: sternal  -LH Additional Comments: mastisol, steri strip, mepilex  -LH    Retired Wound - Properties Group Placement Date: 05/21/25  - Placement Time: 0725 -LH Present on Original Admission: N  - Location: sternal  -LH Additional Comments: mastisol, steri strip, mepilex  -LH    Retired Wound - Properties Group Date first assessed: 05/21/25  - Time first assessed: 0725 - Present on Original Admission: N  - Location: sternal  -LH Additional Comments: mastisol, steri strip, mepilex  -LH      Row Name 05/28/25 0945          Plan of Care Review    Plan of Care Reviewed With patient  -TB     Progress improving  -TB     Outcome Evaluation Pt up in chair c/o pain in chest 5/10. Cardiac warm ups x15. Nursing giving meds. Stood Sup. Anb 450' total with CGA with only x2 standing rest breaks. No complaints of SOA. CGA for bed mob to get in bed. Will cont POC.  -TB       Row Name 05/28/25 1440 05/28/25 0945       Positioning and Restraints    Pre-Treatment Position bathroom  -TB sitting in chair/recliner  -TB    Post Treatment Position bathroom  -TB bed  -TB    In Bed -- fowlers;call light within reach;encouraged to call for assist;side rails up x2;notified nsg  -TB    Bathroom sitting;with nsg  -TB --              User Key  (r) = Recorded By, (t) = Taken By, (c) = Cosigned By      Initials Name Provider Type     Yeny Sullivan RN Registered Nurse    Pelon Painter, PTA Physical Therapist Assistant                    Physical Therapy Education       Title: PT OT SLP Therapies (In Progress)       Topic: Physical Therapy (In Progress)       Point: Mobility training (In Progress)       Learning Progress Summary            Patient Acceptance, E, NR by SERGEY at 5/22/2025 1400    Comment: gait progression, PT POC    Acceptance, E, NR by SERGEY at  5/22/2025 0855    Comment: Benefits of activity, progression of PT, sternal prec                      Point: Home exercise program (Not Started)       Learner Progress:  Not documented in this visit.              Point: Body mechanics (Not Started)       Learner Progress:  Not documented in this visit.              Point: Precautions (In Progress)       Learning Progress Summary            Patient Acceptance, E, NR by SERGEY at 5/22/2025 0855    Comment: Benefits of activity, progression of PT, sternal prec                                      User Key       Initials Effective Dates Name Provider Type Discipline    SERGEY 02/03/23 -  Jackson Ramon, PT DPT Physical Therapist PT                  PT Recommendation and Plan     Plan of Care Reviewed With: patient  Progress: improving  Outcome Evaluation: Pt up in chair c/o pain in chest 5/10. Cardiac warm ups x15. Nursing giving meds. Stood Sup. Anb 450' total with CGA with only x2 standing rest breaks. No complaints of SOA. CGA for bed mob to get in bed. Will cont POC.   Outcome Measures       Row Name 05/27/25 1106 05/26/25 1049          How much help from another person do you currently need...    Turning from your back to your side while in flat bed without using bedrails? 3  -MF 3  -MF     Moving from lying on back to sitting on the side of a flat bed without bedrails? 3  -MF 3  -MF     Moving to and from a bed to a chair (including a wheelchair)? 4  -MF 3  -MF     Standing up from a chair using your arms (e.g., wheelchair, bedside chair)? 4  -MF 4  -MF     Climbing 3-5 steps with a railing? 3  -MF 3  -MF     To walk in hospital room? 3  -MF 3  -MF     AM-PAC 6 Clicks Score (PT) 20  -MF 19  -MF        Functional Assessment    Outcome Measure Options AM-PAC 6 Clicks Basic Mobility (PT)  -MF AM-PAC 6 Clicks Basic Mobility (PT)  -MF               User Key  (r) = Recorded By, (t) = Taken By, (c) = Cosigned By      Initials Name Provider Type    Monique Downs, PTA  Physical Therapist Assistant                     Time Calculation:    PT Charges       Row Name 05/28/25 1543 05/28/25 1411          Time Calculation    Start Time 1440  -TB 0945  -TB     Stop Time 1455  -TB 1008  -TB     Time Calculation (min) 15 min  -TB 23 min  -TB     PT Received On 05/28/25  -TB 05/28/25  -TB        Time Calculation- PT    Total Timed Code Minutes- PT 15 minute(s)  -TB 23 minute(s)  -TB               User Key  (r) = Recorded By, (t) = Taken By, (c) = Cosigned By      Initials Name Provider Type    TB Pelon Roth PTA Physical Therapist Assistant                  Therapy Charges for Today       Code Description Service Date Service Provider Modifiers Qty    65822669299 HC GAIT TRAINING EA 15 MIN 5/28/2025 Pelon Roth PTA GP 2    47000173729 HC GAIT TRAINING EA 15 MIN 5/28/2025 Pelon Roth PTA GP 1            PT G-Codes  Outcome Measure Options: AM-PAC 6 Clicks Basic Mobility (PT)  AM-PAC 6 Clicks Score (PT): 20    Pelon Roth PTA  5/28/2025

## 2025-05-28 NOTE — PROGRESS NOTES
Patient was placed on RA overnight pulse ox study at approx. 2100.  Went back to collect data from study at 0345 and there was no data to collect.  Upon talking to the patient, she indicated that the pulse ox probe was removed by nurse and never placed back on.  Study will have to be re-done.

## 2025-05-28 NOTE — PLAN OF CARE
Problem: Adult Inpatient Plan of Care  Goal: Plan of Care Review  Outcome: Progressing  Goal: Patient-Specific Goal (Individualized)  Outcome: Progressing  Goal: Absence of Hospital-Acquired Illness or Injury  Outcome: Progressing  Intervention: Identify and Manage Fall Risk  Recent Flowsheet Documentation  Taken 5/28/2025 0200 by Ely Briones RN  Safety Promotion/Fall Prevention: safety round/check completed  Taken 5/28/2025 0003 by Ely Briones RN  Safety Promotion/Fall Prevention: safety round/check completed  Taken 5/27/2025 2130 by Ely Briones RN  Safety Promotion/Fall Prevention: safety round/check completed  Taken 5/27/2025 2012 by Ely Briones RN  Safety Promotion/Fall Prevention: safety round/check completed  Taken 5/27/2025 1926 by Ely Briones RN  Safety Promotion/Fall Prevention: safety round/check completed  Goal: Optimal Comfort and Wellbeing  Outcome: Progressing  Goal: Readiness for Transition of Care  Outcome: Progressing     Problem: Skin Injury Risk Increased  Goal: Skin Health and Integrity  Outcome: Progressing     Problem: Cardiovascular Surgery  Goal: Improved Activity Tolerance  Outcome: Progressing  Goal: Optimal Coping with Heart Surgery  Outcome: Progressing  Goal: Absence of Bleeding  Outcome: Progressing  Goal: Effective Bowel Elimination  Outcome: Progressing  Goal: Effective Cardiac Function  Outcome: Progressing  Goal: Optimal Cerebral Tissue Perfusion  Outcome: Progressing  Goal: Fluid and Electrolyte Balance  Outcome: Progressing  Goal: Blood Glucose Level Within Target Range  Outcome: Progressing  Goal: Absence of Infection Signs and Symptoms  Outcome: Progressing  Goal: Anesthesia/Sedation Recovery  Outcome: Progressing  Intervention: Optimize Anesthesia Recovery  Recent Flowsheet Documentation  Taken 5/28/2025 0200 by Ely Briones RN  Safety Promotion/Fall Prevention: safety round/check completed  Taken 5/28/2025 0003 by Ely Briones RN  Safety Promotion/Fall  Prevention: safety round/check completed  Taken 5/27/2025 2130 by Ely Briones RN  Safety Promotion/Fall Prevention: safety round/check completed  Taken 5/27/2025 2012 by Ely Briones RN  Safety Promotion/Fall Prevention: safety round/check completed  Taken 5/27/2025 1926 by Ely Briones RN  Safety Promotion/Fall Prevention: safety round/check completed  Goal: Acceptable Pain Control  Outcome: Progressing  Goal: Nausea and Vomiting Relief  Outcome: Progressing  Goal: Effective Urinary Elimination  Outcome: Progressing  Goal: Effective Oxygenation and Ventilation  Outcome: Progressing  Intervention: Promote Airway Secretion Clearance  Recent Flowsheet Documentation  Taken 5/27/2025 1926 by Ely Briones RN  Cough And Deep Breathing: done independently per patient     Problem: Pain Acute  Goal: Optimal Pain Control and Function  Outcome: Progressing     Problem: Fall Injury Risk  Goal: Absence of Fall and Fall-Related Injury  Outcome: Progressing  Intervention: Promote Injury-Free Environment  Recent Flowsheet Documentation  Taken 5/28/2025 0200 by Ely Briones RN  Safety Promotion/Fall Prevention: safety round/check completed  Taken 5/28/2025 0003 by Ely Briones RN  Safety Promotion/Fall Prevention: safety round/check completed  Taken 5/27/2025 2130 by Ely Briones RN  Safety Promotion/Fall Prevention: safety round/check completed  Taken 5/27/2025 2012 by Ely Briones RN  Safety Promotion/Fall Prevention: safety round/check completed  Taken 5/27/2025 1926 by Ely Briones RN  Safety Promotion/Fall Prevention: safety round/check completed   Goal Outcome Evaluation:   SR 84-88 per tele. No c/o pain.vss. safety maintained.

## 2025-05-28 NOTE — CASE MANAGEMENT/SOCIAL WORK
Continued Stay Note   [facility]     Patient Name: Gladys Pino  MRN: [id number]  Today's Date: 5/28/2025    Admit Date: 5/21/2025    Plan: Home   Discharge Plan       Row Name 05/28/25 0940       Plan    Plan Home    Patient/Family in Agreement with Plan yes    Plan Comments Pt lives alone, family able to assist if needed. Pt has been getting around well, ambulating 450' feet last. Please inform if SW assist needed. Will follow.                   Discharge Codes    No documentation.                       EFREN ChristiansonW

## 2025-05-28 NOTE — PLAN OF CARE
Goal Outcome Evaluation:  Plan of Care Reviewed With: patient        Progress: improving  Outcome Evaluation: Pt up in chair c/o pain in chest 5/10. Cardiac warm ups x15. Nursing giving meds. Stood Sup. Anb 450' total with CGA with only x2 standing rest breaks. No complaints of SOA. CGA for bed mob to get in bed. Will cont POC.

## 2025-05-28 NOTE — THERAPY TREATMENT NOTE
Acute Care - Physical Therapy Treatment Note  [facility]     Patient Name: Galdys Pino  : 1955  MRN: [id number]  Today's Date: 2025      Visit Dx:     ICD-10-CM ICD-9-CM   1. Impaired mobility [Z74.09]  Z74.09 799.89   2. Severe aortic valve regurgitation  I35.1 424.1   3. Severe mitral valve regurgitation  I34.0 424.0     Patient Active Problem List   Diagnosis    Severe aortic valve regurgitation    Severe mitral valve regurgitation    Aortic valve insufficiency     Past Medical History:   Diagnosis Date    Allergic rhinitis     Aortic valve disease     Arrhythmia     Elevated cholesterol     GERD (gastroesophageal reflux disease)     Hyperlipidemia     Hypertension      Past Surgical History:   Procedure Laterality Date    AORTIC VALVE REPAIR/REPLACEMENT N/A 2025    Procedure: AORTIC VALVE REPLACEMENT 23MM, MITRAL VALVE REPAIR 30MM RING, LEFT ATRIAL APPENDAGE EXCLUSION 35MM, TRANSESOPHAGEAL ECHOCARDIOGRAM;  Surgeon: Dilshad Keenan MD;  Location: [facility] OR;  Service: Cardiothoracic;  Laterality: N/A;    ATRIAL APPENDAGE EXCLUSION LEFT WITH TRANSESOPHAGEAL ECHOCARDIOGRAM N/A 2025    Procedure: ATRIAL APPENDAGE EXCLUSION LEFT WITH TRANSESOPHAGEAL ECHOCARDIOGRAM;  Surgeon: Dilshad Keenan MD;  Location: [facility] OR;  Service: Cardiothoracic;  Laterality: N/A;    CATARACT EXTRACTION       SECTION      METATARSAL OSTEOTOMY Bilateral     MITRAL VALVE REPAIR/REPLACEMENT N/A 2025    Procedure: MITRAL VALVE REPAIR/REPLACEMENT;  Surgeon: Dilshad Keenan MD;  Location: [facility] OR;  Service: Cardiothoracic;  Laterality: N/A;     PT Assessment (Last 12 Hours)       PT Evaluation and Treatment       Row Name 25 0945          Physical Therapy Time and Intention    Subjective Information complains of;pain  -TB     Document Type therapy note (daily note)  -TB     Mode of Treatment physical therapy  -TB       Row Name 25 0980          General Information    Existing  Precautions/Restrictions fall;sternal  -TB       Row Name 05/28/25 0945          Pain    Pretreatment Pain Rating 5/10  -TB     Posttreatment Pain Rating 6/10  -TB     Pain Location chest  -TB       Row Name 05/28/25 0945          Bed Mobility    Bed Mobility sit-supine  -TB     Sit-Supine [facility] (Bed Mobility) contact guard;verbal cues  -TB       Row Name 05/28/25 0945          Transfers    Transfers sit-stand transfer;stand-sit transfer  -TB       Row Name 05/28/25 0945          Sit-Stand Transfer    Sit-Stand [facility] (Transfers) supervision  -TB       Row Name 05/28/25 0945          Stand-Sit Transfer    Stand-Sit [facility] (Transfers) supervision  -TB       Row Name 05/28/25 0945          Gait/Stairs (Locomotion)    [facility] Level (Gait) standby assist  -TB     Distance in Feet (Gait) 450  x2 standing rest  -TB     Deviations/Abnormal Patterns (Gait) mendoza decreased;stride length decreased  -TB     [facility] Level (Stairs) verbal cues;contact guard;minimum assist (75% patient effort)  -TB     Assistive Device (Stairs) --  HHA  -TB     Handrail Location (Stairs) right side (ascending);left side (descending)  -TB     Ascending Technique (Stairs) step-over-step  -TB     Descending Technique (Stairs) step-to-step  -TB       Row Name 05/28/25 0945          Motor Skills    Comments, Therapeutic Exercise cardiac warm ups  -TB       Row Name             Wound 05/21/25 0725 sternal Surgical    Wound - Properties Group Placement Date: 05/21/25  [facility] Placement Time: 0725 -LH Present on Original Admission: N  - Location: sternal  -LH Primary Wound Type: Surgical  -LH Additional Comments: mastisol, steri strip, mepilex  -LH    Retired Wound - Properties Group Placement Date: 05/21/25  [facility] Placement Time: 0725 - Present on Original Admission: N  - Location: sternal  -LH Additional Comments: mastisol, steri strip, mepilex  -LH    Retired Wound - Properties Group Placement Date: 05/21/25  [facility]  Placement Time: 0725 - Present on Original Admission: N  - Location: sternal  -LH Additional Comments: mastisol, steri strip, mepilex  -LH    Retired Wound - Properties Group Date first assessed: 05/21/25  - Time first assessed: 0725 - Present on Original Admission: N  - Location: sternal  -LH Additional Comments: mastisol, steri strip, mepilex  -LH      Row Name 05/28/25 0945          Plan of Care Review    Plan of Care Reviewed With patient  -TB     Progress improving  -TB     Outcome Evaluation Pt up in chair c/o pain in chest 5/10. Cardiac warm ups x15. Nursing giving meds. Stood Sup. Anb 450' total with CGA with only x2 standing rest breaks. No complaints of SOA. CGA for bed mob to get in bed. Will cont POC.  -TB       Row Name 05/28/25 0945          Positioning and Restraints    Pre-Treatment Position sitting in chair/recliner  -TB     Post Treatment Position bed  -TB     In Bed fowlers;call light within reach;encouraged to call for assist;side rails up x2;notified nsg  -TB               User Key  (r) = Recorded By, (t) = Taken By, (c) = Cosigned By      Initials Name Provider Type     Yeny Sullivan, RN Registered Nurse    Pelon Painter, PTA Physical Therapist Assistant                    Physical Therapy Education       Title: PT OT SLP Therapies (In Progress)       Topic: Physical Therapy (In Progress)       Point: Mobility training (In Progress)       Learning Progress Summary            Patient Acceptance, E, NR by SERGEY at 5/22/2025 1400    Comment: gait progression, PT POC    Acceptance, E, NR by SERGEY at 5/22/2025 0855    Comment: Benefits of activity, progression of PT, sternal prec                      Point: Home exercise program (Not Started)       Learner Progress:  Not documented in this visit.              Point: Body mechanics (Not Started)       Learner Progress:  Not documented in this visit.              Point: Precautions (In Progress)       Learning Progress Summary             Patient Acceptance, E, NR by SERGEY at 5/22/2025 0804    Comment: Benefits of activity, progression of PT, sternal prec                                      User Key       Initials Effective Dates Name Provider Type Discipline    SERGEY 02/03/23 -  Jackson Ramon PT DPT Physical Therapist PT                  PT Recommendation and Plan     Plan of Care Reviewed With: patient  Progress: improving  Outcome Evaluation: Pt up in chair c/o pain in chest 5/10. Cardiac warm ups x15. Nursing giving meds. Stood Sup. Anb 450' total with CGA with only x2 standing rest breaks. No complaints of SOA. CGA for bed mob to get in bed. Will cont POC.   Outcome Measures       Row Name 05/27/25 1106 05/26/25 1049          How much help from another person do you currently need...    Turning from your back to your side while in flat bed without using bedrails? 3  -MF 3  -MF     Moving from lying on back to sitting on the side of a flat bed without bedrails? 3  -MF 3  -MF     Moving to and from a bed to a chair (including a wheelchair)? 4  -MF 3  -MF     Standing up from a chair using your arms (e.g., wheelchair, bedside chair)? 4  -MF 4  -MF     Climbing 3-5 steps with a railing? 3  -MF 3  -MF     To walk in hospital room? 3  -MF 3  -MF     AM-PAC 6 Clicks Score (PT) 20  -MF 19  -MF        Functional Assessment    Outcome Measure Options AM-PAC 6 Clicks Basic Mobility (PT)  -MF AM-PAC 6 Clicks Basic Mobility (PT)  -MF               User Key  (r) = Recorded By, (t) = Taken By, (c) = Cosigned By      Initials Name Provider Type    Monique Downs PTA Physical Therapist Assistant                     Time Calculation:    PT Charges       Row Name 05/28/25 1411             Time Calculation    Start Time 0945  -TB      Stop Time 1008  -TB      Time Calculation (min) 23 min  -TB      PT Received On 05/28/25  -TB         Time Calculation- PT    Total Timed Code Minutes- PT 23 minute(s)  -TB                User Key  (r) = Recorded  By, (t) = Taken By, (c) = Cosigned By      Initials Name Provider Type    TB Pelon Roth PTA Physical Therapist Assistant                  Therapy Charges for Today       Code Description Service Date Service Provider Modifiers Qty    91120067056 HC GAIT TRAINING EA 15 MIN 5/28/2025 Pelon Roth PTA GP 2            PT G-Codes  Outcome Measure Options: AM-PAC 6 Clicks Basic Mobility (PT)  AM-PAC 6 Clicks Score (PT): 20    Pelon Roth PTA  5/28/2025

## 2025-05-28 NOTE — PROGRESS NOTES
"Patient name: Gladys Pino  Patient : 1955  VISIT # [id number]  MR #[id number]    Procedure:Procedure(s):  AORTIC VALVE REPLACEMENT 23MM, MITRAL VALVE REPAIR 30MM RING, LEFT ATRIAL APPENDAGE EXCLUSION 35MM, TRANSESOPHAGEAL ECHOCARDIOGRAM  MITRAL VALVE REPAIR/REPLACEMENT  ATRIAL APPENDAGE EXCLUSION LEFT WITH TRANSESOPHAGEAL ECHOCARDIOGRAM  Procedure Date:2025  POD:7 Days Post-Op    Subjective   The patient is up in chair on room air.  The patient continues to c/o of chest pain 4-5/10. She is down 2 pounds from yesterday and 7 pounds below baseline weight.  She is ambulating 450' with physical therapy. (+BM).  AM CXR with no pneumothorax, moderate elevation of right diaphragm, atelectasis.  Overnight pulse ox on room air was not completed.  This has been reordered.    Telemetry: Sinus arrhythmia with first-degree block 70-80  IV drips: None       Objective     Visit Vitals  /72 (BP Location: Left arm, Patient Position: Sitting)   Pulse 83   Temp 97.9 °F (36.6 °C) (Oral)   Resp 16   Ht 155 cm (61.02\")   Wt 71.4 kg (157 lb 6.4 oz)   SpO2 94%   BMI 29.72 kg/m²   Baseline weight 164 pounds    Intake/Output Summary (Last 24 hours) at 2025 1019  Last data filed at 2025 0921  Gross per 24 hour   Intake 1005 ml   Output 2100 ml   Net -1095 ml   Lab:     CBC:  Results from last 7 days   Lab Units 25  0335 25  0413 25  0442   WBC 10*3/mm3 6.41 6.41 6.43   HEMATOCRIT % 33.6* 33.6* 30.6*   PLATELETS 10*3/mm3 171 167 126*          BMP:  Results from last 7 days   Lab Units 25  0335 25  1243 25  0413 25  0442   SODIUM mmol/L 133*  --  136 133*   POTASSIUM mmol/L 4.6 4.5 3.9 4.4   CHLORIDE mmol/L 96*  --  97* 98   CO2 mmol/L 26.0  --  24.0 25.0   GLUCOSE mg/dL 105*  --  114* 101*   BUN mg/dL 13.8  --  15 15   CREATININE mg/dL 0.68  --  0.65 0.63          COAG:  Results from last 7 days   Lab Units 25  0335 25  0355 25  1240   INR  1.00   < > " 1.22*   APTT seconds  --   --  41.7*    < > = values in this interval not displayed.       IMAGES:       Imaging Results (Last 24 Hours)       Procedure Component Value Units Date/Time    XR Chest 1 View [[id number]] Collected: 05/28/25 0652     Updated: 05/28/25 0658    Narrative:      EXAMINATION: XR CHEST 1 VW-        HISTORY: Post-Op Heart Surgery     A frontal projection of the chest is compared with the previous study  dated 5/27/2025.     The lungs are moderately well-expanded.     Moderate elevation of right diaphragm persist.     Linear parenchymal changes bilaterally right more than the left  represent atelectasis.     There is no pneumothorax.     There is moderate cardiomegaly. There is evidence of prior cardiac  surgery. Left atrial appendage clamp and valvular prosthesis are in  place. Epicardial pacer leads are in place.       Impression:      1. No change in cardiopulmonary status since the previous study 1 day  ago.        This report was signed and finalized on 5/28/2025 6:54 AM by Dr. Estrella Gerard MD.               Physical Exam:  General: Alert, oriented. No apparent distress.  Cardiovascular: Regular rate and rhythm without murmur, rubs, or gallops.    Pulmonary: Clear to auscultation bilaterally without wheezing, rubs, or rales.  Chest: Sternotomy incision clean, dry, and intact. Sternum stable. No clicks.   Abdomen: Soft, nondistended, and nontender.  Extremities: Warm, moves all extremities.  Mild lower extremity edema  Neurologic:  Grossly intact with no focal deficits.          Impression:  Severe mitral valve regurgitation  Severe aortic valve insufficiency  Hypertension  Hyperlipidemia  Obesity, BMI 31.1  GERD      Plan:   Increase Lasix 20 mg IV TID daily, potassium chloride 20 mEQ orally TID.   losartan 25mg po daily.  Add naproxen 250mg po BID   DC robaxin.  DC pacing wires.   Re-ordered overnight pulse ox on RA for tonight.   Continue multimodality pain control  strategies.  Encourage pulmonary toileting and ambulation.  Routine post cardiac surgery regimen.  Discussed with patient and nursing.      Jayda Todd Nicholas, APRN  05/28/25  10:19 CDT

## 2025-05-28 NOTE — PAYOR COMM NOTE
"5/28/25 [facility] [phone]  [phone]    FAXING UPDATE CLINICAL FOR CONT STAY.  5/27/25 THRU 5/28/25.                Gladys Pino (69 y.o. Female)       Date of Birth   1955    Social Security Number       Address   [address] [place] [address]    Home Phone   [phone]    MRN   [id number]       Holiness   Rastafari    Marital Status                               Admission Date   5/21/2025    Admission Type   Elective    Admitting Provider   Dilshad Keenan MD    Attending Provider   Dilshad Keenan MD    Department, Room/Bed   [address], 435/1       Discharge Date       Discharge Disposition       Discharge Destination                                 Attending Provider: Dilshad Keenan MD    Allergies: Lisinopril, Sulfa Antibiotics    Isolation: None   Infection: None   Code Status: CPR    Ht: 155 cm (61.02\")   Wt: 71.4 kg (157 lb 6.4 oz)    Admission Cmt: None   Principal Problem: Aortic valve insufficiency [I35.1]                   Active Insurance as of 5/21/2025       Primary Coverage       Payor Plan Insurance Group Employer/Plan Group    ANTHEM MEDICARE REPLACEMENT ANTHEM MEDICARE ADVANTAGE PPO [id number]       Payor Plan Address Payor Plan Phone Number Payor Plan Fax Number Effective Dates    [address] [phone]  1/1/2024 - None Entered    [facility] [id number]         Subscriber Name Subscriber Birth Date Member ID       GLADYS PINO 1955 [id number]                     Emergency Contacts        (Rel.) Home Phone Work Phone Mobile Phone    ROHIT PINO (Son) -- -- [phone]    kayla pino (Relative) -- -- [phone]             [facility] Encounter Date/Time: 5/21/2025 0514   Hospital Account: [id number]    MRN: [id number]   Patient:  Gladys Pino   Contact Serial #: [id number]   SSN:          ENCOUNTER             Patient Class: Inpatient   Unit: [address] "   Hospital Service: Cardiothoracic Surgery     Bed: 435/1   Admitting Provider: Dlishad Keenan MD   Referring Physician: Dilshad Keenan   Attending Provider: Dilshad Keenan MD   Adm Diagnosis: Severe aortic valve regu*               PATIENT             Name: Gladys Chong : 1955 (69 yrs)   Address: [address] [place] [address] Sex: Female   City: [place] [address]   County: [place]   Marital Status:  Ethnicity: NOT        Race: WHITE   Primary Care Provider: Glo Roberts APRN Patients Phone: Home Phone: [phone]     Mobile Phone: [phone]     EMERGENCY CONTACT   Contact Name Legal Guardian? Relationship to Patient Home Phone Work Phone Mobile Phone   1. ROHIT CHONG  2. kayla chong      Son  Relative           [phone]   GUARANTOR             Guarantor: Gladys Chong     : 1955   Address: [address] [place] [address] Sex: [place] [address]     Relation to Patient: Self       Home Phone: [phone]   Guarantor ID: [id number]       Work Phone:     GUARANTOR EMPLOYER   Employer:           Status: RETIRED   COVERAGE          PRIMARY INSURANCE   Payor: [place] MED[employer] REPLACEMENT Plan: [place] ADVANT[employer] PPO   Group Number: [id number] Insurance Type: INDEMNITY   Subscriber Name: GLADYS CHONG Subscriber : 1955   Subscriber ID: [id number] Coverage Address: [address] [address]   Pat. Rel. to Subscriber: Self Coverage Phone: [phone]   SECONDARY INSURANCE   Payor: N/A Plan: N/A   Group Number:   Insurance Type:     Subscriber Name:   Subscriber :     Subscriber ID:   Coverage Address:     Pat. Rel. to Subscriber:   Coverage Phone:        Contact Serial # ([id number])         May 28, 2025    Chart ID ([id number])             Expand All Collapse All    Patient name: Gladys Chong  Patient : 1955  VISIT # [id number]  MR #[id number]     Procedure:Procedure(s):  AORTIC VALVE REPLACEMENT 23MM, MITRAL  "VALVE REPAIR 30MM RING, LEFT ATRIAL APPENDAGE EXCLUSION 35MM, TRANSESOPHAGEAL ECHOCARDIOGRAM  MITRAL VALVE REPAIR/REPLACEMENT  ATRIAL APPENDAGE EXCLUSION LEFT WITH TRANSESOPHAGEAL ECHOCARDIOGRAM  Procedure Date:5/21/2025  POD:6 Days Post-Op     Subjective  The patient is up in chair on room air.  The patient reports shortness of breath is mildly improved. She is c/o of pain in chest with burning sensation. Pain 5/10. She is down 4 pounds from yesterday and 5 pounds below baseline weight.  She is ambulating 350' with physical therapy. (+BM).  AM CXR with mildly decreasing left basilar opacity.      Telemetry: Sinus arrhythmia with first-degree block 70-80  IV drips: None        Objective[]Expand by Default  Visit Vitals  /93 (BP Location: Right arm, Patient Position: Sitting)   Pulse 82   Temp 98.5 °F (36.9 °C) (Oral)   Resp 16   Ht 155 cm (61.02\")   Wt 72.4 kg (159 lb 9.6 oz)   SpO2 91%   BMI 30.13 kg/m²   Baseline weight 164 pounds     Intake/Output Summary (Last 24 hours) at 5/27/2025 0859  Last data filed at 5/27/2025 0718      Gross per 24 hour   Intake 1100 ml   Output 3050 ml   Net -1950 ml   Lab:     CBC:         Results from last 7 days   Lab Units 05/27/25  0413 05/26/25  0442 05/25/25  0318   WBC 10*3/mm3 6.41 6.43 8.18   HEMATOCRIT % 33.6* 30.6* 28.3*   PLATELETS 10*3/mm3 167 126* 108*            BMP:           Results from last 7 days   Lab Units 05/27/25  0413 05/26/25  0442 05/25/25  1734 05/25/25  1049 05/25/25  0318   SODIUM mmol/L 136 133*  --   --  137   POTASSIUM mmol/L 3.9 4.4 4.5   < > 3.8   CHLORIDE mmol/L 97* 98  --   --  100   CO2 mmol/L 24.0 25.0  --   --  26.0   GLUCOSE mg/dL 114* 101*  --   --  103*   BUN mg/dL 15 15  --   --  20   CREATININE mg/dL 0.65 0.63  --   --  0.58    < > = values in this interval not displayed.            COAG:         Results from last 7 days   Lab Units 05/27/25  0413 05/22/25  0355 05/21/25  1240   INR   0.98   < > 1.22*   APTT seconds  --   --  41.7*    " < > = values in this interval not displayed.         IMAGES:       Imaging Results (Last 24 Hours)         Procedure Component Value Units Date/Time     XR Chest 1 View [[id number]] Collected: 05/27/25 0707       Updated: 05/27/25 0713     Narrative:       EXAM: XR CHEST 1 VW-      DATE: 5/27/2025 2:30 AM     HISTORY: Post-Op Heart Surgery       COMPARISON: 5/26/2025.     TECHNIQUE:  Single view. Frontal view of the chest. 1 images.       FINDINGS:    No pneumothorax or pleural effusion. Similar to mildly decreased LEFT  basilar opacity.     Changes of aortic and mitral valve replacement. LEFT atrial appendage  occlusion device. Similar prominent cardiac silhouette. Upper  mediastinum appears similar to prior.     Degenerative changes of the spine. Sternotomy wires.           Impression:       1. Similar to mildly decreased LEFT basilar opacity.     This report was signed and finalized on 5/27/2025 7:10 AM by Dr Dacia Alaniz MD.                   Physical Exam:  General: Alert, oriented. No apparent distress.  Cardiovascular: Regular rate and rhythm without murmur, rubs, or gallops.    Pulmonary: Clear to auscultation bilaterally without wheezing, rubs, or rales.  Chest: Sternotomy incision clean, dry, and intact. Sternum stable. No clicks.   Abdomen: Soft, nondistended, and nontender.  Extremities: Warm, moves all extremities.  Mild lower extremity edema  Neurologic:  Grossly intact with no focal deficits.           Impression:  Severe mitral valve regurgitation  Severe aortic valve insufficiency  Hypertension  Hyperlipidemia  Obesity, BMI 31.1  GERD        Plan:   Continue Lasix 20 mg IV twice daily, potassium chloride 20 mEQ orally daily.   Add losartan 25mg po daily.  Overnight pulse ox on RA tonight.   Continue multimodality pain control strategies.  Encourage pulmonary toileting and ambulation.  Routine post cardiac surgery regimen.  Discussed with patient and nursing.        Jayda Peterson,  "APRN  25  08:59 CDT            Nicholas, LYNNE [place]   Nurse Practitioner  Cardiothoracic Surgery     Progress Notes      Cosign Needed     Date of Service: 25 101  Creation Time: 25     Cosign Needed       Expand All Collapse All    Patient name: Gladys Pino  Patient : 1955  VISIT # [id number]  MR #[id number]     Procedure:Procedure(s):  AORTIC VALVE REPLACEMENT 23MM, MITRAL VALVE REPAIR 30MM RING, LEFT ATRIAL APPENDAGE EXCLUSION 35MM, TRANSESOPHAGEAL ECHOCARDIOGRAM  MITRAL VALVE REPAIR/REPLACEMENT  ATRIAL APPENDAGE EXCLUSION LEFT WITH TRANSESOPHAGEAL ECHOCARDIOGRAM  Procedure Date:2025  POD:7 Days Post-Op     Subjective  The patient is up in chair on room air.  The patient continues to c/o of chest pain 4-5/10. She is down 2 pounds from yesterday and 7 pounds below baseline weight.  She is ambulating 450' with physical therapy. (+BM).  AM CXR with no pneumothorax, moderate elevation of right diaphragm, atelectasis.  Overnight pulse ox on room air was not completed.  This has been reordered.     Telemetry: Sinus arrhythmia with first-degree block 70-80  IV drips: None        Objective[]Expand by Default  Visit Vitals  /72 (BP Location: Left arm, Patient Position: Sitting)   Pulse 83   Temp 97.9 °F (36.6 °C) (Oral)   Resp 16   Ht 155 cm (61.02\")   Wt 71.4 kg (157 lb 6.4 oz)   SpO2 94%   BMI 29.72 kg/m²   Baseline weight 164 pounds     Intake/Output Summary (Last 24 hours) at 2025 1019  Last data filed at 2025 0921      Gross per 24 hour   Intake 1005 ml   Output 2100 ml   Net -1095 ml   Lab:     CBC:         Results from last 7 days   Lab Units 25  0335 25  0413 25  0442   WBC 10*3/mm3 6.41 6.41 6.43   HEMATOCRIT % 33.6* 33.6* 30.6*   PLATELETS 10*3/mm3 171 167 126*            BMP:          Results from last 7 days   Lab Units 25  0335 25  1243 25  0413 25  0442   SODIUM mmol/L 133*  --  136 133*   POTASSIUM mmol/L " 4.6 4.5 3.9 4.4   CHLORIDE mmol/L 96*  --  97* 98   CO2 mmol/L 26.0  --  24.0 25.0   GLUCOSE mg/dL 105*  --  114* 101*   BUN mg/dL 13.8  --  15 15   CREATININE mg/dL 0.68  --  0.65 0.63            COAG:         Results from last 7 days   Lab Units 05/28/25  0335 05/22/25  0355 05/21/25  1240   INR   1.00   < > 1.22*   APTT seconds  --   --  41.7*    < > = values in this interval not displayed.         IMAGES:       Imaging Results (Last 24 Hours)         Procedure Component Value Units Date/Time     XR Chest 1 View [[id number]] Collected: 05/28/25 0652       Updated: 05/28/25 0658     Narrative:       EXAMINATION: XR CHEST 1 VW-        HISTORY: Post-Op Heart Surgery     A frontal projection of the chest is compared with the previous study  dated 5/27/2025.     The lungs are moderately well-expanded.     Moderate elevation of right diaphragm persist.     Linear parenchymal changes bilaterally right more than the left  represent atelectasis.     There is no pneumothorax.     There is moderate cardiomegaly. There is evidence of prior cardiac  surgery. Left atrial appendage clamp and valvular prosthesis are in  place. Epicardial pacer leads are in place.        Impression:       1. No change in cardiopulmonary status since the previous study 1 day  ago.        This report was signed and finalized on 5/28/2025 6:54 AM by Dr. Estrella Gerard MD.                   Physical Exam:  General: Alert, oriented. No apparent distress.  Cardiovascular: Regular rate and rhythm without murmur, rubs, or gallops.    Pulmonary: Clear to auscultation bilaterally without wheezing, rubs, or rales.  Chest: Sternotomy incision clean, dry, and intact. Sternum stable. No clicks.   Abdomen: Soft, nondistended, and nontender.  Extremities: Warm, moves all extremities.  Mild lower extremity edema  Neurologic:  Grossly intact with no focal deficits.           Impression:  Severe mitral valve regurgitation  Severe aortic valve  insufficiency  Hypertension  Hyperlipidemia  Obesity, BMI 31.1  GERD        Plan:   Increase Lasix 20 mg IV TID daily, potassium chloride 20 mEQ orally TID.   losartan 25mg po daily.  Add naproxen 250mg po BID   DC robaxin.  DC pacing wires.   Re-ordered overnight pulse ox on RA for tonight.   Continue multimodality pain control strategies.  Encourage pulmonary toileting and ambulation.  Routine post cardiac surgery regimen.  Discussed with patient and nursing.        LYNNE Ramírez  05/28/25  10:19 CDT                   ate/Time Temp Pulse Resp BP Patient Position Device (Oxygen Therapy) Flow (L/min) (Oxygen Therapy) SpO2   05/28/25 0733 97.9 (36.6) 83 16 126/72 Sitting room air -- 94   05/28/25 0335 97.8 (36.6) 88 16 109/73 Lying room air -- 91   05/28/25 0003 97.6 (36.4) 95 16 131/89 Lying room air -- 92   05/27/25 2012 98 (36.7) 94 16 110/70 Lying room air -- 94   05/27/25 1607 97.8 (36.6) 86 16 118/77 Sitting room air -- 93   05                         Current Facility-Administered Medications   Medication Dose Route Frequency Provider Last Rate Last Admin    [Held by provider] acetaminophen (TYLENOL) tablet 650 mg  650 mg Oral Q4H PRN Dilshad Keenan MD        Or    [Held by provider] acetaminophen (TYLENOL) 160 MG/5ML oral solution 650 mg  650 mg Oral Q4H PRN Dislhad Keenan MD        Or    [Held by provider] acetaminophen (TYLENOL) suppository 650 mg  650 mg Rectal Q4H PRN Dilshad Keenan MD        acetaminophen (TYLENOL) tablet 650 mg  650 mg Oral Q6H Jayda Bravo APRN   650 mg at 05/28/25 0945    aspirin EC tablet 81 mg  81 mg Oral Daily Dilshad Keenan MD   81 mg at 05/28/25 0945    atorvastatin (LIPITOR) tablet 20 mg  20 mg Oral Nightly Dilshad Keenan MD   20 mg at 05/27/25 2021    bisacodyl (DULCOLAX) EC tablet 10 mg  10 mg Oral BID Dilshad Keenan MD   10 mg at 05/28/25 0946    calcium carbonate (TUMS) chewable tablet 500 mg (200 mg elemental)  2 tablet Oral TID PRN Patterson, Jayda,  LYNNE   2 tablet at 05/23/25 0832    Calcium Replacement - Follow Nurse / BPA Driven Protocol   Not Applicable PRN Keenan, Dilshad GLEZ MD        dextrose (D50W) (25 g/50 mL) IV injection 10-50 mL  10-50 mL Intravenous Q15 Min PRN Keenan, Dilshad GLEZ MD        dextrose (GLUTOSE) oral gel 15 g  15 g Oral Q15 Min PRN Keenan, Dilshad GLEZ MD        enoxaparin sodium (LOVENOX) syringe 40 mg  40 mg Subcutaneous Daily Keenan, Dilshad GLEZ MD   40 mg at 05/28/25 0947    fluticasone (FLONASE) 50 MCG/ACT nasal spray 1 spray  1 spray Each Nare Daily Keenan, Dilshad GLEZ MD   1 spray at 05/28/25 0947    furosemide (LASIX) injection 20 mg  20 mg Intravenous TID NicholasJayda APRN        glucagon (GLUCAGEN) injection 1 mg  1 mg Intramuscular Q15 Min PRN Keenan, Dilshad GLEZ MD        Lidocaine 4 % 2 patch  2 patch Transdermal Q24H Keenan, Dilshad GLEZ MD   2 patch at 05/28/25 0547    losartan (COZAAR) tablet 25 mg  25 mg Oral Q24H NicholasJayda APRN   25 mg at 05/28/25 0945    Magnesium Cardiology Dose Replacement - Follow Nurse / BPA Driven Protocol   Not Applicable PRN Keenan, Dilshad GLEZ MD        metoprolol tartrate (LOPRESSOR) tablet 25 mg  25 mg Oral Q12H Jayda Bravo APRN   25 mg at 05/28/25 0946    montelukast (SINGULAIR) tablet 10 mg  10 mg Oral Daily Keenan, Dilshad GLEZ MD   10 mg at 05/28/25 0946    multivitamin with minerals 1 tablet  1 tablet Oral Daily Keenan, Dilshad GLEZ MD   1 tablet at 05/28/25 0946    naproxen (NAPROSYN) tablet 250 mg  250 mg Oral BID With Meals NicholasJayda APRN        ondansetron (ZOFRAN) injection 4 mg  4 mg Intravenous Q6H PRN Keenan, Dilshad GLEZ MD   4 mg at 05/23/25 0811    pantoprazole (PROTONIX) EC tablet 40 mg  40 mg Oral Q AM Keenan, Dilshad GLEZ MD   40 mg at 05/28/25 0547    Phosphorus Replacement - Follow Nurse / BPA Driven Protocol   Not Applicable PRN Keenan, Dilshad GLEZ MD        polyethylene glycol (MIRALAX) packet 17 g  17 g Oral Daily Keenan, Dilshad GLEZ MD   17 g at 05/28/25 2696    potassium chloride  (KLOR-CON M20) CR tablet 20 mEq  20 mEq Oral TID With Meals NicholasJayda APRN        Potassium Replacement - Follow Nurse / BPA Driven Protocol   Not Applicable PRN Keenan, Dilshad GLEZ MD        traMADol (ULTRAM) tablet 25 mg  25 mg Oral Q6H PRN Nicholas, LYNNE [place]   25 mg at 05/28/25 0946

## 2025-05-28 NOTE — PROGRESS NOTES
Nutrition Services    Patient Name:  Gladys Pino  YOB: 1955  MRN: [id number]  Admit Date:  5/21/2025    Nutrition screening and chart review completed per LOS protocol. Pt remains at low risk/no risk for malnutrition. She is on a cardiac diet and consuming 63% of meals when documented.  She is consuming ~888 cc/day with meals.  UOP ~ 2800 cc/day.  Last BM: 5/28.  Labs reviewed.  Current wt: 157.4#  Wt is down 4.2% x 1 weeks; however, wt loss d/t fluid loss r/t increased diuretic tx likely.  She is receiving 20 mg of Lasix tid. Continue with daily menu selections and observe food preferences. Will monitor while inpatient and follow per protocol.      Electronically signed by:  Gerard Mayes RD  05/28/25 14:53 CDT

## 2025-05-29 ENCOUNTER — APPOINTMENT (OUTPATIENT)
Dept: GENERAL RADIOLOGY | Facility: HOSPITAL | Age: 70
End: 2025-05-29
Payer: MEDICARE

## 2025-05-29 ENCOUNTER — READMISSION MANAGEMENT (OUTPATIENT)
Dept: CALL CENTER | Facility: HOSPITAL | Age: 70
End: 2025-05-29
Payer: MEDICARE

## 2025-05-29 VITALS
TEMPERATURE: 97.9 F | OXYGEN SATURATION: 95 % | HEART RATE: 78 BPM | RESPIRATION RATE: 16 BRPM | WEIGHT: 156.4 LBS | BODY MASS INDEX: 29.53 KG/M2 | DIASTOLIC BLOOD PRESSURE: 58 MMHG | HEIGHT: 61 IN | SYSTOLIC BLOOD PRESSURE: 106 MMHG

## 2025-05-29 PROBLEM — Q23.81 BICUSPID AORTIC VALVE: Status: ACTIVE | Noted: 2025-05-29

## 2025-05-29 PROBLEM — K21.9 GERD (GASTROESOPHAGEAL REFLUX DISEASE): Status: ACTIVE | Noted: 2025-05-29

## 2025-05-29 PROBLEM — E66.09 CLASS 1 OBESITY DUE TO EXCESS CALORIES WITH BODY MASS INDEX (BMI) OF 31.0 TO 31.9 IN ADULT: Status: ACTIVE | Noted: 2025-05-29

## 2025-05-29 PROBLEM — E66.811 CLASS 1 OBESITY DUE TO EXCESS CALORIES WITH BODY MASS INDEX (BMI) OF 31.0 TO 31.9 IN ADULT: Status: ACTIVE | Noted: 2025-05-29

## 2025-05-29 PROBLEM — I10 HTN (HYPERTENSION): Status: ACTIVE | Noted: 2025-05-29

## 2025-05-29 PROBLEM — E78.5 HLD (HYPERLIPIDEMIA): Status: ACTIVE | Noted: 2025-05-29

## 2025-05-29 LAB
ANION GAP SERPL CALCULATED.3IONS-SCNC: 12 MMOL/L (ref 5–15)
BUN SERPL-MCNC: 25.2 MG/DL (ref 8–23)
BUN/CREAT SERPL: 25.7 (ref 7–25)
CALCIUM SPEC-SCNC: 9.1 MG/DL (ref 8.6–10.5)
CHLORIDE SERPL-SCNC: 99 MMOL/L (ref 98–107)
CO2 SERPL-SCNC: 25 MMOL/L (ref 22–29)
CREAT SERPL-MCNC: 0.98 MG/DL (ref 0.57–1)
DEPRECATED RDW RBC AUTO: 40.7 FL (ref 37–54)
EGFRCR SERPLBLD CKD-EPI 2021: 62.2 ML/MIN/1.73
ERYTHROCYTE [DISTWIDTH] IN BLOOD BY AUTOMATED COUNT: 12.6 % (ref 12.3–15.4)
GLUCOSE BLDC GLUCOMTR-MCNC: 100 MG/DL (ref 70–130)
GLUCOSE BLDC GLUCOMTR-MCNC: 130 MG/DL (ref 70–130)
GLUCOSE SERPL-MCNC: 106 MG/DL (ref 65–99)
HCT VFR BLD AUTO: 34.6 % (ref 34–46.6)
HGB BLD-MCNC: 11.3 G/DL (ref 12–15.9)
MCH RBC QN AUTO: 28.8 PG (ref 26.6–33)
MCHC RBC AUTO-ENTMCNC: 32.7 G/DL (ref 31.5–35.7)
MCV RBC AUTO: 88 FL (ref 79–97)
PLATELET # BLD AUTO: 194 10*3/MM3 (ref 140–450)
PMV BLD AUTO: 10.8 FL (ref 6–12)
POTASSIUM SERPL-SCNC: 5.3 MMOL/L (ref 3.5–5.2)
RBC # BLD AUTO: 3.93 10*6/MM3 (ref 3.77–5.28)
SODIUM SERPL-SCNC: 136 MMOL/L (ref 136–145)
WBC NRBC COR # BLD AUTO: 7.68 10*3/MM3 (ref 3.4–10.8)

## 2025-05-29 PROCEDURE — 71045 X-RAY EXAM CHEST 1 VIEW: CPT

## 2025-05-29 PROCEDURE — 94760 N-INVAS EAR/PLS OXIMETRY 1: CPT

## 2025-05-29 PROCEDURE — 25010000002 ENOXAPARIN PER 10 MG: Performed by: SURGERY

## 2025-05-29 PROCEDURE — 80048 BASIC METABOLIC PNL TOTAL CA: CPT | Performed by: SURGERY

## 2025-05-29 PROCEDURE — 82948 REAGENT STRIP/BLOOD GLUCOSE: CPT

## 2025-05-29 PROCEDURE — 94799 UNLISTED PULMONARY SVC/PX: CPT

## 2025-05-29 PROCEDURE — 97116 GAIT TRAINING THERAPY: CPT

## 2025-05-29 PROCEDURE — 25010000002 FUROSEMIDE PER 20 MG: Performed by: NURSE PRACTITIONER

## 2025-05-29 PROCEDURE — 85027 COMPLETE CBC AUTOMATED: CPT | Performed by: SURGERY

## 2025-05-29 RX ORDER — METOPROLOL TARTRATE 25 MG/1
25 TABLET, FILM COATED ORAL EVERY 12 HOURS SCHEDULED
Qty: 60 TABLET | Refills: 2 | Status: SHIPPED | OUTPATIENT
Start: 2025-05-29

## 2025-05-29 RX ORDER — TRAMADOL HYDROCHLORIDE 50 MG/1
25 TABLET ORAL EVERY 6 HOURS PRN
Qty: 10 TABLET | Refills: 0 | Status: SHIPPED | OUTPATIENT
Start: 2025-05-29

## 2025-05-29 RX ORDER — ATORVASTATIN CALCIUM 20 MG/1
20 TABLET, FILM COATED ORAL NIGHTLY
Qty: 30 TABLET | Refills: 2 | Status: SHIPPED | OUTPATIENT
Start: 2025-05-29

## 2025-05-29 RX ORDER — LOSARTAN POTASSIUM 25 MG/1
25 TABLET ORAL
Qty: 30 TABLET | Refills: 2 | Status: SHIPPED | OUTPATIENT
Start: 2025-05-30

## 2025-05-29 RX ORDER — ASPIRIN 81 MG/1
81 TABLET ORAL DAILY
Qty: 30 TABLET | Refills: 2 | Status: SHIPPED | OUTPATIENT
Start: 2025-05-30

## 2025-05-29 RX ADMIN — LIDOCAINE 2 PATCH: 4 PATCH TOPICAL at 05:17

## 2025-05-29 RX ADMIN — LOSARTAN POTASSIUM 25 MG: 25 TABLET, FILM COATED ORAL at 08:22

## 2025-05-29 RX ADMIN — ENOXAPARIN SODIUM 40 MG: 100 INJECTION SUBCUTANEOUS at 08:02

## 2025-05-29 RX ADMIN — METOPROLOL TARTRATE 25 MG: 25 TABLET, FILM COATED ORAL at 08:01

## 2025-05-29 RX ADMIN — MONTELUKAST SODIUM 10 MG: 10 TABLET, COATED ORAL at 08:01

## 2025-05-29 RX ADMIN — ASPIRIN 81 MG: 81 TABLET, COATED ORAL at 08:01

## 2025-05-29 RX ADMIN — FUROSEMIDE 20 MG: 10 INJECTION, SOLUTION INTRAMUSCULAR; INTRAVENOUS at 08:01

## 2025-05-29 RX ADMIN — ACETAMINOPHEN 650 MG: 325 TABLET, FILM COATED ORAL at 01:52

## 2025-05-29 RX ADMIN — ACETAMINOPHEN 650 MG: 325 TABLET, FILM COATED ORAL at 12:56

## 2025-05-29 RX ADMIN — TRAMADOL HYDROCHLORIDE 25 MG: 50 TABLET, COATED ORAL at 11:27

## 2025-05-29 RX ADMIN — Medication 1 TABLET: at 08:01

## 2025-05-29 RX ADMIN — TRAMADOL HYDROCHLORIDE 25 MG: 50 TABLET, COATED ORAL at 05:12

## 2025-05-29 RX ADMIN — PANTOPRAZOLE SODIUM 40 MG: 40 TABLET, DELAYED RELEASE ORAL at 05:13

## 2025-05-29 RX ADMIN — NAPROXEN 250 MG: 250 TABLET ORAL at 07:57

## 2025-05-29 RX ADMIN — FLUTICASONE PROPIONATE 1 SPRAY: 50 SPRAY, METERED NASAL at 08:02

## 2025-05-29 RX ADMIN — ACETAMINOPHEN 650 MG: 325 TABLET, FILM COATED ORAL at 07:57

## 2025-05-29 NOTE — THERAPY DISCHARGE NOTE
Acute Care - Physical Therapy Discharge Summary  [facility]       Patient Name: Gladys Pino  : 1955  MRN: [id number]    Today's Date: 2025                 Admit Date: 2025      PT Recommendation and Plan    Visit Dx:    ICD-10-CM ICD-9-CM   1. Impaired mobility [Z74.09]  Z74.09 799.89   2. Severe aortic valve regurgitation  I35.1 424.1   3. Severe mitral valve regurgitation  I34.0 424.0        Outcome Measures       Row Name 25 1100 25 1106          How much help from another person do you currently need...    Turning from your back to your side while in flat bed without using bedrails? 4  -GEOVANNY 3  -MF     Moving from lying on back to sitting on the side of a flat bed without bedrails? 4  -GEOVANNY 3  -MF     Moving to and from a bed to a chair (including a wheelchair)? 4  -GEOVANNY 4  -MF     Standing up from a chair using your arms (e.g., wheelchair, bedside chair)? 4  -GEOVANNY 4  -MF     Climbing 3-5 steps with a railing? 4  -GEOVANNY 3  -MF     To walk in hospital room? 4  -GEOVANNY 3  -MF     AM-PAC 6 Clicks Score (PT) 24  -GEOVANNY 20  -MF        Functional Assessment    Outcome Measure Options -- AM-PAC 6 Clicks Basic Mobility (PT)  -MF               User Key  (r) = Recorded By, (t) = Taken By, (c) = Cosigned By      Initials Name Provider Type    Shireen Duff, MAYANK Physical Therapist Assistant    Monique Downs PTA Physical Therapist Assistant                     PT Charges       Row Name 25 1545 25 1145          Time Calculation    Start Time 1407  -GEOVANNY 0922  -GEOVANNY     Stop Time 1422  -GEOVANNY 0947  -GEOVANNY     Time Calculation (min) 15 min  -GEOVANNY 25 min  -GEOVANNY        Timed Charges    04619 - Gait Training Minutes  15  -GEOVANNY 25  -GEOVANNY        Total Minutes    Timed Charges Total Minutes 15  -GEOVANNY 25  -GEOVANNY      Total Minutes 15  -GEOVANNY 25  -GEOVANNY               User Key  (r) = Recorded By, (t) = Taken By, (c) = Cosigned By      Initials Name Provider Type    Shireen Duff PTA Physical Therapist Assistant                      PT Rehab Goals       Row Name 05/29/25 1500             Bed Mobility Goal 1 (PT)    [place] Level/Cues Needed (Bed Mobility Goal 1, PT) supervision required  -GEOVANNY      Progress/Outcomes (Bed Mobility Goal 1, PT) goal met  -GEOVANNY         Transfer Goal 1 (PT)    [place] Level/Cues Needed (Transfer Goal 1, PT) independent  Goal: supervision  -GEOVANNY      Progress/Outcome (Transfer Goal 1, PT) goal met  -GEOVANNY         Gait Training Goal 1 (PT)    [place] Level (Gait Training Goal 1, PT) supervision required  -GEOVANNY      Distance (Gait Training Goal 1, PT) 450  Goal: 200  -GEOVANNY      Progress/Outcome (Gait Training Goal 1, PT) goal met  -GEOVANNY                User Key  (r) = Recorded By, (t) = Taken By, (c) = Cosigned By      Initials Name Provider Type Discipline    Shireen Duff PTA Physical Therapist Assistant PT                    Therapy Charges for Today       Code Description Service Date Service Provider Modifiers Qty    44813062594 HC GAIT TRAINING EA 15 MIN 5/29/2025 Shireen Abbott PTA GP 2    17998335016 HC GAIT TRAINING EA 15 MIN 5/29/2025 Shireen Abbott PTA GP 1            PT Discharge Summary  Anticipated Discharge Disposition (PT): home with assist  Reason for Discharge: Discharge from facility  Outcomes Achieved: Able to achieve all goals within established timeline  Discharge Destination: Home with assist      Shireen Abbott PTA   5/29/2025

## 2025-05-29 NOTE — THERAPY TREATMENT NOTE
Acute Care - Physical Therapy Treatment Note  [facility]     Patient Name: Gladys Pino  : 1955  MRN: [id number]  Today's Date: 2025      Visit Dx:     ICD-10-CM ICD-9-CM   1. Impaired mobility [Z74.09]  Z74.09 799.89   2. Severe aortic valve regurgitation  I35.1 424.1   3. Severe mitral valve regurgitation  I34.0 424.0     Patient Active Problem List   Diagnosis    Severe aortic valve regurgitation    Severe mitral valve regurgitation    Aortic valve insufficiency     Past Medical History:   Diagnosis Date    Allergic rhinitis     Aortic valve disease     Arrhythmia     Elevated cholesterol     GERD (gastroesophageal reflux disease)     Hyperlipidemia     Hypertension      Past Surgical History:   Procedure Laterality Date    AORTIC VALVE REPAIR/REPLACEMENT N/A 2025    Procedure: AORTIC VALVE REPLACEMENT 23MM, MITRAL VALVE REPAIR 30MM RING, LEFT ATRIAL APPENDAGE EXCLUSION 35MM, TRANSESOPHAGEAL ECHOCARDIOGRAM;  Surgeon: Dilshad Keenan MD;  Location: [facility] OR;  Service: Cardiothoracic;  Laterality: N/A;    ATRIAL APPENDAGE EXCLUSION LEFT WITH TRANSESOPHAGEAL ECHOCARDIOGRAM N/A 2025    Procedure: ATRIAL APPENDAGE EXCLUSION LEFT WITH TRANSESOPHAGEAL ECHOCARDIOGRAM;  Surgeon: Dilshad Keenan MD;  Location: [facility] OR;  Service: Cardiothoracic;  Laterality: N/A;    CATARACT EXTRACTION       SECTION      METATARSAL OSTEOTOMY Bilateral     MITRAL VALVE REPAIR/REPLACEMENT N/A 2025    Procedure: MITRAL VALVE REPAIR/REPLACEMENT;  Surgeon: Dilshad Keenan MD;  Location: [facility] OR;  Service: Cardiothoracic;  Laterality: N/A;     PT Assessment (Last 12 Hours)       PT Evaluation and Treatment       Row Name 25 0922          Physical Therapy Time and Intention    Subjective Information complains of;pain  -GEOVANNY     Document Type therapy note (daily note)  -GEOVANNY     Mode of Treatment physical therapy  -       Row Name 25 0922          General Information    Existing  Precautions/Restrictions fall;sternal  -GEOVANNY       Row Name 05/29/25 0922          Pain    Pretreatment Pain Rating 4/10  -GEOVANNY     Posttreatment Pain Rating 4/10  -GEOVANNY     Pain Location chest  -GEOVANNY       Row Name 05/29/25 1127          Bed Mobility    Rolling Right [place] (Bed Mobility) independent  -GEOVANNY     Sidelying-Sit [place] (Bed Mobility) supervision  -GEOVANNY     Sit-Sidelying [place] (Bed Mobility) supervision  -GEOVANNY       Row Name 05/29/25 1127          Sit-Stand Transfer    Sit-Stand [place] (Transfers) independent  -GEOVANNY       Row Name 05/29/25 1127          Stand-Sit Transfer    Stand-Sit [place] (Transfers) independent  -GEOVANNY       Row Name 05/29/25 1127          Gait/Stairs (Locomotion)    [place] Level (Gait) supervision  -     Distance in Feet (Gait) 450  -GEOVANNY     Deviations/Abnormal Patterns (Gait) mendoza decreased  -GEOVANNY       Row Name             Wound 05/21/25 0725 sternal Surgical    Wound - Properties Group Placement Date: 05/21/25  - Placement Time: 0725 - Present on Original Admission: N  -LH Location: sternal  -LH Primary Wound Type: Surgical  -LH Additional Comments: mastisol, steri strip, mepilex  -LH    Retired Wound - Properties Group Placement Date: 05/21/25  - Placement Time: 0725 - Present on Original Admission: N  -LH Location: sternal  -LH Additional Comments: mastisol, steri strip, mepilex  -LH    Retired Wound - Properties Group Placement Date: 05/21/25  - Placement Time: 0725 - Present on Original Admission: N  -LH Location: sternal  -LH Additional Comments: mastisol, steri strip, mepilex  -LH    Retired Wound - Properties Group Date first assessed: 05/21/25  - Time first assessed: 0725 - Present on Original Admission: N  -LH Location: sternal  -LH Additional Comments: mastisol, steri strip, mepilex  -      Row Name 05/29/25 0922          Positioning and Restraints    Pre-Treatment Position sitting in chair/recliner  -     Post Treatment  Position chair  -GEOVANNY     In Chair reclined;call light within reach;encouraged to call for assist;with family/caregiver;LUE elevated;RUE elevated  -               User Key  (r) = Recorded By, (t) = Taken By, (c) = Cosigned By      Initials Name Provider Type    Shireen Duff PTA Physical Therapist Assistant    Yeny Dougherty, RN Registered Nurse                    Physical Therapy Education       Title: PT OT SLP Therapies (In Progress)       Topic: Physical Therapy (In Progress)       Point: Mobility training (Done)       Learning Progress Summary            Patient Acceptance, E,D, DU,VU by GEOVANNY at 5/29/2025 1144    Comment: bed transfer    Acceptance, E, NR by SERGEY at 5/22/2025 1400    Comment: gait progression, PT POC    Acceptance, E, NR by SERGEY at 5/22/2025 0855    Comment: Benefits of activity, progression of PT, sternal prec                      Point: Home exercise program (Not Started)       Learner Progress:  Not documented in this visit.              Point: Body mechanics (Not Started)       Learner Progress:  Not documented in this visit.              Point: Precautions (In Progress)       Learning Progress Summary            Patient Acceptance, E, NR by SERGEY at 5/22/2025 0855    Comment: Benefits of activity, progression of PT, sternal prec                                      User Key       Initials Effective Dates Name Provider Type Discipline    SERGEY 02/03/23 -  Jackson Ramon, PT DPT Physical Therapist PT    GEOVANNY 02/03/23 -  Shireen Abbott PTA Physical Therapist Assistant PT                  PT Recommendation and Plan         Outcome Measures       Row Name 05/29/25 1100 05/27/25 1106          How much help from another person do you currently need...    Turning from your back to your side while in flat bed without using bedrails? 4  -GEOVANNY 3  -MF     Moving from lying on back to sitting on the side of a flat bed without bedrails? 4  -GEOVANNY 3  -MF     Moving to and from a bed to a chair (including  a wheelchair)? 4  -GEOVANNY 4  -MF     Standing up from a chair using your arms (e.g., wheelchair, bedside chair)? 4  -GEOVANNY 4  -MF     Climbing 3-5 steps with a railing? 4  -GEOVANNY 3  -MF     To walk in hospital room? 4  -GEOVANNY 3  -MF     AM-PAC 6 Clicks Score (PT) 24  -GEOVANNY 20  -MF        Functional Assessment    Outcome Measure Options -- AM-PAC 6 Clicks Basic Mobility (PT)  -MF               User Key  (r) = Recorded By, (t) = Taken By, (c) = Cosigned By      Initials Name Provider Type    Shireen Duff PTA Physical Therapist Assistant    Monique Downs PTA Physical Therapist Assistant                     Time Calculation:    PT Charges       Row Name 05/29/25 1145             Time Calculation    Start Time 0922  -GEOVANNY      Stop Time 0947  -GEOVANNY      Time Calculation (min) 25 min  -GEOVANNY         Timed Charges    12892 - Gait Training Minutes  25  -GEOVANNY         Total Minutes    Timed Charges Total Minutes 25  -GEOVANNY       Total Minutes 25  -GEOVANNY                User Key  (r) = Recorded By, (t) = Taken By, (c) = Cosigned By      Initials Name Provider Type    Shireen Duff PTA Physical Therapist Assistant                  Therapy Charges for Today       Code Description Service Date Service Provider Modifiers Qty    10668385930 HC GAIT TRAINING EA 15 MIN 5/29/2025 Shireen Abbott PTA GP 2            PT G-Codes  Outcome Measure Options: AM-PAC 6 Clicks Basic Mobility (PT)  AM-PAC 6 Clicks Score (PT): 24    Shireen Abbott PTA  5/29/2025

## 2025-05-29 NOTE — PROGRESS NOTES
"Patient name: Gladys Pino  Patient : 1955  VISIT # [id number]  MR #[id number]    Procedure:Procedure(s):  AORTIC VALVE REPLACEMENT 23MM, MITRAL VALVE REPAIR 30MM RING, LEFT ATRIAL APPENDAGE EXCLUSION 35MM, TRANSESOPHAGEAL ECHOCARDIOGRAM  MITRAL VALVE REPAIR/REPLACEMENT  ATRIAL APPENDAGE EXCLUSION LEFT WITH TRANSESOPHAGEAL ECHOCARDIOGRAM  Procedure Date:2025  POD:8 Days Post-Op    Subjective   The patient is up in chair on room air.  She appears to be doing much better this morning.  She is smiling.  She states that her pain is a 3/10 today.  She feels that she is ready to discharge home today.  Overnight pulse ox was reviewed and shows longest time of desaturation <88% was 35 seconds.  Patient agreeable to no home oxygen at this time. (+BM). She is ambulating 450'. She is 1 lb down from yesterday and remains 8lbs down from baseline.     Telemetry: Sinus arrhythmia with first-degree block 70-80  IV drips: None       Objective     Visit Vitals  /69   Pulse 88   Temp 97.3 °F (36.3 °C) (Oral)   Resp 16   Ht 155 cm (61.02\")   Wt 70.9 kg (156 lb 6.4 oz)   SpO2 96%   BMI 29.53 kg/m²   Baseline weight 164 pounds    Intake/Output Summary (Last 24 hours) at 2025 1047  Last data filed at 2025 0900  Gross per 24 hour   Intake 1140 ml   Output 1675 ml   Net -535 ml   Lab:     CBC:  Results from last 7 days   Lab Units 25  0335 25  0413   WBC 10*3/mm3 7.68 6.41 6.41   HEMATOCRIT % 34.6 33.6* 33.6*   PLATELETS 10*3/mm3 194 171 167          BMP:  Results from last 7 days   Lab Units 25  0335 25  1243 25  0413   SODIUM mmol/L 136 133*  --  136   POTASSIUM mmol/L 5.3* 4.6 4.5 3.9   CHLORIDE mmol/L 99 96*  --  97*   CO2 mmol/L 25.0 26.0  --  24.0   GLUCOSE mg/dL 106* 105*  --  114*   BUN mg/dL 25.2* 13.8  --  15   CREATININE mg/dL 0.98 0.68  --  0.65          COAG:  Results from last 7 days   Lab Units 25  0335   INR  1.00       IMAGES:   "     Imaging Results (Last 24 Hours)       Procedure Component Value Units Date/Time    XR Chest 1 View [[id number]] Collected: 05/29/25 0648     Updated: 05/29/25 0653    Narrative:      EXAMINATION: XR CHEST 1 VW-        HISTORY: Post-Op Heart Surgery     A frontal projection of the chest is compared with the previous study  dated 5/28/2025.     The lungs are moderately well-expanded.     There is no evidence of recent infiltrate, pleural effusion, pulmonary  congestion or pneumothorax.     The heart size is not optimally evaluated due to the AP projection.  There is evidence of prior cardiac surgery. Left atrial appendage clamp  and valvular prosthesis are in place.       Impression:      1. No active cardiopulmonary disease.         This report was signed and finalized on 5/29/2025 6:50 AM by Dr. Estrella Gerard MD.               Physical Exam:  General: Alert, oriented. No apparent distress.  Cardiovascular: Regular rate and rhythm without murmur, rubs, or gallops.    Pulmonary: Clear to auscultation bilaterally without wheezing, rubs, or rales.  Chest: Sternotomy incision clean, dry, and intact. Sternum stable. No clicks.   Abdomen: Soft, nondistended, and nontender.  Extremities: Warm, moves all extremities.  Mild lower extremity edema  Neurologic:  Grossly intact with no focal deficits.          Impression:  Severe mitral valve regurgitation  Severe aortic valve insufficiency  Hypertension  Hyperlipidemia  Obesity, BMI 31.1  GERD    Plan:   Overnight pulse ox was reviewed and shows longest time of desaturation <88% was 35 seconds.  Patient agreeable to no home oxygen at this time.  Prepare for DC home today.    Continue multimodality pain control strategies.  Encourage pulmonary toileting and ambulation.  Routine post cardiac surgery regimen.  Discussed with patient and nursing.      Jayda Peterson, LYNNE  05/29/25  10:47 CDT

## 2025-05-29 NOTE — DISCHARGE SUMMARY
[facility] Cardiothoracic Surgery  DISCHARGE SUMMARY        Date of Admission: 5/21/2025  Date of Discharge:  5/29/2025  Primary Care Physician: Glo Roberts APRN    Discharge Diagnoses:  Active Hospital Problems    Diagnosis     **Severe mitral valve regurgitation     GERD (gastroesophageal reflux disease)     HTN (hypertension)     HLD (hyperlipidemia)     Class 1 obesity due to excess calories with body mass index (BMI) of 31.0 to 31.9 in adult     Bicuspid aortic valve     Severe aortic valve regurgitation      Procedures Performed: Mitral Valve Repair, (Closure of Cleft Between P1 and P2, Annuloplasty with 30mm Physio2 Ring), Aortic Valve Replacement (23mm Inspiris), LAAE (35mm Atriclip) performed on May 21, 2025 by Dr. Keenan.    HPI:  Ms. Gladys Pino is a 69 y.o. female who was initially diagnosed with a heart murmur by her primary care physician, which led to a referral to Dr. Donis. An echocardiogram conducted 5 years ago revealed mild valvular abnormalities which led to annual monitoring. A subsequent echocardiogram identified valvular issues, prompting further investigation via a transesophageal echocardiogram. This test revealed a congenital bicuspid aortic valve, aortic valve regurgitation, this was potentially causing impaired function of the adjacent valve. She reports experiencing dyspnea and fatigue, particularly during activities such as singing in [employer] or performing household chores like mopping or sweeping. These symptoms have progressively worsened over the past few months. She also reports occasional chest discomfort described as a burning sensation. She has no history of cardiac, pulmonary, or thoracic surgeries. She also reports no history of untreated streptococcal pharyngitis or rheumatic fever. 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 She occasionally requires additional rest breaks during work due to her  symptoms. She has been prescribed Lasix, which provides some relief when she is seated.  Referred to cardiothoracic surgery services for further evaluation and treatment of severe eccentric aortic valve insufficiency, bicuspid aortic valve, and severe eccentric mitral valve regurgitation.  She was recommended to undergo aortic valve replacement and mitral valve repair versus replacement.  She was deemed an acceptable risk candidate to proceed and she is agreeable to proceed with surgery.    Hospital Course: Ms. Pino was admitted on the morning of surgery.  Please see a separate op note by Dr. Keenan.  Operative findings were remarkable for performing aortic valve replacement and mitral valve repair.  Following surgery, she was transferred to the intensive care unit in stable guarded condition.  She remained hemodynamically stable overnight.  She was extubated and demonstrated a neurologically intact exam.  On postop day 1, she was up to the chair and walking with physical therapy.  She required nicardipine infusion which was weaned off and she remained normotensive.  She was kept in the ICU for close watch.  She required multimodality pain control strategies which were ultimately de-escalated quickly due to drowsiness.  On postop day 2, mediastinal chest tubes were removed without remark.  On postop day 3, she met criteria to transfer to  for ongoing recovery.  The remaining stay of her hospitalization was remarkable for bowel regimen, management of surgical pain, encouraging pulmonary toilet and ambulation, diuresis, weaning supplemental oxygen and overnight pulse oximetry study which demonstrated a very mild desaturation that only lasted 35 seconds.  Discussion was held with her for possible nocturnal oxygen at time of discharge, but she declined.  Pacing wires were removed on postop day 7 without remark.  She is saturating appropriately on room air.  She is walking over 400 feet without remark.  She has much  improved pain control with tramadol as needed and naproxen 250 mg twice daily.  She is 8 pounds below baseline weight.  She meets criteria for discharge home on postoperative day 8 and is agreeable to do so with family.    Condition on Discharge:  Neurologically intact and has good pain control.  She is eating well and has demonstrable good bowel function.  The sternum is stable without clicks. The heart is in normal sinus rhythm.  She has met all physical therapy criteria and verbalizes understanding of sternotomy precautions.   She verbalizes understanding of a separately handed out cardiac surgery handout.       Discharge Disposition:  Home or Self Care [1]    Discharge Medications:     Discharge Medications        New Medications        Instructions Start Date   aspirin 81 MG EC tablet   81 mg, Oral, Daily   Start Date: May 30, 2025     atorvastatin 20 MG tablet  Commonly known as: LIPITOR   20 mg, Oral, Nightly      losartan 25 MG tablet  Commonly known as: COZAAR   25 mg, Oral, Every 24 Hours Scheduled   Start Date: May 30, 2025     metoprolol tartrate 25 MG tablet  Commonly known as: LOPRESSOR   25 mg, Oral, Every 12 Hours Scheduled      traMADol 50 MG tablet  Commonly known as: ULTRAM   25 mg, Oral, Every 6 Hours PRN             Continue These Medications        Instructions Start Date   b complex vitamins capsule   1 capsule, Oral, Daily      B-1 PO   1 tablet, Oral, Daily      Cholecalciferol 50 MCG (2000 UT) capsule   1 capsule, Oral, Daily      CO Q 10 PO   1 capsule, Oral, Daily      cyanocobalamin 500 MCG tablet  Commonly known as: VITAMIN B-12   1 tablet, Oral, Daily      ELDERBERRY PO   50 mg, Oral, Daily      fluticasone 50 MCG/ACT nasal spray  Commonly known as: FLONASE   1 spray, Each Nare, Daily      montelukast 10 MG tablet  Commonly known as: SINGULAIR   10 mg, Oral, Nightly      OMEGA 3 PO   1 capsule, Oral, Daily      omeprazole 40 MG capsule  Commonly known as: priLOSEC   40 mg, Oral,  Daily      PROBIOTIC PO   1 capsule, Oral, Daily      therapeutic multivitamin-minerals tablet  Generic drug: multivitamin with minerals   1 tablet, Oral, Daily             Stop These Medications      amLODIPine 5 MG tablet  Commonly known as: NORVASC     furosemide 20 MG tablet  Commonly known as: LASIX     hydrALAZINE 25 MG tablet  Commonly known as: APRESOLINE     NON FORMULARY     NON FORMULARY     potassium chloride 10 MEQ CR tablet     RED YEAST RICE PO     telmisartan 40 MG tablet  Commonly known as: MICARDIS     terbinafine 250 MG tablet  Commonly known as: lamiSIL              Discharge Diet: Regular diet      Discharge Care Plan / Instructions: Please see the separately handed out cardiac surgery handout. Cardiac rehab deferred at this time due to sternotomy precautions-will reassess at formal office visit.     Activity at Discharge:   No heavy lifting greater than 5 pounds or a gallon of milk while maintaining sternal precautions.  Gladys Pino has been instructed on an exercise  regimen as detailed in a handed out cardiac surgery handout.    Tobacco: The patient does not use tobacco products and therefore does not need tobacco cessation education.    BMI: BMI is >= 25 and <30. (Overweight) The following options were offered after discussion;: weight loss educational material (shared in after visit summary).     Follow-up Appointments: Gladys Pino  is requested to see Glo Roberts APRN within 1-2 weeks from time of discharge, to see Jayda BATISTA in 1 week, to follow-up with Dr. Keenan in 6 weeks, and to follow-up with Dr. Donis of the cardiology service in 6 weeks.

## 2025-05-29 NOTE — PLAN OF CARE
Problem: Adult Inpatient Plan of Care  Goal: Plan of Care Review  Outcome: Progressing  Flowsheets  Taken 5/29/2025 0431 by Marizol Lam RN  Outcome Evaluation: A&Ox4, very pleasant, status improving per patient. Up in chair overnight. Completed last walk of day, walking to 4C station and back to room with minimal breaks. SBA for transfers. IV Lasix administered per order with good output noted, documented in chart. C/o 4/10 incisional soreness relieved with PRN Tramadol per pt. Accucheck completed, WNL. Continues scheduled Tylenol as ordered. Pulse ox remained on patient overnight for O2 study. Gauze dressing applied to previous tube/wire site per patient request d/t rubbing from surgical bra causing discomfort. Effective results voiced by patient. VSS. Loose BM noted, patient states multiple loose BM throughout day, Bisacodyl not given at HS. HR SR 79-96 this shift. Will continue to monitor for any changes and update as needed.  Taken 5/28/2025 0945 by Pelon Roth, PTA  Progress: improving  Taken 5/27/2025 0430 by Roseline Rodriguez RN  Plan of Care Reviewed With: patient  Goal: Patient-Specific Goal (Individualized)  Outcome: Progressing  Goal: Absence of Hospital-Acquired Illness or Injury  Outcome: Progressing  Intervention: Identify and Manage Fall Risk  Recent Flowsheet Documentation  Taken 5/29/2025 0400 by aMrizol Lam RN  Safety Promotion/Fall Prevention: safety round/check completed  Taken 5/29/2025 0200 by Marizol Lam RN  Safety Promotion/Fall Prevention: safety round/check completed  Taken 5/29/2025 0000 by Marizol Lam RN  Safety Promotion/Fall Prevention: safety round/check completed  Taken 5/28/2025 2200 by Marizol Lam RN  Safety Promotion/Fall Prevention: safety round/check completed  Taken 5/28/2025 2100 by Marizol Lam RN  Safety Promotion/Fall Prevention: safety round/check completed  Taken 5/28/2025 2022 by Marizol Lam RN  Safety  Promotion/Fall Prevention:   activity supervised   assistive device/personal items within reach   clutter free environment maintained   fall prevention program maintained   lighting adjusted   nonskid shoes/slippers when out of bed   room organization consistent   safety round/check completed   toileting scheduled  Taken 5/28/2025 2000 by Marizol Lam RN  Safety Promotion/Fall Prevention: safety round/check completed  Taken 5/28/2025 1900 by Marizol Lam RN  Safety Promotion/Fall Prevention: safety round/check completed  Intervention: Prevent Skin Injury  Recent Flowsheet Documentation  Taken 5/29/2025 0400 by Marizol Lam RN  Body Position:   position changed independently   weight shifting   heels elevated   supine  Skin Protection: transparent dressing maintained  Taken 5/29/2025 0200 by Marizol Lam RN  Body Position:   position changed independently   weight shifting   heels elevated   supine  Skin Protection: transparent dressing maintained  Taken 5/29/2025 0000 by Marizol Lam RN  Body Position:   position changed independently   weight shifting   heels elevated   supine  Skin Protection: transparent dressing maintained  Taken 5/28/2025 2200 by Marizol Lam RN  Body Position:   position changed independently   weight shifting   heels elevated   supine  Skin Protection: transparent dressing maintained  Taken 5/28/2025 2100 by Marizol Lam RN  Body Position:   position changed independently   weight shifting   heels elevated   supine  Skin Protection: transparent dressing maintained  Taken 5/28/2025 2031 by Marizol Lam RN  Body Position: (up in chair at this time)   other (see comments)   legs elevated   heels elevated   position changed independently   weight shifting  Taken 5/28/2025 2022 by Marizol Lam RN  Skin Protection:   protective footwear used   pulse oximeter probe site changed   transparent dressing maintained  Taken 5/28/2025 2000 by Carole  ERIKA Fontana  Body Position:   position changed independently   weight shifting   heels elevated   supine  Skin Protection: transparent dressing maintained  Taken 5/28/2025 1900 by Marizol Lam RN  Body Position:   position changed independently   weight shifting   heels elevated   supine  Skin Protection: transparent dressing maintained  Intervention: Prevent and Manage VTE (Venous Thromboembolism) Risk  Recent Flowsheet Documentation  Taken 5/28/2025 2022 by Marizol Lam RN  VTE Prevention/Management: (Lovenox ordered) other (see comments)  Intervention: Prevent Infection  Recent Flowsheet Documentation  Taken 5/28/2025 2022 by Marizol Lam RN  Infection Prevention:   cohorting utilized   hand hygiene promoted   rest/sleep promoted   single patient room provided  Goal: Optimal Comfort and Wellbeing  Outcome: Progressing  Intervention: Monitor Pain and Promote Comfort  Recent Flowsheet Documentation  Taken 5/28/2025 2214 by Marizol Lam RN  Pain Management Interventions: (Gauze dressing applied to previous tube/wire sites to prevent rubbing from surgical bra, effective per patient)   pain medication given   pillow support provided   position adjusted   other (see comments)  Taken 5/28/2025 2200 by Marizol Lam RN  Pain Management Interventions:   pain management plan reviewed with patient/caregiver   pillow support provided   position adjusted   quiet environment facilitated   relaxation techniques promoted  Taken 5/28/2025 2100 by Marizol Lam RN  Pain Management Interventions:   pain management plan reviewed with patient/caregiver   pillow support provided   position adjusted   quiet environment facilitated   relaxation techniques promoted  Taken 5/28/2025 2022 by Marizol Lam RN  Pain Management Interventions:   pain management plan reviewed with patient/caregiver   pillow support provided   position adjusted  Taken 5/28/2025 2000 by Marizol Lam RN  Pain Management  Interventions:   pain management plan reviewed with patient/caregiver   pillow support provided   position adjusted   quiet environment facilitated   relaxation techniques promoted  Intervention: Provide Person-Centered Care  Recent Flowsheet Documentation  Taken 5/28/2025 2252 by Marizol Lam RN  Trust Relationship/Rapport:   care explained   choices provided   emotional support provided   questions answered   questions encouraged   reassurance provided   thoughts/feelings acknowledged  Taken 5/28/2025 2022 by Marizol Lam RN  Trust Relationship/Rapport:   care explained   choices provided   emotional support provided   questions answered   reassurance provided   thoughts/feelings acknowledged  Goal: Readiness for Transition of Care  Outcome: Progressing     Problem: Skin Injury Risk Increased  Goal: Skin Health and Integrity  Outcome: Progressing  Intervention: Optimize Skin Protection  Recent Flowsheet Documentation  Taken 5/29/2025 0400 by Marizol Lam RN  Pressure Reduction Techniques:   frequent weight shift encouraged   weight shift assistance provided  Head of Bed (HOB) Positioning: HOB at 30-45 degrees  Pressure Reduction Devices: pressure-redistributing mattress utilized  Skin Protection: transparent dressing maintained  Taken 5/29/2025 0200 by Marizol Lam RN  Pressure Reduction Techniques:   frequent weight shift encouraged   weight shift assistance provided  Head of Bed (HOB) Positioning: HOB at 30-45 degrees  Pressure Reduction Devices: pressure-redistributing mattress utilized  Skin Protection: transparent dressing maintained  Taken 5/29/2025 0000 by Marizol Lam RN  Pressure Reduction Techniques:   frequent weight shift encouraged   weight shift assistance provided  Head of Bed (HOB) Positioning: HOB at 30-45 degrees  Pressure Reduction Devices: pressure-redistributing mattress utilized  Skin Protection: transparent dressing maintained  Taken 5/28/2025 2200 by Carole  ERIKA Fontana  Pressure Reduction Techniques:   frequent weight shift encouraged   weight shift assistance provided  Head of Bed (HOB) Positioning: HOB at 45 degrees  Pressure Reduction Devices: pressure-redistributing mattress utilized  Skin Protection: transparent dressing maintained  Taken 5/28/2025 2100 by Marizol Lam RN  Pressure Reduction Techniques:   frequent weight shift encouraged   weight shift assistance provided  Head of Bed (HOB) Positioning: HOB at 60 degrees  Pressure Reduction Devices: pressure-redistributing mattress utilized  Skin Protection: transparent dressing maintained  Taken 5/28/2025 2031 by Marizol Lam RN  Activity Management: (4C nurses' station and back) ambulated outside room  Head of Bed (HOB) Positioning: HOB at 60-90 degrees  Taken 5/28/2025 2022 by Marizol Lam RN  Activity Management:   up in chair   activity encouraged  Pressure Reduction Techniques:   frequent weight shift encouraged   heels elevated off bed   pressure points protected   weight shift assistance provided  Pressure Reduction Devices: pressure-redistributing mattress utilized  Skin Protection:   protective footwear used   pulse oximeter probe site changed   transparent dressing maintained  Taken 5/28/2025 2000 by Marizol Lam RN  Pressure Reduction Techniques:   frequent weight shift encouraged   weight shift assistance provided  Head of Bed (HOB) Positioning: HOB at 30-45 degrees  Pressure Reduction Devices: pressure-redistributing mattress utilized  Skin Protection: transparent dressing maintained  Taken 5/28/2025 1900 by Marizol Lam RN  Pressure Reduction Techniques:   frequent weight shift encouraged   weight shift assistance provided  Head of Bed (HOB) Positioning: HOB at 30-45 degrees  Pressure Reduction Devices: pressure-redistributing mattress utilized  Skin Protection: transparent dressing maintained  Intervention: Promote and Optimize Oral Intake  Recent Flowsheet  Documentation  Taken 5/28/2025 2022 by Marizol Lam RN  Oral Nutrition Promotion:   physical activity promoted   rest periods promoted   social interaction promoted     Problem: Cardiovascular Surgery  Goal: Improved Activity Tolerance  Outcome: Progressing  Intervention: Optimize Tolerance for Activity  Recent Flowsheet Documentation  Taken 5/28/2025 2031 by Marizol Lam RN  Self-Care Promotion:   independence encouraged   BADL personal objects within reach  Goal: Optimal Coping with Heart Surgery  Outcome: Progressing  Intervention: Support Psychosocial Response to Surgery  Recent Flowsheet Documentation  Taken 5/28/2025 2022 by Marizol Lam RN  Family/Support System Care:   self-care encouraged   support provided  Goal: Absence of Bleeding  Outcome: Progressing  Intervention: Monitor and Manage Bleeding  Recent Flowsheet Documentation  Taken 5/28/2025 2022 by Marizol Lam RN  Bleeding Management: dressing monitored  Goal: Effective Bowel Elimination  Outcome: Progressing  Intervention: Enhance Bowel Motility and Elimination  Recent Flowsheet Documentation  Taken 5/28/2025 2022 by Marizol Lam RN  Bowel Elimination Management: toileting offered  Bowel Motility Enhancement:   ambulation promoted   oral intake encouraged  Goal: Effective Cardiac Function  Outcome: Progressing  Goal: Optimal Cerebral Tissue Perfusion  Outcome: Progressing  Intervention: Protect and Optimize Cerebral Perfusion  Recent Flowsheet Documentation  Taken 5/29/2025 0400 by Marizol Lam RN  Head of Bed (HOB) Positioning: HOB at 30-45 degrees  Taken 5/29/2025 0200 by Marizol Lam RN  Head of Bed (HOB) Positioning: HOB at 30-45 degrees  Taken 5/29/2025 0000 by Marizol Lam RN  Head of Bed (HOB) Positioning: HOB at 30-45 degrees  Taken 5/28/2025 2200 by Marizol Lam RN  Head of Bed (HOB) Positioning: HOB at 45 degrees  Taken 5/28/2025 2100 by Marizol Lam RN  Head of Bed (HOB)  Positioning: HOB at 60 degrees  Taken 5/28/2025 2031 by Marizol Lam RN  Head of Bed (HOB) Positioning: HOB at 60-90 degrees  Taken 5/28/2025 2022 by Marizol Lam RN  Glycemic Management: blood glucose monitored  Fever Reduction/Comfort Measures:   lightweight bedding   lightweight clothing  Sensory Stimulation Regulation:   care clustered   quiet environment promoted  Taken 5/28/2025 2000 by Marizol Lam RN  Head of Bed (HOB) Positioning: HOB at 30-45 degrees  Taken 5/28/2025 1900 by Marizol Lam RN  Head of Bed (HOB) Positioning: HOB at 30-45 degrees  Goal: Fluid and Electrolyte Balance  Outcome: Progressing  Intervention: Monitor and Manage Fluid and Electrolyte Balance  Recent Flowsheet Documentation  Taken 5/28/2025 2022 by Marizol Lam RN  Fluid/Electrolyte Management: fluids provided  Goal: Blood Glucose Level Within Target Range  Outcome: Progressing  Intervention: Optimize Glycemic Control  Recent Flowsheet Documentation  Taken 5/28/2025 2022 by Marizol Lam RN  Glycemic Management: blood glucose monitored  Goal: Absence of Infection Signs and Symptoms  Outcome: Progressing  Intervention: Prevent or Manage Infection  Recent Flowsheet Documentation  Taken 5/28/2025 2022 by Marizol Lam RN  Fever Reduction/Comfort Measures:   lightweight bedding   lightweight clothing  Infection Prevention:   cohorting utilized   hand hygiene promoted   rest/sleep promoted   single patient room provided  Goal: Anesthesia/Sedation Recovery  Outcome: Progressing  Intervention: Optimize Anesthesia Recovery  Recent Flowsheet Documentation  Taken 5/29/2025 0400 by Marizol Lam RN  Safety Promotion/Fall Prevention: safety round/check completed  Taken 5/29/2025 0200 by Marizol Lam RN  Safety Promotion/Fall Prevention: safety round/check completed  Taken 5/29/2025 0000 by Marizol Lam RN  Safety Promotion/Fall Prevention: safety round/check completed  Taken 5/28/2025 2200  by Frontera, Breesha, RN  Safety Promotion/Fall Prevention: safety round/check completed  Taken 5/28/2025 2100 by Marizol Lam RN  Safety Promotion/Fall Prevention: safety round/check completed  Taken 5/28/2025 2022 by Marizol Lam RN  Safety Promotion/Fall Prevention:   activity supervised   assistive device/personal items within reach   clutter free environment maintained   fall prevention program maintained   lighting adjusted   nonskid shoes/slippers when out of bed   room organization consistent   safety round/check completed   toileting scheduled  Taken 5/28/2025 2000 by Marizol Lam RN  Patient Tolerance (IS): good  Safety Promotion/Fall Prevention: safety round/check completed  Administration (IS):   instruction provided, follow-up   self-administered  Level Incentive Spirometer (mL): 1500  Incentive Spirometer Predicted Level (mL): 1200  Number of Repetitions (IS): 10  Taken 5/28/2025 1900 by Marizol Lam RN  Safety Promotion/Fall Prevention: safety round/check completed  Goal: Acceptable Pain Control  Outcome: Progressing  Intervention: Prevent or Manage Pain  Recent Flowsheet Documentation  Taken 5/28/2025 2214 by Marizol Lam RN  Pain Management Interventions: (Gauze dressing applied to previous tube/wire sites to prevent rubbing from surgical bra, effective per patient)   pain medication given   pillow support provided   position adjusted   other (see comments)  Taken 5/28/2025 2200 by Marizol Lam RN  Pain Management Interventions:   pain management plan reviewed with patient/caregiver   pillow support provided   position adjusted   quiet environment facilitated   relaxation techniques promoted  Taken 5/28/2025 2100 by Marizol Lam RN  Pain Management Interventions:   pain management plan reviewed with patient/caregiver   pillow support provided   position adjusted   quiet environment facilitated   relaxation techniques promoted  Taken 5/28/2025 2022 by  Marizol Lam RN  Pain Management Interventions:   pain management plan reviewed with patient/caregiver   pillow support provided   position adjusted  Diversional Activities:   smartphone   television  Taken 5/28/2025 2000 by Marizol Lam RN  Pain Management Interventions:   pain management plan reviewed with patient/caregiver   pillow support provided   position adjusted   quiet environment facilitated   relaxation techniques promoted  Goal: Nausea and Vomiting Relief  Outcome: Progressing  Goal: Effective Urinary Elimination  Outcome: Progressing  Intervention: Monitor and Manage Urinary Retention  Recent Flowsheet Documentation  Taken 5/28/2025 2214 by Marizol Lam RN  Urinary Elimination Promotion:   toileting offered   frequent voiding encouraged   positioned for ease of voiding  Taken 5/28/2025 2022 by Marizol Lam RN  Urinary Elimination Promotion:   frequent voiding encouraged   positioned for ease of voiding   toileting offered   voiding relaxation promoted  Goal: Effective Oxygenation and Ventilation  Outcome: Progressing  Intervention: Promote Airway Secretion Clearance  Recent Flowsheet Documentation  Taken 5/28/2025 2022 by Marizol Lam RN  Airway/Ventilation Management:   airway patency maintained   pulmonary hygiene promoted  Cough And Deep Breathing: done independently per patient  Taken 5/28/2025 2000 by Marizol Lma RN  Patient Tolerance (IS): good  Administration (IS):   instruction provided, follow-up   self-administered  Level Incentive Spirometer (mL): 1500  Incentive Spirometer Predicted Level (mL): 1200  Number of Repetitions (IS): 10     Problem: Pain Acute  Goal: Optimal Pain Control and Function  Outcome: Progressing  Intervention: Optimize Psychosocial Wellbeing  Recent Flowsheet Documentation  Taken 5/28/2025 2022 by Marizol Lam RN  Diversional Activities:   smartphone   television  Intervention: Develop Pain Management Plan  Recent Flowsheet  Documentation  Taken 5/28/2025 2214 by Marizol Lam RN  Pain Management Interventions: (Gauze dressing applied to previous tube/wire sites to prevent rubbing from surgical bra, effective per patient)   pain medication given   pillow support provided   position adjusted   other (see comments)  Taken 5/28/2025 2200 by Marizol Lam RN  Pain Management Interventions:   pain management plan reviewed with patient/caregiver   pillow support provided   position adjusted   quiet environment facilitated   relaxation techniques promoted  Taken 5/28/2025 2100 by Marizol Lam RN  Pain Management Interventions:   pain management plan reviewed with patient/caregiver   pillow support provided   position adjusted   quiet environment facilitated   relaxation techniques promoted  Taken 5/28/2025 2022 by Marizol Lam RN  Pain Management Interventions:   pain management plan reviewed with patient/caregiver   pillow support provided   position adjusted  Taken 5/28/2025 2000 by Marizol Lam RN  Pain Management Interventions:   pain management plan reviewed with patient/caregiver   pillow support provided   position adjusted   quiet environment facilitated   relaxation techniques promoted  Intervention: Prevent or Manage Pain  Recent Flowsheet Documentation  Taken 5/28/2025 2252 by Marizol Lam RN  Sleep/Rest Enhancement:   awakenings minimized   regular sleep/rest pattern promoted   relaxation techniques promoted   room darkened  Taken 5/28/2025 2022 by Marizol Lam RN  Sensory Stimulation Regulation:   care clustered   quiet environment promoted  Bowel Elimination Promotion:   adequate fluid intake promoted   ambulation promoted   privacy promoted  Sleep/Rest Enhancement:   awakenings minimized   consistent schedule promoted   noise level reduced   regular sleep/rest pattern promoted   room darkened  Medication Review/Management:   medications reviewed   high-risk medications identified      Problem: Fall Injury Risk  Goal: Absence of Fall and Fall-Related Injury  Outcome: Progressing  Intervention: Identify and Manage Contributors  Recent Flowsheet Documentation  Taken 5/28/2025 2031 by Marizol Lam RN  Self-Care Promotion:   independence encouraged   BADL personal objects within reach  Taken 5/28/2025 2022 by Marizol Lam RN  Medication Review/Management:   medications reviewed   high-risk medications identified  Intervention: Promote Injury-Free Environment  Recent Flowsheet Documentation  Taken 5/29/2025 0400 by Marizol Lam RN  Safety Promotion/Fall Prevention: safety round/check completed  Taken 5/29/2025 0200 by Marizol Lam RN  Safety Promotion/Fall Prevention: safety round/check completed  Taken 5/29/2025 0000 by Marizol Lam RN  Safety Promotion/Fall Prevention: safety round/check completed  Taken 5/28/2025 2200 by Marizol Lam RN  Safety Promotion/Fall Prevention: safety round/check completed  Taken 5/28/2025 2100 by Marizol Lam RN  Safety Promotion/Fall Prevention: safety round/check completed  Taken 5/28/2025 2022 by Marizol Lam RN  Safety Promotion/Fall Prevention:   activity supervised   assistive device/personal items within reach   clutter free environment maintained   fall prevention program maintained   lighting adjusted   nonskid shoes/slippers when out of bed   room organization consistent   safety round/check completed   toileting scheduled  Taken 5/28/2025 2000 by Marizol Lam RN  Safety Promotion/Fall Prevention: safety round/check completed  Taken 5/28/2025 1900 by Marizol Lam RN  Safety Promotion/Fall Prevention: safety round/check completed   Goal Outcome Evaluation:              Outcome Evaluation: A&Ox4, very pleasant, status improving per patient. Up in chair overnight. Completed last walk of day, walking to 4C station and back to room with minimal breaks. SBA for transfers. IV Lasix administered per order  with good output noted, documented in chart. C/o 4/10 incisional soreness relieved with PRN Tramadol per pt. Accucheck completed, WNL. Continues scheduled Tylenol as ordered. Pulse ox remained on patient overnight for O2 study. Gauze dressing applied to previous tube/wire site per patient request d/t rubbing from surgical bra causing discomfort. Effective results voiced by patient. VSS. Loose BM noted, patient states multiple loose BM throughout day, Bisacodyl not given at HS. HR SR 79-96 this shift. Will continue to monitor for any changes and update as needed.

## 2025-05-29 NOTE — PLAN OF CARE
Goal Outcome Evaluation:      Patient was instructed on safe bed transfer which she did very well. Reviewed sternal restrictions. Patient stands and sits independently. Patient ambulated 450' with Supervision. Patient tolerated activity very well.

## 2025-05-29 NOTE — PLAN OF CARE
Goal Outcome Evaluation:   Patient ambulated 450' with supervision. Tolerates walks very well.

## 2025-05-30 LAB
CYTO UR: NORMAL
LAB AP CASE REPORT: NORMAL
Lab: NORMAL
PATH REPORT.FINAL DX SPEC: NORMAL
PATH REPORT.GROSS SPEC: NORMAL

## 2025-05-30 NOTE — OUTREACH NOTE
Prep Survey      Flowsheet Row Responses   [facility] [employer] facility patient discharged from? [facility]   Is LACE score < 7 ? No   Eligibility Readm Mgmt   Discharge diagnosis Severe mitral valve regurgitation, AORTIC VALVE REPLACEMENT, MITRAL VALVE REPAIR, LEFT ATRIAL APPENDAGE EXCLUSION   Does the patient have one of the following disease processes/diagnoses(primary or secondary)? Cardiothoracic surgery   Does the patient have Home health ordered? No   Is there a DME ordered? No   Prep survey completed? Yes            Anamika Rutherford Registered Nurse

## 2025-05-30 NOTE — PAYOR COMM NOTE
"REF:  [id number]     [facility]  FAX  [phone]      Gladys Chong (69 y.o. Female)       Date of Birth   1955    Social Security Number       Address   [address] [place] [address]    Home Phone   [phone]    MRN   [id number]       Protestant   [facility]    Marital Status                               Admission Date   5/21/2025    Admission Type   Elective    Admitting Provider   Keenan, Dilshad GLEZ MD    Attending Provider       Department, Room/Bed   [facility] 4B, 435/1       Discharge Date   5/29/2025    Discharge Disposition   Home or Self Care    Discharge Destination                                 Attending Provider: (none)   Allergies: Lisinopril, Sulfa Antibiotics    Isolation: None   Infection: None   Code Status: Prior    Ht: 155 cm (61.02\")   Wt: 70.9 kg (156 lb 6.4 oz)    Admission Cmt: None   Principal Problem: Severe mitral valve regurgitation [I34.0]                   Active Insurance as of 5/21/2025       Primary Coverage       Payor Plan Insurance Group Employer/Plan Group    ANTHEM MEDICARE REPLACEMENT ANTHEM MEDICARE ADVANTAGE PPO [id number]       Payor Plan Address Payor Plan Phone Number Payor Plan Fax Number Effective Dates    [address] [phone]  1/1/2024 - None Entered    [facility] [address]         Subscriber Name Subscriber Birth Date Member ID       GLADYS CHONG 1955 [id number]                     Emergency Contacts        (Rel.) Home Phone Work Phone Mobile Phone    ROHIT CHONG (Son) -- -- [phone]    kayla chong (Relative) -- -- [phone]                 Discharge Summary        Jayda Pardo APRN at 05/29/25 1405                [facility] Cardiothoracic Surgery  DISCHARGE SUMMARY        Date of Admission: 5/21/2025  Date of Discharge:  5/29/2025  Primary Care Physician: Glo Roberts APRN    Discharge Diagnoses:  Active Hospital Problems    Diagnosis     " **Severe mitral valve regurgitation     GERD (gastroesophageal reflux disease)     HTN (hypertension)     HLD (hyperlipidemia)     Class 1 obesity due to excess calories with body mass index (BMI) of 31.0 to 31.9 in adult     Bicuspid aortic valve     Severe aortic valve regurgitation      Procedures Performed: Mitral Valve Repair, (Closure of Cleft Between P1 and P2, Annuloplasty with 30mm Physio2 Ring), Aortic Valve Replacement (23mm Inspiris), LAAE (35mm Atriclip) performed on May 21, 2025 by Dr. Keenan.    HPI:  Ms. Gladys iPno is a 69 y.o. female who was initially diagnosed with a heart murmur by her primary care physician, which led to a referral to Dr. Donis. An echocardiogram conducted 5 years ago revealed mild valvular abnormalities which led to annual monitoring. A subsequent echocardiogram identified valvular issues, prompting further investigation via a transesophageal echocardiogram. This test revealed a congenital bicuspid aortic valve, aortic valve regurgitation, this was potentially causing impaired function of the adjacent valve. She reports experiencing dyspnea and fatigue, particularly during activities such as singing in [employer] or performing household chores like mopping or sweeping. These symptoms have progressively worsened over the past few months. She also reports occasional chest discomfort described as a burning sensation. She has no history of cardiac, pulmonary, or thoracic surgeries. She also reports no history of untreated streptococcal pharyngitis or rheumatic fever. 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 She occasionally requires additional rest breaks during work due to her symptoms. She has been prescribed Lasix, which provides some relief when she is seated.  Referred to cardiothoracic surgery services for further evaluation and treatment of severe eccentric aortic valve insufficiency, bicuspid aortic valve, and severe eccentric  mitral valve regurgitation.  She was recommended to undergo aortic valve replacement and mitral valve repair versus replacement.  She was deemed an acceptable risk candidate to proceed and she is agreeable to proceed with surgery.    Hospital Course: Ms. Pino was admitted on the morning of surgery.  Please see a separate op note by Dr. Keenan.  Operative findings were remarkable for performing aortic valve replacement and mitral valve repair.  Following surgery, she was transferred to the intensive care unit in stable guarded condition.  She remained hemodynamically stable overnight.  She was extubated and demonstrated a neurologically intact exam.  On postop day 1, she was up to the chair and walking with physical therapy.  She required nicardipine infusion which was weaned off and she remained normotensive.  She was kept in the ICU for close watch.  She required multimodality pain control strategies which were ultimately de-escalated quickly due to drowsiness.  On postop day 2, mediastinal chest tubes were removed without remark.  On postop day 3, she met criteria to transfer to  for ongoing recovery.  The remaining stay of her hospitalization was remarkable for bowel regimen, management of surgical pain, encouraging pulmonary toilet and ambulation, diuresis, weaning supplemental oxygen and overnight pulse oximetry study which demonstrated a very mild desaturation that only lasted 35 seconds.  Discussion was held with her for possible nocturnal oxygen at time of discharge, but she declined.  Pacing wires were removed on postop day 7 without remark.  She is saturating appropriately on room air.  She is walking over 400 feet without remark.  She has much improved pain control with tramadol as needed and naproxen 250 mg twice daily.  She is 8 pounds below baseline weight.  She meets criteria for discharge home on postoperative day 8 and is agreeable to do so with family.    Condition on Discharge:  Neurologically  intact and has good pain control.  She is eating well and has demonstrable good bowel function.  The sternum is stable without clicks. The heart is in normal sinus rhythm.  She has met all physical therapy criteria and verbalizes understanding of sternotomy precautions.   She verbalizes understanding of a separately handed out cardiac surgery handout.       Discharge Disposition:  Home or Self Care [1]    Discharge Medications:     Discharge Medications        New Medications        Instructions Start Date   aspirin 81 MG EC tablet   81 mg, Oral, Daily   Start Date: May 30, 2025     atorvastatin 20 MG tablet  Commonly known as: LIPITOR   20 mg, Oral, Nightly      losartan 25 MG tablet  Commonly known as: COZAAR   25 mg, Oral, Every 24 Hours Scheduled   Start Date: May 30, 2025     metoprolol tartrate 25 MG tablet  Commonly known as: LOPRESSOR   25 mg, Oral, Every 12 Hours Scheduled      traMADol 50 MG tablet  Commonly known as: ULTRAM   25 mg, Oral, Every 6 Hours PRN             Continue These Medications        Instructions Start Date   b complex vitamins capsule   1 capsule, Oral, Daily      B-1 PO   1 tablet, Oral, Daily      Cholecalciferol 50 MCG (2000 UT) capsule   1 capsule, Oral, Daily      CO Q 10 PO   1 capsule, Oral, Daily      cyanocobalamin 500 MCG tablet  Commonly known as: VITAMIN B-12   1 tablet, Oral, Daily      ELDERBERRY PO   50 mg, Oral, Daily      fluticasone 50 MCG/ACT nasal spray  Commonly known as: FLONASE   1 spray, Each Nare, Daily      montelukast 10 MG tablet  Commonly known as: SINGULAIR   10 mg, Oral, Nightly      OMEGA 3 PO   1 capsule, Oral, Daily      omeprazole 40 MG capsule  Commonly known as: priLOSEC   40 mg, Oral, Daily      PROBIOTIC PO   1 capsule, Oral, Daily      therapeutic multivitamin-minerals tablet  Generic drug: multivitamin with minerals   1 tablet, Oral, Daily             Stop These Medications      amLODIPine 5 MG tablet  Commonly known as: NORVASC     furosemide 20  MG tablet  Commonly known as: LASIX     hydrALAZINE 25 MG tablet  Commonly known as: APRESOLINE     NON FORMULARY     NON FORMULARY     potassium chloride 10 MEQ CR tablet     RED YEAST RICE PO     telmisartan 40 MG tablet  Commonly known as: MICARDIS     terbinafine 250 MG tablet  Commonly known as: lamiSIL              Discharge Diet: Regular diet      Discharge Care Plan / Instructions: Please see the separately handed out cardiac surgery handout. Cardiac rehab deferred at this time due to sternotomy precautions-will reassess at formal office visit.     Activity at Discharge:   No heavy lifting greater than 5 pounds or a gallon of milk while maintaining sternal precautions.  Gladys Pino has been instructed on an exercise  regimen as detailed in a handed out cardiac surgery handout.    Tobacco: The patient does not use tobacco products and therefore does not need tobacco cessation education.    BMI: BMI is >= 25 and <30. (Overweight) The following options were offered after discussion;: weight loss educational material (shared in after visit summary).     Follow-up Appointments: Gladys Pino  is requested to see Glo Roberts APRN within 1-2 weeks from time of discharge, to see Jayda BATISTA in 1 week, to follow-up with Dr. Keenan in 6 weeks, and to follow-up with Dr. Donis of the cardiology service in 6 weeks.         Electronically signed by Jayda Pardo APRN at 05/29/25 1405       Discharge Order (From admission, onward)       Start     Ordered    05/29/25 1401  Discharge patient  Once        Expected Discharge Date: 05/29/25   Discharge Disposition: Home or Self Care   Physician of Record for Attribution - Please select from Treatment Team: HITESH KEENAN [[id number]]   Review needed by CMO to determine Physician of Record: No      Question Answer Comment   Physician of Record for Attribution - Please select from Treatment Team HITESH KEENAN    Review needed by CMO to determine Physician of  Record No        05/29/25 1405

## 2025-06-03 NOTE — PROGRESS NOTES
Subjective   Chief Complaint   Patient presents with    Post-op Follow-up     Patient had AVR/MVR on 5/21       Patient ID: Gladys Pino is a 69 y.o. 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 Keenan.     History of Present Illness  Ms. Pino is a 69-year-old female who underwent the above listed procedure by Dr. Keenan on 5/21/2025.  Postoperative recovery was significant for weaning supplemental O2 and pain control.  She ultimately met criteria for discharge home on postop day 8.  Weight at the time of discharge was well below her baseline and Lasix was not continued at discharge.  Weight today is up 1 pound since discharge and she remains below her baseline weight.  She is here today for routine 1 week postop follow-up visit.  Her main complaint today is that she has a head cold and complains of pain from sneezing and occasional cough.  She has a scheduled follow-up with her PCP tomorrow for this.  No fevers.  No chest congestion.  From a surgery standpoint she states she is doing pretty well other than pain control.  She is ambulating 10 minutes 3-4 times a day.  She is eating well.  No lower extremity edema and she continues off Lasix.    The following portions of the patient's history were reviewed and updated as appropriate: allergies, current medications, past family history, past medical history, past social history, past surgical history and problem list.    Review of Systems   Constitutional:  Negative for chills, diaphoresis, fatigue and fever.   HENT:  Positive for congestion and sneezing. Negative for trouble swallowing and voice change.    Eyes:  Negative for visual disturbance.   Respiratory:  Positive for cough. Negative for chest tightness and shortness of breath.    Cardiovascular:  Negative for chest pain,  "palpitations and leg swelling.   Gastrointestinal:  Negative for abdominal pain, diarrhea, nausea and vomiting.   Musculoskeletal:  Negative for arthralgias and myalgias.   Skin:  Negative for color change, pallor, rash and wound.   Neurological:  Negative for dizziness, syncope and light-headedness.   Psychiatric/Behavioral:  Negative for agitation, confusion and sleep disturbance.        Objective   Visit Vitals  /69   Pulse 79   Ht 155 cm (61.02\")   Wt 71.2 kg (157 lb)   SpO2 97%   BMI 29.65 kg/m²       Physical Exam  Vitals reviewed.   Constitutional:       General: She is not in acute distress.  HENT:      Head: Normocephalic.   Eyes:      Pupils: Pupils are equal, round, and reactive to light.   Cardiovascular:      Rate and Rhythm: Normal rate and regular rhythm.      Heart sounds: Normal heart sounds. No murmur heard.  Pulmonary:      Breath sounds: Normal breath sounds. No wheezing or rales.   Abdominal:      General: There is no distension.      Palpations: Abdomen is soft.      Tenderness: There is no abdominal tenderness.   Musculoskeletal:         General: No swelling or tenderness.   Skin:     General: Skin is warm and dry.      Comments: Sternum is stable, no clicks.  Sternal incision is clean dry and intact and healing nicely.   Neurological:      General: No focal deficit present.      Mental Status: She is alert and oriented to person, place, and time.   Psychiatric:         Mood and Affect: Mood normal.         Behavior: Behavior normal.         Thought Content: Thought content normal.         Judgment: Judgment normal.             Assessment & Plan     Diagnoses and all orders for this visit:    1. Severe aortic valve regurgitation (Primary)  -     Adult Transthoracic Echo Complete W/ Cont if Necessary Per Protocol; Future  -     traMADol (ULTRAM) 50 MG tablet; Take 0.5 tablets by mouth Every 6 (Six) Hours As Needed for Moderate Pain.  Dispense: 10 tablet; Refill: 0    2. Severe mitral " valve regurgitation  -     Adult Transthoracic Echo Complete W/ Cont if Necessary Per Protocol; Future  -     traMADol (ULTRAM) 50 MG tablet; Take 0.5 tablets by mouth Every 6 (Six) Hours As Needed for Moderate Pain.  Dispense: 10 tablet; Refill: 0           Overall, Gladys Pino is doing well from a surgery standpoint.  Continue to hold Lasix for now and monitor weight closely.  She does have some sinus congestion and sneezing and I have advised her to keep follow-up with her PCP tomorrow for treatment for this.  Lungs are clear today.  Due to occasional cough and sneezing she does have ongoing postoperative sternal pain.  A refill of tramadol has been sent to her pharmacy and we discussed using scheduled Tylenol along with lidocaine patches as needed for pain control.  Should begin spacing out pain medication.  Surgical incisions today are healing without remark.  All Steri-Strips removed.  Clean with antibacterial soap and water, no ointments, lotions, or creams.  We discussed current sternotomy precautions and how these will not be advanced yet.  We discussed driving at 4 weeks postop, approximately 6/18/2025 as long as she is having no issues with surgical incisions and no sternal click and is no longer taking pain medication.  Following post op cardiac surgery home instructions. Provided support and encouragement. All questions have been answered to the best of my ability. Will discuss starting cardiac rehabilitation at official 1 month post op visit. Patient has follow up with Dr. Keenan in a few weeks.  A repeat transthoracic echo will be arranged prior to this appointment for new postoperative baseline.  Patient has follow up with Cardiology in a few weeks. Patient has been instructed to contact our office with any questions or concerns should they arise prior to the next office visit.  Keep scheduled follow-up with Dr. Keenan next month, call me in 1 week with an update and I will see her sooner if  needed.    Gladys Pino is a non-smoker and therefore does not need tobacco cessation education/counseling.      Advance Care Planning   ACP discussion was held with the patient during this visit. Patient has an advance directive in EMR which is still valid.

## 2025-06-05 ENCOUNTER — READMISSION MANAGEMENT (OUTPATIENT)
Dept: CALL CENTER | Facility: HOSPITAL | Age: 70
End: 2025-06-05
Payer: MEDICARE

## 2025-06-05 ENCOUNTER — OFFICE VISIT (OUTPATIENT)
Dept: CARDIAC SURGERY | Facility: CLINIC | Age: 70
End: 2025-06-05
Payer: MEDICARE

## 2025-06-05 VITALS
BODY MASS INDEX: 29.64 KG/M2 | HEIGHT: 61 IN | SYSTOLIC BLOOD PRESSURE: 111 MMHG | WEIGHT: 157 LBS | DIASTOLIC BLOOD PRESSURE: 69 MMHG | OXYGEN SATURATION: 97 % | HEART RATE: 79 BPM

## 2025-06-05 DIAGNOSIS — I34.0 SEVERE MITRAL VALVE REGURGITATION: ICD-10-CM

## 2025-06-05 DIAGNOSIS — I35.1 SEVERE AORTIC VALVE REGURGITATION: Primary | ICD-10-CM

## 2025-06-05 PROCEDURE — 1160F RVW MEDS BY RX/DR IN RCRD: CPT | Performed by: NURSE PRACTITIONER

## 2025-06-05 PROCEDURE — 1159F MED LIST DOCD IN RCRD: CPT | Performed by: NURSE PRACTITIONER

## 2025-06-05 PROCEDURE — 3078F DIAST BP <80 MM HG: CPT | Performed by: NURSE PRACTITIONER

## 2025-06-05 PROCEDURE — 99024 POSTOP FOLLOW-UP VISIT: CPT | Performed by: NURSE PRACTITIONER

## 2025-06-05 PROCEDURE — 3074F SYST BP LT 130 MM HG: CPT | Performed by: NURSE PRACTITIONER

## 2025-06-05 RX ORDER — TRAMADOL HYDROCHLORIDE 50 MG/1
25 TABLET ORAL EVERY 6 HOURS PRN
Qty: 10 TABLET | Refills: 0 | Status: SHIPPED | OUTPATIENT
Start: 2025-06-05

## 2025-06-05 NOTE — OUTREACH NOTE
CT Surgery Week 1 Survey      Flowsheet Row Responses   [facility] facility patient discharged from? [place]   Does the patient have one of the following disease processes/diagnoses(primary or secondary)? Cardiothoracic surgery   Week 1 attempt successful? No   Unsuccessful attempts Attempt 1              Meaghan DIEZ - Registered Nurse

## 2025-06-10 ENCOUNTER — READMISSION MANAGEMENT (OUTPATIENT)
Dept: CALL CENTER | Facility: HOSPITAL | Age: 70
End: 2025-06-10
Payer: MEDICARE

## 2025-06-10 ENCOUNTER — TELEPHONE (OUTPATIENT)
Dept: CARDIAC SURGERY | Facility: CLINIC | Age: 70
End: 2025-06-10
Payer: MEDICARE

## 2025-06-10 NOTE — TELEPHONE ENCOUNTER
Called to check on patient.  She states she is feeling much better.  Her PCP started her on a nasal spray and allergy medication and she reports much improvement.  She states she is walking better and feeling stronger.  She has follow up with Dr. Keenan on 7/7/25 and she reports that she would like to keep this follow up for now and call me sooner with any issues.  I advised her I will see her sooner if any issues arise.  She verbalized understanding and is agreeable.

## 2025-06-12 LAB
AV MEAN PRESS GRAD SYS DOP V1V2: 12 MMHG
AV VMAX SYS DOP: 219 CM/SEC
BH CV ECHO MEAS - AO MAX PG: 19.2 MMHG
BH CV ECHO MEAS - AO V2 VTI: 55.3 CM

## 2025-06-18 RX ORDER — POTASSIUM CHLORIDE 750 MG/1
10 TABLET, EXTENDED RELEASE ORAL DAILY
Qty: 90 TABLET | Refills: 1 | Status: SHIPPED | OUTPATIENT
Start: 2025-06-18

## 2025-06-20 ENCOUNTER — APPOINTMENT (OUTPATIENT)
Dept: GENERAL RADIOLOGY | Facility: HOSPITAL | Age: 70
End: 2025-06-20
Payer: MEDICARE

## 2025-06-20 ENCOUNTER — APPOINTMENT (OUTPATIENT)
Dept: CT IMAGING | Facility: HOSPITAL | Age: 70
End: 2025-06-20
Payer: MEDICARE

## 2025-06-20 ENCOUNTER — HOSPITAL ENCOUNTER (EMERGENCY)
Facility: HOSPITAL | Age: 70
Discharge: HOME | End: 2025-06-20
Attending: FAMILY MEDICINE
Payer: MEDICARE

## 2025-06-20 ENCOUNTER — TELEPHONE (OUTPATIENT)
Dept: CARDIAC SURGERY | Facility: CLINIC | Age: 70
End: 2025-06-20
Payer: MEDICARE

## 2025-06-20 VITALS
RESPIRATION RATE: 25 BRPM | SYSTOLIC BLOOD PRESSURE: 143 MMHG | OXYGEN SATURATION: 93 % | TEMPERATURE: 98 F | WEIGHT: 152 LBS | BODY MASS INDEX: 28.7 KG/M2 | HEART RATE: 81 BPM | HEIGHT: 61 IN | DIASTOLIC BLOOD PRESSURE: 89 MMHG

## 2025-06-20 DIAGNOSIS — I10 ELEVATED BLOOD PRESSURE READING WITH DIAGNOSIS OF HYPERTENSION: Primary | ICD-10-CM

## 2025-06-20 LAB
ALBUMIN SERPL-MCNC: 4.2 G/DL (ref 3.5–5.2)
ALBUMIN/GLOB SERPL: 1.6 G/DL
ALP SERPL-CCNC: 78 U/L (ref 39–117)
ALT SERPL W P-5'-P-CCNC: 22 U/L (ref 1–33)
ANION GAP SERPL CALCULATED.3IONS-SCNC: 13 MMOL/L (ref 5–15)
AST SERPL-CCNC: 29 U/L (ref 1–32)
BASOPHILS # BLD AUTO: 0.04 10*3/MM3 (ref 0–0.2)
BASOPHILS NFR BLD AUTO: 0.7 % (ref 0–1.5)
BILIRUB SERPL-MCNC: 0.5 MG/DL (ref 0–1.2)
BUN SERPL-MCNC: 13.3 MG/DL (ref 8–23)
BUN/CREAT SERPL: 19.9 (ref 7–25)
CALCIUM SPEC-SCNC: 9.7 MG/DL (ref 8.6–10.5)
CHLORIDE SERPL-SCNC: 105 MMOL/L (ref 98–107)
CO2 SERPL-SCNC: 22 MMOL/L (ref 22–29)
CREAT SERPL-MCNC: 0.67 MG/DL (ref 0.57–1)
DEPRECATED RDW RBC AUTO: 42 FL (ref 37–54)
EGFRCR SERPLBLD CKD-EPI 2021: 94.2 ML/MIN/1.73
EOSINOPHIL # BLD AUTO: 0.25 10*3/MM3 (ref 0–0.4)
EOSINOPHIL NFR BLD AUTO: 4.1 % (ref 0.3–6.2)
ERYTHROCYTE [DISTWIDTH] IN BLOOD BY AUTOMATED COUNT: 13.6 % (ref 12.3–15.4)
GEN 5 1HR TROPONIN T REFLEX: 23 NG/L
GLOBULIN UR ELPH-MCNC: 2.7 GM/DL
GLUCOSE SERPL-MCNC: 103 MG/DL (ref 65–99)
HCT VFR BLD AUTO: 35.5 % (ref 34–46.6)
HGB BLD-MCNC: 11.7 G/DL (ref 12–15.9)
HOLD SPECIMEN: NORMAL
HOLD SPECIMEN: NORMAL
IMM GRANULOCYTES # BLD AUTO: 0.07 10*3/MM3 (ref 0–0.05)
IMM GRANULOCYTES NFR BLD AUTO: 1.2 % (ref 0–0.5)
INR PPP: 0.96 (ref 0.91–1.09)
LYMPHOCYTES # BLD AUTO: 1.67 10*3/MM3 (ref 0.7–3.1)
LYMPHOCYTES NFR BLD AUTO: 27.6 % (ref 19.6–45.3)
MCH RBC QN AUTO: 28.3 PG (ref 26.6–33)
MCHC RBC AUTO-ENTMCNC: 33 G/DL (ref 31.5–35.7)
MCV RBC AUTO: 85.7 FL (ref 79–97)
MONOCYTES # BLD AUTO: 0.49 10*3/MM3 (ref 0.1–0.9)
MONOCYTES NFR BLD AUTO: 8.1 % (ref 5–12)
NEUTROPHILS NFR BLD AUTO: 3.52 10*3/MM3 (ref 1.7–7)
NEUTROPHILS NFR BLD AUTO: 58.3 % (ref 42.7–76)
NRBC BLD AUTO-RTO: 0 /100 WBC (ref 0–0.2)
NT-PROBNP SERPL-MCNC: 1554 PG/ML (ref 0–900)
PLATELET # BLD AUTO: 213 10*3/MM3 (ref 140–450)
PMV BLD AUTO: 11.6 FL (ref 6–12)
POTASSIUM SERPL-SCNC: 4.5 MMOL/L (ref 3.5–5.2)
PROT SERPL-MCNC: 6.9 G/DL (ref 6–8.5)
PROTHROMBIN TIME: 13.3 SECONDS (ref 11.8–14.8)
RBC # BLD AUTO: 4.14 10*6/MM3 (ref 3.77–5.28)
SODIUM SERPL-SCNC: 140 MMOL/L (ref 136–145)
TROPONIN T % DELTA: 0
TROPONIN T NUMERIC DELTA: 0 NG/L
TROPONIN T SERPL HS-MCNC: 23 NG/L
WBC NRBC COR # BLD AUTO: 6.04 10*3/MM3 (ref 3.4–10.8)
WHOLE BLOOD HOLD COAG: NORMAL
WHOLE BLOOD HOLD SPECIMEN: NORMAL

## 2025-06-20 PROCEDURE — 25010000002 MORPHINE PER 10 MG: Performed by: FAMILY MEDICINE

## 2025-06-20 PROCEDURE — 25010000002 LABETALOL 5 MG/ML SOLUTION: Performed by: FAMILY MEDICINE

## 2025-06-20 PROCEDURE — 80053 COMPREHEN METABOLIC PANEL: CPT | Performed by: NURSE PRACTITIONER

## 2025-06-20 PROCEDURE — 85025 COMPLETE CBC W/AUTO DIFF WBC: CPT | Performed by: NURSE PRACTITIONER

## 2025-06-20 PROCEDURE — 71045 X-RAY EXAM CHEST 1 VIEW: CPT

## 2025-06-20 PROCEDURE — 93010 ELECTROCARDIOGRAM REPORT: CPT | Performed by: INTERNAL MEDICINE

## 2025-06-20 PROCEDURE — 85610 PROTHROMBIN TIME: CPT | Performed by: NURSE PRACTITIONER

## 2025-06-20 PROCEDURE — 36415 COLL VENOUS BLD VENIPUNCTURE: CPT

## 2025-06-20 PROCEDURE — 96374 THER/PROPH/DIAG INJ IV PUSH: CPT

## 2025-06-20 PROCEDURE — 84484 ASSAY OF TROPONIN QUANT: CPT | Performed by: NURSE PRACTITIONER

## 2025-06-20 PROCEDURE — 83880 ASSAY OF NATRIURETIC PEPTIDE: CPT | Performed by: NURSE PRACTITIONER

## 2025-06-20 PROCEDURE — 99284 EMERGENCY DEPT VISIT MOD MDM: CPT | Performed by: FAMILY MEDICINE

## 2025-06-20 PROCEDURE — 93005 ELECTROCARDIOGRAM TRACING: CPT | Performed by: NURSE PRACTITIONER

## 2025-06-20 PROCEDURE — 70450 CT HEAD/BRAIN W/O DYE: CPT

## 2025-06-20 PROCEDURE — 96375 TX/PRO/DX INJ NEW DRUG ADDON: CPT

## 2025-06-20 RX ORDER — LOSARTAN POTASSIUM 100 MG/1
100 TABLET ORAL DAILY
Qty: 30 TABLET | Refills: 0 | Status: SHIPPED | OUTPATIENT
Start: 2025-06-20 | End: 2025-07-20

## 2025-06-20 RX ORDER — LABETALOL HYDROCHLORIDE 5 MG/ML
20 INJECTION, SOLUTION INTRAVENOUS ONCE
Status: COMPLETED | OUTPATIENT
Start: 2025-06-20 | End: 2025-06-20

## 2025-06-20 RX ORDER — SODIUM CHLORIDE 0.9 % (FLUSH) 0.9 %
10 SYRINGE (ML) INJECTION AS NEEDED
Status: DISCONTINUED | OUTPATIENT
Start: 2025-06-20 | End: 2025-06-20 | Stop reason: HOSPADM

## 2025-06-20 RX ADMIN — MORPHINE SULFATE 4 MG: 4 INJECTION, SOLUTION INTRAMUSCULAR; INTRAVENOUS at 16:47

## 2025-06-20 RX ADMIN — LABETALOL HYDROCHLORIDE 20 MG: 5 INJECTION, SOLUTION INTRAVENOUS at 17:07

## 2025-06-20 NOTE — TELEPHONE ENCOUNTER
Pt had Aortic Valve Replacement 23mm, Mitral Valve Repair 30mm Ring, Left Atrial Appendage Exclusion 35mm, Transesophageal Echocardiogram, Mitral Valve Repair/replacement, and Atrial Appendage Exclusion Left With Transesophageal Echocardiogram on 5/21/2025     Pt's daughter-in-law, Jana, is calling to report pt has c/o headache and hypertension. This began last night between 8-9 PM. BP last night measured 168/115. At about 10:40 AM today, BP measured 150/110. At 10:51 AM today, BP is 142/114 with HR 84 bpm. She reports she still has headache, but it is not as bad as last night and she is feeling a little lightheaded. Reviewed medications. Pt confirms she is taking Losartan 25 mg every morning and Metoprolol 25 mg q12h. Pt's daughter-in-law states pt is staying well-hydrated with water. She reports is is not in pain, but does have a little bit of a cough. Denies fever, chills and shortness of breath. She states she did not eat much yesterday, just fruit. This morning she had turkey and cheese for breakfast. Please advise. Jana can be reached at [phone].

## 2025-06-20 NOTE — ED NOTES
MEDICAL SCREENING:    Reason for Visit: here for elevated blood pressure. C/o headache. C/o numbness to both arms. Had valve replacement per Dr. Keenan in May 2025. Has had 2 doses of blood pressure medication today after talking to Dr. Keenan's office. C/o left chest wall soreness that she has had since valve replacement surgery. Is supposed to see Dr. Keenan again July 10. Took losartan 50mg today. Had been on 25mg.     Patient initially seen in triage.  The patient was advised further evaluation and diagnostic testing will be needed, some of the treatment and testing will be initiated in the lobby in order to begin the process.  The patient will be returned to the waiting area for the time being and possibly be re-assessed by a subsequent ED provider.  The patient will be brought back to the treatment area in as timely manner as possible.       Morenita Barth, APRN  06/20/25 0782

## 2025-06-20 NOTE — TELEPHONE ENCOUNTER
Discussed with LYNNE Patricia. She reviewed pt's chart and recommends pt take one more Losartan tablet (increase to 50 mg today), check BP one hour after taking this and call our office back with updated BP. If BP still elevated, pt needs to be seen in ER. I have relayed this to Jana. Jana states BP is currently at 131/92 and asks if pt should take this anyway since BP has come down a little bit. I have advised her for pt to go ahead and take one more as BP is still elevated and call us back in an hour.  She voiced understanding and pt will take another Losartan 25 mg now.

## 2025-06-20 NOTE — ED PROVIDER NOTES
Subjective   History of Present Illness  Patient presents emergency room with high blood pressure.  She is also having headache.  She had recent valve replacement done by Dr. Keenan.  She states that she took a couple doses of her blood pressure medication.  She states that she took losartan 50 mg today.  Normally she is prescribed 25 mg.  She denies any other symptoms at this time.          Review of Systems   All other systems reviewed and are negative.      Past Medical History:   Diagnosis Date    Allergic rhinitis     Aortic valve disease     Arrhythmia     Elevated cholesterol     GERD (gastroesophageal reflux disease)     Hyperlipidemia     Hypertension        Allergies   Allergen Reactions    Lisinopril Cough     cough    Sulfa Antibiotics Rash       Past Surgical History:   Procedure Laterality Date    AORTIC VALVE REPAIR/REPLACEMENT N/A 2025    Procedure: AORTIC VALVE REPLACEMENT 23MM, MITRAL VALVE REPAIR 30MM RING, LEFT ATRIAL APPENDAGE EXCLUSION 35MM, TRANSESOPHAGEAL ECHOCARDIOGRAM;  Surgeon: Dilshad Keenan MD;  Location:  [facility] OR;  Service: Cardiothoracic;  Laterality: N/A;    ATRIAL APPENDAGE EXCLUSION LEFT WITH TRANSESOPHAGEAL ECHOCARDIOGRAM N/A 2025    Procedure: ATRIAL APPENDAGE EXCLUSION LEFT WITH TRANSESOPHAGEAL ECHOCARDIOGRAM;  Surgeon: Dilshad Keenan MD;  Location:  [facility] OR;  Service: Cardiothoracic;  Laterality: N/A;    CATARACT EXTRACTION       SECTION      METATARSAL OSTEOTOMY Bilateral     MITRAL VALVE REPAIR/REPLACEMENT N/A 2025    Procedure: MITRAL VALVE REPAIR/REPLACEMENT;  Surgeon: Dilshad Keenan MD;  Location: [facility] OR;  Service: Cardiothoracic;  Laterality: N/A;       History reviewed. No pertinent family history.    Social History     Socioeconomic History    Marital status:    Tobacco Use    Smoking status: Never     Passive exposure: Past (Pt reports her parents used to smoke)    Smokeless tobacco: Never   Vaping Use    Vaping status: Never Used    Substance and Sexual Activity    Alcohol use: Never    Drug use: Never    Sexual activity: Defer           Objective   Physical Exam  Vitals and nursing note reviewed.   Constitutional:       Appearance: Normal appearance.   HENT:      Head: Normocephalic and atraumatic.      Mouth/Throat:      Mouth: Mucous membranes are moist.   Eyes:      Extraocular Movements: Extraocular movements intact.      Pupils: Pupils are equal, round, and reactive to light.   Cardiovascular:      Rate and Rhythm: Normal rate and regular rhythm.      Heart sounds: Normal heart sounds.   Pulmonary:      Effort: Pulmonary effort is normal.      Breath sounds: Normal breath sounds.   Abdominal:      General: Bowel sounds are normal.      Palpations: Abdomen is soft.      Tenderness: There is no abdominal tenderness.   Skin:     General: Skin is warm and dry.   Neurological:      General: No focal deficit present.      Mental Status: She is alert and oriented to person, place, and time.      Cranial Nerves: No cranial nerve deficit.      Sensory: No sensory deficit.      Motor: No weakness.      Coordination: Coordination normal.   Psychiatric:         Mood and Affect: Mood normal.         Behavior: Behavior normal.         Procedures           ED Course                                                     Lab Results (last 24 hours)       Procedure Component Value Units Date/Time    CBC & Differential [[id number]]  (Abnormal) Collected: 06/20/25 1437    Specimen: Blood Updated: 06/20/25 1517    Narrative:      The following orders were created for panel order CBC & Differential.  Procedure                               Abnormality         Status                     ---------                               -----------         ------                     CBC Auto Differential[[id number]]        Abnormal            Final result                 Please view results for these tests on the individual orders.    Comprehensive Metabolic Panel  [[id number]]  (Abnormal) Collected: 06/20/25 1437    Specimen: Blood Updated: 06/20/25 1535     Glucose 103 mg/dL      BUN 13.3 mg/dL      Creatinine 0.67 mg/dL      Sodium 140 mmol/L      Potassium 4.5 mmol/L      Comment: Slight hemolysis detected by analyzer. Result may be falsely elevated.        Chloride 105 mmol/L      CO2 22.0 mmol/L      Calcium 9.7 mg/dL      Total Protein 6.9 g/dL      Albumin 4.2 g/dL      ALT (SGPT) 22 U/L      AST (SGOT) 29 U/L      Comment: Slight hemolysis detected by analyzer. Result may be falsely elevated.        Alkaline Phosphatase 78 U/L      Total Bilirubin 0.5 mg/dL      Globulin 2.7 gm/dL      A/G Ratio 1.6 g/dL      BUN/Creatinine Ratio 19.9     Anion Gap 13.0 mmol/L      eGFR 94.2 mL/min/1.73     Narrative:      GFR Categories in Chronic Kidney Disease (CKD)              GFR Category          GFR (mL/min/1.73)    Interpretation  G1                    90 or greater        Normal or high (1)  G2                    60-89                Mild decrease (1)  G3a                   45-59                Mild to moderate decrease  G3b                   30-44                Moderate to severe decrease  G4                    15-29                Severe decrease  G5                    14 or less           Kidney failure    (1)In the absence of evidence of kidney disease, neither GFR category G1 or G2 fulfill the criteria for CKD.    eGFR calculation 2021 CKD-EPI creatinine equation, which does not include race as a factor    Protime-INR [[id number]]  (Normal) Collected: 06/20/25 1437    Specimen: Blood Updated: 06/20/25 1524     Protime 13.3 Seconds      INR 0.96    BNP [[id number]]  (Abnormal) Collected: 06/20/25 1437    Specimen: Blood Updated: 06/20/25 1531     proBNP 1,554.0 pg/mL     Narrative:      This assay is used as an aid in the diagnosis of individuals suspected of having heart failure. It can be used as an aid in the diagnosis of acute decompensated heart failure (ADHF) in  patients presenting with signs and symptoms of ADHF to the emergency department (ED). In addition, NT-proBNP of <300 pg/mL indicates ADHF is not likely.    Age Range Result Interpretation  NT-proBNP Concentration (pg/mL:      <50             Positive            >450                   Gray                 300-450                    Negative             <300    50-75           Positive            >900                  Gray                300-900                  Negative            <300      >75             Positive            >1800                  Gray                300-1800                  Negative            <300    High Sensitivity Troponin T [[id number]]  (Abnormal) Collected: 06/20/25 1437    Specimen: Blood Updated: 06/20/25 1530     HS Troponin T 23 ng/L     Narrative:      High Sensitive Troponin T Reference Range:  <14.0 ng/L- Negative Female for AMI  <22.0 ng/L- Negative Male for AMI  >=14 - Abnormal Female indicating possible myocardial injury.  >=22 - Abnormal Male indicating possible myocardial injury.   Clinicians would have to utilize clinical acumen, EKG, Troponin, and serial changes to determine if it is an Acute Myocardial Infarction or myocardial injury due to an underlying chronic condition.         CBC Auto Differential [[id number]]  (Abnormal) Collected: 06/20/25 1437    Specimen: Blood Updated: 06/20/25 1517     WBC 6.04 10*3/mm3      RBC 4.14 10*6/mm3      Hemoglobin 11.7 g/dL      Hematocrit 35.5 %      MCV 85.7 fL      MCH 28.3 pg      MCHC 33.0 g/dL      RDW 13.6 %      RDW-SD 42.0 fl      MPV 11.6 fL      Platelets 213 10*3/mm3      Neutrophil % 58.3 %      Lymphocyte % 27.6 %      Monocyte % 8.1 %      Eosinophil % 4.1 %      Basophil % 0.7 %      Immature Grans % 1.2 %      Neutrophils, Absolute 3.52 10*3/mm3      Lymphocytes, Absolute 1.67 10*3/mm3      Monocytes, Absolute 0.49 10*3/mm3      Eosinophils, Absolute 0.25 10*3/mm3      Basophils, Absolute 0.04 10*3/mm3      Immature Grans,  Absolute 0.07 10*3/mm3      nRBC 0.0 /100 WBC     High Sensitivity Troponin T 1Hr [[id number]]  (Abnormal) Collected: 06/20/25 1556    Specimen: Blood from Arm, Left Updated: 06/20/25 1627     HS Troponin T 23 ng/L      Troponin T Numeric Delta 0 ng/L      Troponin T % Delta 0    Narrative:      High Sensitive Troponin T Reference Range:  <14.0 ng/L- Negative Female for AMI  <22.0 ng/L- Negative Male for AMI  >=14 - Abnormal Female indicating possible myocardial injury.  >=22 - Abnormal Male indicating possible myocardial injury.   Clinicians would have to utilize clinical acumen, EKG, Troponin, and serial changes to determine if it is an Acute Myocardial Infarction or myocardial injury due to an underlying chronic condition.               CT Head Without Contrast   Final Result       1. Mild cerebral and cerebellar atrophy with chronic microvascular   disease but no evidence of acute intracranial process.               This report was signed and finalized on 6/20/2025 5:28 PM by Dr. Murtaza Carreon MD.          XR Chest 1 View   Final Result   1. Stable chest x-ray appearance.   2. No acute disease.               This report was signed and finalized on 6/20/2025 3:52 PM by Dr. Dilip Jones MD.            Medications   sodium chloride 0.9 % flush 10 mL (has no administration in time range)   morphine injection 4 mg (4 mg Intravenous Given 6/20/25 1647)   labetalol (NORMODYNE,TRANDATE) injection 20 mg (20 mg Intravenous Given 6/20/25 1707)       Medical Decision Making  I had a discussion with the patient/family regarding diagnosis, diagnostic results, treatment plan, and medications.  The patient/family indicated understanding of these instructions.  I spent adequate time at the bedside prior to discharge necessary to discuss the aftercare instructions, giving patient education, providing explanations of the results of our evaluations/findings, and my decision making to assure that the patient/family understand  the plan of care.  Time was allotted to answer questions at that time and throughout the ED course.  Emphasis was placed on timely follow-up after discharge.  I also discussed the potential for the development of an acute emergent condition requiring further evaluation, return to the ER, admission, or even surgical intervention. I discussed that we found nothing during the visit today indicating the need for further ER workup at this time, admission to the hospital, or the presence of an acute unstable medical condition.  I encouraged the patient to return to the emergency department immediately for ANY concerns, worsening, new complaints, or if symptoms persist and unable to seek follow-up in a timely fashion.  The patient/family expressed understanding and agreement with this plan.      Problems Addressed:  Elevated blood pressure reading with diagnosis of hypertension: complicated acute illness or injury    Amount and/or Complexity of Data Reviewed  Labs: ordered.  Radiology: ordered.  ECG/medicine tests: ordered.  Discussion of management or test interpretation with external provider(s): Dr. Luna was consulted.  Recommending increasing the losartan to 100 mg daily.    Risk  Prescription drug management.        Final diagnoses:   Elevated blood pressure reading with diagnosis of hypertension       ED Disposition  ED Disposition       ED Disposition   Discharge    Condition   Stable    Comment   --               Glo Roberts, LYNNE  [address] [place] [address] [id number] [phone]    Schedule an appointment as soon as possible for a visit       [facility] EMERGENCY DEPARTMENT  [address] [place] [id number] [phone]    As needed, If symptoms worsen         Medication List        Changed      losartan 100 MG tablet  Commonly known as: Cozaar  Take 1 tablet by mouth Daily for 30 days.  What changed:   medication strength  how much to take  when to take this                Where to Get Your Medications        These medications were sent to [id number] - [place], [place] - [address] - [phone]  - [phone] FX  [address], [place] [address]      Phone: [phone]   losartan 100 MG tablet            Mik Tracy MD  06/20/25 3960

## 2025-06-21 LAB
QT INTERVAL: 432 MS
QTC INTERVAL: 486 MS

## 2025-06-23 ENCOUNTER — OFFICE VISIT (OUTPATIENT)
Dept: CARDIAC SURGERY | Facility: CLINIC | Age: 70
End: 2025-06-23
Payer: MEDICARE

## 2025-06-23 VITALS
BODY MASS INDEX: 28.96 KG/M2 | OXYGEN SATURATION: 96 % | HEIGHT: 61 IN | WEIGHT: 153.4 LBS | DIASTOLIC BLOOD PRESSURE: 84 MMHG | SYSTOLIC BLOOD PRESSURE: 129 MMHG | HEART RATE: 88 BPM

## 2025-06-23 DIAGNOSIS — I34.0 SEVERE MITRAL VALVE REGURGITATION: ICD-10-CM

## 2025-06-23 DIAGNOSIS — I10 PRIMARY HYPERTENSION: ICD-10-CM

## 2025-06-23 DIAGNOSIS — I35.1 SEVERE AORTIC VALVE REGURGITATION: Primary | ICD-10-CM

## 2025-06-23 PROCEDURE — 1160F RVW MEDS BY RX/DR IN RCRD: CPT | Performed by: NURSE PRACTITIONER

## 2025-06-23 PROCEDURE — 1159F MED LIST DOCD IN RCRD: CPT | Performed by: NURSE PRACTITIONER

## 2025-06-23 PROCEDURE — 3074F SYST BP LT 130 MM HG: CPT | Performed by: NURSE PRACTITIONER

## 2025-06-23 PROCEDURE — 99024 POSTOP FOLLOW-UP VISIT: CPT | Performed by: NURSE PRACTITIONER

## 2025-06-23 PROCEDURE — 3079F DIAST BP 80-89 MM HG: CPT | Performed by: NURSE PRACTITIONER

## 2025-06-23 RX ORDER — CETIRIZINE HYDROCHLORIDE 10 MG/1
10 TABLET ORAL DAILY PRN
COMMUNITY

## 2025-06-23 NOTE — TELEPHONE ENCOUNTER
If her blood pressure is better after medicine adjustments made over the weekend, she does not have to have follow-up with me.  If she is still having elevated readings, let me know.  Also find out if she has had follow-up with cardiology yet since surgery

## 2025-06-23 NOTE — TELEPHONE ENCOUNTER
Called daughter and she reports BP is not improved (133/125 this morning).  Also states pt has only seen our office and PCP since surgery.  Informed Jayda BATISTA of this info and per her verbal instruction, appt sched for pt to be seen this afternoon/marcia

## 2025-06-23 NOTE — PROGRESS NOTES
Subjective   Chief Complaint   Patient presents with    Post-op Follow-up     Patient had AVR/MVR on 5/21       Patient ID: Gladys Pino is a 69 y.o. 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 Keenan.     History of Present Illness  Ms. Pino is a 69-year-old female who underwent the above listed procedure by Dr. Keenan on 5/21/2025.  Postoperative recovery was significant for weaning supplemental O2 and pain control.  She ultimately met criteria for discharge home on postop day 8.  She has overall done pretty well since discharge but last week developed hypotension prompting visit to the ER.  Losartan was increased to 100 mg daily and she ultimately met criteria for discharge home.  Over the weekend she resumed hydralazine and called the office this morning to report ongoing elevations in diastolic blood pressure readings in the 1 teens to 120s.  She was advised follow-up in the office today.  She has not yet had follow-up with cardiology and is scheduled to see them on 7/17/2025.  Heart rate at home has been in the 80s.  Diastolic blood pressure will fluctuate between 80 and 1 teens throughout the day.      The following portions of the patient's history were reviewed and updated as appropriate: allergies, current medications, past family history, past medical history, past social history, past surgical history and problem list.    Review of Systems   Constitutional:  Negative for chills, diaphoresis, fatigue and fever.   HENT:  Negative for congestion, sneezing, trouble swallowing and voice change.    Eyes:  Negative for visual disturbance.   Respiratory:  Negative for cough, chest tightness and shortness of breath.    Cardiovascular:  Negative for chest pain, palpitations and leg swelling.   Gastrointestinal:  Negative for  "abdominal pain, diarrhea, nausea and vomiting.   Musculoskeletal:  Negative for arthralgias and myalgias.   Skin:  Negative for color change, pallor, rash and wound.   Neurological:  Positive for headaches. Negative for dizziness, syncope and light-headedness.   Psychiatric/Behavioral:  Negative for agitation, confusion and sleep disturbance.        Objective   Visit Vitals  /84   Pulse 88   Ht 155 cm (61.02\")   Wt 69.6 kg (153 lb 6.4 oz)   SpO2 96%   BMI 28.97 kg/m²         Physical Exam  Vitals reviewed.   Constitutional:       General: She is not in acute distress.  HENT:      Head: Normocephalic.   Eyes:      Pupils: Pupils are equal, round, and reactive to light.   Cardiovascular:      Rate and Rhythm: Normal rate and regular rhythm.      Heart sounds: Normal heart sounds. No murmur heard.  Pulmonary:      Breath sounds: Normal breath sounds. No wheezing or rales.   Abdominal:      General: There is no distension.      Palpations: Abdomen is soft.      Tenderness: There is no abdominal tenderness.   Musculoskeletal:         General: No swelling or tenderness.   Skin:     General: Skin is warm and dry.      Comments: Sternum is stable, no clicks.  Sternal incision is clean dry and intact and healing nicely.   Neurological:      General: No focal deficit present.      Mental Status: She is alert and oriented to person, place, and time.   Psychiatric:         Mood and Affect: Mood normal.         Behavior: Behavior normal.         Thought Content: Thought content normal.         Judgment: Judgment normal.             Assessment & Plan     Diagnoses and all orders for this visit:    1. Severe aortic valve regurgitation (Primary)    2. Severe mitral valve regurgitation    3. Primary hypertension           Overall, Gladys Pino is doing well from a surgery standpoint.   Medication list reviewed and I have advised her to resume amlodipine 5 mg daily as she took prior to surgery.  Blood pressures have been " better after restarting hydralazine but at times diastolic is getting above 100.  We discussed monitoring blood pressure an hour or 2 after she takes her morning medications and then as needed.  She verbalized understanding.  She was on Lasix daily prior to surgery, she is having no lower extremity edema and is well below her baseline weight.  We discussed only using Lasix on an as-needed basis for weight gain of 3 to 5 pounds in a day.  Following post op cardiac surgery home instructions. Provided support and encouragement. All questions have been answered to the best of my ability. Will discuss starting cardiac rehabilitation at official 1 month post op visit. Patient has follow up with Dr. Keenan on 7/10/25.  A repeat transthoracic echo will be arranged prior to this appointment for new postoperative baseline.  Patient has follow up with Cardiology in a few weeks. Patient has been instructed to contact our office with any questions or concerns should they arise prior to the next office visit.  Keep scheduled follow-up with Dr. Keenan next month.    Gladys Pino is a non-smoker and therefore does not need tobacco cessation education/counseling.      Advance Care Planning   ACP discussion was held with the patient during this visit. Patient has an advance directive in EMR which is still valid.

## 2025-06-23 NOTE — TELEPHONE ENCOUNTER
Daughter calling stating that, per Jayda BATISTA, she was supposed to call this morning to get appt this week.  Does pt need to be seen today or Thursday?/marcia

## 2025-06-30 NOTE — TELEPHONE ENCOUNTER
"Called pt re: upcoming appointments. No answer. Left detailed message and requested call back to confirm her appointment.          If pt returns call, please relay the above and document in this encounter.    Relay     \"Check in at main registration inside the hospital at 2:00 PM. Please have nothing to eat or drink for 6 hours prior to this appointment (except medications with a sip of water). Cease all smoking/nicotine/caffeine/decaffinated use for 24 hours prior to this exam.\"                " IV ABX and other Meds and walker delivered at bedside. Rn wants to teach pt how to use glucometer which delivered from pharmacy and Wife Meme refused, she said no need to teach pt now, she will teach pt how to use it after they gets home because she used it herself everyday at home.  Pt and wife given discharge instruction, medication reviewed with the pt, follow up appts reviewed. All questions and concerns addressed. Pt verbalized understanding. PIV, telemetry removed. Discharge paperwork given to patient. Pt in room waiting for transport. 1605: Pt left with family and belongings.

## 2025-07-07 NOTE — NURSING NOTE
IV attempted x 2, L & R FA, unsuccessful. 22g, blood return on both, veins blown upon advancement of catheter. VAT consult placed.   on the discharge service for the patient. I have reviewed and made amendments to the documentation where necessary.

## 2025-07-08 ENCOUNTER — HOSPITAL ENCOUNTER (OUTPATIENT)
Dept: CARDIOLOGY | Facility: HOSPITAL | Age: 70
Discharge: HOME | End: 2025-07-08
Admitting: NURSE PRACTITIONER
Payer: MEDICARE

## 2025-07-08 VITALS
WEIGHT: 153 LBS | BODY MASS INDEX: 28.89 KG/M2 | SYSTOLIC BLOOD PRESSURE: 132 MMHG | HEIGHT: 61 IN | DIASTOLIC BLOOD PRESSURE: 76 MMHG

## 2025-07-08 DIAGNOSIS — I34.0 SEVERE MITRAL VALVE REGURGITATION: ICD-10-CM

## 2025-07-08 DIAGNOSIS — I35.1 SEVERE AORTIC VALVE REGURGITATION: ICD-10-CM

## 2025-07-08 PROCEDURE — 93306 TTE W/DOPPLER COMPLETE: CPT

## 2025-07-10 ENCOUNTER — OFFICE VISIT (OUTPATIENT)
Dept: CARDIAC SURGERY | Facility: CLINIC | Age: 70
End: 2025-07-10
Payer: MEDICARE

## 2025-07-10 VITALS
OXYGEN SATURATION: 97 % | WEIGHT: 157 LBS | BODY MASS INDEX: 29.64 KG/M2 | SYSTOLIC BLOOD PRESSURE: 120 MMHG | DIASTOLIC BLOOD PRESSURE: 82 MMHG | HEART RATE: 79 BPM | HEIGHT: 61 IN

## 2025-07-10 DIAGNOSIS — I35.1 SEVERE AORTIC VALVE REGURGITATION: Primary | ICD-10-CM

## 2025-07-10 DIAGNOSIS — I34.0 SEVERE MITRAL VALVE REGURGITATION: ICD-10-CM

## 2025-07-10 DIAGNOSIS — I34.0 SEVERE MITRAL VALVE REGURGITATION: Primary | ICD-10-CM

## 2025-07-10 DIAGNOSIS — I35.1 SEVERE AORTIC VALVE REGURGITATION: ICD-10-CM

## 2025-07-10 LAB
AORTIC DIMENSIONLESS INDEX: 1.02 (DI)
AV MEAN PRESS GRAD SYS DOP V1V2: 4.5 MMHG
AV VMAX SYS DOP: 159 CM/SEC
BH CV ECHO MEAS - AO MAX PG: 10.1 MMHG
BH CV ECHO MEAS - AO V2 VTI: 30.1 CM
BH CV ECHO MEAS - AVA(I,D): 3.2 CM2
BH CV ECHO MEAS - EDV(CUBED): 38.6 ML
BH CV ECHO MEAS - EDV(MOD-SP2): 33.8 ML
BH CV ECHO MEAS - EDV(MOD-SP4): 34.4 ML
BH CV ECHO MEAS - EF(MOD-SP2): 45.6 %
BH CV ECHO MEAS - EF(MOD-SP4): 44.2 %
BH CV ECHO MEAS - ESV(CUBED): 9.3 ML
BH CV ECHO MEAS - ESV(MOD-SP2): 18.4 ML
BH CV ECHO MEAS - ESV(MOD-SP4): 19.2 ML
BH CV ECHO MEAS - FS: 37.9 %
BH CV ECHO MEAS - IVS/LVPW: 1.12 CM
BH CV ECHO MEAS - IVSD: 1.54 CM
BH CV ECHO MEAS - LA DIMENSION: 3.2 CM
BH CV ECHO MEAS - LAT PEAK E' VEL: 11.7 CM/SEC
BH CV ECHO MEAS - LV DIASTOLIC VOL/BSA (35-75): 20.5 CM2
BH CV ECHO MEAS - LV MASS(C)D: 175.5 GRAMS
BH CV ECHO MEAS - LV MAX PG: 7 MMHG
BH CV ECHO MEAS - LV MEAN PG: 4 MMHG
BH CV ECHO MEAS - LV SYSTOLIC VOL/BSA (12-30): 11.5 CM2
BH CV ECHO MEAS - LV V1 MAX: 132 CM/SEC
BH CV ECHO MEAS - LV V1 VTI: 30.7 CM
BH CV ECHO MEAS - LVIDD: 3.4 CM
BH CV ECHO MEAS - LVIDS: 2.1 CM
BH CV ECHO MEAS - LVOT AREA: 3.1 CM2
BH CV ECHO MEAS - LVOT DIAM: 2 CM
BH CV ECHO MEAS - LVPWD: 1.37 CM
BH CV ECHO MEAS - MED PEAK E' VEL: 8.7 CM/SEC
BH CV ECHO MEAS - MV A MAX VEL: 95.5 CM/SEC
BH CV ECHO MEAS - MV DEC SLOPE: 392 CM/SEC2
BH CV ECHO MEAS - MV E MAX VEL: 82.3 CM/SEC
BH CV ECHO MEAS - MV E/A: 0.86
BH CV ECHO MEAS - MV MAX PG: 4.6 MMHG
BH CV ECHO MEAS - MV MEAN PG: 2 MMHG
BH CV ECHO MEAS - MV P1/2T: 71.4 MSEC
BH CV ECHO MEAS - MV V2 VTI: 32.3 CM
BH CV ECHO MEAS - MVA(P1/2T): 3.1 CM2
BH CV ECHO MEAS - MVA(VTI): 3 CM2
BH CV ECHO MEAS - PA V2 MAX: 78.2 CM/SEC
BH CV ECHO MEAS - RAP SYSTOLE: 3 MMHG
BH CV ECHO MEAS - RVSP: 29.4 MMHG
BH CV ECHO MEAS - SV(LVOT): 96.4 ML
BH CV ECHO MEAS - SV(MOD-SP2): 15.4 ML
BH CV ECHO MEAS - SV(MOD-SP4): 15.2 ML
BH CV ECHO MEAS - SVI(LVOT): 57.5 ML/M2
BH CV ECHO MEAS - SVI(MOD-SP2): 9.2 ML/M2
BH CV ECHO MEAS - SVI(MOD-SP4): 9.1 ML/M2
BH CV ECHO MEAS - TAPSE (>1.6): 1.26 CM
BH CV ECHO MEAS - TR MAX PG: 26.4 MMHG
BH CV ECHO MEAS - TR MAX VEL: 257 CM/SEC
BH CV ECHO MEASUREMENTS AVERAGE E/E' RATIO: 8.07
BH CV XLRA - RV BASE: 3.6 CM

## 2025-07-10 PROCEDURE — 3079F DIAST BP 80-89 MM HG: CPT | Performed by: SURGERY

## 2025-07-10 PROCEDURE — 1160F RVW MEDS BY RX/DR IN RCRD: CPT | Performed by: SURGERY

## 2025-07-10 PROCEDURE — 1159F MED LIST DOCD IN RCRD: CPT | Performed by: SURGERY

## 2025-07-10 PROCEDURE — 3074F SYST BP LT 130 MM HG: CPT | Performed by: SURGERY

## 2025-07-10 PROCEDURE — 99024 POSTOP FOLLOW-UP VISIT: CPT | Performed by: SURGERY

## 2025-07-10 NOTE — LETTER
July 10, 2025     LYNNE Golden  [address] [place] [address] [place] [address]    Patient: Gladys Pino   YOB: 1955   Date of Visit: 7/10/2025       Dear LYNNE Golden,    Gladys Pino was in my office today. Below are the relevant portions of my assessment and plan of care.    Ms. Pnio is a 69-year-old female she is now 6 weeks postop from aortic valve replacement with 23 mm Inspiris valve, mitral valve repair for severe mitral regurgitation.  She initially presented with class II-III NYHA heart failure symptoms, shortness of breath fatigue with activities of daily living and household chores as well as while she was singing at [employer].  She feels much better compared to prior with near resolution of her heart failure symptoms.  Her wounds have healed well she has a stable sternum.  Overall very happy with how she is done.  I reviewed her echocardiogram and overall shows an excellent surgical result no residual MR well-seated aortic valve with no PVL.    We discussed return to normal activity on progressive basis she understands and agrees.  She has follow-up scheduled with Dr. Donis at [facility] cardiology.  Her blood pressure here is well-controlled at 120/82, she has stopped taking her losartan but continues to take metoprolol twice daily.  I did encourage her and stressed the importance of continuing lifelong aspirin, she understands.  We also discussed the importance of SBE prophylaxis prior to any invasive or dental procedures, she understands.  Will make referral for cardiac rehab.    Should continue to follow with [facility] cardiology.  I would like to see her back in 1 year with repeat echo.  Thank you for trusting with the care of Ms. Pino.  Please do not hesitate to call questions or concerns.         Sincerely,        Dilshad Keenan MD        CC: Hayes Donis MD

## 2025-07-10 NOTE — PROGRESS NOTES
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    She reports an improvement in her condition following the surgery, with enhanced ability to participate in choir singing and perform household chores. However, she experiences discomfort when sleeping on her side, which she attributes to the pressure exerted on her sternum. She has been managing this pain with Tylenol as ibuprofen causes stomach upset. She has an upcoming appointment with Dr. Becker on 07/17/2025.    MEDICATIONS  Current: Tylenol       Objective:      07/10/25  1026   Weight: 71.2 kg (157 lb)        PE:  Visit Vitals  /82   Pulse 79   Ht 154.9 cm (61\")   Wt 71.2 kg (157 lb)   SpO2 97%   BMI 29.66 kg/m²       GENERAL: NAD, resting comfortably, normal palor  CARDIOVASCULAR: regular, regular rate, well healed sternotomy with stable sternum  PULMONARY: Normal bilateral breath sounds, no labored breathing  ABDOMEN: soft, nt/nd  EXTREMITIES: mild peripheral edema, normal pulses, normal ROM                 Lab Results (last 72 hours)      ** No results found for the last 72 hours. **           I personally reviewed echocardiogram the following is my interpretation:  Well-seated prosthetic aortic valve mean gradient of 4 no PVL, normal LV function, mitral valve repair with no residual MR and minimal mitral valve gradient overall excellent surgical result     Assessment/Plan      Ms. Pino is a 69-year-old female she is now 6 weeks postop from aortic valve replacement with 23 mm Inspiris valve, mitral valve repair for severe mitral regurgitation.  She initially presented with class II-III NYHA heart failure " symptoms, shortness of breath fatigue with activities of daily living and household chores as well as while she was singing at [employer].  She feels much better compared to prior with near resolution of her heart failure symptoms.  Her wounds have healed well she has a stable sternum.  Overall very happy with how she is done.  I reviewed her echocardiogram and overall shows an excellent surgical result no residual MR well-seated aortic valve with no PVL.    We discussed return to normal activity on progressive basis she understands and agrees.  She has follow-up scheduled with Dr. Donis at [facility] cardiology.  Her blood pressure here is well-controlled at 120/82, she has stopped taking her losartan but continues to take metoprolol twice daily.  I did encourage her and stressed the importance of continuing lifelong aspirin, she understands.  We also discussed the importance of SBE prophylaxis prior to any invasive or dental procedures, she understands.  Will make referral for cardiac rehab.    Should continue to follow with [facility] cardiology.  I would like to see her back in 1 year with repeat echo.  Thank you for trusting with the care of Ms. Pino.  Please do not hesitate to call questions or concerns.         Dilshad Keenan MD   Cardiothoracic Surgeon      Patient or patient representative verbalized consent for the use of Ambient Listening during the visit with  Dilshad Keenan MD for chart documentation. 7/10/2025  11:07 CDT

## 2025-07-17 ENCOUNTER — OFFICE VISIT (OUTPATIENT)
Dept: CARDIOLOGY CLINIC | Age: 70
End: 2025-07-17
Payer: MEDICARE

## 2025-07-17 VITALS — SYSTOLIC BLOOD PRESSURE: 124 MMHG | WEIGHT: 160 LBS | DIASTOLIC BLOOD PRESSURE: 80 MMHG | BODY MASS INDEX: 30.23 KG/M2

## 2025-07-17 DIAGNOSIS — I38 VALVULAR HEART DISEASE: Primary | ICD-10-CM

## 2025-07-17 PROCEDURE — 1160F RVW MEDS BY RX/DR IN RCRD: CPT | Performed by: INTERNAL MEDICINE

## 2025-07-17 PROCEDURE — 3079F DIAST BP 80-89 MM HG: CPT | Performed by: INTERNAL MEDICINE

## 2025-07-17 PROCEDURE — 1159F MED LIST DOCD IN RCRD: CPT | Performed by: INTERNAL MEDICINE

## 2025-07-17 PROCEDURE — 1123F ACP DISCUSS/DSCN MKR DOCD: CPT | Performed by: INTERNAL MEDICINE

## 2025-07-17 PROCEDURE — 99214 OFFICE O/P EST MOD 30 MIN: CPT | Performed by: INTERNAL MEDICINE

## 2025-07-17 PROCEDURE — 3074F SYST BP LT 130 MM HG: CPT | Performed by: INTERNAL MEDICINE

## 2025-07-17 RX ORDER — TRAMADOL HYDROCHLORIDE 50 MG/1
50 TABLET ORAL EVERY 6 HOURS PRN
COMMUNITY

## 2025-07-17 RX ORDER — METOPROLOL TARTRATE 25 MG/1
25 TABLET, FILM COATED ORAL 2 TIMES DAILY
COMMUNITY

## 2025-07-17 RX ORDER — ATORVASTATIN CALCIUM 20 MG/1
20 TABLET, FILM COATED ORAL DAILY
COMMUNITY
End: 2025-07-17 | Stop reason: ALTCHOICE

## 2025-07-17 RX ORDER — LOSARTAN POTASSIUM 25 MG/1
25 TABLET ORAL DAILY
COMMUNITY
End: 2025-07-17 | Stop reason: ALTCHOICE

## 2025-07-17 RX ORDER — ASPIRIN 81 MG/1
81 TABLET ORAL DAILY
COMMUNITY

## 2025-07-17 RX ORDER — CETIRIZINE HYDROCHLORIDE 10 MG/1
10 TABLET ORAL DAILY
COMMUNITY

## 2025-07-17 RX ORDER — AMLODIPINE BESYLATE 5 MG/1
5 TABLET ORAL DAILY
COMMUNITY

## 2025-07-17 RX ORDER — HYDRALAZINE HYDROCHLORIDE 25 MG/1
25 TABLET, FILM COATED ORAL 2 TIMES DAILY
Qty: 60 TABLET | Refills: 3 | Status: SHIPPED | OUTPATIENT
Start: 2025-07-17

## 2025-07-17 ASSESSMENT — ENCOUNTER SYMPTOMS
ABDOMINAL DISTENTION: 0
ABDOMINAL PAIN: 0
EYE REDNESS: 0
EYE PAIN: 0
SHORTNESS OF BREATH: 0
APNEA: 0
VOMITING: 0
EYE DISCHARGE: 0
BLOOD IN STOOL: 0
SORE THROAT: 0
CHEST TIGHTNESS: 0
DIARRHEA: 0
CONSTIPATION: 0
COUGH: 0
NAUSEA: 0
WHEEZING: 0
FACIAL SWELLING: 0

## 2025-07-17 NOTE — PROGRESS NOTES
Cardiology Office Visit Note  [address], [facility]  [address]  Phone: [phone]  Fax: [phone]                            Date:  7/17/2025  Patient: Maye Butler  Age:  69 y.o., 1955    Referral: No ref. provider found    REASON FOR VISIT:  Follow-up (Has been having headaches/Bp has been around 107/44)         PROBLEM LIST:  Patient Active Problem List    Diagnosis Date Noted    Severe aortic regurgitation 03/27/2025     Priority: High    Chest pain, unspecified 03/24/2025    Shortness of breath 03/24/2025    PVC (premature ventricular contraction) 03/30/2023    Diastolic dysfunction 02/01/2019    Valvular heart disease 08/23/2017    Bicuspid aortic valve 08/23/2017    Palpitations 10/20/2015    Tricuspid regurgitation      Overview Note:     echo mild to moderate      Hypertension        ASSESSMENT PLAN:  Assessment & Plan  Chronic valvular heart disease:  - Severe mitral regurgitation due to mitral valve prolapse and severe aortic regurgitation due to congenital bicuspid aortic valve  - Successful surgical intervention in late May 2025  - Echocardiogram on 07/10/2025 showed EF 51-55%, mild to moderate LVH, indeterminate diastolic function, normal functioning bioprosthetic aortic valve with mean gradient 4 mmHg  - No significant stenosis or regurgitation in mitral valve  - Postoperative follow-up: ultrasound one year after surgery, then every few years    Headaches:  - CT head on 06/20/2025 showed mild cerebral and cerebellar atrophy, chronic microvascular disease, no acute intracranial process  - If headaches persist >2 weeks, consider MRI and neurology referral    Hypertension:  - Home BP readings low, recent 107/44  - Reduce hydralazine to twice daily  - Continue amlodipine 5 mg and baby aspirin  - Discontinue Cozaar and Lasix  - Monitor BP at home, report readings in next phone consultation in two weeks    Cholesterol management:  - Continue vitamins  - Discontinue

## 2025-07-31 ENCOUNTER — SCHEDULED TELEPHONE ENCOUNTER (OUTPATIENT)
Dept: CARDIOLOGY CLINIC | Age: 70
End: 2025-07-31
Payer: MEDICARE

## 2025-07-31 DIAGNOSIS — I10 PRIMARY HYPERTENSION: Primary | ICD-10-CM

## 2025-07-31 PROCEDURE — 1123F ACP DISCUSS/DSCN MKR DOCD: CPT | Performed by: INTERNAL MEDICINE

## 2025-07-31 PROCEDURE — 1159F MED LIST DOCD IN RCRD: CPT | Performed by: INTERNAL MEDICINE

## 2025-07-31 PROCEDURE — 99213 OFFICE O/P EST LOW 20 MIN: CPT | Performed by: INTERNAL MEDICINE

## 2025-07-31 NOTE — PROGRESS NOTES
Cardiology Office Visit Note  [address], [facility]  [address]  Phone: [phone]  Fax: [phone]                            Date:  7/31/2025  Patient: Maye Butler  Age:  70 y.o., 1955    Referral: No ref. provider found    REASON FOR VISIT:         PROBLEM LIST:  Patient Active Problem List    Diagnosis Date Noted    Severe aortic regurgitation 03/27/2025     Priority: High    Chest pain, unspecified 03/24/2025    Shortness of breath 03/24/2025    PVC (premature ventricular contraction) 03/30/2023    Diastolic dysfunction 02/01/2019    Valvular heart disease 08/23/2017    Bicuspid aortic valve 08/23/2017    Palpitations 10/20/2015    Tricuspid regurgitation      Overview Note:     echo mild to moderate      Hypertension        ASSESSMENT PLAN:  Assessment & Plan  Chronic valvular heart disease:  Severe mitral regurgitation due to mitral valve prolapse and severe aortic regurgitation due to congenital bicuspid aortic valve  - no significant CAD on cath 3/2025  - Successful surgical intervention in late May 2025  - Echocardiogram on 07/10/2025 showed EF 51-55%, mild to moderate LVH, indeterminate diastolic function, normal functioning bioprosthetic aortic valve with mean gradient 4 mmHg. No significant stenosis or regurgitation in mitral valve.    Chronic resting hypertension complicated by mild orthostatsis  - Continue amlodipine and hydralazine  - Reduce amlodipine dosage from 5 mg to 2.5 mg  - Monitor BP a few times weekly  - Contact office if systolic trends towards 90s for potential medication adjustment      Follow-up:  - Scheduled call in 1 month to assess condition and BP      PRESENTATION:   History of Present Illness  The patient, a 70-year-old female, presents via telephone for reevaluation of her blood pressure trend. She has a history of chronic valvular heart disease, severe mitral regurgitation secondary to mitral valve prolapse, and severe aortic regurgitation

## 2025-09-02 ENCOUNTER — TELEPHONE (OUTPATIENT)
Dept: CARDIOLOGY CLINIC | Age: 70
End: 2025-09-02

## 2025-09-02 RX ORDER — METOPROLOL TARTRATE 25 MG/1
25 TABLET, FILM COATED ORAL 2 TIMES DAILY
Qty: 180 TABLET | Refills: 3 | Status: SHIPPED | OUTPATIENT
Start: 2025-09-02 | End: 2025-12-01

## (undated) DEVICE — ADHS LIQ MASTISOL 2/3ML

## (undated) DEVICE — STRIP,CLOSURE,WOUND,MEDI-STRIP,1/2X4: Brand: MEDLINE

## (undated) DEVICE — E-PACK PROCEDURE KIT: Brand: E-PACK

## (undated) DEVICE — Device

## (undated) DEVICE — SUT ETHIB 2/0 SH SH 36IN X523H

## (undated) DEVICE — CATH IV ANGIO FEP 14GA 5.25IN ORNG 10PK

## (undated) DEVICE — CATHETER DIAG 5FR L100CM LUMN ID0.047IN JL3.5 CRV 0 SIDE H

## (undated) DEVICE — SCANLAN® SURG-I-PAW® INSTRUMENT COVERS - RED, 1/10" X 5"/ 3 MMX13 CM (2 - 5" PCS /PKG): Brand: SCANLAN® SURG-I-PAW® INSTRUMENT COVERS

## (undated) DEVICE — OASIS DRAIN, SINGLE, INLINE & ATS COMPATIBLE: Brand: OASIS

## (undated) DEVICE — SUT PROLN 4/0 RB1 36IN 4PK M8557

## (undated) DEVICE — ANTIBACTERIAL UNDYED BRAIDED (POLYGLACTIN 910), SYNTHETIC ABSORBABLE SUTURE: Brand: COATED VICRYL

## (undated) DEVICE — SUT GUT CHRM 4/0 RB1 27IN U203H

## (undated) DEVICE — NDL CNTR 40CT FM MAG: Brand: MEDLINE INDUSTRIES, INC.

## (undated) DEVICE — BLAKE SILICONE DRAINS CARDIO CONNECTOR 2:1: Brand: BLAKE

## (undated) DEVICE — SUT PROLN 3/0 SH D/A 36IN 8522H

## (undated) DEVICE — OPTIFOAM GENTLE SA, POSTOP, 4X12: Brand: MEDLINE

## (undated) DEVICE — BG OR ZSUT SADDLE 20IN CLR STRL

## (undated) DEVICE — KIT ANGIO W/ AT P65 PREM HND CTRL FOR CNTRST DEL ANGIOTOUCH

## (undated) DEVICE — DRAIN,WOUND,ROUND,24FR,5/16",FULL-FLUTED: Brand: MEDLINE

## (undated) DEVICE — INTENDED FOR TISSUE SEPARATION, AND OTHER PROCEDURES THAT REQUIRE A SHARP SURGICAL BLADE TO PUNCTURE OR CUT.: Brand: BARD-PARKER ® STAINLESS STEEL BLADES

## (undated) DEVICE — CATHETER DIAG 5FR L100CM LUMN ID0.047IN JR4 CRV 0 SIDE H

## (undated) DEVICE — DOVER HYDROGEL COATED LATEX FOLEY CATHETER, 30 ML, 3-WAY 24 FR/CH (8.0 MM): Brand: DOVER

## (undated) DEVICE — ELECTRD DEFIB M/FUNC GEL LIQ RL PROPADZ

## (undated) DEVICE — CLTH CLENS READYCLEANSE PERI CARE PK/5

## (undated) DEVICE — BAPTIST TURNOVER KIT: Brand: MEDLINE INDUSTRIES, INC.

## (undated) DEVICE — TRAP FLD MINIVAC MEGADYNE 100ML

## (undated) DEVICE — SYR LUERLOK 20CC BX/50

## (undated) DEVICE — GLIDESHEATH SLENDER STAINLESS STEEL KIT: Brand: GLIDESHEATH SLENDER

## (undated) DEVICE — Device: Brand: NOMOLINE™ LH ADULT NASAL CO2 CANNULA WITH O2 4M

## (undated) DEVICE — ORGANIZER SUT SHELIGH 3T 213013

## (undated) DEVICE — BAND COMPR L24CM REG CLR PLAS HEMSTAT EXT HK AND LOOP RETEN

## (undated) DEVICE — SUT SILK 2/0 FS BLK 18IN 685G

## (undated) DEVICE — SUT ETHIB 5/0 RB1 DA 36IN X556H

## (undated) DEVICE — ELECTRD BLD EZ CLN MOD 4IN

## (undated) DEVICE — SUT POLY BR TP 2STRND 1/8X30IN

## (undated) DEVICE — STOCKINETTE,DOUBLE PLY,6X54,STERILE: Brand: MEDLINE

## (undated) DEVICE — PK OPN HEART 30

## (undated) DEVICE — CATH IV ANGIOCATH FEP 14GA 1.88IN ORNG

## (undated) DEVICE — Device: Brand: RETRACT-O-TAPE 18G X 30.5CM BLUNT NEEDLE

## (undated) DEVICE — MARKER SKIN W/RULER DUAL: Brand: MEDLINE INDUSTRIES, INC.

## (undated) DEVICE — GUIDE SELECT ATRICLIP

## (undated) DEVICE — ELECTRD BLD EZ CLN MOD XLNG 2.75IN

## (undated) DEVICE — KIT MFLD ISOLATN NACL CNTRST PRT TBNG SPIK W/ PRSS TRNSDUC

## (undated) DEVICE — 6 FOOT DISPOSABLE EXTENSION CABLE WITH SAFE CONNECT / SCREW-DOWN

## (undated) DEVICE — GUIDEWIRE VASC J 3 MM 0.035 INX260 CM 10 CM PTFE SS STRT

## (undated) DEVICE — DRSNG SURESITE123 4X10IN

## (undated) DEVICE — SUT SILK 2/0 SH CR8 18IN CR8 C012D

## (undated) DEVICE — GLV SURG BIOGEL M LTX PF 7 1/2

## (undated) DEVICE — PERCUTANEOUS ENTRY THINWALL NEEDLE  ONE-PART: Brand: COOK

## (undated) DEVICE — PK SET UP ANES OPN HEART 30

## (undated) DEVICE — PATIENT RETURN ELECTRODE, SINGLE-USE, CONTACT QUALITY MONITORING, ADULT, WITH 9FT CORD, FOR PATIENTS WEIGING OVER 33LBS. (15KG): Brand: MEGADYNE

## (undated) DEVICE — STERNUM BLADE, OFFSET (32.0 X 0.8 X 6.3MM)